# Patient Record
Sex: MALE | Race: WHITE | NOT HISPANIC OR LATINO | Employment: OTHER | URBAN - METROPOLITAN AREA
[De-identification: names, ages, dates, MRNs, and addresses within clinical notes are randomized per-mention and may not be internally consistent; named-entity substitution may affect disease eponyms.]

---

## 2017-01-09 ENCOUNTER — OFFICE VISIT (OUTPATIENT)
Dept: FAMILY MEDICINE | Facility: CLINIC | Age: 72
End: 2017-01-09
Payer: COMMERCIAL

## 2017-01-09 VITALS
HEIGHT: 69 IN | OXYGEN SATURATION: 97 % | BODY MASS INDEX: 31.36 KG/M2 | SYSTOLIC BLOOD PRESSURE: 128 MMHG | TEMPERATURE: 97.6 F | HEART RATE: 72 BPM | WEIGHT: 211.75 LBS | DIASTOLIC BLOOD PRESSURE: 78 MMHG

## 2017-01-09 DIAGNOSIS — M25.531 RIGHT WRIST PAIN: Primary | ICD-10-CM

## 2017-01-09 PROCEDURE — 99213 OFFICE O/P EST LOW 20 MIN: CPT | Performed by: FAMILY MEDICINE

## 2017-01-09 RX ORDER — MELOXICAM 15 MG/1
15 TABLET ORAL DAILY
Qty: 30 TABLET | Refills: 1 | Status: SHIPPED | OUTPATIENT
Start: 2017-01-09 | End: 2017-03-07

## 2017-01-09 RX ORDER — HYDROCODONE BITARTRATE AND ACETAMINOPHEN 5; 325 MG/1; MG/1
1-2 TABLET ORAL EVERY 8 HOURS PRN
Qty: 20 TABLET | Refills: 0 | Status: SHIPPED | OUTPATIENT
Start: 2017-01-09 | End: 2017-04-03

## 2017-01-09 NOTE — NURSING NOTE
"Chief Complaint   Patient presents with     Musculoskeletal Problem     right hand       Initial /78 mmHg  Pulse 72  Temp(Src) 97.6  F (36.4  C) (Oral)  Ht 5' 9\" (1.753 m)  Wt 211 lb 12 oz (96.049 kg)  BMI 31.26 kg/m2  SpO2 97% Estimated body mass index is 31.26 kg/(m^2) as calculated from the following:    Height as of this encounter: 5' 9\" (1.753 m).    Weight as of this encounter: 211 lb 12 oz (96.049 kg).  BP completed using cuff size: sherri Nicholson CMA      "

## 2017-01-09 NOTE — MR AVS SNAPSHOT
After Visit Summary   1/9/2017    Saul Hairston    MRN: [id number]           Patient Information     Date Of Birth          1945        Visit Information        Provider Department      1/9/2017 3:20 PM Waldo Matson MD [facility]        Today's Diagnoses     Right wrist pain    -  1       Care Instructions    Use mobic as perscribed. Do not use ibuprofen or aspirin along with it.   Tylenol can be used if needed along with mobic.  - when pain is severe, use vicodin - should not drive or operate heavy machinery.  - should benefit from seeing a sports medicine specialist before your trip.  - if you use gout pill - do not use mobic.          Follow-ups after your visit        Additional Services     ORTHO  REFERRAL       [facility] is referring you to the Orthopedic  Services at [facility].       The  Representative will assist you in the coordination of your Orthopedic and Musculoskeletal Care as prescribed by your physician.    The  Representative will call you within 1 business day to help schedule your appointment, or you may contact the  Representative at:    All areas ~ [phone]     Type of Referral : Non Surgical       Timeframe requested: 3 - 5 days    Coverage of these services is subject to the terms and limitations of your health insurance plan.  Please call member services at your health plan with any benefit or coverage questions.      If X-rays, CT or MRI's have been performed, please contact the facility where they were done to arrange for , prior to your scheduled appointment.  Please bring this referral request to your appointment and present it to your specialist.                  Who to contact     If you have questions or need follow up information about today's clinic visit or your schedule please contact [facility] directly at  "[phone].  Normal or non-critical lab and imaging results will be communicated to you by MyChart, letter or phone within 4 business days after the clinic has received the results. If you do not hear from us within 7 days, please contact the clinic through [employer] or phone. If you have a critical or abnormal lab result, we will notify you by phone as soon as possible.  Submit refill requests through [employer] or call your pharmacy and they will forward the refill request to us. Please allow 3 business days for your refill to be completed.          Additional Information About Your Visit        [employer] Information     [employer] gives you secure access to your electronic health record. If you see a primary care provider, you can also send messages to your care team and make appointments. If you have questions, please call your primary care clinic.  If you do not have a primary care provider, please call [phone] and they will assist you.        Care EveryWhere ID     This is your Care EveryWhere ID. This could be used by other organizations to access your [place] medical records  [id number]        Your Vitals Were     Pulse Temperature Height BMI (Body Mass Index) Pulse Oximetry       72 97.6  F (36.4  C) (Oral) 5' 9\" (1.753 m) 31.26 kg/m2 97%        Blood Pressure from Last 3 Encounters:   01/09/17 128/78   12/20/16 139/81   12/12/16 120/74    Weight from Last 3 Encounters:   01/09/17 211 lb 12 oz (96.049 kg)   12/20/16 212 lb (96.163 kg)   12/12/16 208 lb (94.348 kg)              We Performed the Following     ORTHO  REFERRAL          Today's Medication Changes          These changes are accurate as of: 1/9/17  3:50 PM.  If you have any questions, ask your nurse or doctor.               Start taking these medicines.        Dose/Directions    HYDROcodone-acetaminophen 5-325 MG per tablet   Commonly known as:  NORCO   Used for:  Right wrist pain   Started by:  Waldo Matson MD        Dose:  " 1-2 tablet   Take 1-2 tablets by mouth every 8 hours as needed for moderate to severe pain   Quantity:  20 tablet   Refills:  0       meloxicam 15 MG tablet   Commonly known as:  MOBIC   Used for:  Right wrist pain   Started by:  Waldo Matson MD        Dose:  15 mg   Take 1 tablet (15 mg) by mouth daily   Quantity:  30 tablet   Refills:  1            Where to get your medicines      These medications were sent to [place] [address] [place] [facility] - [facility] [address] [place] [address] [address]  [address] [facility] [address]     Phone:  [phone]    - meloxicam 15 MG tablet      Some of these will need a paper prescription and others can be bought over the counter.  Ask your nurse if you have questions.     Bring a paper prescription for each of these medications    - HYDROcodone-acetaminophen 5-325 MG per tablet             Primary Care Provider Office Phone # Fax #    Waldo Matson [phone]       [address] [address]        Thank you!     Thank you for choosing [facility]  for your care. Our goal is always to provide you with excellent care. Hearing back from our patients is one way we can continue to improve our services. Please take a few minutes to complete the written survey that you may receive in the mail after your visit with us. Thank you!             Your Updated Medication List - Protect others around you: Learn how to safely use, store and throw away your medicines at www.disposemymeds.org.          This list is accurate as of: 1/9/17  3:50 PM.  Always use your most recent med list.                   Brand Name Dispense Instructions for use    allopurinol 100 MG tablet    ZYLOPRIM    180 tablet    Take 2 tablets (200 mg) by mouth daily       atorvastatin 20 MG tablet    LIPITOR    90 tablet    Take 1 tablet (20 mg) by mouth daily       cyclobenzaprine 5 MG tablet    FLEXERIL    30 tablet    Take 1 tablet (5 mg) by  mouth 3 times daily as needed for muscle spasms       hydrochlorothiazide 25 MG tablet    HYDRODIURIL    90 tablet    Take 1 tablet (25 mg) by mouth daily       HYDROcodone-acetaminophen 5-325 MG per tablet    NORCO    20 tablet    Take 1-2 tablets by mouth every 8 hours as needed for moderate to severe pain       indomethacin 50 MG capsule    INDOCIN    40 capsule    Take 1 capsule (50 mg) by mouth 3 times daily (with meals)       meloxicam 15 MG tablet    MOBIC    30 tablet    Take 1 tablet (15 mg) by mouth daily       MULTIVITAMIN PO          OMEGA-3 FISH OIL PO          TAMSULOSIN HCL PO      Take 0.4 mg by mouth

## 2017-01-09 NOTE — PATIENT INSTRUCTIONS
Use mobic as perscribed. Do not use ibuprofen or aspirin along with it.   Tylenol can be used if needed along with mobic.  - when pain is severe, use vicodin - should not drive or operate heavy machinery.  - should benefit from seeing a sports medicine specialist before your trip.  - if you use gout pill - do not use mobic.

## 2017-01-11 ENCOUNTER — OFFICE VISIT (OUTPATIENT)
Dept: ORTHOPEDICS | Facility: CLINIC | Age: 72
End: 2017-01-11
Payer: COMMERCIAL

## 2017-01-11 ENCOUNTER — RADIANT APPOINTMENT (OUTPATIENT)
Dept: GENERAL RADIOLOGY | Facility: CLINIC | Age: 72
End: 2017-01-11
Attending: PHYSICAL MEDICINE & REHABILITATION
Payer: COMMERCIAL

## 2017-01-11 VITALS
WEIGHT: 211 LBS | HEIGHT: 69 IN | DIASTOLIC BLOOD PRESSURE: 78 MMHG | BODY MASS INDEX: 31.25 KG/M2 | SYSTOLIC BLOOD PRESSURE: 128 MMHG

## 2017-01-11 DIAGNOSIS — M25.531 RIGHT WRIST PAIN: ICD-10-CM

## 2017-01-11 DIAGNOSIS — M25.531 RIGHT WRIST PAIN: Primary | ICD-10-CM

## 2017-01-11 PROCEDURE — 73110 X-RAY EXAM OF WRIST: CPT | Mod: RT | Performed by: PHYSICAL MEDICINE & REHABILITATION

## 2017-01-11 PROCEDURE — 99203 OFFICE O/P NEW LOW 30 MIN: CPT | Performed by: PHYSICAL MEDICINE & REHABILITATION

## 2017-01-11 NOTE — PROGRESS NOTES
" [facility]   Clinic Visit s Jan 11, 2017    Subjective:  Saul Hairston is a 71 year old right-hand dominant male, who is seen in consultation at the request of Dr. Matson for evaluation of right wrist pain.    Symptoms began 1 week ago.  Describes injury as possibly from moving boxes at that time. Since that time symptoms have been improving.  Reports achy pain that is located around the hypothenar eminence of the right hand with radiation absent.  Pain is 6/10 in maximal severity and 2/10 currently.  Symptoms are generally worse with ulnar deviation activities and with daily activities and better with Norco and Mobic.  Other treatment has consisted of wrist brace with moderate relief.  Reports weakness of the right hand.  Denies any numbness/tingling/weakness of the right upper extremity.  Denies any previous right hand injuries/surgeries.    Patient's past medical, surgical, social, and family histories are reviewed today.  There are no significant contributory medical issues  Past Medical History   Diagnosis Date     Gout, unspecified      CARDIOVASCULAR SCREENING; LDL GOAL LESS THAN 160 8/14/2006     Seasonal allergic rhinitis      Spring and Fall     Kidney stone 2009     Doing ok now     Elevated PSA      Umbilical hernia without mention of obstruction or gangrene 6/2013     Arthritis      Gout     Hypertension        Review of Systems:  Constitutional: NEGATIVE for fever, chills, or change in weight  Skin: NEGATIVE for worrisome rashes, moles, or lesions  Neuro: POSITIVE for weakness of the right hand  MSK: see HPI  Additional 10 point ROS is negative other than symptoms noted above and in HPI    Objective:  /78 mmHg  Ht 5' 9\" (1.753 m)  Wt 211 lb (95.709 kg)  BMI 31.15 kg/m2  General: healthy, alert, obese, and in no distress  Skin: no suspicious lesions or rashes  Psych: mentation appears normal and affect normal/bright  HEENT: no scleral icterus  CV: no pedal edema  Resp: " normal respiratory effort without conversational dyspnea   Neuro: motor strength as noted below  Lymph: no palpable lymphadenopathy    MSK:    RIGHT WRIST  Inspection:    No swelling, bruising, discoloration, or obvious deformity or asymmetry  Palpation:    Tender about the triquetrum, trapezoid, and MCP joint of the little finger    No tenderness of the distal ulna, distal radius, scaphoid, lunate, pisiform, trapezium, capitate, or hamate, thumb MCP joint, thumb PIP joint, thumb IP joint, or CMC joint of the thumb, proximal/mid/distal     phalanges, PIP joints, and DIP joints of the 2nd-5th fingers, and MCP joints of the 2nd-4th fingers  Strength:    Little finger flexion: 5/5 (with pain)    Little finger extension: 5/5    Little finger adduction: 5/5    Little finger abduction: 5/5  Special Tests:    Positive: None    Negative: Thenar eminence wasting, hypothenar eminence wasting, flexor digitorum superficialis testing, and flexor digitorum profundus testing    Imaging:  Right wrist x-rays (3 views) were ordered, independent visualization of images was performed, and interpreted in the office today  Impression:   1. Negative for fracture, subluxation, joint space narrowing, or other acute osseous abnormality.    ASSESSMENT:  1. Acute right wrist pain - differential diagnosis includes flexor carpi ulnaris tendinopathy, triangular fibrocartilage complex degeneration/tear, wrist osteoarthrosis, etc    PLAN:  1. Acetaminophen/ice as needed for improved pain control.  2. Activity modification as discussed, including limitation of activities that cause pain/discomfort.  3. Initiate use of wrist brace for 2-4 weeks for further treatment purposes.  4. Continue with use of Mobic for 1 week for further treatment purposes.  5. Follow-up in 2-4 weeks if no improvement of symptoms for further evaluation/medical care.  Consider MR arthrogram of the right wrist, right wrist corticosteroid injection with ultrasound guidance  (flexor carpi ulnaris tendon sheath vs triangular fibrocatilage complex), referral to hand therapy, etc as deemed appropriate moving forward.  Instructed to contact our office should the condition evolve or worsen.    Patient's conditions were thoroughly discussed during today's visit with greater than 50% of the visit spent counseling the patient with total time spent face-to-face with the patient being 15 minutes.    Eduar Weldon DO, CAQSM  [facility]    Disclaimer: This note consists of symbols derived from keyboarding, dictation and/or voice recognition software. As a result, there may be errors in the script that have gone undetected. Please consider this when interpreting information found in this chart.    This document serves as a record of the services and decisions personally performed and made by Eduar Weldon DO. It was created on his behalf by Donald Rosa, a trained medical scribe. The creation of this document is based on the provider's statements to the medical scribe.  Donald Rosa January 11, 2017 1:56 PM

## 2017-01-11 NOTE — PATIENT INSTRUCTIONS
We addressed the following today:    1. Right ulnar sided wrist pain    Activity modification as discussed    Over the counter medication: Acetaminophen (Tylenol) 1000 mg every 6 hours with food (Maximum of 3000 mg/day)    Prescription Medication as directed: Mobic as prescribed for an additional week    Initiate use of wrist brace for 2-4 weeks as discussed    Follow-up in 2-4 weeks if no improvement of symptoms for further evaluation/medical care (call direct clinic number [[phone]] at any time with questions or concerns)

## 2017-01-11 NOTE — Clinical Note
Dear Saul Hartmann saw me at [facility] [place] [place] on Jan 11, 2017.  Please refer to the visit note at your convenience and feel free to contact me should you have any questions.  Sincerely,  Eduar Weldon DO, [facility]

## 2017-01-11 NOTE — MR AVS SNAPSHOT
After Visit Summary   1/11/2017    Saul Hairston    MRN: [id number]           Patient Information     Date Of Birth          1945        Visit Information        Provider Department      1/11/2017 1:40 PM Eduar Weldon DO [facility]        Today's Diagnoses     Right wrist pain    -  1       Care Instructions    We addressed the following today:    1. Right ulnar sided wrist pain    Activity modification as discussed    Over the counter medication: Acetaminophen (Tylenol) 1000 mg every 6 hours with food (Maximum of 3000 mg/day)    Prescription Medication as directed: Mobic as prescribed for an additional week    Initiate use of wrist brace for 2-4 weeks as discussed    Follow-up in 2-4 weeks if no improvement of symptoms for further evaluation/medical care (call direct clinic number [[phone]] at any time with questions or concerns)          Follow-ups after your visit        Who to contact     If you have questions or need follow up information about today's clinic visit or your schedule please contact [facility] directly at [phone].  Normal or non-critical lab and imaging results will be communicated to you by [employer], letter or phone within 4 business days after the clinic has received the results. If you do not hear from us within 7 days, please contact the clinic through [employer] or phone. If you have a critical or abnormal lab result, we will notify you by phone as soon as possible.  Submit refill requests through [employer] or call your pharmacy and they will forward the refill request to us. Please allow 3 business days for your refill to be completed.          Additional Information About Your Visit        [employer] Information     [employer] gives you secure access to your electronic health record. If you see a primary care provider, you can also send messages to your care team and make appointments. If you have questions, please call your  "primary care clinic.  If you do not have a primary care provider, please call [phone] and they will assist you.        Care EveryWhere ID     This is your Care EveryWhere ID. This could be used by other organizations to access your [place] medical records  [id number]        Your Vitals Were     Height BMI (Body Mass Index)                1.753 m (5' 9\") 31.15 kg/m2           Blood Pressure from Last 3 Encounters:   01/11/17 128/78   01/09/17 128/78   12/20/16 139/81    Weight from Last 3 Encounters:   01/11/17 95.709 kg (211 lb)   01/09/17 96.049 kg (211 lb 12 oz)   12/20/16 96.163 kg (212 lb)               Primary Care Provider Office Phone # Fax #    Waldo Brennan Matson [phone]       [address] [address]        Thank you!     Thank you for choosing [facility]  for your care. Our goal is always to provide you with excellent care. Hearing back from our patients is one way we can continue to improve our services. Please take a few minutes to complete the written survey that you may receive in the mail after your visit with us. Thank you!             Your Updated Medication List - Protect others around you: Learn how to safely use, store and throw away your medicines at www.disposemymeds.org.          This list is accurate as of: 1/11/17  2:19 PM.  Always use your most recent med list.                   Brand Name Dispense Instructions for use    allopurinol 100 MG tablet    ZYLOPRIM    180 tablet    Take 2 tablets (200 mg) by mouth daily       atorvastatin 20 MG tablet    LIPITOR    90 tablet    Take 1 tablet (20 mg) by mouth daily       cyclobenzaprine 5 MG tablet    FLEXERIL    30 tablet    Take 1 tablet (5 mg) by mouth 3 times daily as needed for muscle spasms       hydrochlorothiazide 25 MG tablet    HYDRODIURIL    90 tablet    Take 1 tablet (25 mg) by mouth daily       HYDROcodone-acetaminophen 5-325 MG per tablet "    NORCO    20 tablet    Take 1-2 tablets by mouth every 8 hours as needed for moderate to severe pain       indomethacin 50 MG capsule    INDOCIN    40 capsule    Take 1 capsule (50 mg) by mouth 3 times daily (with meals)       meloxicam 15 MG tablet    MOBIC    30 tablet    Take 1 tablet (15 mg) by mouth daily       MULTIVITAMIN PO          OMEGA-3 FISH OIL PO          order for DME     1 Units    Equipment being ordered: right wrist splint       TAMSULOSIN HCL PO      Take 0.4 mg by mouth

## 2017-01-11 NOTE — NURSING NOTE
"Chief Complaint   Patient presents with     Musculoskeletal Problem     right wrist pain       Initial /78 mmHg  Ht 5' 9\" (1.753 m)  Wt 211 lb (95.709 kg)  BMI 31.15 kg/m2 Estimated body mass index is 31.15 kg/(m^2) as calculated from the following:    Height as of this encounter: 5' 9\" (1.753 m).    Weight as of this encounter: 211 lb (95.709 kg).  BP completed using cuff size: yamini Mckeon  ATC/R      "

## 2017-03-07 DIAGNOSIS — M25.531 RIGHT WRIST PAIN: ICD-10-CM

## 2017-03-08 NOTE — TELEPHONE ENCOUNTER
MELOXICAM 15 MG TABLET        Last Written Prescription Date: 1/9/17  Last Quantity: 30, # refills: 1  Last Office Visit with G, P or [facility] prescribing provider: 1/9/17       Creatinine   Date Value Ref Range Status   09/19/2016 1.15 0.66 - 1.25 mg/dL Final     Lab Results   Component Value Date    AST 16 09/19/2016     Lab Results   Component Value Date    ALT 34 09/19/2016     BP Readings from Last 3 Encounters:   01/11/17 128/78   01/09/17 128/78   12/20/16 139/81

## 2017-03-09 RX ORDER — MELOXICAM 15 MG/1
TABLET ORAL
Qty: 30 TABLET | Refills: 5 | Status: SHIPPED | OUTPATIENT
Start: 2017-03-09 | End: 2017-04-03

## 2017-03-15 ENCOUNTER — OFFICE VISIT (OUTPATIENT)
Dept: FAMILY MEDICINE | Facility: CLINIC | Age: 72
End: 2017-03-15
Payer: COMMERCIAL

## 2017-03-15 VITALS
HEART RATE: 74 BPM | WEIGHT: 209 LBS | TEMPERATURE: 97.9 F | SYSTOLIC BLOOD PRESSURE: 134 MMHG | OXYGEN SATURATION: 96 % | DIASTOLIC BLOOD PRESSURE: 72 MMHG | BODY MASS INDEX: 30.86 KG/M2

## 2017-03-15 DIAGNOSIS — M1A.0720 IDIOPATHIC CHRONIC GOUT OF LEFT FOOT WITHOUT TOPHUS: ICD-10-CM

## 2017-03-15 DIAGNOSIS — I10 BENIGN ESSENTIAL HYPERTENSION: ICD-10-CM

## 2017-03-15 DIAGNOSIS — L30.9 DERMATITIS: Primary | ICD-10-CM

## 2017-03-15 PROCEDURE — 36415 COLL VENOUS BLD VENIPUNCTURE: CPT | Performed by: FAMILY MEDICINE

## 2017-03-15 PROCEDURE — 84550 ASSAY OF BLOOD/URIC ACID: CPT | Performed by: FAMILY MEDICINE

## 2017-03-15 PROCEDURE — 99213 OFFICE O/P EST LOW 20 MIN: CPT | Performed by: FAMILY MEDICINE

## 2017-03-15 RX ORDER — ALLOPURINOL 100 MG/1
200 TABLET ORAL DAILY
Qty: 180 TABLET | Refills: 1 | Status: SHIPPED | OUTPATIENT
Start: 2017-03-15 | End: 2017-03-18

## 2017-03-15 RX ORDER — TRIAMCINOLONE ACETONIDE 1 MG/G
CREAM TOPICAL
Qty: 45 G | Refills: 0 | Status: SHIPPED | OUTPATIENT
Start: 2017-03-15 | End: 2018-03-07

## 2017-03-15 NOTE — NURSING NOTE
"Chief Complaint   Patient presents with     Rash       Initial /77 (BP Location: Right arm, Patient Position: Chair, Cuff Size: Adult Large)  Pulse 74  Temp 97.9  F (36.6  C) (Oral)  Wt 209 lb (94.8 kg)  SpO2 96%  BMI 30.86 kg/m2 Estimated body mass index is 30.86 kg/(m^2) as calculated from the following:    Height as of 1/11/17: 5' 9\" (1.753 m).    Weight as of this encounter: 209 lb (94.8 kg).  Medication Reconciliation: complete     Angelica Yen MA    "

## 2017-03-15 NOTE — PATIENT INSTRUCTIONS
Try to limit your use of NSAIDs (Non-Steroidal Anti-Inflammatory Drugs), a class of pain relievers that includes the over-the-counter medications aspirin, ibuprofen (Advil and Motrin), and naproxen (Aleve) - and meloxicam.  Regular use can raise blood pressure, damage kidneys, and upset stomach.    For the dermatitis use the triamcinolone cream for a couple weeks.  If no improvement, see dermatology.

## 2017-03-15 NOTE — MR AVS SNAPSHOT
After Visit Summary   3/15/2017    Saul Hairston    MRN: [id number]           Patient Information     Date Of Birth          1945        Visit Information        Provider Department      3/15/2017 3:40 PM Leatha Fernandez MD [facility]        Today's Diagnoses     Dermatitis    -  1    Idiopathic chronic gout 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          Care Instructions    Try to limit your use of NSAIDs (Non-Steroidal Anti-Inflammatory Drugs), a class of pain relievers that includes the over-the-counter medications aspirin, ibuprofen (Advil and Motrin), and naproxen (Aleve) - and meloxicam.  Regular use can raise blood pressure, damage kidneys, and upset stomach.    For the dermatitis use the triamcinolone cream for a couple weeks.  If no improvement, see dermatology.        Follow-ups after your visit        Additional Services     DERMATOLOGY REFERRAL       Your provider has referred you to:   [facility] [phone]   [email or web address]  OR  [facility] [phone]   [email or web address]    Please be aware that coverage of these services is subject to the terms and limitations of your health insurance plan.  Call member services at your health plan with any benefit or coverage questions.      Please bring the following to your appointment:  Any x-rays, CTs or MRIs which have been performed.  Contact the facility where they were done to arrange for  prior to your scheduled appointment.  Any new CT, MRI or other procedures ordered by your specialist must be performed at a [facility] facility or coordinated by your clinic's referral office.    List of current medications   This referral request   Any documents/labs given to you for this referral                  Who to contact     If you have questions or need follow up information about today's clinic visit or your schedule please contact  [facility] directly at [phone].  Normal or non-critical lab and imaging results will be communicated to you by MyChart, letter or phone within 4 business days after the clinic has received the results. If you do not hear from us within 7 days, please contact the clinic through [employer] or phone. If you have a critical or abnormal lab result, we will notify you by phone as soon as possible.  Submit refill requests through [employer] or call your pharmacy and they will forward the refill request to us. Please allow 3 business days for your refill to be completed.          Additional Information About Your Visit        [employer] Information     [employer] gives you secure access to your electronic health record. If you see a primary care provider, you can also send messages to your care team and make appointments. If you have questions, please call your primary care clinic.  If you do not have a primary care provider, please call [phone] and they will assist you.        Care EveryWhere ID     This is your Care EveryWhere ID. This could be used by other organizations to access your [facility] medical records  [id number]        Your Vitals Were     Pulse Temperature Pulse Oximetry BMI (Body Mass Index)          74 97.9  F (36.6  C) (Oral) 96% 30.86 kg/m2         Blood Pressure from Last 3 Encounters:   03/15/17 148/77   01/11/17 128/78   01/09/17 128/78    Weight from Last 3 Encounters:   03/15/17 209 lb (94.8 kg)   01/11/17 211 lb (95.7 kg)   01/09/17 211 lb 12 oz (96 kg)              We Performed the Following     DERMATOLOGY REFERRAL     Uric acid          Today's Medication Changes          These changes are accurate as of: 3/15/17  4:19 PM.  If you have any questions, ask your nurse or doctor.               Start taking these medicines.        Dose/Directions    triamcinolone 0.1 % cream   Commonly known as:  KENALOG   Used for:  Dermatitis   Started by:  Leatha Feranndez MD        Apply sparingly  to affected area three times daily for 14 days.   Quantity:  45 g   Refills:  0         Stop taking these medicines if you haven't already. Please contact your care team if you have questions.     cyclobenzaprine 5 MG tablet   Commonly known as:  FLEXERIL   Stopped by:  Leatha Fernandez MD           order for DME   Stopped by:  Leatha Fernandez MD                Where to get your medicines      These medications were sent to [place] [address] [place] [facility] [address]  [address] [address]     Phone:  [phone]     allopurinol 100 MG tablet    triamcinolone 0.1 % cream                Primary Care Provider Office Phone # Fax #    Waldo Brennan Matson [phone]       [address] [address]        Thank you!     Thank you for choosing [facility]  for your care. Our goal is always to provide you with excellent care. Hearing back from our patients is one way we can continue to improve our services. Please take a few minutes to complete the written survey that you may receive in the mail after your visit with us. Thank you!             Your Updated Medication List - Protect others around you: Learn how to safely use, store and throw away your medicines at www.disposemymeds.org.          This list is accurate as of: 3/15/17  4:19 PM.  Always use your most recent med list.                   Brand Name Dispense Instructions for use    allopurinol 100 MG tablet    ZYLOPRIM    180 tablet    Take 2 tablets (200 mg) by mouth daily       atorvastatin 20 MG tablet    LIPITOR    90 tablet    Take 1 tablet (20 mg) by mouth daily       hydrochlorothiazide 25 MG tablet    HYDRODIURIL    90 tablet    Take 1 tablet (25 mg) by mouth daily       HYDROcodone-acetaminophen 5-325 MG per tablet    NORCO    20 tablet    Take 1-2 tablets by mouth every 8 hours as needed for moderate to severe pain       indomethacin 50 MG  capsule    INDOCIN    40 capsule    Take 1 capsule (50 mg) by mouth 3 times daily (with meals)       meloxicam 15 MG tablet    MOBIC    30 tablet    TAKE 1 TABLET (15 MG) BY MOUTH DAILY       MULTIVITAMIN PO          OMEGA-3 FISH OIL PO          TAMSULOSIN HCL PO      Take 0.4 mg by mouth       triamcinolone 0.1 % cream    KENALOG    45 g    Apply sparingly to affected area three times daily for 14 days.

## 2017-03-15 NOTE — PROGRESS NOTES
"  SUBJECTIVE:                                                    Saul Hairston is a 71 year old male who presents to clinic today for the following health issues:      Rash      Duration: x 2 mos    Description  Location: started on right forearm - now on both arms, both legs, right side of neck  Itching: severe    Intensity:  moderate    Accompanying signs and symptoms: None    History (similar episodes/previous evaluation): None    Precipitating or alleviating factors:  New exposures:  None.  Tried switching to hypoallergenic laundry detergent but no change  Recent travel: No     Therapies tried and outcome: hydrocortisone cream -  helps the itch but isn't stopping it from spreading.       Gout  Increased allopurinol dose to 200 mg daily last Sept when uric acid was 10.2.  Has not had uric acid level rechecked.  Has not had further acute gout episodes.   Has been taking meloxicam daily since wrist injury 2 months ago and notes that it helps his joints feel better \"all over.\"  Wrist pain has resolved.   He also occasionally uses ibuprofen and has indocin on hand for gout attacks.      Problem list and histories reviewed & adjusted, as indicated.  Additional history: as documented    BP Readings from Last 3 Encounters:   03/15/17 134/72   01/11/17 128/78   01/09/17 128/78    Wt Readings from Last 3 Encounters:   03/15/17 209 lb (94.8 kg)   01/11/17 211 lb (95.7 kg)   01/09/17 211 lb 12 oz (96 kg)           Reviewed and updated as needed this visit by clinical staff  Tobacco  Allergies  Meds  Problems  Med Hx  Surg Hx  Fam Hx  Soc Hx        Reviewed and updated as needed this visit by Provider  Allergies  Meds  Problems           OBJECTIVE:                                                    /72  Pulse 74  Temp 97.9  F (36.6  C) (Oral)  Wt 209 lb (94.8 kg)  SpO2 96%  BMI 30.86 kg/m2  Body mass index is 30.86 kg/(m^2).  GEN:  no apparent distress  SKIN:  poorly-demarcated, erythematous, scaly, rough, " raised, maculopapular rash with excoriations - large patch on right forearm and scattered patches on left forearm and lower legs.       ASSESSMENT/PLAN:                                                        1. Dermatitis  Unclear etiology.  Will treat with topical steroid cream.  If no response he will see derm.  - triamcinolone (KENALOG) 0.1 % cream; Apply sparingly to affected area three times daily for 14 days.  Dispense: 45 g; Refill: 0  - DERMATOLOGY REFERRAL    2. Idiopathic chronic gout 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  Discussed that we should do repeat uric acid level as his was quite high on the 100 mg dose allopurinol.    - allopurinol (ZYLOPRIM) 100 MG tablet; Take 2 tablets (200 mg) by mouth daily  Dispense: 180 tablet; Refill: 1  - Uric acid    3. Benign essential hypertension  Initial reading was high but repeat reading was at goal.  Recommended he back off on the NSAIDs.      Patient Instructions   Try to limit your use of NSAIDs (Non-Steroidal Anti-Inflammatory Drugs), a class of pain relievers that includes the over-the-counter medications aspirin, ibuprofen (Advil and Motrin), and naproxen (Aleve) - and meloxicam.  Regular use can raise blood pressure, damage kidneys, and upset stomach.    For the dermatitis use the triamcinolone cream for a couple weeks.  If no improvement, see dermatology.       Leatha Fernandez MD  [facility]

## 2017-03-17 LAB — URATE SERPL-MCNC: 8.9 MG/DL (ref 3.5–7.2)

## 2017-03-18 ENCOUNTER — TELEPHONE (OUTPATIENT)
Dept: FAMILY MEDICINE | Facility: CLINIC | Age: 72
End: 2017-03-18

## 2017-03-18 DIAGNOSIS — M1A.0720 IDIOPATHIC CHRONIC GOUT OF LEFT FOOT WITHOUT TOPHUS: Primary | ICD-10-CM

## 2017-03-18 RX ORDER — ALLOPURINOL 100 MG/1
300 TABLET ORAL DAILY
Start: 2017-03-18 | End: 2017-09-26

## 2017-03-18 RX ORDER — COLCHICINE 0.6 MG/1
0.6 TABLET ORAL DAILY
Qty: 30 TABLET | Refills: 1 | Status: SHIPPED | OUTPATIENT
Start: 2017-03-18 | End: 2017-03-30

## 2017-03-19 NOTE — PROGRESS NOTES
Gary Hughes,  Thanks for coming to clinic.  Your uric acid level is better than it was last fall.  (It dropped from 10.2 to 8.9 on the 200 mg dose of allopurinol.)  However, our goal is to get the uric acid level down to less than 6.0 to prevent complications from gout.      I therefore recommend that you increase your allopurinol dose to 300 mg daily and then come back in a month for a lab-only appointment to recheck the uric acid level on that dose.   For now you can take 3 of your 100 mg tablets together (but eventually we will switch you to the 300 mg tablets).      I also recommend that you take daily colchicine for the first month after a dose increase as sometimes raising the dose when the uric acid level is still high can precipitate a gout episode.  I will send in a prescription for colchicine for you.    Please let me know if you have any questions.  Leatha Fernandez MD

## 2017-03-20 ENCOUNTER — TRANSFERRED RECORDS (OUTPATIENT)
Dept: HEALTH INFORMATION MANAGEMENT | Facility: CLINIC | Age: 72
End: 2017-03-20

## 2017-03-30 ENCOUNTER — OFFICE VISIT (OUTPATIENT)
Dept: FAMILY MEDICINE | Facility: CLINIC | Age: 72
End: 2017-03-30
Payer: COMMERCIAL

## 2017-03-30 VITALS
WEIGHT: 210 LBS | SYSTOLIC BLOOD PRESSURE: 142 MMHG | RESPIRATION RATE: 16 BRPM | BODY MASS INDEX: 31.01 KG/M2 | HEART RATE: 67 BPM | DIASTOLIC BLOOD PRESSURE: 76 MMHG | TEMPERATURE: 97 F | OXYGEN SATURATION: 98 %

## 2017-03-30 DIAGNOSIS — M25.50 PAIN IN JOINT, MULTIPLE SITES: Primary | ICD-10-CM

## 2017-03-30 LAB
BASOPHILS # BLD AUTO: 0 10E9/L (ref 0–0.2)
BASOPHILS NFR BLD AUTO: 0.3 %
DIFFERENTIAL METHOD BLD: NORMAL
EOSINOPHIL # BLD AUTO: 0.4 10E9/L (ref 0–0.7)
EOSINOPHIL NFR BLD AUTO: 4.1 %
ERYTHROCYTE [DISTWIDTH] IN BLOOD BY AUTOMATED COUNT: 13.9 % (ref 10–15)
ERYTHROCYTE [SEDIMENTATION RATE] IN BLOOD BY WESTERGREN METHOD: 8 MM/H (ref 0–20)
HCT VFR BLD AUTO: 49.5 % (ref 40–53)
HGB BLD-MCNC: 16.3 G/DL (ref 13.3–17.7)
LYMPHOCYTES # BLD AUTO: 1.6 10E9/L (ref 0.8–5.3)
LYMPHOCYTES NFR BLD AUTO: 15.5 %
MCH RBC QN AUTO: 28.7 PG (ref 26.5–33)
MCHC RBC AUTO-ENTMCNC: 32.9 G/DL (ref 31.5–36.5)
MCV RBC AUTO: 87 FL (ref 78–100)
MONOCYTES # BLD AUTO: 0.7 10E9/L (ref 0–1.3)
MONOCYTES NFR BLD AUTO: 6.8 %
NEUTROPHILS # BLD AUTO: 7.7 10E9/L (ref 1.6–8.3)
NEUTROPHILS NFR BLD AUTO: 73.3 %
PLATELET # BLD AUTO: 200 10E9/L (ref 150–450)
RBC # BLD AUTO: 5.68 10E12/L (ref 4.4–5.9)
WBC # BLD AUTO: 10.5 10E9/L (ref 4–11)

## 2017-03-30 PROCEDURE — 86140 C-REACTIVE PROTEIN: CPT | Performed by: NURSE PRACTITIONER

## 2017-03-30 PROCEDURE — 99214 OFFICE O/P EST MOD 30 MIN: CPT | Performed by: NURSE PRACTITIONER

## 2017-03-30 PROCEDURE — 85025 COMPLETE CBC W/AUTO DIFF WBC: CPT | Performed by: NURSE PRACTITIONER

## 2017-03-30 PROCEDURE — 36415 COLL VENOUS BLD VENIPUNCTURE: CPT | Performed by: NURSE PRACTITIONER

## 2017-03-30 PROCEDURE — 85652 RBC SED RATE AUTOMATED: CPT | Performed by: NURSE PRACTITIONER

## 2017-03-30 PROCEDURE — 84550 ASSAY OF BLOOD/URIC ACID: CPT | Performed by: NURSE PRACTITIONER

## 2017-03-30 RX ORDER — PREDNISONE 20 MG/1
20 TABLET ORAL 2 TIMES DAILY
Qty: 10 TABLET | Refills: 0 | Status: SHIPPED | OUTPATIENT
Start: 2017-03-30 | End: 2017-09-26

## 2017-03-30 NOTE — PROGRESS NOTES
SUBJECTIVE:                                                    Saul Hairston is a 71 year old male who presents to clinic today for the following health issues:    Musculoskeletal problem/pain      Duration: 1 day    Description  Location: right lower arm (entire forearm up through elbow)    Intensity:  severe    Accompanying signs and symptoms: Eczema over site of pain    History  Previous similar problem: no   Previous evaluation:  none    Precipitating or alleviating factors:  Trauma or overuse: no   Aggravating factors include: turning wrist/ forearm    Therapies tried and outcome: NSAID - ibuprofen, no relief       Has had some difficulty moving right lower arm for several months.    Became mildly sore over the past few days.    Sudden onset pain last night, worst of it to lower arm, wrist and hand also painful.  Mild numbness and tingling to 1-3rd fingers.  Now very difficult to move hand and wrist, couldn't write his name.    No fevers, chills, or body aches.  Does note fatigue.      No initial injury.  Rash to bilateral lower arms and lower legs, saw derm, diagnosed with eczema, dramatic improvement with cream.    Gout to toes, allopurinol dose increased 1 week ago.    No dysuria.  Chronic nasal congestion, no other new URI symptoms.  No cough or sobb.  No chest pain or palpitations.  No abd pain, nausea, diarrhea.       No personal of family history of inflammatory arthritis.    Patient Active Problem List   Diagnosis     Idiopathic chronic gout 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     Rotator cuff tear     Kidney stone     Elevated PSA     Seasonal allergic rhinitis     Hyperlipidemia LDL goal <130     Umbilical hernia     Benign essential hypertension     Past Surgical History:   Procedure Laterality Date     COLONOSCOPY  6/16/2006     COLONOSCOPY N/A 6/15/2016    Procedure: COLONOSCOPY;  Surgeon: Poly Sanchez MD;  Location:  GI     HERNIORRHAPHY UMBILICAL  7/11/2013    Procedure: HERNIORRHAPHY  UMBILICAL;  Open Umbilical Hernia Repair With Mesh ;  Surgeon: Sergio Tomas MD;  Location: [facility] OR     ROTATOR CUFF REPAIR RT/LT  5/19/2011    Left Shoulder     SURGICAL HISTORY OF -       ear operation as child     SURGICAL HISTORY OF -       removal of pseudogout deposits - Right knee     TONSILLECTOMY      Child       Social History   Substance Use Topics     Smoking status: Never Smoker     Smokeless tobacco: Never Used     Alcohol use No     Family History   Problem Relation Age of Onset     Hypertension Father      Alcohol/Drug Father      Allergies Father      Respiratory Father      CEREBROVASCULAR DISEASE Maternal Grandmother      Arthritis Maternal Grandmother      Alzheimer Disease Mother      Arthritis Mother      Eye Disorder Mother      C.A.D. Maternal Uncle      C.A.D. Paternal Uncle      Prostate Cancer Maternal Uncle      Lipids Sister      Lipids Brother      Obesity Brother      C.A.D. Son          Current Outpatient Prescriptions   Medication Sig Dispense Refill     predniSONE (DELTASONE) 20 MG tablet Take 1 tablet (20 mg) by mouth 2 times daily 10 tablet 0     allopurinol (ZYLOPRIM) 100 MG tablet Take 3 tablets (300 mg) by mouth daily       triamcinolone (KENALOG) 0.1 % cream Apply sparingly to affected area three times daily for 14 days. 45 g 0     HYDROcodone-acetaminophen (NORCO) 5-325 MG per tablet Take 1-2 tablets by mouth every 8 hours as needed for moderate to severe pain 20 tablet 0     TAMSULOSIN HCL PO Take 0.4 mg by mouth       hydrochlorothiazide (HYDRODIURIL) 25 MG tablet Take 1 tablet (25 mg) by mouth daily 90 tablet 3     atorvastatin (LIPITOR) 20 MG tablet Take 1 tablet (20 mg) by mouth daily 90 tablet 3     indomethacin (INDOCIN) 50 MG capsule Take 1 capsule (50 mg) by mouth 3 times daily (with meals) 40 capsule 3     Omega-3 Fatty Acids (OMEGA-3 FISH OIL PO)        Multiple Vitamins-Minerals (MULTIVITAMIN PO)        meloxicam (MOBIC) 15 MG tablet TAKE 1 TABLET (15 MG)  BY MOUTH DAILY (Patient not taking: Reported on 3/30/2017) 30 tablet 5       ROS:  Const, HEENT, Resp, CV, Neuro, MSK, GI,  as above, otherwise negative       OBJECTIVE:                                                    /76 (BP Location: Left arm, Patient Position: Chair, Cuff Size: Adult Regular)  Pulse 67  Temp 97  F (36.1  C) (Tympanic)  Resp 16  Wt 210 lb (95.3 kg)  SpO2 98%  BMI 31.01 kg/m2   GENERAL APPEARANCE: healthy, alert and no distress.  Pt is unable to shake hands with right hand  EYES: Eyes grossly normal to inspection and conjunctivae and sclerae normal  RESP: respirations nonlabored   MS:   Right elbow:  No erythema or swelling.  No pain with palpation olecranon or alteral/medical epicondyl.  Limited active flexion and extension, ROM improved with passive movement but still limited   Right lower arm:  No pain with palpation radius or ulna  Right wrist: No erythema or swelling.  No pain with palpation distal ulna or radius, or carpals.  Limited ulnar/radial deviation and flexion/extension.  Right hand: No pain with palpation of metacarpals or phalanges.  Unable to fully clench fist,unable to fully extend digits.  SKIN: fading erythema with mild scaling to dorsal aspect bilateral lower arms and hands  PSYCH: mentation appears normal and affect normal/bright     Diagnostic test results:  Diagnostic Test Results:  Results for orders placed or performed in visit on 03/30/17 (from the past 24 hour(s))   CBC with platelets and differential   Result Value Ref Range    WBC 10.5 4.0 - 11.0 10e9/L    RBC Count 5.68 4.4 - 5.9 10e12/L    Hemoglobin 16.3 13.3 - 17.7 g/dL    Hematocrit 49.5 40.0 - 53.0 %    MCV 87 78 - 100 fl    MCH 28.7 26.5 - 33.0 pg    MCHC 32.9 31.5 - 36.5 g/dL    RDW 13.9 10.0 - 15.0 %    Platelet Count 200 150 - 450 10e9/L    Diff Method Automated Method     % Neutrophils 73.3 %    % Lymphocytes 15.5 %    % Monocytes 6.8 %    % Eosinophils 4.1 %    % Basophils 0.3 %    Absolute  Neutrophil 7.7 1.6 - 8.3 10e9/L    Absolute Lymphocytes 1.6 0.8 - 5.3 10e9/L    Absolute Monocytes 0.7 0.0 - 1.3 10e9/L    Absolute Eosinophils 0.4 0.0 - 0.7 10e9/L    Absolute Basophils 0.0 0.0 - 0.2 10e9/L   ESR: Erythrocyte sedimentation rate   Result Value Ref Range    Sed Rate 8 0 - 20 mm/h        ASSESSMENT/PLAN:                                                    (M25.50) Pain in joint, multiple sites  (primary encounter diagnosis)  Comment: unclear etiology.  No joint erythema or swelling to suggest septic arthritis and normal wbc/esr.  Consider gout flare with recent adjustment allopurinol but no improvement in symptoms with ibu and atypical to affect multiple joints.  Normal ESR makes inflammatory arthritis less likely.  Acute onset does not fit with tendonitis  Plan: CBC with platelets and differential, ESR:         Erythrocyte sedimentation rate, CRP,         inflammation, Uric acid, predniSONE (DELTASONE)        20 MG tablet        Start prednisone 20mg twice a day x 5 days, monitor symptoms closely.  If worsening, impacting more joints, fevers, or visible redness/swelling follow up immediately for evaluation.  Recheck scheduled for Monday.          See Patient Instructions    Domitila Feldman, [facility]    Patient Instructions   1.  Unclear cause of joint pain, sudden onset fits more with infection, inflammatory process, gout usually does not hit multiple joints.  Reassuring normal white count and inflammatory marker.  Start prednisone (antiinflammatory med) 1 pill  Twice a day for 5 days.  Follow up on Monday for recheck    2.  If fever, pain is spreading to other joints, worsening pain, or red swollen joints go into urgent care or emergency room over the weekend.

## 2017-03-30 NOTE — NURSING NOTE
"Chief Complaint   Patient presents with     Musculoskeletal Problem       Initial /76 (BP Location: Left arm, Patient Position: Chair, Cuff Size: Adult Regular)  Pulse 67  Temp 97  F (36.1  C) (Tympanic)  Resp 16  Wt 210 lb (95.3 kg)  SpO2 98%  BMI 31.01 kg/m2 Estimated body mass index is 31.01 kg/(m^2) as calculated from the following:    Height as of 1/11/17: 5' 9\" (1.753 m).    Weight as of this encounter: 210 lb (95.3 kg).  Medication Reconciliation: complete     Juliana Garcia, [facility]INC[facility]    "

## 2017-03-30 NOTE — MR AVS SNAPSHOT
After Visit Summary   3/30/2017    Saul Hairston    MRN: [id number]           Patient Information     Date Of Birth          1945        Visit Information        Provider Department      3/30/2017 3:20 PM Domitila Feldman APRN CNP [facility]        Today's Diagnoses     Pain in joint, multiple sites    -  1      Care Instructions    1.  Unclear cause of joint pain, sudden onset fits more with infection, inflammatory process, gout usually does not hit multiple joints.  Reassuring normal white count and inflammatory marker.  Start prednisone (antiinflammatory med) 1 pill  Twice a day for 5 days.  Follow up on Monday for recheck    2.  If fever, pain is spreading to other joints, worsening pain, or red swollen joints go into urgent care or emergency room over the weekend.        Follow-ups after your visit        Who to contact     If you have questions or need follow up information about today's clinic visit or your schedule please contact [facility] directly at [phone].  Normal or non-critical lab and imaging results will be communicated to you by [employer], letter or phone within 4 business days after the clinic has received the results. If you do not hear from us within 7 days, please contact the clinic through [employer] or phone. If you have a critical or abnormal lab result, we will notify you by phone as soon as possible.  Submit refill requests through [employer] or call your pharmacy and they will forward the refill request to us. Please allow 3 business days for your refill to be completed.          Additional Information About Your Visit        [employer] Information     [employer] gives you secure access to your electronic health record. If you see a primary care provider, you can also send messages to your care team and make appointments. If you have questions, please call your primary care clinic.  If you do not have a primary care provider, please call  [phone] and they will assist you.        Care EveryWhere ID     This is your Care EveryWhere ID. This could be used by other organizations to access your [place] medical records  [id number]        Your Vitals Were     Pulse Temperature Respirations Pulse Oximetry BMI (Body Mass Index)       67 97  F (36.1  C) (Tympanic) 16 98% 31.01 kg/m2        Blood Pressure from Last 3 Encounters:   03/30/17 142/76   03/15/17 134/72   01/11/17 128/78    Weight from Last 3 Encounters:   03/30/17 210 lb (95.3 kg)   03/15/17 209 lb (94.8 kg)   01/11/17 211 lb (95.7 kg)              We Performed the Following     CBC with platelets and differential     CRP, inflammation     ESR: Erythrocyte sedimentation rate     Uric acid          Today's Medication Changes          These changes are accurate as of: 3/30/17  4:34 PM.  If you have any questions, ask your nurse or doctor.               Start taking these medicines.        Dose/Directions    predniSONE 20 MG tablet   Commonly known as:  DELTASONE   Used for:  Pain in joint, multiple sites   Started by:  Domitila Feldman APRN CNP        Dose:  20 mg   Take 1 tablet (20 mg) by mouth 2 times daily   Quantity:  10 tablet   Refills:  0         Stop taking these medicines if you haven't already. Please contact your care team if you have questions.     colchicine 0.6 MG tablet   Commonly known as:  COLCRYS   Stopped by:  Domitila Feldman APRN CNP                Where to get your medicines      These medications were sent to [place] [address] [place] [facility] [address] [facility]  [address] [facility] [address]     Phone:  [phone]     predniSONE 20 MG tablet                Primary Care Provider Office Phone # Fax #    Waldo Brennan Matson [phone]       [address] [address]        Thank you!     Thank you for choosing [facility]  for your care. Our goal is always to provide  you with excellent care. Hearing back from our patients is one way we can continue to improve our services. Please take a few minutes to complete the written survey that you may receive in the mail after your visit with us. Thank you!             Your Updated Medication List - Protect others around you: Learn how to safely use, store and throw away your medicines at www.disposemymeds.org.          This list is accurate as of: 3/30/17  4:34 PM.  Always use your most recent med list.                   Brand Name Dispense Instructions for use    allopurinol 100 MG tablet    ZYLOPRIM     Take 3 tablets (300 mg) by mouth daily       atorvastatin 20 MG tablet    LIPITOR    90 tablet    Take 1 tablet (20 mg) by mouth daily       hydrochlorothiazide 25 MG tablet    HYDRODIURIL    90 tablet    Take 1 tablet (25 mg) by mouth daily       HYDROcodone-acetaminophen 5-325 MG per tablet    NORCO    20 tablet    Take 1-2 tablets by mouth every 8 hours as needed for moderate to severe pain       indomethacin 50 MG capsule    INDOCIN    40 capsule    Take 1 capsule (50 mg) by mouth 3 times daily (with meals)       meloxicam 15 MG tablet    MOBIC    30 tablet    TAKE 1 TABLET (15 MG) BY MOUTH DAILY       MULTIVITAMIN PO          OMEGA-3 FISH OIL PO          predniSONE 20 MG tablet    DELTASONE    10 tablet    Take 1 tablet (20 mg) by mouth 2 times daily       TAMSULOSIN HCL PO      Take 0.4 mg by mouth       triamcinolone 0.1 % cream    KENALOG    45 g    Apply sparingly to affected area three times daily for 14 days.

## 2017-03-30 NOTE — PATIENT INSTRUCTIONS
1.  Unclear cause of joint pain, sudden onset fits more with infection, inflammatory process, gout usually does not hit multiple joints.  Reassuring normal white count and inflammatory marker.  Start prednisone (antiinflammatory med) 1 pill  Twice a day for 5 days.  Follow up on Monday for recheck    2.  If fever, pain is spreading to other joints, worsening pain, or red swollen joints go into urgent care or emergency room over the weekend.

## 2017-03-31 LAB
CRP SERPL-MCNC: 8.2 MG/L (ref 0–8)
URATE SERPL-MCNC: 6.2 MG/DL (ref 3.5–7.2)

## 2017-03-31 NOTE — PROGRESS NOTES
SUBJECTIVE:                                                    Saul Hairston is a 71 year old male who presents to clinic today for the following health issues:      Arm pain F/U      The night after visit arm pain improved greatly    Doing much better now. Not 100% gone but able to move much better       Interval history:  Seen 3/30/17 with acute onset right lower arm, hand, wrist, and elbow pain, significant limited ROM of hand, wrist, and elbow.  Pt had normal CBC and ESR.  Started on prednisone course for possible inflammatory/viral/gout etiology.  Hx gout with recent change to allopurinol dose 1 week ago.    Update today:   Pain has improved, notes ongoing mild pain to right lower arm and right wrist pain with some limited range of motion.    Limited rang of motion of right wrist since December, noticed he is using fork differently.  Difficulty shaving.  Did see ortho 1/2016 who felt symptoms were related to tendonitis/arthritis, started on NSAIDs, pt reports no improvement.      Patient Active Problem List   Diagnosis     Idiopathic chronic gout 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     Rotator cuff tear     Kidney stone     Elevated PSA     Seasonal allergic rhinitis     Hyperlipidemia LDL goal <130     Umbilical hernia     Hypertension goal BP (blood pressure) < 150/90     Past Surgical History:   Procedure Laterality Date     COLONOSCOPY  6/16/2006     COLONOSCOPY N/A 6/15/2016    Procedure: COLONOSCOPY;  Surgeon: Poly Sanchez MD;  Location:  GI     HERNIORRHAPHY UMBILICAL  7/11/2013    Procedure: HERNIORRHAPHY UMBILICAL;  Open Umbilical Hernia Repair With Mesh ;  Surgeon: Sergio Tomas MD;  Location: [facility] OR     ROTATOR CUFF REPAIR RT/LT  5/19/2011    Left Shoulder     SURGICAL HISTORY OF -       ear operation as child     SURGICAL HISTORY OF -       removal of pseudogout deposits - Right knee     TONSILLECTOMY      Child       Social History   Substance Use Topics     Smoking status: Never Smoker      Smokeless tobacco: Never Used     Alcohol use No     Family History   Problem Relation Age of Onset     Hypertension Father      Alcohol/Drug Father      Allergies Father      Respiratory Father      CEREBROVASCULAR DISEASE Maternal Grandmother      Arthritis Maternal Grandmother      Alzheimer Disease Mother      Arthritis Mother      Eye Disorder Mother      C.A.D. Maternal Uncle      C.A.D. Paternal Uncle      Prostate Cancer Maternal Uncle      Lipids Sister      Lipids Brother      Obesity Brother      C.A.D. Son          Current Outpatient Prescriptions   Medication Sig Dispense Refill     predniSONE (DELTASONE) 20 MG tablet 2 tabs (40 mg) daily x 3 days, 1 tab (20 mg) daily x 3 days, then 1/2 tab (10 mg) x 3 days. 11 tablet 0     predniSONE (DELTASONE) 20 MG tablet Take 1 tablet (20 mg) by mouth 2 times daily 10 tablet 0     allopurinol (ZYLOPRIM) 100 MG tablet Take 3 tablets (300 mg) by mouth daily       triamcinolone (KENALOG) 0.1 % cream Apply sparingly to affected area three times daily for 14 days. 45 g 0     TAMSULOSIN HCL PO Take 0.4 mg by mouth       hydrochlorothiazide (HYDRODIURIL) 25 MG tablet Take 1 tablet (25 mg) by mouth daily 90 tablet 3     atorvastatin (LIPITOR) 20 MG tablet Take 1 tablet (20 mg) by mouth daily 90 tablet 3     indomethacin (INDOCIN) 50 MG capsule Take 1 capsule (50 mg) by mouth 3 times daily (with meals) 40 capsule 3     Omega-3 Fatty Acids (OMEGA-3 FISH OIL PO)        Multiple Vitamins-Minerals (MULTIVITAMIN PO)          ROS:  Const, MSK as above, otherwise negative       OBJECTIVE:                                                    /84  Pulse 76  Temp 97  F (36.1  C) (Tympanic)  Resp 16  Wt 209 lb (94.8 kg)  SpO2 97%  BMI 30.86 kg/m2   GENERAL APPEARANCE: healthy, alert and no distress  EYES: Eyes grossly normal to inspection and conjunctivae and sclerae normal  RESP: respirations nonlabored   MS:   Right elbow: no erythema or swelling, full pain-free  ROM  Right lower arm:  No pain with palpation radius or ulna, no erythema or edema  Right wrist: no erythema or swelling. Does have limited flexion and radial/ulnar deviation but improved since last visit.    Full hand range of motion, again no erythema or swelling.  PSYCH: mentation appears normal and affect normal/bright     Office Visit on 03/30/2017   Component Date Value Ref Range Status     WBC 03/30/2017 10.5  4.0 - 11.0 10e9/L Final     RBC Count 03/30/2017 5.68  4.4 - 5.9 10e12/L Final     Hemoglobin 03/30/2017 16.3  13.3 - 17.7 g/dL Final     Hematocrit 03/30/2017 49.5  40.0 - 53.0 % Final     MCV 03/30/2017 87  78 - 100 fl Final     MCH 03/30/2017 28.7  26.5 - 33.0 pg Final     MCHC 03/30/2017 32.9  31.5 - 36.5 g/dL Final     RDW 03/30/2017 13.9  10.0 - 15.0 % Final     Platelet Count 03/30/2017 200  150 - 450 10e9/L Final     Diff Method 03/30/2017 Automated Method   Final     % Neutrophils 03/30/2017 73.3  % Final     % Lymphocytes 03/30/2017 15.5  % Final     % Monocytes 03/30/2017 6.8  % Final     % Eosinophils 03/30/2017 4.1  % Final     % Basophils 03/30/2017 0.3  % Final     Absolute Neutrophil 03/30/2017 7.7  1.6 - 8.3 10e9/L Final     Absolute Lymphocytes 03/30/2017 1.6  0.8 - 5.3 10e9/L Final     Absolute Monocytes 03/30/2017 0.7  0.0 - 1.3 10e9/L Final     Absolute Eosinophils 03/30/2017 0.4  0.0 - 0.7 10e9/L Final     Absolute Basophils 03/30/2017 0.0  0.0 - 0.2 10e9/L Final     Sed Rate 03/30/2017 8  0 - 20 mm/h Final     CRP Inflammation 03/30/2017 8.2* 0.0 - 8.0 mg/L Final     Uric Acid 03/30/2017 6.2  3.5 - 7.2 mg/dL Final          ASSESSMENT/PLAN:                                                    (M25.50) Multiple joint pain  (primary encounter diagnosis)  Comment: improving with prednisone, ongoing right wrist limited ROM.  normal uric acid (tough on allopurinol), normal inflammatory markers and WBC making infectious/inflammatory etiology less likely, however response to prednisone  indicates underlying inflammatory component  Plan: predniSONE (DELTASONE) 20 MG tablet,         RHEUMATOLOGY REFERRAL        Continue prednisone over 9 day taper.  If ongoing limited right wrist ROM recommend follow up with rheumatology for workup of possible inflammatory arthritis.    (I10) Hypertension goal BP (blood pressure) < 150/90  Comment: controlled on recheck   Plan: monitor           See Patient Instructions    Domitila Feldman, RICHA  [facility]    Patient Instructions   1.  Continue prednisone and taper off over the next 9 days.  This seems like some sort of inflammatory process given that it has improved with prednisone.  I recommend follow up rheumatology if right wrist range of motion does not improve over the next 2 weeks.  Call /mychart if  youre going outside of [place] so we can send records.

## 2017-04-03 ENCOUNTER — OFFICE VISIT (OUTPATIENT)
Dept: FAMILY MEDICINE | Facility: CLINIC | Age: 72
End: 2017-04-03
Payer: COMMERCIAL

## 2017-04-03 VITALS
TEMPERATURE: 97 F | HEART RATE: 76 BPM | WEIGHT: 209 LBS | SYSTOLIC BLOOD PRESSURE: 142 MMHG | DIASTOLIC BLOOD PRESSURE: 84 MMHG | BODY MASS INDEX: 30.86 KG/M2 | RESPIRATION RATE: 16 BRPM | OXYGEN SATURATION: 97 %

## 2017-04-03 DIAGNOSIS — I10 HYPERTENSION GOAL BP (BLOOD PRESSURE) < 150/90: ICD-10-CM

## 2017-04-03 DIAGNOSIS — M25.50 MULTIPLE JOINT PAIN: Primary | ICD-10-CM

## 2017-04-03 PROCEDURE — 99213 OFFICE O/P EST LOW 20 MIN: CPT | Performed by: NURSE PRACTITIONER

## 2017-04-03 RX ORDER — PREDNISONE 20 MG/1
TABLET ORAL
Qty: 11 TABLET | Refills: 0 | Status: SHIPPED | OUTPATIENT
Start: 2017-04-03 | End: 2017-09-26

## 2017-04-03 NOTE — PATIENT INSTRUCTIONS
1.  Continue prednisone and taper off over the next 9 days.  This seems like some sort of inflammatory process given that it has improved with prednisone.  I recommend follow up rheumatology if right wrist range of motion does not improve over the next 2 weeks.  Call /mychart if  youre going outside of [place] so we can send records.

## 2017-04-03 NOTE — MR AVS SNAPSHOT
After Visit Summary   4/3/2017    Saul Hairston    MRN: [id number]           Patient Information     Date Of Birth          1945        Visit Information        Provider Department      4/3/2017 10:20 AM Domitila Feldman APRN CNP [facility]        Today's Diagnoses     Hypertension goal BP (blood pressure) < 150/90    -  1    Multiple joint pain          Care Instructions    1.  Continue prednisone and taper off over the next 9 days.  This seems like some sort of inflammatory process given that it has improved with prednisone.  I recommend follow up rheumatology if right wrist range of motion does not improve over the next 2 weeks.  Call /[email or web address] if  youre going outside of [place] so we can send records.        Follow-ups after your visit        Additional Services     RHEUMATOLOGY REFERRAL       Your provider has referred you to: [facility]: Rheumatology Clinic [facility] [phone]   [email or web address]  [facility] [place] [phone]   [email or web address]    Please be aware that coverage of these services is subject to the terms and limitations of your health insurance plan.  Call member services at your health plan with any benefit or coverage questions.      Please bring the following with you to your appointment:    (1) Any X-Rays, CTs or MRIs which have been performed.  Contact the facility where they were done to arrange for  prior to your scheduled appointment.    (2) List of current medications   (3) This referral request   (4) Any documents/labs given to you for this referral                  Who to contact     If you have questions or need follow up information about today's clinic visit or your schedule please contact [facility] directly at [phone].  Normal or non-critical lab and imaging results will be communicated to you by MyChart, letter or phone within 4  business days after the clinic has received the results. If you do not hear from us within 7 days, please contact the clinic through [employer] or phone. If you have a critical or abnormal lab result, we will notify you by phone as soon as possible.  Submit refill requests through [employer] or call your pharmacy and they will forward the refill request to us. Please allow 3 business days for your refill to be completed.          Additional Information About Your Visit        [employer] Information     [employer] gives you secure access to your electronic health record. If you see a primary care provider, you can also send messages to your care team and make appointments. If you have questions, please call your primary care clinic.  If you do not have a primary care provider, please call [phone] and they will assist you.        Care EveryWhere ID     This is your Care EveryWhere ID. This could be used by other organizations to access your [place] medical records  [id number]        Your Vitals Were     Pulse Temperature Respirations Pulse Oximetry BMI (Body Mass Index)       76 97  F (36.1  C) (Tympanic) 16 97% 30.86 kg/m2        Blood Pressure from Last 3 Encounters:   04/03/17 142/84   03/30/17 142/76   03/15/17 134/72    Weight from Last 3 Encounters:   04/03/17 209 lb (94.8 kg)   03/30/17 210 lb (95.3 kg)   03/15/17 209 lb (94.8 kg)              We Performed the Following     RHEUMATOLOGY REFERRAL          Today's Medication Changes          These changes are accurate as of: 4/3/17 10:38 AM.  If you have any questions, ask your nurse or doctor.               These medicines have changed or have updated prescriptions.        Dose/Directions    * predniSONE 20 MG tablet   Commonly known as:  DELTASONE   This may have changed:  Another medication with the same name was added. Make sure you understand how and when to take each.   Used for:  Pain in joint, multiple sites   Changed by:  Domitila Feldman APRN CNP         Dose:  20 mg   Take 1 tablet (20 mg) by mouth 2 times daily   Quantity:  10 tablet   Refills:  0       * predniSONE 20 MG tablet   Commonly known as:  DELTASONE   This may have changed:  You were already taking a medication with the same name, and this prescription was added. Make sure you understand how and when to take each.   Used for:  Multiple joint pain   Changed by:  Domitila Feldman APRN CNP        2 tabs (40 mg) daily x 3 days, 1 tab (20 mg) daily x 3 days, then 1/2 tab (10 mg) x 3 days.   Quantity:  11 tablet   Refills:  0       * Notice:  This list has 2 medication(s) that are the same as other medications prescribed for you. Read the directions carefully, and ask your doctor or other care provider to review them with you.      Stop taking these medicines if you haven't already. Please contact your care team if you have questions.     HYDROcodone-acetaminophen 5-325 MG per tablet   Commonly known as:  NORCO   Stopped by:  Domitila Feldman APRN CNP           meloxicam 15 MG tablet   Commonly known as:  MOBIC   Stopped by:  Domitila Feldman APRN CNP                Where to get your medicines      These medications were sent to [place] [address] [place] [address]  [address] [place] [address]     Phone:  [phone]     predniSONE 20 MG tablet                Primary Care Provider Office Phone # Fax #    Waldo Brennan Matson [phone]       [address] [address]        Thank you!     Thank you for choosing [facility]  for your care. Our goal is always to provide you with excellent care. Hearing back from our patients is one way we can continue to improve our services. Please take a few minutes to complete the written survey that you may receive in the mail after your visit with us. Thank you!             Your Updated Medication List - Protect others around you: Learn how to  safely use, store and throw away your medicines at www.disposemymeds.org.          This list is accurate as of: 4/3/17 10:38 AM.  Always use your most recent med list.                   Brand Name Dispense Instructions for use    allopurinol 100 MG tablet    ZYLOPRIM     Take 3 tablets (300 mg) by mouth daily       atorvastatin 20 MG tablet    LIPITOR    90 tablet    Take 1 tablet (20 mg) by mouth daily       hydrochlorothiazide 25 MG tablet    HYDRODIURIL    90 tablet    Take 1 tablet (25 mg) by mouth daily       indomethacin 50 MG capsule    INDOCIN    40 capsule    Take 1 capsule (50 mg) by mouth 3 times daily (with meals)       MULTIVITAMIN PO          OMEGA-3 FISH OIL PO          * predniSONE 20 MG tablet    DELTASONE    10 tablet    Take 1 tablet (20 mg) by mouth 2 times daily       * predniSONE 20 MG tablet    DELTASONE    11 tablet    2 tabs (40 mg) daily x 3 days, 1 tab (20 mg) daily x 3 days, then 1/2 tab (10 mg) x 3 days.       TAMSULOSIN HCL PO      Take 0.4 mg by mouth       triamcinolone 0.1 % cream    KENALOG    45 g    Apply sparingly to affected area three times daily for 14 days.       * Notice:  This list has 2 medication(s) that are the same as other medications prescribed for you. Read the directions carefully, and ask your doctor or other care provider to review them with you.

## 2017-04-03 NOTE — NURSING NOTE
"Chief Complaint   Patient presents with     RECHECK       Initial /88 (BP Location: Right arm, Patient Position: Chair, Cuff Size: Adult Regular)  Pulse 76  Temp 97  F (36.1  C) (Tympanic)  Resp 16  Wt 209 lb (94.8 kg)  SpO2 97%  BMI 30.86 kg/m2 Estimated body mass index is 30.86 kg/(m^2) as calculated from the following:    Height as of 1/11/17: 5' 9\" (1.753 m).    Weight as of this encounter: 209 lb (94.8 kg).  Medication Reconciliation: complete     Juliana Garcia, CMA    "

## 2017-04-10 ENCOUNTER — OFFICE VISIT (OUTPATIENT)
Dept: URGENT CARE | Facility: URGENT CARE | Age: 72
End: 2017-04-10
Payer: COMMERCIAL

## 2017-04-10 VITALS
OXYGEN SATURATION: 96 % | DIASTOLIC BLOOD PRESSURE: 80 MMHG | BODY MASS INDEX: 30.57 KG/M2 | WEIGHT: 207 LBS | RESPIRATION RATE: 20 BRPM | HEART RATE: 85 BPM | TEMPERATURE: 99 F | SYSTOLIC BLOOD PRESSURE: 154 MMHG

## 2017-04-10 DIAGNOSIS — R50.9 FEVER AND CHILLS: Primary | ICD-10-CM

## 2017-04-10 DIAGNOSIS — J10.1 INFLUENZA B: ICD-10-CM

## 2017-04-10 LAB
FLUAV+FLUBV AG SPEC QL: ABNORMAL
FLUAV+FLUBV AG SPEC QL: NEGATIVE
SPECIMEN SOURCE: ABNORMAL

## 2017-04-10 PROCEDURE — 99214 OFFICE O/P EST MOD 30 MIN: CPT | Performed by: PHYSICIAN ASSISTANT

## 2017-04-10 PROCEDURE — 87804 INFLUENZA ASSAY W/OPTIC: CPT | Performed by: PHYSICIAN ASSISTANT

## 2017-04-10 RX ORDER — OSELTAMIVIR PHOSPHATE 75 MG/1
75 CAPSULE ORAL 2 TIMES DAILY
Qty: 10 CAPSULE | Refills: 0 | Status: SHIPPED | OUTPATIENT
Start: 2017-04-10 | End: 2017-09-26

## 2017-04-10 NOTE — PATIENT INSTRUCTIONS
Influenza  Influenza ( the flu ) is an infection that affects your respiratory tract (the mouth, nose, and lungs, and the passages between them). Unlike a cold, the flu can make you very ill. And it can lead to pneumonia, a serious lung infection. For some people, especially older adults, young children, and people with certain chronic conditions, the flu can have serious complications and even be fatal.  What Are the Risk Factors for the Flu?     Viruses that cause influenza spread through the air in droplets when someone who has the flu coughs, sneezes, laughs, or talks.   Anyone can get the flu. But you re more likely to become infected if you:    Have a weakened immune system.    Work in a health care setting where you may be exposed to flu germs.    Live or work with someone who has the flu.    Haven t received an annual flu shot.  How Does the Flu Spread?  The flu is caused by viruses. The viruses spread through the air in droplets when someone who has the flu coughs, sneezes, laughs, or talks. You can become infected when you inhale these viruses directly. You can also become infected when you touch a surface on which the droplets have landed and then transfer the germs to your eyes, nose, or mouth. Touching used tissues, or sharing utensils, drinking glasses, or a toothbrush with an infected person can expose you to flu viruses, too.  What Are the Symptoms of the Flu?  Flu symptoms tend to come on quickly and may last a few days to a few weeks. They include:    Fever usually higher than 101 F  (38.3 C) and chills    Sore throat and headache    Dry cough    Runny nose    Tiredness and weakness    Muscle aches  Factors That Can Make Flu Worse  For some people, the flu can be very serious. The risk of complications is greater for:    Children under age 5.    Adults 65 years of age and older.    People with a chronic illness, such as diabetes or heart, kidney, or lung disease.    People who live in a nursing  home or long-term care facility.   How Is the Flu Treated?  Influenza usually improves after 7 days or so. In some cases, your health care provider may prescribe an antiviral medication. This may help you get well sooner. For the medication to help, you need to take it as soon as possible (ideally within 48 hours) after your symptoms start. If you develop pneumonia or other serious illness, hospital care may be needed.  Easing Flu Symptoms    Drink lots of fluids such as water, juice, and warm soup. A good rule is to drink enough so that you urinate your normal amount.    Get plenty of rest.    Ask your health care provider what to take for fever and pain.    Call your provider if your fever rises over 101 F (38.3 C) or you become dizzy, lightheaded, or short of breath.  Taking Steps to Protect Others    Wash your hands often, especially after coughing or sneezing. Or, clean your hands with an alcohol-based hand  containing at least 60 percent alcohol.    Cough or sneeze into a tissue. Then throw the tissue away and wash your hands. If you don t have a tissue, cough and sneeze into the crook of your elbow.    Stay home until at least 24 hours after you no longer have a fever or chills. Be sure the fever isn t being hidden by fever-reducing medication.    Don t share food, utensils, drinking glasses, or a toothbrush with others.    Ask your health care provider if others in your household should receive antiviral medication to help them avoid infection.  How Can the Flu Be Prevented?    One of the best ways to avoid the flu is to get a flu vaccination each year. Viruses that cause the flu change from year to year. For that reason, doctors recommend getting the flu vaccine each year, as soon as it's available in your area. The vaccine may be given as a shot or as a nasal spray. Your health care provider can tell you which vaccine is right for you.    Wash your hands often. Frequent handwashing is a proven way  to help prevent infection.    Carry an alcohol-based hand gel containing at least 60 percent alcohol. Use it when you don t have access to soap and water. Then wash your hands as soon as you can.    Avoid touching your eyes, nose, and mouth.    At home and work, clean phones, computer keyboards, and toys often with disinfectant wipes.    If possible, avoid close contact with others who have the flu or symptoms of the flu.  Handwashing Tips  Handwashing is one of the best ways to prevent many common infections. If you re caring for or visiting someone with the flu, wash your hands each time you enter and leave the room. Follow these steps:    Use warm water and plenty of soap. Rub your hands together well.    Clean the whole hand, under your nails, between your fingers, and up the wrists.    Wash for at least 15 seconds.    Rinse, letting the water run down your fingers, not up your wrists.    Dry your hands well. Use a paper towel to turn off the faucet and open the door.  Using Alcohol-Based Hand   Alcohol-based hand  are also a good choice. Use them when you don t have access to soap and water. Follow these steps:    Squeeze about a tablespoon of gel into the palm of one hand.    Rub your hands together briskly, cleaning the backs of your hands, the palms, between your fingers, and up the wrists.    Rub until the gel is gone and your hands are completely dry.  Preventing Influenza in Healthcare Settings  The flu is a special concern for people in hospitals and long-term care facilities. To help prevent the spread of flu, many hospitals and nursing homes take these steps:    Health care providers wash their hands or use an alcohol-based hand  before and after treating each patient.    People with the flu have private rooms and bathrooms or share a room with someone with the same infection.    High-risk patients who don t have the flu are encouraged to get the flu and pneumonia  vaccines.    All health care workers are encouraged or required to get flu shots.        8276-4659 [employer]. [address] [place] [address], [place] [address]. All rights reserved. This information is not intended as a substitute for professional medical care. Always follow your healthcare professional's instructions.

## 2017-04-10 NOTE — MR AVS SNAPSHOT
After Visit Summary   4/10/2017    Saul Hairston    MRN: [id number]           Patient Information     Date Of Birth          1945        Visit Information        Provider Department      4/10/2017 1:00 PM Eduar Fletcher PA-C [facility]        Today's Diagnoses     Fever and chills    -  1    Influenza B          Care Instructions      Influenza  Influenza ( the flu ) is an infection that affects your respiratory tract (the mouth, nose, and lungs, and the passages between them). Unlike a cold, the flu can make you very ill. And it can lead to pneumonia, a serious lung infection. For some people, especially older adults, young children, and people with certain chronic conditions, the flu can have serious complications and even be fatal.  What Are the Risk Factors for the Flu?     Viruses that cause influenza spread through the air in droplets when someone who has the flu coughs, sneezes, laughs, or talks.   Anyone can get the flu. But you re more likely to become infected if you:    Have a weakened immune system.    Work in a health care setting where you may be exposed to flu germs.    Live or work with someone who has the flu.    Haven t received an annual flu shot.  How Does the Flu Spread?  The flu is caused by viruses. The viruses spread through the air in droplets when someone who has the flu coughs, sneezes, laughs, or talks. You can become infected when you inhale these viruses directly. You can also become infected when you touch a surface on which the droplets have landed and then transfer the germs to your eyes, nose, or mouth. Touching used tissues, or sharing utensils, drinking glasses, or a toothbrush with an infected person can expose you to flu viruses, too.  What Are the Symptoms of the Flu?  Flu symptoms tend to come on quickly and may last a few days to a few weeks. They include:    Fever usually higher than 101 F  (38.3 C) and chills    Sore throat and  headache    Dry cough    Runny nose    Tiredness and weakness    Muscle aches  Factors That Can Make Flu Worse  For some people, the flu can be very serious. The risk of complications is greater for:    Children under age 5.    Adults 65 years of age and older.    People with a chronic illness, such as diabetes or heart, kidney, or lung disease.    People who live in a nursing home or long-term care facility.   How Is the Flu Treated?  Influenza usually improves after 7 days or so. In some cases, your health care provider may prescribe an antiviral medication. This may help you get well sooner. For the medication to help, you need to take it as soon as possible (ideally within 48 hours) after your symptoms start. If you develop pneumonia or other serious illness, hospital care may be needed.  Easing Flu Symptoms    Drink lots of fluids such as water, juice, and warm soup. A good rule is to drink enough so that you urinate your normal amount.    Get plenty of rest.    Ask your health care provider what to take for fever and pain.    Call your provider if your fever rises over 101 F (38.3 C) or you become dizzy, lightheaded, or short of breath.  Taking Steps to Protect Others    Wash your hands often, especially after coughing or sneezing. Or, clean your hands with an alcohol-based hand  containing at least 60 percent alcohol.    Cough or sneeze into a tissue. Then throw the tissue away and wash your hands. If you don t have a tissue, cough and sneeze into the crook of your elbow.    Stay home until at least 24 hours after you no longer have a fever or chills. Be sure the fever isn t being hidden by fever-reducing medication.    Don t share food, utensils, drinking glasses, or a toothbrush with others.    Ask your health care provider if others in your household should receive antiviral medication to help them avoid infection.  How Can the Flu Be Prevented?    One of the best ways to avoid the flu is to get a  flu vaccination each year. Viruses that cause the flu change from year to year. For that reason, doctors recommend getting the flu vaccine each year, as soon as it's available in your area. The vaccine may be given as a shot or as a nasal spray. Your health care provider can tell you which vaccine is right for you.    Wash your hands often. Frequent handwashing is a proven way to help prevent infection.    Carry an alcohol-based hand gel containing at least 60 percent alcohol. Use it when you don t have access to soap and water. Then wash your hands as soon as you can.    Avoid touching your eyes, nose, and mouth.    At home and work, clean phones, computer keyboards, and toys often with disinfectant wipes.    If possible, avoid close contact with others who have the flu or symptoms of the flu.  Handwashing Tips  Handwashing is one of the best ways to prevent many common infections. If you re caring for or visiting someone with the flu, wash your hands each time you enter and leave the room. Follow these steps:    Use warm water and plenty of soap. Rub your hands together well.    Clean the whole hand, under your nails, between your fingers, and up the wrists.    Wash for at least 15 seconds.    Rinse, letting the water run down your fingers, not up your wrists.    Dry your hands well. Use a paper towel to turn off the faucet and open the door.  Using Alcohol-Based Hand   Alcohol-based hand  are also a good choice. Use them when you don t have access to soap and water. Follow these steps:    Squeeze about a tablespoon of gel into the palm of one hand.    Rub your hands together briskly, cleaning the backs of your hands, the palms, between your fingers, and up the wrists.    Rub until the gel is gone and your hands are completely dry.  Preventing Influenza in Healthcare Settings  The flu is a special concern for people in hospitals and long-term care facilities. To help prevent the spread of flu, many  hospitals and nursing homes take these steps:    Health care providers wash their hands or use an alcohol-based hand  before and after treating each patient.    People with the flu have private rooms and bathrooms or share a room with someone with the same infection.    High-risk patients who don t have the flu are encouraged to get the flu and pneumonia vaccines.    All health care workers are encouraged or required to get flu shots.        [id number] [employer]. [address] [place] [address], [place] [address]. All rights reserved. This information is not intended as a substitute for professional medical care. Always follow your healthcare professional's instructions.              Follow-ups after your visit        Who to contact     If you have questions or need follow up information about today's clinic visit or your schedule please contact [facility] directly at [phone].  Normal or non-critical lab and imaging results will be communicated to you by MyChart, letter or phone within 4 business days after the clinic has received the results. If you do not hear from us within 7 days, please contact the clinic through [email or web address] or phone. If you have a critical or abnormal lab result, we will notify you by phone as soon as possible.  Submit refill requests through [employer] or call your pharmacy and they will forward the refill request to us. Please allow 3 business days for your refill to be completed.          Additional Information About Your Visit        MyChart Information     [employer] gives you secure access to your electronic health record. If you see a primary care provider, you can also send messages to your care team and make appointments. If you have questions, please call your primary care clinic.  If you do not have a primary care provider, please call [phone] and they will assist you.        Care EveryWhere ID     This is your Care EveryWhere ID. This could  be used by other organizations to access your [place] medical records  [id number]        Your Vitals Were     Pulse Temperature Respirations Pulse Oximetry BMI (Body Mass Index)       85 99  F (37.2  C) (Oral) 20 96% 30.57 kg/m2        Blood Pressure from Last 3 Encounters:   04/10/17 154/80   04/03/17 142/84   03/30/17 142/76    Weight from Last 3 Encounters:   04/10/17 207 lb (93.9 kg)   04/03/17 209 lb (94.8 kg)   03/30/17 210 lb (95.3 kg)              We Performed the Following     Influenza A/B antigen          Today's Medication Changes          These changes are accurate as of: 4/10/17  1:33 PM.  If you have any questions, ask your nurse or doctor.               Start taking these medicines.        Dose/Directions    oseltamivir 75 MG capsule   Commonly known as:  TAMIFLU   Used for:  Influenza B   Started by:  Eduar Fletcher PA-C        Dose:  75 mg   Take 1 capsule (75 mg) by mouth 2 times daily   Quantity:  10 capsule   Refills:  0            Where to get your medicines      These medications were sent to [place] [address] [place] [address]  [address] [facility] [address]     Phone:  [phone]     oseltamivir 75 MG capsule                Primary Care Provider Office Phone # Fax #    Waldo Brennan Matson [phone]       [address] [address]        Thank you!     Thank you for choosing [facility]  for your care. Our goal is always to provide you with excellent care. Hearing back from our patients is one way we can continue to improve our services. Please take a few minutes to complete the written survey that you may receive in the mail after your visit with us. Thank you!             Your Updated Medication List - Protect others around you: Learn how to safely use, store and throw away your medicines at www.disposemymeds.org.          This list is accurate as of: 4/10/17   1:33 PM.  Always use your most recent med list.                   Brand Name Dispense Instructions for use    allopurinol 100 MG tablet    ZYLOPRIM     Take 3 tablets (300 mg) by mouth daily       atorvastatin 20 MG tablet    LIPITOR    90 tablet    Take 1 tablet (20 mg) by mouth daily       hydrochlorothiazide 25 MG tablet    HYDRODIURIL    90 tablet    Take 1 tablet (25 mg) by mouth daily       indomethacin 50 MG capsule    INDOCIN    40 capsule    Take 1 capsule (50 mg) by mouth 3 times daily (with meals)       MULTIVITAMIN PO          OMEGA-3 FISH OIL PO          oseltamivir 75 MG capsule    TAMIFLU    10 capsule    Take 1 capsule (75 mg) by mouth 2 times daily       * predniSONE 20 MG tablet    DELTASONE    10 tablet    Take 1 tablet (20 mg) by mouth 2 times daily       * predniSONE 20 MG tablet    DELTASONE    11 tablet    2 tabs (40 mg) daily x 3 days, 1 tab (20 mg) daily x 3 days, then 1/2 tab (10 mg) x 3 days.       TAMSULOSIN HCL PO      Take 0.4 mg by mouth       triamcinolone 0.1 % cream    KENALOG    45 g    Apply sparingly to affected area three times daily for 14 days.       * Notice:  This list has 2 medication(s) that are the same as other medications prescribed for you. Read the directions carefully, and ask your doctor or other care provider to review them with you.

## 2017-04-10 NOTE — NURSING NOTE
"Chief Complaint   Patient presents with     Cough     cough , sinus pressure, headache x 2 days ,chill and fever x last night        Initial /80 (BP Location: Right arm, Patient Position: Chair, Cuff Size: Adult Large)  Pulse 85  Temp 99  F (37.2  C) (Oral)  Resp 20  Wt 207 lb (93.9 kg)  SpO2 96%  BMI 30.57 kg/m2 Estimated body mass index is 30.57 kg/(m^2) as calculated from the following:    Height as of 1/11/17: 5' 9\" (1.753 m).    Weight as of this encounter: 207 lb (93.9 kg).  Medication Reconciliation: complete    "

## 2017-04-10 NOTE — PROGRESS NOTES
SUBJECTIVE:   Saul Hairston is a 71 year old male presenting with a chief complaint of fever, nasal congestion, rhinorrhea, cough  and myalgias.  Onset of symptoms was 1 day(s) ago.  Course of illness is same.    Severity moderate  Current and Associated symptoms: fever, chills  Treatment measures tried include OTC meds.  Predisposing factors include none.    Past Medical History:   Diagnosis Date     Arthritis     Gout     CARDIOVASCULAR SCREENING; LDL GOAL LESS THAN 160 8/14/2006     Elevated PSA      Gout, unspecified      Hypertension      Kidney stone 2009    Doing ok now     Seasonal allergic rhinitis     Spring and Fall     Umbilical hernia without mention of obstruction or gangrene 6/2013        Allergies   Allergen Reactions     No Known Drug Allergies          Social History   Substance Use Topics     Smoking status: Never Smoker     Smokeless tobacco: Never Used     Alcohol use No       ROS:  CONSTITUTIONAL:POSITIVE  for fever and chills  INTEGUMENTARY/SKIN: NEGATIVE for worrisome rashes, moles or lesions  ENT/MOUTH: Positive for runny nose, nasal congestion  RESP:POSITIVE for cough-non productive  CV: NEGATIVE for chest pain, palpitations or peripheral edema  GI: NEGATIVE for nausea, abdominal pain, heartburn, or change in bowel habits  MUSCULOSKELETAL: POSITIVE  for body aches  NEURO: NEGATIVE for weakness, dizziness or paresthesias    OBJECTIVE  :/80 (BP Location: Right arm, Patient Position: Chair, Cuff Size: Adult Large)  Pulse 85  Temp 99  F (37.2  C) (Oral)  Resp 20  Wt 207 lb (93.9 kg)  SpO2 96%  BMI 30.57 kg/m2  GENERAL APPEARANCE: healthy, alert and no distress  EYES: EOMI,  PERRL, conjunctiva clear  HENT: TM's normal bilaterally and rhinorrhea clear  NECK: supple, nontender, no lymphadenopathy  RESP: lungs clear to auscultation - no rales, rhonchi or wheezes  CV: regular rates and rhythm, normal S1 S2, no murmur noted  NEURO: Normal strength and tone, sensory exam grossly normal,   normal speech and mentation  SKIN: no suspicious lesions or rashes    Results for orders placed or performed in visit on 04/10/17   Influenza A/B antigen   Result Value Ref Range    Influenza A/B Agn Specimen Nasopharyngeal     Influenza A Negative NEG    Influenza B (A) NEG     Positive   Test results must be correlated with clinical data. If necessary, results   should be confirmed by a molecular assay or viral culture.         ASSESSMENT/PLAN:      ICD-10-CM    1. Fever and chills R50.9 Influenza A/B antigen   2. Influenza B J10.1 oseltamivir (TAMIFLU) 75 MG capsule     Patient given information about influenza.  Patient understands they are contagious until their fever has resolved without the use of motrin or tylenol.  At that time they can return to school/work.  Patient is to monitor for any worsening symptoms and return to the clinic if this occurs.  The most common complication of influenza is Pneumonia or other respiratory problems especially in those with underlying lung problems including asthma and COPD.  Patient will follow up if this occurs.

## 2017-07-25 ENCOUNTER — OFFICE VISIT (OUTPATIENT)
Dept: URGENT CARE | Facility: URGENT CARE | Age: 72
End: 2017-07-25
Payer: COMMERCIAL

## 2017-07-25 VITALS
HEART RATE: 61 BPM | SYSTOLIC BLOOD PRESSURE: 116 MMHG | WEIGHT: 208 LBS | OXYGEN SATURATION: 95 % | BODY MASS INDEX: 30.72 KG/M2 | TEMPERATURE: 97.8 F | DIASTOLIC BLOOD PRESSURE: 82 MMHG

## 2017-07-25 DIAGNOSIS — L03.113 CELLULITIS OF RIGHT UPPER EXTREMITY: ICD-10-CM

## 2017-07-25 DIAGNOSIS — T63.444A BEE STING REACTION, UNDETERMINED INTENT, INITIAL ENCOUNTER: Primary | ICD-10-CM

## 2017-07-25 PROCEDURE — 99214 OFFICE O/P EST MOD 30 MIN: CPT | Performed by: FAMILY MEDICINE

## 2017-07-25 RX ORDER — CEPHALEXIN 500 MG/1
500 CAPSULE ORAL 3 TIMES DAILY
Qty: 30 CAPSULE | Refills: 0 | Status: SHIPPED | OUTPATIENT
Start: 2017-07-25 | End: 2017-08-04

## 2017-07-25 RX ORDER — PREDNISONE 20 MG/1
20 TABLET ORAL DAILY
Qty: 5 TABLET | Refills: 0 | Status: SHIPPED | OUTPATIENT
Start: 2017-07-25 | End: 2017-07-30

## 2017-07-25 RX ORDER — CETIRIZINE HYDROCHLORIDE 10 MG/1
10 TABLET ORAL EVERY EVENING
Qty: 10 TABLET | Refills: 0 | COMMUNITY
Start: 2017-07-25 | End: 2017-08-04

## 2017-07-25 NOTE — PROGRESS NOTES
SUBJECTIVE:Saul Hairston is a 71 year old male who presents to the clinic today for a rash.  Onset of rash was 1 day(s) ago.   Rash is still present.   Location of the rash: arm, lower.  Associated symptoms include: itching, painful and redness.    Symptoms are moderate and rash seems to be worsening.  Therapies tried to improve the rash: none.  Previous history of a similar rash? No  Recent exposure history: bee sting    Past Medical History:   Diagnosis Date     Arthritis     Gout     CARDIOVASCULAR SCREENING; LDL GOAL LESS THAN 160 8/14/2006     Elevated PSA      Gout, unspecified      Hypertension      Kidney stone 2009    Doing ok now     Seasonal allergic rhinitis     Spring and Fall     Umbilical hernia without mention of obstruction or gangrene 6/2013     Allergies   Allergen Reactions     No Known Drug Allergies      Social History   Substance Use Topics     Smoking status: Never Smoker     Smokeless tobacco: Never Used     Alcohol use No       ROS:CONSTITUTIONAL:NEGATIVE for fever, chills, change in weight  RESP:NEGATIVE for significant cough or SOB    EXAM: VITALS: /82 (BP Location: Left arm, Patient Position: Chair, Cuff Size: Adult Regular)  Pulse 61  Temp 97.8  F (36.6  C) (Oral)  Wt 208 lb (94.3 kg)  SpO2 95%  BMI 30.72 kg/m2  General:healthy,alert,no distress    Location: arm, lower     Distribution: localized     Lesion grouping: single patch and unilateral     Lesion type: macular     Color: red with warmth and tendernessPERTINENT EXAM: GENERAL APPEARANCE: healthy, alert and no distress      ICD-10-CM    1. Bee sting reaction, undetermined intent, initial encounter T63.444A cetirizine (ZYRTEC) 10 MG tablet     predniSONE (DELTASONE) 20 MG tablet   2. Cellulitis of right upper extremity L03.113 cephALEXin (KEFLEX) 500 MG capsule       Follow-up with primary clinic if not improving

## 2017-07-25 NOTE — MR AVS SNAPSHOT
After Visit Summary   7/25/2017    Saul Hairston    MRN: [id number]           Patient Information     Date Of Birth          1945        Visit Information        Provider Department      7/25/2017 1:15 PM Efrain Zapata, DO [facility]        Today's Diagnoses     Bee sting reaction, undetermined intent, initial encounter    -  1    Cellulitis of right upper extremity           Follow-ups after your visit        Who to contact     If you have questions or need follow up information about today's clinic visit or your schedule please contact [facility] directly at [phone].  Normal or non-critical lab and imaging results will be communicated to you by [employer], letter or phone within 4 business days after the clinic has received the results. If you do not hear from us within 7 days, please contact the clinic through [employer] or phone. If you have a critical or abnormal lab result, we will notify you by phone as soon as possible.  Submit refill requests through [employer] or call your pharmacy and they will forward the refill request to us. Please allow 3 business days for your refill to be completed.          Additional Information About Your Visit        MyChart Information     [employer] gives you secure access to your electronic health record. If you see a primary care provider, you can also send messages to your care team and make appointments. If you have questions, please call your primary care clinic.  If you do not have a primary care provider, please call [phone] and they will assist you.        Care EveryWhere ID     This is your Care EveryWhere ID. This could be used by other organizations to access your [place] medical records  [id number]        Your Vitals Were     Pulse Temperature Pulse Oximetry BMI (Body Mass Index)          61 97.8  F (36.6  C) (Oral) 95% 30.72 kg/m2         Blood Pressure from Last 3 Encounters:   07/25/17  116/82   04/10/17 154/80   04/03/17 142/84    Weight from Last 3 Encounters:   07/25/17 208 lb (94.3 kg)   04/10/17 207 lb (93.9 kg)   04/03/17 209 lb (94.8 kg)              Today, you had the following     No orders found for display         Today's Medication Changes          These changes are accurate as of: 7/25/17  2:27 PM.  If you have any questions, ask your nurse or doctor.               Start taking these medicines.        Dose/Directions    cephALEXin 500 MG capsule   Commonly known as:  KEFLEX   Used for:  Cellulitis of right upper extremity   Started by:  Efrain Zapata DO        Dose:  500 mg   Take 1 capsule (500 mg) by mouth 3 times daily for 10 days   Quantity:  30 capsule   Refills:  0       cetirizine 10 MG tablet   Commonly known as:  zyrTEC   Used for:  Bee sting reaction, undetermined intent, initial encounter   Started by:  Efrain Zapata DO        Dose:  10 mg   Take 1 tablet (10 mg) by mouth every evening for 10 days   Quantity:  10 tablet   Refills:  0         These medicines have changed or have updated prescriptions.        Dose/Directions    * predniSONE 20 MG tablet   Commonly known as:  DELTASONE   This may have changed:  Another medication with the same name was added. Make sure you understand how and when to take each.   Used for:  Pain in joint, multiple sites   Changed by:  Domitila Munguia APRN CNP        Dose:  20 mg   Take 1 tablet (20 mg) by mouth 2 times daily   Quantity:  10 tablet   Refills:  0       * predniSONE 20 MG tablet   Commonly known as:  DELTASONE   This may have changed:  Another medication with the same name was added. Make sure you understand how and when to take each.   Used for:  Multiple joint pain   Changed by:  Domitila Munguia APRN CNP        2 tabs (40 mg) daily x 3 days, 1 tab (20 mg) daily x 3 days, then 1/2 tab (10 mg) x 3 days.   Quantity:  11 tablet   Refills:  0       * predniSONE 20 MG tablet   Commonly known as:  DELTASONE    This may have changed:  You were already taking a medication with the same name, and this prescription was added. Make sure you understand how and when to take each.   Used for:  Bee sting reaction, undetermined intent, initial encounter   Changed by:  Efrain Zapata DO        Dose:  20 mg   Take 1 tablet (20 mg) by mouth daily for 5 days   Quantity:  5 tablet   Refills:  0       * Notice:  This list has 3 medication(s) that are the same as other medications prescribed for you. Read the directions carefully, and ask your doctor or other care provider to review them with you.         Where to get your medicines      These medications were sent to [place] [id number] [place] [facility] [address] [place] [address]  [address] [facility] [address]     Phone:  [phone]     cephALEXin 500 MG capsule    predniSONE 20 MG tablet         Some of these will need a paper prescription and others can be bought over the counter.  Ask your nurse if you have questions.     You don't need a prescription for these medications     cetirizine 10 MG tablet                Primary Care Provider Office Phone # Fax #    Waldo Brennan Matson [phone]       [address] [address]        Equal Access to Services     OLGA MARINELLI AH: Hadii marybeth corral Sogold, waaxda luqadaha, qaybta kaalmada adejulitayada, luis e yang. So [facility] [phone].    ATENCIÓN: Si habla español, tiene a dubon disposición servicios gratuitos de asistencia lingüística. Nithin al [phone].    We comply with applicable federal civil rights laws and [place] laws. We do not discriminate on the basis of race, color, national origin, age, disability sex, sexual orientation or gender identity.            Thank you!     Thank you for choosing [facility]  for your care. Our goal is always to provide you with excellent care. Hearing back from our  patients is one way we can continue to improve our services. Please take a few minutes to complete the written survey that you may receive in the mail after your visit with us. Thank you!             Your Updated Medication List - Protect others around you: Learn how to safely use, store and throw away your medicines at www.disposemymeds.org.          This list is accurate as of: 7/25/17  2:27 PM.  Always use your most recent med list.                   Brand Name Dispense Instructions for use Diagnosis    allopurinol 100 MG tablet    ZYLOPRIM     Take 3 tablets (300 mg) by mouth daily    Idiopathic chronic gout 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       atorvastatin 20 MG tablet    LIPITOR    90 tablet    Take 1 tablet (20 mg) by mouth daily    Hyperlipidemia LDL goal <130       cephALEXin 500 MG capsule    KEFLEX    30 capsule    Take 1 capsule (500 mg) by mouth 3 times daily for 10 days    Cellulitis of right upper extremity       cetirizine 10 MG tablet    zyrTEC    10 tablet    Take 1 tablet (10 mg) by mouth every evening for 10 days    Bee sting reaction, undetermined intent, initial encounter       hydrochlorothiazide 25 MG tablet    HYDRODIURIL    90 tablet    Take 1 tablet (25 mg) by mouth daily    Benign essential hypertension       indomethacin 50 MG capsule    INDOCIN    40 capsule    Take 1 capsule (50 mg) by mouth 3 times daily (with meals)    Idiopathic chronic gout 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       MULTIVITAMIN PO           OMEGA-3 FISH OIL PO           oseltamivir 75 MG capsule    TAMIFLU    10 capsule    Take 1 capsule (75 mg) by mouth 2 times daily    Influenza B       * predniSONE 20 MG tablet    DELTASONE    10 tablet    Take 1 tablet (20 mg) by mouth 2 times daily    Pain in joint, multiple sites       * predniSONE 20 MG tablet    DELTASONE    11 tablet    2 tabs (40 mg) daily x 3 days, 1 tab (20 mg) daily x 3 days, then 1/2 tab (10 mg) x 3 days.    Multiple joint pain       * predniSONE 20 MG tablet     DELTASONE    5 tablet    Take 1 tablet (20 mg) by mouth daily for 5 days    Bee sting reaction, undetermined intent, initial encounter       TAMSULOSIN HCL PO      Take 0.4 mg by mouth        triamcinolone 0.1 % cream    KENALOG    45 g    Apply sparingly to affected area three times daily for 14 days.    Dermatitis       * Notice:  This list has 3 medication(s) that are the same as other medications prescribed for you. Read the directions carefully, and ask your doctor or other care provider to review them with you.

## 2017-07-25 NOTE — NURSING NOTE
"Chief Complaint   Patient presents with     Insect Bites     probable bee sting to rt upper arm yesterday,swelling,redness       Initial /82 (BP Location: Left arm, Patient Position: Chair, Cuff Size: Adult Regular)  Pulse 61  Temp 97.8  F (36.6  C) (Oral)  Wt 208 lb (94.3 kg)  SpO2 95%  BMI 30.72 kg/m2 Estimated body mass index is 30.72 kg/(m^2) as calculated from the following:    Height as of 1/11/17: 5' 9\" (1.753 m).    Weight as of this encounter: 208 lb (94.3 kg).  Medication Reconciliation: complete   Adela REDDY    "

## 2017-09-25 ENCOUNTER — TRANSFERRED RECORDS (OUTPATIENT)
Dept: HEALTH INFORMATION MANAGEMENT | Facility: CLINIC | Age: 72
End: 2017-09-25

## 2017-09-25 NOTE — PROGRESS NOTES
SUBJECTIVE:   Saul Hairston is a 72 year old male who presents for Preventive Visit.    Are you in the first 12 months of your Medicare coverage?  No    Physical   Annual:     Getting at least 3 servings of Calcium per day::  NO    Bi-annual eye exam::  Yes    Dental care twice a year::  Yes    Sleep apnea or symptoms of sleep apnea::  Daytime drowsiness and Excessive snoring    Frequency of exercise::  None    Taking medications regularly::  Yes    Medication side effects::  Not applicable    Additional concerns today::  No      COGNITIVE SCREEN  1) Repeat 3 items (Banana, Sunrise, Chair)    2) Clock draw: NORMAL  3) 3 item recall: Recalls 2 objects   Results: NORMAL clock, 1-2 items recalled: COGNITIVE IMPAIRMENT LESS LIKELY    Mini-CogTM Copyright S Pop. Licensed by the author for use in [facility]; reprinted with permission (minna@[facility]). All rights reserved.          Reviewed and updated as needed this visit by clinical staff  Tobacco  Allergies  Meds  Med Hx  Surg Hx  Fam Hx  Soc Hx        Reviewed and updated as needed this visit by Provider        Social History   Substance Use Topics     Smoking status: Never Smoker     Smokeless tobacco: Never Used     Alcohol use No       patient does not drink alcohol           Today's PHQ-2 Score:   PHQ-2 ( 1999 [employer]) 9/24/2017   Q1: Little interest or pleasure in doing things 0   Q2: Feeling down, depressed or hopeless 0   PHQ-2 Score 0   Q1: Little interest or pleasure in doing things Not at all   Q2: Feeling down, depressed or hopeless Not at all   PHQ-2 Score 0       Do you feel safe in your environment - Yes    Do you have a Health Care Directive?: No: Advance care planning reviewed with patient; information given to patient to review.      Current providers sharing in care for this patient include: Patient Care Team:  Waldo Matson MD as PCP - General (Family Practice)      Hearing impairment: Yes, Difficulty following a  "conversation in a noisy restaurant or crowded room.    Find that men's voices are easier to understand than women's.    Ability to successfully perform activities of daily living: Yes, no assistance needed     Fall risk:         Home safety:  none identified       The following health maintenance items are reviewed in Epic and correct as of today:  Health Maintenance   Topic Date Due     AORTIC ANEURYSM SCREENING (SYSTEM ASSIGNED)  09/25/2012     ADVANCE DIRECTIVE PLANNING Q5 YRS  09/20/2016     CMP Q1 YR  09/19/2017     LIPID MONITORING Q1 YEAR  09/19/2017     INFLUENZA VACCINE (SYSTEM ASSIGNED)  09/01/2017     FALL RISK ASSESSMENT  09/19/2017     CBC Q1 YR  03/30/2018     TETANUS IMMUNIZATION (SYSTEM ASSIGNED)  05/28/2023     COLON CANCER SCREEN (SYSTEM ASSIGNED)  06/15/2026     PNEUMOCOCCAL  Completed     HEPATITIS C SCREENING  Completed     MRI of prostate tomorrow by urologist.    Swollen foot - sitting for long period of time gets better.   Lump on elbow -   Feeling tired and stops breathing at night time.  Spot on foot.     ROS:  Constitutional, HEENT, cardiovascular, pulmonary, GI, , musculoskeletal, neuro, skin, endocrine and psych systems are negative, except as otherwise noted.      OBJECTIVE:   /77 (BP Location: Right arm, Patient Position: Chair, Cuff Size: Adult Large)  Pulse 66  Temp 98.1  F (36.7  C) (Oral)  Resp 16  Ht 5' 9\" (1.753 m)  Wt 208 lb 4 oz (94.5 kg)  SpO2 98%  BMI 30.75 kg/m2 Estimated body mass index is 30.75 kg/(m^2) as calculated from the following:    Height as of this encounter: 5' 9\" (1.753 m).    Weight as of this encounter: 208 lb 4 oz (94.5 kg).  EXAM:   GENERAL: healthy, alert and no distress  EYES: Eyes grossly normal to inspection, PERRL and conjunctivae and sclerae normal  HENT: ear canals and TM's normal, nose and mouth without ulcers or lesions  NECK: no adenopathy, no asymmetry, masses, or scars and thyroid normal to palpation  RESP: lungs clear to " auscultation - no rales, rhonchi or wheezes  CV: regular rate and rhythm, normal S1 S2, no S3 or S4, no murmur, click or rub, no peripheral edema and peripheral pulses strong  ABDOMEN: soft, nontender, no hepatosplenomegaly, no masses and bowel sounds normal  MS: no gross musculoskeletal defects noted, no edema  SKIN:right foot -small seborrheic keratosis, left elbow - small cystic lesion. Healed surgical scar.   NEURO: Normal strength and tone, mentation intact and speech normal  PSYCH: mentation appears normal, affect normal/bright    ASSESSMENT / PLAN:   1. Medicare annual wellness visit, subsequent   compression socks for swollen feet. If not improving - follow up.  Benign lesions on skin.      2. Need for prophylactic vaccination and inoculation against influenza       3. Snoring  With some stopped breathing as per wife. Obese. Sleep study would be helpful.   - SLEEP EVALUATION & MANAGEMENT REFERRAL - ADULT; Future    4. Hyperlipidemia LDL goal <130     - Lipid with Reflex to Direct LDL  - Comprehensive metabolic panel    5. Hypertension goal BP (blood pressure) < 150/90       6. Non morbid obesity, unspecified obesity type  Body mass index is 30.75 kg/(m^2).    7. Idiopathic chronic gout 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     - Uric acid    End of Life Planning:  Patient currently has an advanced directive: Yes.  Practitioner is supportive of decision.    COUNSELING:  Reviewed preventive health counseling, as reflected in patient instructions  Special attention given to:       Regular exercise       Healthy diet/nutrition       Vision screening       Hearing screening       Dental care       Colon cancer screening       Prostate cancer screening    BP Screening:   Last 3 BP Readings:    BP Readings from Last 3 Encounters:   09/26/17 137/77   07/25/17 116/82   04/10/17 154/80       The following was recommended to the patient:  Re-screen BP within a year and recommended lifestyle modifications    Estimated body mass  "index is 30.75 kg/(m^2) as calculated from the following:    Height as of this encounter: 5' 9\" (1.753 m).    Weight as of this encounter: 208 lb 4 oz (94.5 kg).  Weight management plan: Discussed healthy diet and exercise guidelines and patient will follow up in 12 months in clinic to re-evaluate.   reports that he has never smoked. He has never used smokeless tobacco.        Appropriate preventive services were discussed with this patient, including applicable screening as appropriate for cardiovascular disease, diabetes, osteopenia/osteoporosis, and glaucoma.  As appropriate for age/gender, discussed screening for colorectal cancer, prostate cancer, breast cancer, and cervical cancer. Checklist reviewing preventive services available has been given to the patient.    Reviewed patients plan of care and provided an AVS. The Basic Care Plan (routine screening as documented in Health Maintenance) for Saul meets the Care Plan requirement. This Care Plan has been established and reviewed with the Patient.    Counseling Resources:  ATP IV Guidelines  Pooled Cohorts Equation Calculator  Breast Cancer Risk Calculator  FRAX Risk Assessment  ICSI Preventive Guidelines  Dietary Guidelines for Americans, 2010  USDA's MyPlate  ASA Prophylaxis  Lung CA Screening    Waldo Matson MD, MD  [facility] for HPI/ROS submitted by the patient on 9/24/2017   PHQ-2 Score: 0              "

## 2017-09-26 ENCOUNTER — OFFICE VISIT (OUTPATIENT)
Dept: FAMILY MEDICINE | Facility: CLINIC | Age: 72
End: 2017-09-26
Payer: COMMERCIAL

## 2017-09-26 VITALS
HEART RATE: 66 BPM | WEIGHT: 208.25 LBS | HEIGHT: 69 IN | RESPIRATION RATE: 16 BRPM | BODY MASS INDEX: 30.84 KG/M2 | DIASTOLIC BLOOD PRESSURE: 77 MMHG | SYSTOLIC BLOOD PRESSURE: 137 MMHG | TEMPERATURE: 98.1 F | OXYGEN SATURATION: 98 %

## 2017-09-26 DIAGNOSIS — E78.5 HYPERLIPIDEMIA LDL GOAL <130: ICD-10-CM

## 2017-09-26 DIAGNOSIS — Z23 NEED FOR PROPHYLACTIC VACCINATION AND INOCULATION AGAINST INFLUENZA: ICD-10-CM

## 2017-09-26 DIAGNOSIS — Z00.00 MEDICARE ANNUAL WELLNESS VISIT, SUBSEQUENT: Primary | ICD-10-CM

## 2017-09-26 DIAGNOSIS — I10 HYPERTENSION GOAL BP (BLOOD PRESSURE) < 150/90: ICD-10-CM

## 2017-09-26 DIAGNOSIS — E66.9 NON MORBID OBESITY, UNSPECIFIED OBESITY TYPE: ICD-10-CM

## 2017-09-26 DIAGNOSIS — R06.83 SNORING: ICD-10-CM

## 2017-09-26 DIAGNOSIS — M1A.0720 IDIOPATHIC CHRONIC GOUT OF LEFT FOOT WITHOUT TOPHUS: ICD-10-CM

## 2017-09-26 PROCEDURE — 80061 LIPID PANEL: CPT | Performed by: FAMILY MEDICINE

## 2017-09-26 PROCEDURE — 36415 COLL VENOUS BLD VENIPUNCTURE: CPT | Performed by: FAMILY MEDICINE

## 2017-09-26 PROCEDURE — 84550 ASSAY OF BLOOD/URIC ACID: CPT | Performed by: FAMILY MEDICINE

## 2017-09-26 PROCEDURE — 80053 COMPREHEN METABOLIC PANEL: CPT | Performed by: FAMILY MEDICINE

## 2017-09-26 PROCEDURE — 99397 PER PM REEVAL EST PAT 65+ YR: CPT | Performed by: FAMILY MEDICINE

## 2017-09-26 NOTE — NURSING NOTE
"Chief Complaint   Patient presents with     Physical       Initial /77 (BP Location: Right arm, Patient Position: Chair, Cuff Size: Adult Large)  Pulse 66  Temp 98.1  F (36.7  C) (Oral)  Resp 16  Ht 1.753 m (5' 9\")  Wt 94.5 kg (208 lb 4 oz)  SpO2 98%  BMI 30.75 kg/m2 Estimated body mass index is 30.75 kg/(m^2) as calculated from the following:    Height as of this encounter: 1.753 m (5' 9\").    Weight as of this encounter: 94.5 kg (208 lb 4 oz).  Medication Reconciliation: complete     Ashley Nava CMA      "

## 2017-09-26 NOTE — MR AVS SNAPSHOT
After Visit Summary   9/26/2017    Saul Hairston    MRN: [id number]           Patient Information     Date Of Birth          1945        Visit Information        Provider Department      9/26/2017 10:40 AM Waldo Matson MD [facility] [place]        Today's Diagnoses     Medicare annual wellness visit, subsequent    -  1    Need for prophylactic vaccination and inoculation against influenza        Snoring        Hyperlipidemia LDL goal <130        Hypertension goal BP (blood pressure) < 150/90        Non morbid obesity, unspecified obesity type        Idiopathic chronic gout 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          Care Instructions      Preventive Health Recommendations:       Male Ages 65 and over    Yearly exam:             See your health care provider every year in order to  o   Review health changes.   o   Discuss preventive care.    o   Review your medicines if your doctor has prescribed any.    Talk with your health care provider about whether you should have a test to screen for prostate cancer (PSA).    Every 3 years, have a diabetes test (fasting glucose). If you are at risk for diabetes, you should have this test more often.    Every 5 years, have a cholesterol test. Have this test more often if you are at risk for high cholesterol or heart disease.     Every 10 years, have a colonoscopy. Or, have a yearly FIT test (stool test). These exams will check for colon cancer.    Talk to with your health care provider about screening for Abdominal Aortic Aneurysm if you have a family history of AAA or have a history of smoking.  Shots:     Get a flu shot each year.     Get a tetanus shot every 10 years.     Talk to your doctor about your pneumonia vaccines. There are now two you should receive - Pneumovax (PPSV 23) and Prevnar (PCV 13).    Talk to your doctor about a shingles vaccine.     Talk to your doctor about the hepatitis B vaccine.  Nutrition:     Eat at least 5 servings of  fruits and vegetables each day.     Eat whole-grain bread, whole-wheat pasta and brown rice instead of white grains and rice.     Talk to your doctor about Calcium and Vitamin D.   Lifestyle    Exercise for at least 150 minutes a week (30 minutes a day, 5 days a week). This will help you control your weight and prevent disease.     Limit alcohol to one drink per day.     No smoking.     Wear sunscreen to prevent skin cancer.     See your dentist every six months for an exam and cleaning.     See your eye doctor every 1 to 2 years to screen for conditions such as glaucoma, macular degeneration and cataracts.          Follow-ups after your visit        Additional Services     SLEEP EVALUATION & MANAGEMENT REFERRAL - ADULT       Please be aware that coverage of these services is subject to the terms and limitations of your health insurance plan.  Call member services at your health plan with any benefit or coverage questions.      Please bring the following to your appointment:    >>   List of current medications   >>   This referral request   >>   Any documents/labs given to you for this referral    [facility])   [phone] (Age 18 and up)                  Future tests that were ordered for you today     Open Future Orders        Priority Expected Expires Ordered    SLEEP EVALUATION & MANAGEMENT REFERRAL - ADULT Routine  9/26/2018 9/26/2017            Who to contact     If you have questions or need follow up information about today's clinic visit or your schedule please contact [facility] directly at [phone].  Normal or non-critical lab and imaging results will be communicated to you by MyChart, letter or phone within 4 business days after the clinic has received the results. If you do not hear from us within 7 days, please contact the clinic through MyChart or phone. If you have a critical or abnormal lab result, we will notify you by phone as soon as possible.  Submit refill  "requests through [employer] or call your pharmacy and they will forward the refill request to us. Please allow 3 business days for your refill to be completed.          Additional Information About Your Visit        [employer] Information     [employer] gives you secure access to your electronic health record. If you see a primary care provider, you can also send messages to your care team and make appointments. If you have questions, please call your primary care clinic.  If you do not have a primary care provider, please call [phone] and they will assist you.        Care EveryWhere ID     This is your Care EveryWhere ID. This could be used by other organizations to access your [facility] medical records  [id number]        Your Vitals Were     Pulse Temperature Respirations Height Pulse Oximetry BMI (Body Mass Index)    66 98.1  F (36.7  C) (Oral) 16 5' 9\" (1.753 m) 98% 30.75 kg/m2       Blood Pressure from Last 3 Encounters:   09/26/17 137/77   07/25/17 116/82   04/10/17 154/80    Weight from Last 3 Encounters:   09/26/17 208 lb 4 oz (94.5 kg)   07/25/17 208 lb (94.3 kg)   04/10/17 207 lb (93.9 kg)              We Performed the Following     Comprehensive metabolic panel     Lipid with Reflex to Direct LDL     Uric acid        Primary Care Provider Office Phone # Fax #    Waldo Brennan Matson [phone] [address] [place] [id number]        Equal Access to Services     OLGA MARINELLI AH: Hadii marybeth Oro, waaxda phoebe, qaybta kaalmada jaya, luis e yang. So [facility] [phone].    ATENCIÓN: Si habla español, tiene a dubon disposición servicios gratuitos de asistencia lingüística. Llame al [phone].    We comply with applicable federal civil rights laws and [place] laws. We do not discriminate on the basis of race, color, national origin, age, disability sex, sexual orientation or gender identity.            Thank you!     Thank you for " choosing [facility]  for your care. Our goal is always to provide you with excellent care. Hearing back from our patients is one way we can continue to improve our services. Please take a few minutes to complete the written survey that you may receive in the mail after your visit with us. Thank you!             Your Updated Medication List - Protect others around you: Learn how to safely use, store and throw away your medicines at www.disposemymeds.org.          This list is accurate as of: 9/26/17 11:13 AM.  Always use your most recent med list.                   Brand Name Dispense Instructions for use Diagnosis    allopurinol 100 MG tablet    ZYLOPRIM    270 tablet    Take 3 tablets (300 mg) by mouth daily    Idiopathic chronic gout 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       atorvastatin 20 MG tablet    LIPITOR    90 tablet    Take 1 tablet (20 mg) by mouth daily    Hyperlipidemia LDL goal <130       hydrochlorothiazide 25 MG tablet    HYDRODIURIL    90 tablet    Take 1 tablet (25 mg) by mouth daily    Benign essential hypertension       indomethacin 50 MG capsule    INDOCIN    40 capsule    Take 1 capsule (50 mg) by mouth 3 times daily (with meals)    Idiopathic chronic gout 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       MULTIVITAMIN PO           OMEGA-3 FISH OIL PO           triamcinolone 0.1 % cream    KENALOG    45 g    Apply sparingly to affected area three times daily for 14 days.    Dermatitis

## 2017-09-27 LAB
ALBUMIN SERPL-MCNC: 3.8 G/DL (ref 3.4–5)
ALP SERPL-CCNC: 126 U/L (ref 40–150)
ALT SERPL W P-5'-P-CCNC: 63 U/L (ref 0–70)
ANION GAP SERPL CALCULATED.3IONS-SCNC: 11 MMOL/L (ref 3–14)
AST SERPL W P-5'-P-CCNC: 30 U/L (ref 0–45)
BILIRUB SERPL-MCNC: 0.4 MG/DL (ref 0.2–1.3)
BUN SERPL-MCNC: 21 MG/DL (ref 7–30)
CALCIUM SERPL-MCNC: 8.9 MG/DL (ref 8.5–10.1)
CHLORIDE SERPL-SCNC: 106 MMOL/L (ref 94–109)
CHOLEST SERPL-MCNC: 137 MG/DL
CO2 SERPL-SCNC: 25 MMOL/L (ref 20–32)
CREAT SERPL-MCNC: 1.06 MG/DL (ref 0.66–1.25)
GFR SERPL CREATININE-BSD FRML MDRD: 69 ML/MIN/1.7M2
GLUCOSE SERPL-MCNC: 106 MG/DL (ref 70–99)
HDLC SERPL-MCNC: 39 MG/DL
LDLC SERPL CALC-MCNC: 81 MG/DL
NONHDLC SERPL-MCNC: 98 MG/DL
POTASSIUM SERPL-SCNC: 4.2 MMOL/L (ref 3.4–5.3)
PROT SERPL-MCNC: 7.2 G/DL (ref 6.8–8.8)
SODIUM SERPL-SCNC: 142 MMOL/L (ref 133–144)
TRIGL SERPL-MCNC: 84 MG/DL
URATE SERPL-MCNC: 7.3 MG/DL (ref 3.5–7.2)

## 2017-10-17 ENCOUNTER — OFFICE VISIT (OUTPATIENT)
Dept: SLEEP MEDICINE | Facility: CLINIC | Age: 72
End: 2017-10-17
Attending: FAMILY MEDICINE
Payer: COMMERCIAL

## 2017-10-17 VITALS
DIASTOLIC BLOOD PRESSURE: 74 MMHG | BODY MASS INDEX: 31.26 KG/M2 | HEIGHT: 70 IN | OXYGEN SATURATION: 98 % | SYSTOLIC BLOOD PRESSURE: 153 MMHG | HEART RATE: 69 BPM | WEIGHT: 218.4 LBS | RESPIRATION RATE: 16 BRPM

## 2017-10-17 DIAGNOSIS — R40.0 SLEEPINESS: ICD-10-CM

## 2017-10-17 DIAGNOSIS — I10 HYPERTENSION GOAL BP (BLOOD PRESSURE) < 150/90: ICD-10-CM

## 2017-10-17 DIAGNOSIS — G47.30 OBSERVED SLEEP APNEA: Primary | ICD-10-CM

## 2017-10-17 DIAGNOSIS — I10 ESSENTIAL HYPERTENSION: ICD-10-CM

## 2017-10-17 DIAGNOSIS — R06.83 SNORING: ICD-10-CM

## 2017-10-17 PROCEDURE — 99204 OFFICE O/P NEW MOD 45 MIN: CPT | Performed by: PHYSICIAN ASSISTANT

## 2017-10-17 NOTE — NURSING NOTE
"Chief Complaint   Patient presents with     Sleep Problem     Consult, \"Tired, not rested in the morning.\"       Initial /79  Pulse 69  Resp 16  Ht 1.778 m (5' 10\")  Wt 99.1 kg (218 lb 6.4 oz)  SpO2 98%  BMI 31.34 kg/m2 Estimated body mass index is 31.34 kg/(m^2) as calculated from the following:    Height as of this encounter: 1.778 m (5' 10\").    Weight as of this encounter: 99.1 kg (218 lb 6.4 oz).  Medication Reconciliation: complete     ESS 8  Neck 46cm  Britt Mark    "
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
## 2017-10-17 NOTE — MR AVS SNAPSHOT
"              After Visit Summary   10/17/2017    Saul Hairston    MRN: [id number]           Patient Information     Date Of Birth          1945        Visit Information        Provider Department      10/17/2017 12:30 PM Goltz, Bennett Ezra, PA-C [facility]        Today's Diagnoses     Observed sleep apnea    -  1    Snoring        Sleepiness        Essential hypertension        Hypertension goal BP (blood pressure) < 150/90          Care Instructions    MY TREATMENT INFORMATION FOR SLEEP APNEA-  Saulbev Hairston    DOCTOR : Bennett Ezra Goltz, PA-C  SLEEP CENTER : [facility]     MY CONTACT NUMBER: [phone]    Am I having a sleep study at a sleep center?  Make sure you have an appointment for the study before you leave!    Am I having a home sleep study?  Watch this video:  https://www.iJento.com/watch?v=CteI_GhyP9g&[email or web address]    Frequently asked questions:  1. What is Obstructive Sleep Apnea (MUNIRA)? MUNIRA is the most common type of sleep apnea. Apnea means, \"without breath.\"  Apnea is most often caused by narrowing or collapse of the upper airway as muscles relax during sleep.   Almost everyone has occasional apneas. Most people with sleep apnea have had brief interruptions at night frequently for many years.  The severity of sleep apnea is related to how frequent and severe the events are.   2. What are the consequences of MUNIRA? Symptoms include: feeling sleepy during the day, snoring loudly, gasping or stopping of breathing, trouble sleeping, and occasionally morning headaches or heartburn at night.  Sleepiness can be serious and even increase the risk of falling asleep while driving. Other health consequences may include development of high blood pressure and other cardiovascular disease in persons who are susceptible. Untreated MUNIRA can contribute to heart disease, stroke and diabetes.   3. What are the treatment options? In most situations, sleep apnea is a lifelong " disease that must be managed with daily therapy. Medications are not effective for sleep apnea and surgery is generally not considered until other therapies have been tried. Your treatment is your choice . Continuous Positive Airway (CPAP) works right away and is the therapy that is effective in nearly everyone. An oral device to hold your jaw forward is usually the next most reliable option. Other options include postioning devices (to keep you off your back), weight loss, and surgery including a tongue pacing device. There is more detail about some of these options below.    Important tips for using CPAP and similar devices   Know your equipment:  CPAP is continuous positive airway pressure that prevents obstructive sleep apnea by keeping the throat from collapsing while you are sleeping. In most cases, the device is  smart  and can slowly self-adjusts if your throat collapses and keeps a record every day of how well you are treated-this information is available to you and your care team.  BPAP is bilevel positive airway pressure that keeps your throat open and also assists each breath with a pressure boost to maintain adequate breathing.  Special kinds of BPAP are used in patients who have inadequate breathing from lung or heart disease. In most cases, the device is  smart  and can slowly self-adjusts to assist breathing. Like CPAP, the device keeps a record of how well you are treated.  Your mask is your connection to the device. You get to choose what feels most comfortable and the staff will help to make sure if fits. Here: are some examples of the different masks that are available:       Key points to remember on your journey with sleep apnea:  1. Sleep study.  PAP devices often need to be adjusted during a sleep study to show that they are effective and adjusted right.  2. Good tips to remember: Try wearing just the mask during a quiet time during the day so your body adapts to wearing it. A humidifier is  recommended for comfort in most cases to prevent drying of your nose and throat. Allergy medication from your provider may help you if you are having nasal congestion.  3. Getting settled-in. It takes more than one night for most of us to get used to wearing a mask. Try wearing just the mask during a quiet time during the day so your body adapts to wearing it. A humidifier is recommended for comfort in most cases. Our team will work with you carefully on the first day and will be in contact within 4 days and again at 2 and 4 weeks for advice and remote device adjustments. Your therapy is evaluated by the device each day.   4. Use it every night. The more you are able to sleep naturally for 7-8 hours, the more likely you will have good sleep and to prevent health risks or symptoms from sleep apnea. Even if you use it 4 hours it helps. Occasionally all of us are unable to use a medical therapy, in sleep apnea, it is not dangerous to miss one night.   5. Communicate. Call our skilled team on the number provided on the first day if your visit for problems that make it difficult to wear the device. Over 2 out of 3 patients can learn to wear the device long-term with help from our team. Remember to call our team or your sleep providers if you are unable to wear the device as we may have other solutions for those who cannot adapt to mask CPAP therapy. It is recommended that you sleep your sleep provider within the first 3 months and yearly after that if you are not having problems.   Take care of your equipment. Make sure you clean your mask and tubing using directions every day and that your filter and mask are replaced as recommended or if they are not working.     BESIDES CPAP, WHAT OTHER THERAPIES ARE THERE?    Positioning Device  Positioning devices are generally used when sleep apnea is mild and only occurs on your back.This example shows a pillow that straps around the waist. It may be appropriate for those whose  sleep study shows milder sleep apnea that occurs primarily when lying flat on one's back. Preliminary studies have shown benefit but effectiveness at home may need to be verified by a home sleep test. These devices are generally not covered by medical insurance.  Examples of devices that maintain sleeping on the back to prevent snoring and mild sleep apnea.    Belt type body positioner  Http://Napatech[employer]/    Electronic reminder  Http://nightshifttherapy.com/  http://www.Yododo.OptionsCity Software.au/product.html    http://[employer]/    Oral Appliance  What is oral appliance therapy?  An oral appliance device fits on your teeth at night like a retainer used after having braces. The device is made by a specialized dentist and requires several visits over 1-2 months before a manufactured device is made to fit your teeth and is adjusted to prevent your sleep apnea. Once an oral device is working properly, snoring should be improved. A home sleep test may be recommended at that time if to determine whether the sleep apnea is adequately treated.       Some things to remember:  -Oral devices are often, but not always, covered by your medical insurance. Be sure to check with your insurance provider.   -If you are referred for oral therapy, you will be given a list of specialized dentists to consider or you may choose to visit the Web site of the American Academy of Dental Sleep Medicine  -Oral devices are less likely to work if you have severe sleep apnea or are extremely overweight.     More detailed information  An oral appliance is a small acrylic device that fits over the upper and lower teeth  (similar to a retainer or a mouth guard). This device slightly moves jaw forward, which moves the base of the tongue forward, opens the airway, improves breathing for effective treat snoring and obstructive sleep apnea in perhaps 7 out of 10 people .  The best working devices are custom-made by a dental device  after a mold  is made of the teeth 1, 2, 3.  When is an oral appliance indicated?  Oral appliance therapy is recommended as a first-line treatment for patients with primary snoring, mild sleep apnea, and for patients with moderate sleep apnea who prefer appliance therapy to use of CPAP4, 5. Severity of sleep apnea is determined by sleep testing and is based on the number of respiratory events per hour of sleep.   How successful is oral appliance therapy?  The success rate of oral appliance therapy in patients with mild sleep apnea is 75-80% while in patients with moderate sleep apnea it is 50-70%. The chance of success in patients with severe sleep apnea is 40-50%. The research also shows that oral appliances have a beneficial effect on the cardiovascular health of MUNIRA patients at the same magnitude as CPAP therapy7.  Oral appliances should be a second-line treatment in cases of severe sleep apnea, but if not completely successful then a combination therapy utilizing CPAP plus oral appliance therapy may be effective. Oral appliances tend to be effective in a broad range of patients although studies show that the patients who have the highest success are females, younger patients, those with milder disease, and less severe obesity. 3, 6.   Finding a dentist that practices dental sleep medicine  Specific training is available through the American Academy of Dental Sleep Medicine for dentists interested in working in the field of sleep. To find a dentist who is educated in the field of sleep and the use of oral appliances, near you, visit the Web site of the American Academy of Dental Sleep Medicine.    References  1. Henrietta, et al. Objectively measured vs self-reported compliance during oral appliance therapy for sleep-disordered breathing. Chest 2013; 144(5): 1500-1520.  2. Dajuan et al. Objective measurement of compliance during oral appliance therapy for sleep-disordered breathing. Thorax 2013; 68(1): 91-96.  3.  Hector et al. Mandibular advancement devices in 620 men and women with MUNIRA and snoring: tolerability and predictors of treatment success. Chest 2004; 125: 5966-4514.  4. Momo et al. Oral appliances for snoring and MUNIRA: a review. Sleep 2006; 29: 244-262.  5. Delores et al. Oral appliance treatment for MUNIRA: an update. J Clin Sleep Med 2014; 10(2): 215-227.  6. Inga et al. Predictors of OSAH treatment outcome. J Dent Res 2007; 86: 2246-6641.      Weight Loss:    Weight loss is a long-term strategy that may improve sleep apnea in some patients.    Weight management is a personal decision.  If you are interested in exploring weight loss strategies, the following discussion covers the impact on weight loss on sleep apnea and the approaches that may be successful.    Weight loss decreases severity of sleep apnea in most people with obesity. For those with mild obesity who have developed snoring with weight gain, even 15-30 pound weight loss can improve and occasionally eliminate sleep apnea.  Structured and life-long dietary and health habits are necessary to lose weight and keep healthier weight levels.     Though there may be significant health benefits from weight loss, long-term weight loss is very difficult to achieve- studies show success with dietary management in less than 10% of people. In addition, substantial weight loss may require years of dietary control and may be difficult if patients have severe obesity. In these cases, surgical management may be considered.  Finally, older individuals who have tolerated obesity without health complications may be less likely to benefit from weight loss strategies.      Your BMI is Body mass index is 31.34 kg/(m^2).  Weight management is a personal decision.  If you are interested in exploring weight loss strategies, the following discussion covers the approaches that may be successful. Body mass index (BMI) is one way to tell whether you are at a healthy  weight, overweight, or obese. It measures your weight in relation to your height.  A BMI of 18.5 to 24.9 is in the healthy range. A person with a BMI of 25 to 29.9 is considered overweight, and someone with a BMI of 30 or greater is considered obese. More than two-thirds of American adults are considered overweight or obese.  Being overweight or obese increases the risk for further weight gain. Excess weight may lead to heart disease and diabetes.  Creating and following plans for healthy eating and physical activity may help you improve your health.  Weight control is part of healthy lifestyle and includes exercise, emotional health, and healthy eating habits. Careful eating habits lifelong are the mainstay of weight control. Though there are significant health benefits from weight loss, long-term weight loss with diet alone may be very difficult to achieve- studies show long-term success with dietary management in less than 10% of people. Attaining a healthy weight may be especially difficult to achieve in those with severe obesity. In some cases, medications, devices and surgical management might be considered.  What can you do?  If you are overweight or obese and are interested in methods for weight loss, you should discuss this with your provider.     Consider reducing daily calorie intake by 500 calories.     Keep a food journal.     Avoiding skipping meals, consider cutting portions instead.    Diet combined with exercise helps maintain muscle while optimizing fat loss. Strength training is particularly important for building and maintaining muscle mass. Exercise helps reduce stress, increase energy, and improves fitness. Increasing exercise without diet control, however, may not burn enough calories to loose weight.       Start walking three days a week 10-20 minutes at a time    Work towards walking thirty minutes five days a week     Eventually, increase the speed of your walking for 1-2 minutes at  time    In addition, we recommend that you review healthy lifestyles and methods for weight loss available through the National Institutes of Health patient information sites:  http://win.niddk.nih.gov/publications/index.htm    And look into health and wellness programs that may be available through your health insurance provider, employer, local community center, or [employer].    Weight management plan: Patient was referred to their PCP to discuss a diet and exercise plan.  Surgery:    Surgery for obstructive sleep apnea is considered generally only when other therapies fail to work. Surgery may be discussed with you if you are having a difficult time tolerating CPAP and or when there is an abnormal structure that requires surgical correction.  Nose and throat surgeries often enlarge the airway to prevent collapse.  Most of these surgeries create pain for 1-2 weeks and up to half of the most common surgeries are not effective throughout life.  You should carefully discuss the benefits and drawbacks to surgery with your sleep provider and surgeon to determine if it is the best solution for you.   More information  Surgery for MUNIRA is directed at areas that are responsible for narrowing or complete obstruction of the airway during sleep.  There are a wide range of procedures available to enlarge and/or stabilize the airway to prevent blockage of breathing in the three major areas where it can occur: the palate, tongue, and nasal regions.  Successful surgical treatment depends on the accurate identification of the factors responsible for obstructive sleep apnea in each person.  A personalized approach is required because there is no single treatment that works well for everyone.  Because of anatomic variation, consultation with an examination by a sleep surgeon is a critical first step in determining what surgical options are best for each patient.  In some cases, examination during sedation may be recommended in  order to guide the selection of procedures.  Patients will be counseled about risks and benefits as well as the typical recovery course after surgery. Surgery is typically not a cure for a person s MUNIRA.  However, surgery will often significantly improve one s MUNIRA severity (termed  success rate ).  Even in the absence of a cure, surgery will decrease the cardiovascular risk associated with OSA7; improve overall quality of life8 (sleepiness, functionality, sleep quality, etc).  Palate Procedures:  Patients with MUNIRA often have narrowing of their airway in the region of their tonsils and uvula.  The goals of palate procedures are to widen the airway in this region as well as to help the tissues resist collapse.  Modern palate procedure techniques focus on tissue conservation and soft tissue rearrangement, rather than tissue removal.  Often the uvula is preserved in this procedure. Residual sleep apnea is common in patient after pharyngoplasty with an average reduction in sleep apnea events of 33%2.    Tongue Procedures:  ExamWhile patients are awake, the muscles that surround the throat are active and keep this region open for breathing. These muscles relax during sleep, allowing the tongue and other structures to collapse and block breathing.  There are several different tongue procedures available.  Selection of a tongue base procedure depends on characteristics seen on physical exam.  Generally, procedures are aimed at removing bulky tissues in this area or preventing the back of the tongue from falling back during sleep.  Success rates for tongue surgery range from 50-62%3.  Hypoglossal Nerve Stimulation:  Hypoglossal nerve stimulation has recently received approval from the [place] Food and Drug Administration for the treatment of obstructive sleep apnea.  This is based on research showing that the system was safe and effective in treating sleep apnea6.  Results showed that the median AHI score decreased 68%,  from 29.3 to 9.0. This therapy uses an implant system that senses breathing patterns and delivers mild stimulation to airway muscles, which keeps the airway open during sleep.  The system consists of three fully implanted components: a small generator (similar in size to a pacemaker), a breathing sensor, and a stimulation lead.  Using a small handheld remote, a patient turns the therapy on before bed and off upon awakening.    Candidates for this device must be greater than 22 years of age, have moderate to severe MUNIRA (AHI between 20-65), BMI less than 32, have tried CPAP/oral appliance without success, and have appropriate upper airway anatomy (determined by a sleep endoscopy performed by Dr. Lester).  Hypoglossal Nerve Stimulation Pathway:    The sleep surgeon s office will work with the patient through the insurance prior-authorization process (including communications and appeals).    Nasal Procedures:  Nasal obstruction can interfere with nasal breathing during the day and night.  Studies have shown that relief of nasal obstruction can improve the ability of some patients to tolerate positive airway pressure therapy for obstructive sleep apnea1.  Treatment options include medications such as nasal saline, topical corticosteroid and antihistamine sprays, and oral medications such as antihistamines or decongestants. Non-surgical treatments can include external nasal dilators for selected patients. If these are not successful by themselves, surgery can improve the nasal airway either alone or in combination with these other options.  Combination Procedures:  Combination of surgical procedures and other treatments may be recommended, particularly if patients have more than one area of narrowing or persistent positional disease.  The success rate of combination surgery ranges from 66-80%2,3.    References  1. Milton MARLEY. The Role of the Nose in Snoring and Obstructive Sleep Apnoea: An Update.  Eur Arch  Otorhinolaryngol. 2011; 268: 1365-73.  2.  Saida SM; Golden JA; Donovan JR; Pallanch JF; Mara MB; Dario SG; Day GLASER. Surgical modifications of the upper airway for obstructive sleep apnea in adults: a systematic review and meta-analysis. SLEEP 2010;33(10):4220-6642. Josiah FERREIRA. Hypopharyngeal surgery in obstructive sleep apnea: an evidence-based medicine review.  Arch Otolaryngol Head Neck Surg. 2006 Feb;132(2):206-13.  3. Heron YH1, Hiram Y, Shiraz JUSTIN. The efficacy of anatomically based multilevel surgery for obstructive sleep apnea. Otolaryngol Head Neck Surg. 2003 Oct;129(4):327-35.  4. Josiah FERREIRA, Goldberg A. Hypopharyngeal Surgery in Obstructive Sleep Apnea: An Evidence-Based Medicine Review. Arch Otolaryngol Head Neck Surg. 2006 Feb;132(2):206-13.  5. Ileana MILLER et al. Upper-Airway Stimulation for Obstructive Sleep Apnea.  N Engl J Med. 2014 Jan 9;370(2):139-49.  6. Peker Y et al. Increased Incidence of Cardiovascular Disease in Middle-aged Men with Obstructive Sleep Apnea. Am J Respir Crit Care Med; 2002 166: 159-165  7. Boo GOFF et al. Studying Life Effects and Effectiveness of Palatopharyngoplasty (SLEEP) study: Subjective Outcomes of Isolated Uvulopalatopharyngoplasty. Otolaryngol Head Neck Surg. 2011; 144: 623-631.            Follow-ups after your visit        Follow-up notes from your care team     Return in about 2 weeks (around 10/31/2017) for Sleep Study Review.      Your next 10 appointments already scheduled     Oct 23, 2017 11:30 AM CDT   H[place]  with [place]ED 7  [place]   [facility]    [address] [address]   [phone]            Oct 24, 2017 12:00 PM CDT   HST Drop Off with  SLEEP CENTER DME   [facility] ([facility])    [address] [address]   [phone]            Oct 31, 2017  1:30 PM CDT   Return Sleep Patient with Stevenson  "Ezra Goltz, PA-C   [facility] ([facility])    [address] [address] [phone]              Future tests that were ordered for you today     Open Future Orders        Priority Expected Expires Ordered    HST-Home Sleep Apnea Test Routine  4/18/2018 10/17/2017            Who to contact     If you have questions or need follow up information about today's clinic visit or your schedule please contact [facility] directly at [phone].  Normal or non-critical lab and imaging results will be communicated to you by Nearpodhart, letter or phone within 4 business days after the clinic has received the results. If you do not hear from us within 7 days, please contact the clinic through Cent[employer] or phone. If you have a critical or abnormal lab result, we will notify you by phone as soon as possible.  Submit refill requests through [employer] or call your pharmacy and they will forward the refill request to us. Please allow 3 business days for your refill to be completed.          Additional Information About Your Visit        Nearpodhart Information     [employer] gives you secure access to your electronic health record. If you see a primary care provider, you can also send messages to your care team and make appointments. If you have questions, please call your primary care clinic.  If you do not have a primary care provider, please call [phone] and they will assist you.        Care EveryWhere ID     This is your Care EveryWhere ID. This could be used by other organizations to access your [facility] medical records  [id number]        Your Vitals Were     Pulse Respirations Height Pulse Oximetry BMI (Body Mass Index)       69 16 1.778 m (5' 10\") 98% 31.34 kg/m2        Blood Pressure from Last 3 Encounters:   10/17/17 153/74   09/26/17 137/77   07/25/17 116/82    Weight from Last 3 Encounters:   10/17/17 99.1 kg (218 lb 6.4 oz)   09/26/17 " 94.5 kg (208 lb 4 oz)   07/25/17 94.3 kg (208 lb)              We Performed the Following     SLEEP EVALUATION & MANAGEMENT REFERRAL - ADULT        Primary Care Provider Office Phone # Fax #    Waldo Brennan Matson [phone] [address] [place] [id number]        Equal Access to Services     OLGA MARINELLI : Hadii aad ku hadasho Soomaali, waaxda luqadaha, qaybta kaalmada adeegyada, waxay sashain hayaan adejulita henrypaulaarsalan gill . So [facility] [phone].    ATENCIÓN: Si habla español, tiene a dubon disposición servicios gratuitos de asistencia lingüística. [facility] [phone].    We comply with applicable federal civil rights laws and [place] laws. We do not discriminate on the basis of race, color, national origin, age, disability, sex, sexual orientation, or gender identity.            Thank you!     Thank you for choosing [facility]  for your care. Our goal is always to provide you with excellent care. Hearing back from our patients is one way we can continue to improve our services. Please take a few minutes to complete the written survey that you may receive in the mail after your visit with us. Thank you!             Your Updated Medication List - Protect others around you: Learn how to safely use, store and throw away your medicines at www.disposemymeds.org.          This list is accurate as of: 10/17/17  1:27 PM.  Always use your most recent med list.                   Brand Name Dispense Instructions for use Diagnosis    allopurinol 100 MG tablet    ZYLOPRIM    270 tablet    Take 3 tablets (300 mg) by mouth daily    Idiopathic chronic gout 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       atorvastatin 20 MG tablet    LIPITOR    90 tablet    Take 1 tablet (20 mg) by mouth daily    Hyperlipidemia LDL goal <130       hydrochlorothiazide 25 MG tablet    HYDRODIURIL    90 tablet    Take 1 tablet (25 mg) by mouth daily    Benign essential hypertension       indomethacin 50 MG capsule     INDOCIN    40 capsule    Take 1 capsule (50 mg) by mouth 3 times daily (with meals)    Idiopathic chronic gout 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       MULTIVITAMIN PO           OMEGA-3 FISH OIL PO           triamcinolone 0.1 % cream    KENALOG    45 g    Apply sparingly to affected area three times daily for 14 days.    Dermatitis

## 2017-10-17 NOTE — PATIENT INSTRUCTIONS
"MY TREATMENT INFORMATION FOR SLEEP APNEA-  Saul Hairston    DOCTOR : Bennett Ezra Goltz, PA-C  SLEEP CENTER : [facility]     MY CONTACT NUMBER: [phone]    Am I having a sleep study at a sleep center?  Make sure you have an appointment for the study before you leave!    Am I having a home sleep study?  Watch this video:  https://www.Sapheneia.com/watch?v=CteI_GhyP9g&list=[email or web address]    Frequently asked questions:  1. What is Obstructive Sleep Apnea (MUNIRA)? MUNIRA is the most common type of sleep apnea. Apnea means, \"without breath.\"  Apnea is most often caused by narrowing or collapse of the upper airway as muscles relax during sleep.   Almost everyone has occasional apneas. Most people with sleep apnea have had brief interruptions at night frequently for many years.  The severity of sleep apnea is related to how frequent and severe the events are.   2. What are the consequences of MUNIRA? Symptoms include: feeling sleepy during the day, snoring loudly, gasping or stopping of breathing, trouble sleeping, and occasionally morning headaches or heartburn at night.  Sleepiness can be serious and even increase the risk of falling asleep while driving. Other health consequences may include development of high blood pressure and other cardiovascular disease in persons who are susceptible. Untreated MUNIRA can contribute to heart disease, stroke and diabetes.   3. What are the treatment options? In most situations, sleep apnea is a lifelong disease that must be managed with daily therapy. Medications are not effective for sleep apnea and surgery is generally not considered until other therapies have been tried. Your treatment is your choice . Continuous Positive Airway (CPAP) works right away and is the therapy that is effective in nearly everyone. An oral device to hold your jaw forward is usually the next most reliable option. Other options include postioning devices (to keep you off your back), weight loss, and " surgery including a tongue pacing device. There is more detail about some of these options below.    Important tips for using CPAP and similar devices   Know your equipment:  CPAP is continuous positive airway pressure that prevents obstructive sleep apnea by keeping the throat from collapsing while you are sleeping. In most cases, the device is  smart  and can slowly self-adjusts if your throat collapses and keeps a record every day of how well you are treated-this information is available to you and your care team.  BPAP is bilevel positive airway pressure that keeps your throat open and also assists each breath with a pressure boost to maintain adequate breathing.  Special kinds of BPAP are used in patients who have inadequate breathing from lung or heart disease. In most cases, the device is  smart  and can slowly self-adjusts to assist breathing. Like CPAP, the device keeps a record of how well you are treated.  Your mask is your connection to the device. You get to choose what feels most comfortable and the staff will help to make sure if fits. Here: are some examples of the different masks that are available:       Key points to remember on your journey with sleep apnea:  1. Sleep study.  PAP devices often need to be adjusted during a sleep study to show that they are effective and adjusted right.  2. Good tips to remember: Try wearing just the mask during a quiet time during the day so your body adapts to wearing it. A humidifier is recommended for comfort in most cases to prevent drying of your nose and throat. Allergy medication from your provider may help you if you are having nasal congestion.  3. Getting settled-in. It takes more than one night for most of us to get used to wearing a mask. Try wearing just the mask during a quiet time during the day so your body adapts to wearing it. A humidifier is recommended for comfort in most cases. Our team will work with you carefully on the first day and will be  in contact within 4 days and again at 2 and 4 weeks for advice and remote device adjustments. Your therapy is evaluated by the device each day.   4. Use it every night. The more you are able to sleep naturally for 7-8 hours, the more likely you will have good sleep and to prevent health risks or symptoms from sleep apnea. Even if you use it 4 hours it helps. Occasionally all of us are unable to use a medical therapy, in sleep apnea, it is not dangerous to miss one night.   5. Communicate. Call our skilled team on the number provided on the first day if your visit for problems that make it difficult to wear the device. Over 2 out of 3 patients can learn to wear the device long-term with help from our team. Remember to call our team or your sleep providers if you are unable to wear the device as we may have other solutions for those who cannot adapt to mask CPAP therapy. It is recommended that you sleep your sleep provider within the first 3 months and yearly after that if you are not having problems.   Take care of your equipment. Make sure you clean your mask and tubing using directions every day and that your filter and mask are replaced as recommended or if they are not working.     BESIDES CPAP, WHAT OTHER THERAPIES ARE THERE?    Positioning Device  Positioning devices are generally used when sleep apnea is mild and only occurs on your back.This example shows a pillow that straps around the waist. It may be appropriate for those whose sleep study shows milder sleep apnea that occurs primarily when lying flat on one's back. Preliminary studies have shown benefit but effectiveness at home may need to be verified by a home sleep test. These devices are generally not covered by medical insurance.  Examples of devices that maintain sleeping on the back to prevent snoring and mild sleep apnea.    Belt type body positioner  Http://"[employer]/    Electronic  reminder  Http://nightshifttherapy.com/  http://www.[employer]on[employer].au/product.html    http://[employer]/    Oral Appliance  What is oral appliance therapy?  An oral appliance device fits on your teeth at night like a retainer used after having braces. The device is made by a specialized dentist and requires several visits over 1-2 months before a manufactured device is made to fit your teeth and is adjusted to prevent your sleep apnea. Once an oral device is working properly, snoring should be improved. A home sleep test may be recommended at that time if to determine whether the sleep apnea is adequately treated.       Some things to remember:  -Oral devices are often, but not always, covered by your medical insurance. Be sure to check with your insurance provider.   -If you are referred for oral therapy, you will be given a list of specialized dentists to consider or you may choose to visit the Web site of the American Academy of Dental Sleep Medicine  -Oral devices are less likely to work if you have severe sleep apnea or are extremely overweight.     More detailed information  An oral appliance is a small acrylic device that fits over the upper and lower teeth  (similar to a retainer or a mouth guard). This device slightly moves jaw forward, which moves the base of the tongue forward, opens the airway, improves breathing for effective treat snoring and obstructive sleep apnea in perhaps 7 out of 10 people .  The best working devices are custom-made by a dental device  after a mold is made of the teeth 1, 2, 3.  When is an oral appliance indicated?  Oral appliance therapy is recommended as a first-line treatment for patients with primary snoring, mild sleep apnea, and for patients with moderate sleep apnea who prefer appliance therapy to use of CPAP4, 5. Severity of sleep apnea is determined by sleep testing and is based on the number of respiratory events per hour of sleep.   How successful is oral  appliance therapy?  The success rate of oral appliance therapy in patients with mild sleep apnea is 75-80% while in patients with moderate sleep apnea it is 50-70%. The chance of success in patients with severe sleep apnea is 40-50%. The research also shows that oral appliances have a beneficial effect on the cardiovascular health of MUNIRA patients at the same magnitude as CPAP therapy7.  Oral appliances should be a second-line treatment in cases of severe sleep apnea, but if not completely successful then a combination therapy utilizing CPAP plus oral appliance therapy may be effective. Oral appliances tend to be effective in a broad range of patients although studies show that the patients who have the highest success are females, younger patients, those with milder disease, and less severe obesity. 3, 6.   Finding a dentist that practices dental sleep medicine  Specific training is available through the American Academy of Dental Sleep Medicine for dentists interested in working in the field of sleep. To find a dentist who is educated in the field of sleep and the use of oral appliances, near you, visit the Web site of the American Academy of Dental Sleep Medicine.    References  1. Henrietta et al. Objectively measured vs self-reported compliance during oral appliance therapy for sleep-disordered breathing. Chest 2013; 144(5): 9482-7240.  2. Dajuan et al. Objective measurement of compliance during oral appliance therapy for sleep-disordered breathing. Thorax 2013; 68(1): 91-96.  3. Hector, et al. Mandibular advancement devices in 620 men and women with MUNIRA and snoring: tolerability and predictors of treatment success. Chest 2004; 125: 0420-1175.  4. Momo, et al. Oral appliances for snoring and MUNIRA: a review. Sleep 2006; 29: 244-262.  5. eDlores et al. Oral appliance treatment for MUNIRA: an update. J Clin Sleep Med 2014; 10(2): 215-227.  6. Inga et al. Predictors of OSAH treatment outcome. J  Dent Res 2007; 86: 9741-7691.      Weight Loss:    Weight loss is a long-term strategy that may improve sleep apnea in some patients.    Weight management is a personal decision.  If you are interested in exploring weight loss strategies, the following discussion covers the impact on weight loss on sleep apnea and the approaches that may be successful.    Weight loss decreases severity of sleep apnea in most people with obesity. For those with mild obesity who have developed snoring with weight gain, even 15-30 pound weight loss can improve and occasionally eliminate sleep apnea.  Structured and life-long dietary and health habits are necessary to lose weight and keep healthier weight levels.     Though there may be significant health benefits from weight loss, long-term weight loss is very difficult to achieve- studies show success with dietary management in less than 10% of people. In addition, substantial weight loss may require years of dietary control and may be difficult if patients have severe obesity. In these cases, surgical management may be considered.  Finally, older individuals who have tolerated obesity without health complications may be less likely to benefit from weight loss strategies.      Your BMI is Body mass index is 31.34 kg/(m^2).  Weight management is a personal decision.  If you are interested in exploring weight loss strategies, the following discussion covers the approaches that may be successful. Body mass index (BMI) is one way to tell whether you are at a healthy weight, overweight, or obese. It measures your weight in relation to your height.  A BMI of 18.5 to 24.9 is in the healthy range. A person with a BMI of 25 to 29.9 is considered overweight, and someone with a BMI of 30 or greater is considered obese. More than two-thirds of American adults are considered overweight or obese.  Being overweight or obese increases the risk for further weight gain. Excess weight may lead to heart  disease and diabetes.  Creating and following plans for healthy eating and physical activity may help you improve your health.  Weight control is part of healthy lifestyle and includes exercise, emotional health, and healthy eating habits. Careful eating habits lifelong are the mainstay of weight control. Though there are significant health benefits from weight loss, long-term weight loss with diet alone may be very difficult to achieve- studies show long-term success with dietary management in less than 10% of people. Attaining a healthy weight may be especially difficult to achieve in those with severe obesity. In some cases, medications, devices and surgical management might be considered.  What can you do?  If you are overweight or obese and are interested in methods for weight loss, you should discuss this with your provider.     Consider reducing daily calorie intake by 500 calories.     Keep a food journal.     Avoiding skipping meals, consider cutting portions instead.    Diet combined with exercise helps maintain muscle while optimizing fat loss. Strength training is particularly important for building and maintaining muscle mass. Exercise helps reduce stress, increase energy, and improves fitness. Increasing exercise without diet control, however, may not burn enough calories to loose weight.       Start walking three days a week 10-20 minutes at a time    Work towards walking thirty minutes five days a week     Eventually, increase the speed of your walking for 1-2 minutes at time    In addition, we recommend that you review healthy lifestyles and methods for weight loss available through the National Institutes of Health patient information sites:  http://win.niddk.nih.gov/publications/index.htm    And look into health and wellness programs that may be available through your health insurance provider, employer, local community center, or [employer].    Weight management plan: Patient was referred to  their PCP to discuss a diet and exercise plan.  Surgery:    Surgery for obstructive sleep apnea is considered generally only when other therapies fail to work. Surgery may be discussed with you if you are having a difficult time tolerating CPAP and or when there is an abnormal structure that requires surgical correction.  Nose and throat surgeries often enlarge the airway to prevent collapse.  Most of these surgeries create pain for 1-2 weeks and up to half of the most common surgeries are not effective throughout life.  You should carefully discuss the benefits and drawbacks to surgery with your sleep provider and surgeon to determine if it is the best solution for you.   More information  Surgery for MUNIRA is directed at areas that are responsible for narrowing or complete obstruction of the airway during sleep.  There are a wide range of procedures available to enlarge and/or stabilize the airway to prevent blockage of breathing in the three major areas where it can occur: the palate, tongue, and nasal regions.  Successful surgical treatment depends on the accurate identification of the factors responsible for obstructive sleep apnea in each person.  A personalized approach is required because there is no single treatment that works well for everyone.  Because of anatomic variation, consultation with an examination by a sleep surgeon is a critical first step in determining what surgical options are best for each patient.  In some cases, examination during sedation may be recommended in order to guide the selection of procedures.  Patients will be counseled about risks and benefits as well as the typical recovery course after surgery. Surgery is typically not a cure for a person s MUNIRA.  However, surgery will often significantly improve one s MUNIRA severity (termed  success rate ).  Even in the absence of a cure, surgery will decrease the cardiovascular risk associated with OSA7; improve overall quality of life8  (sleepiness, functionality, sleep quality, etc).  Palate Procedures:  Patients with MUNIRA often have narrowing of their airway in the region of their tonsils and uvula.  The goals of palate procedures are to widen the airway in this region as well as to help the tissues resist collapse.  Modern palate procedure techniques focus on tissue conservation and soft tissue rearrangement, rather than tissue removal.  Often the uvula is preserved in this procedure. Residual sleep apnea is common in patient after pharyngoplasty with an average reduction in sleep apnea events of 33%2.    Tongue Procedures:  ExamWhile patients are awake, the muscles that surround the throat are active and keep this region open for breathing. These muscles relax during sleep, allowing the tongue and other structures to collapse and block breathing.  There are several different tongue procedures available.  Selection of a tongue base procedure depends on characteristics seen on physical exam.  Generally, procedures are aimed at removing bulky tissues in this area or preventing the back of the tongue from falling back during sleep.  Success rates for tongue surgery range from 50-62%3.  Hypoglossal Nerve Stimulation:  Hypoglossal nerve stimulation has recently received approval from the [place] Food and Drug Administration for the treatment of obstructive sleep apnea.  This is based on research showing that the system was safe and effective in treating sleep apnea6.  Results showed that the median AHI score decreased 68%, from 29.3 to 9.0. This therapy uses an implant system that senses breathing patterns and delivers mild stimulation to airway muscles, which keeps the airway open during sleep.  The system consists of three fully implanted components: a small generator (similar in size to a pacemaker), a breathing sensor, and a stimulation lead.  Using a small handheld remote, a patient turns the therapy on before bed and off upon awakening.     Candidates for this device must be greater than 22 years of age, have moderate to severe MUNIRA (AHI between 20-65), BMI less than 32, have tried CPAP/oral appliance without success, and have appropriate upper airway anatomy (determined by a sleep endoscopy performed by Dr. Lester).  Hypoglossal Nerve Stimulation Pathway:    The sleep surgeon s office will work with the patient through the insurance prior-authorization process (including communications and appeals).    Nasal Procedures:  Nasal obstruction can interfere with nasal breathing during the day and night.  Studies have shown that relief of nasal obstruction can improve the ability of some patients to tolerate positive airway pressure therapy for obstructive sleep apnea1.  Treatment options include medications such as nasal saline, topical corticosteroid and antihistamine sprays, and oral medications such as antihistamines or decongestants. Non-surgical treatments can include external nasal dilators for selected patients. If these are not successful by themselves, surgery can improve the nasal airway either alone or in combination with these other options.  Combination Procedures:  Combination of surgical procedures and other treatments may be recommended, particularly if patients have more than one area of narrowing or persistent positional disease.  The success rate of combination surgery ranges from 66-80%2,3.    References  1. Milton MARLEY. The Role of the Nose in Snoring and Obstructive Sleep Apnoea: An Update.  Eur Arch Otorhinolaryngol. 2011; 268: 1365-73.  2.  Saida SM; Golden JA; Donovan JR; Pallanch JF; Mara MB; Dario SG; Day REBOLLARD. Surgical modifications of the upper airway for obstructive sleep apnea in adults: a systematic review and meta-analysis. SLEEP 2010;33(10):0666-3363. Josiah FERREIRA. Hypopharyngeal surgery in obstructive sleep apnea: an evidence-based medicine review.  Arch Otolaryngol Head Neck Surg. 2006 Feb;132(2):206-13.  3. Heron DIETZ,  Hiram Y, Shiraz JUSTIN. The efficacy of anatomically based multilevel surgery for obstructive sleep apnea. Otolaryngol Head Neck Surg. 2003 Oct;129(4):327-35.  4. Josiah FERREIRA, Goldberg A. Hypopharyngeal Surgery in Obstructive Sleep Apnea: An Evidence-Based Medicine Review. Arch Otolaryngol Head Neck Surg. 2006 Feb;132(2):206-13.  5. Ileana MILLER et al. Upper-Airway Stimulation for Obstructive Sleep Apnea.  N Engl J Med. 2014 Jan 9;370(2):139-49.  6. Karlos Y et al. Increased Incidence of Cardiovascular Disease in Middle-aged Men with Obstructive Sleep Apnea. Am J Respir Crit Care Med; 2002 166: 159-165  7. Boo GOFF et al. Studying Life Effects and Effectiveness of Palatopharyngoplasty (SLEEP) study: Subjective Outcomes of Isolated Uvulopalatopharyngoplasty. Otolaryngol Head Neck Surg. 2011; 144: 623-631.

## 2017-10-18 ENCOUNTER — DOCUMENTATION ONLY (OUTPATIENT)
Dept: VASCULAR SURGERY | Facility: CLINIC | Age: 72
End: 2017-10-18

## 2017-10-18 DIAGNOSIS — Z13.6 ENCOUNTER FOR ABDOMINAL AORTIC ANEURYSM (AAA) SCREENING: Primary | ICD-10-CM

## 2017-10-23 ENCOUNTER — OFFICE VISIT (OUTPATIENT)
Dept: SLEEP MEDICINE | Facility: CLINIC | Age: 72
End: 2017-10-23
Payer: COMMERCIAL

## 2017-10-23 DIAGNOSIS — G47.30 OBSERVED SLEEP APNEA: ICD-10-CM

## 2017-10-23 DIAGNOSIS — R40.0 SLEEPINESS: ICD-10-CM

## 2017-10-23 DIAGNOSIS — I10 ESSENTIAL HYPERTENSION: ICD-10-CM

## 2017-10-23 DIAGNOSIS — I10 HYPERTENSION GOAL BP (BLOOD PRESSURE) < 150/90: ICD-10-CM

## 2017-10-23 DIAGNOSIS — R06.83 SNORING: Primary | ICD-10-CM

## 2017-10-23 NOTE — PROGRESS NOTES
Patient instructed on HST use. Patient demonstrated and verbalized knowledge of use. Device programmed to start at midnight. Device will be returned tomorrow before noon.    Shaniqua DelgadoGarcia  [facility] - Specialist

## 2017-10-23 NOTE — MR AVS SNAPSHOT
After Visit Summary   10/23/2017    Saul Hairston    MRN: [id number]           Patient Information     Date Of Birth          1945        Visit Information        Provider Department      10/23/2017 11:30 AM BED 7 [facility]        Today's Diagnoses     Snoring    -  1       Follow-ups after your visit        Your next 10 appointments already scheduled     Oct 24, 2017 12:00 PM CDT   HST Drop Off with [facility] D[facility]   [facility] ([facility])    [address]  [facility] [address] [phone]            Oct 31, 2017  1:30 PM CDT   Return Sleep Patient with Bennett Ezra Goltz, PA-C   [facility] ([facility])    [address]  [place] [facility] [place] [place] [address] [phone]              Who to contact     If you have questions or need follow up information about today's clinic visit or your schedule please contact [facility] directly at [phone].  Normal or non-critical lab and imaging results will be communicated to you by Particlehart, letter or phone within 4 business days after the clinic has received the results. If you do not hear from us within 7 days, please contact the clinic through Echographt or phone. If you have a critical or abnormal lab result, we will notify you by phone as soon as possible.  Submit refill requests through SolePower or call your pharmacy and they will forward the refill request to us. Please allow 3 business days for your refill to be completed.          Additional Information About Your Visit        Particlehart Information     SolePower gives you secure access to your electronic health record. If you see a primary care provider, you can also send messages to your care team and make appointments. If you have questions, please call your primary care clinic.  If you do not have a primary care provider, please call [phone]  and they will assist you.        Care EveryWhere ID     This is your Care EveryWhere ID. This could be used by other organizations to access your [place] medical records  [id number]         Blood Pressure from Last 3 Encounters:   10/17/17 153/74   09/26/17 137/77   07/25/17 116/82    Weight from Last 3 Encounters:   10/17/17 99.1 kg (218 lb 6.4 oz)   09/26/17 94.5 kg (208 lb 4 oz)   07/25/17 94.3 kg (208 lb)              Today, you had the following     No orders found for display       Primary Care Provider Office Phone # Fax #    Waldo rBennan Matson [phone] [address] [place], [place] [id number]        Equal Access to Services     OLGA MARINELLI : Haddavid veeo Sogold, waaxda luqadaha, qaybta kaalmada adejulitayada, luis e yang. So [facility] [phone].    ATENCIÓN: Si habla español, tiene a dubon disposición servicios gratuitos de asistencia lingüística. Llame al [phone].    We comply with applicable federal civil rights laws and [place] laws. We do not discriminate on the basis of race, color, national origin, age, disability, sex, sexual orientation, or gender identity.            Thank you!     Thank you for choosing [facility]  for your care. Our goal is always to provide you with excellent care. Hearing back from our patients is one way we can continue to improve our services. Please take a few minutes to complete the written survey that you may receive in the mail after your visit with us. Thank you!             Your Updated Medication List - Protect others around you: Learn how to safely use, store and throw away your medicines at www.disposemymeds.org.          This list is accurate as of: 10/23/17  1:44 PM.  Always use your most recent med list.                   Brand Name Dispense Instructions for use Diagnosis    allopurinol 100 MG tablet    ZYLOPRIM    270 tablet    Take 3 tablets (300 mg) by mouth daily     Idiopathic chronic gout 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       atorvastatin 20 MG tablet    LIPITOR    90 tablet    Take 1 tablet (20 mg) by mouth daily    Hyperlipidemia LDL goal <130       hydrochlorothiazide 25 MG tablet    HYDRODIURIL    90 tablet    Take 1 tablet (25 mg) by mouth daily    Benign essential hypertension       indomethacin 50 MG capsule    INDOCIN    40 capsule    Take 1 capsule (50 mg) by mouth 3 times daily (with meals)    Idiopathic chronic gout 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       MULTIVITAMIN PO           OMEGA-3 FISH OIL PO           triamcinolone 0.1 % cream    KENALOG    45 g    Apply sparingly to affected area three times daily for 14 days.    Dermatitis

## 2017-10-24 ENCOUNTER — DOCUMENTATION ONLY (OUTPATIENT)
Dept: SLEEP MEDICINE | Facility: CLINIC | Age: 72
End: 2017-10-24

## 2017-10-24 NOTE — NURSING NOTE
HST drop off  Download successful. Routed for scoring.    Shaniqua DelgadoGarcia  [facility] Clinic - Specialist

## 2017-10-25 ENCOUNTER — TELEPHONE (OUTPATIENT)
Dept: SLEEP MEDICINE | Facility: CLINIC | Age: 72
End: 2017-10-25

## 2017-10-25 DIAGNOSIS — G47.33 OSA (OBSTRUCTIVE SLEEP APNEA): Primary | ICD-10-CM

## 2017-10-25 NOTE — TELEPHONE ENCOUNTER
I called Mr. Hairston because his home study showed severe MUNIRA and I would like to get him started on treatment as soon as possible. I called and left a message asking for a return call.  Bennett Goltz, PA-C      Home Sleep Study Test Results  Saul Hairston underwent a home sleep study done on 10/23/2017 for feeling tired and being told he stops breathing in his sleep for 15+ years. His medical history is significant for HTN, gout, obesity and allergic rhinitis.    Bed Time Starts: 12:18 AM.  Bed Time Ends: 7:26 AM.  Total estimated sleep time 398.6 minutes.    AHI: 68.8/hr DORIS: 55.7/hr Supine AHI: 0/hr Lateral AHI: 75.1/hr on left and 62.4/hr on right   Average SpO2: 89.2%, baseline 91.6% Lowest Desaturation: 62%  Time Spent Below 89%: 124.4 minutes  Total Obstructive Apneas 420  Total Central/Mixed Apneas 10  Hypopneas 27    Description of Snoring: Loud    Average Pulse: 62.6 bpm  Highest Pulse: 88 bpm  Lowest Pulse: 51 bpm    Percent of time spent supine: 0.4%. He spent 48.1% on his left and 45% on his right.  Preliminary Interpretation of Results:  Severe MUNIRA with hypoxemia

## 2017-10-26 PROCEDURE — 99207 ZZC NO CHARGE LOS: CPT | Performed by: INTERNAL MEDICINE

## 2017-10-26 PROCEDURE — G0399 HOME SLEEP TEST/TYPE 3 PORTA: HCPCS | Performed by: INTERNAL MEDICINE

## 2017-10-26 NOTE — PROCEDURES
"HOME SLEEP STUDY INTERPRETATION    Patient: Saul Hairston  MRN: [id number]  YOB: 1945  Study Date: 10/23/2017  Referring Provider: Waldo Matson;   Ordering Provider: Bennett Goltz, PA     Indications for Home Study: Saul Hairston is a 72 year old male with a history of hypertension, gout who presents with symptoms suggestive of obstructive sleep apnea.    Estimated body mass index is 31.34 kg/(m^2) as calculated from the following:    Height as of 10/17/17: 1.778 m (5' 10\").    Weight as of 10/17/17: 99.1 kg (218 lb 6.4 oz).  Total score - Fenton: 8 (10/17/2017 12:00 PM)  STOP-BAN/8    Data: A full night home sleep study was performed recording the standard physiologic parameters including body position, movement, sound, nasal pressure, thermal oral airflow, chest and abdominal movements with respiratory inductance plethysmography, and oxygen saturation by pulse oximetry. Pulse rate was estimated by oximetry recording. This study was considered adequate based on > 4 hours of quality oximetry and respiratory recording. As specified by the AASM Manual for the Scoring of Sleep and Associated events, version 2.3, Rule VIII.D 1B, 4% oxygen desaturation scoring for hypopneas is used as a standard of care on all home sleep apnea testing.    Analysis Time:  398.6 minutes    Respiration:   Sleep Associated Hypoxemia: sustained hypoxemia was present. Baseline oxygen saturation was 91.6%.  Time with saturation less than or equal to 88% was 124.4 minutes. The lowest oxygen saturation was 62%.   Snoring: Snoring was loud.  Respiratory events: The home study revealed a presence of 420 obstructive apneas and 1 mixed and 9 central apneas. There were 27 hypopneas resulting in a combined apnea/hypopnea index [AHI] of 68.8 events per hour.  AHI was - per hour supine, - per hour prone, 75 per hour on left side, and 62.4 per hour on right side.   Pattern: Excluding events noted above, respiratory rate and " pattern was Normal.    Position: Percent of time spent: supine - 0.4%, prone - 0%, on left - 48%, on right - 45%.    Heart Rate: By pulse oximetry normal rate was noted.     Assessment:   Severe obstructive sleep apnea.  Sleep associated hypoxemia was present.    Recommendations:  Consider polysomnography with full night PAP titration or auto PAP therapy.   Suggest optimizing sleep hygiene and avoiding sleep deprivation.  Weight management.    Diagnosis Code(s): Obstructive Sleep Apnea G47.33, Hypoxemia G47.36    Paramjit Zaman MD, MD, October 26, 2017   Diplomate, American Board of Psychiatry and Neurology, Sleep Medicine

## 2017-10-27 NOTE — TELEPHONE ENCOUNTER
I spoke with Saul and told him I would recommend getting started on CPAP as soon as possible. His wife has a machine that she has not used. It is about 3 years old. He would like to try that machine. I have placed an order to have the pressures changed to 6-18 cm, and for new supplies. He will be out of town next week. He will return to clinic in 2 months. He was advised to bring his CPAP to that appointment.   Bennett Goltz, PA-C

## 2017-10-31 ENCOUNTER — TELEPHONE (OUTPATIENT)
Dept: SLEEP MEDICINE | Facility: CLINIC | Age: 72
End: 2017-10-31

## 2017-11-03 DIAGNOSIS — M1A.0720 IDIOPATHIC CHRONIC GOUT OF LEFT FOOT WITHOUT TOPHUS: ICD-10-CM

## 2017-11-07 RX ORDER — INDOMETHACIN 50 MG/1
CAPSULE ORAL
Qty: 40 CAPSULE | Refills: 2 | Status: SHIPPED | OUTPATIENT
Start: 2017-11-07 | End: 2020-11-17

## 2017-11-07 NOTE — TELEPHONE ENCOUNTER
LOV: 9/26/2017  All labs updated on 9/26/2017.    Prescription not on formulary. Will route to PCP for review.     Thanks! Blanka Thomas RN

## 2017-11-21 DIAGNOSIS — G47.33 OSA (OBSTRUCTIVE SLEEP APNEA): Primary | ICD-10-CM

## 2017-11-27 ENCOUNTER — HOSPITAL ENCOUNTER (EMERGENCY)
Facility: CLINIC | Age: 72
Discharge: HOME | End: 2017-11-27
Attending: EMERGENCY MEDICINE | Admitting: EMERGENCY MEDICINE
Payer: MEDICARE

## 2017-11-27 ENCOUNTER — DOCUMENTATION ONLY (OUTPATIENT)
Dept: SLEEP MEDICINE | Facility: CLINIC | Age: 72
End: 2017-11-27

## 2017-11-27 ENCOUNTER — APPOINTMENT (OUTPATIENT)
Dept: CT IMAGING | Facility: CLINIC | Age: 72
End: 2017-11-27
Attending: EMERGENCY MEDICINE
Payer: MEDICARE

## 2017-11-27 VITALS
DIASTOLIC BLOOD PRESSURE: 83 MMHG | HEART RATE: 61 BPM | WEIGHT: 217 LBS | TEMPERATURE: 97.8 F | RESPIRATION RATE: 16 BRPM | OXYGEN SATURATION: 96 % | SYSTOLIC BLOOD PRESSURE: 156 MMHG | HEIGHT: 70 IN | BODY MASS INDEX: 31.07 KG/M2

## 2017-11-27 DIAGNOSIS — R06.02 SOB (SHORTNESS OF BREATH): ICD-10-CM

## 2017-11-27 DIAGNOSIS — R07.9 CHEST PAIN, UNSPECIFIED TYPE: ICD-10-CM

## 2017-11-27 LAB
ALBUMIN SERPL-MCNC: 3.7 G/DL (ref 3.4–5)
ALP SERPL-CCNC: 134 U/L (ref 40–150)
ALT SERPL W P-5'-P-CCNC: 55 U/L (ref 0–70)
ANION GAP SERPL CALCULATED.3IONS-SCNC: 9 MMOL/L (ref 3–14)
AST SERPL W P-5'-P-CCNC: 42 U/L (ref 0–45)
BASOPHILS # BLD AUTO: 0.1 10E9/L (ref 0–0.2)
BASOPHILS NFR BLD AUTO: 0.7 %
BILIRUB DIRECT SERPL-MCNC: <0.1 MG/DL (ref 0–0.2)
BILIRUB SERPL-MCNC: 0.4 MG/DL (ref 0.2–1.3)
BUN SERPL-MCNC: 20 MG/DL (ref 7–30)
CALCIUM SERPL-MCNC: 8.7 MG/DL (ref 8.5–10.1)
CHLORIDE SERPL-SCNC: 102 MMOL/L (ref 94–109)
CO2 SERPL-SCNC: 27 MMOL/L (ref 20–32)
CREAT SERPL-MCNC: 1.08 MG/DL (ref 0.66–1.25)
D DIMER PPP FEU-MCNC: 0.8 UG/ML FEU (ref 0–0.5)
DIFFERENTIAL METHOD BLD: ABNORMAL
EOSINOPHIL # BLD AUTO: 0.9 10E9/L (ref 0–0.7)
EOSINOPHIL NFR BLD AUTO: 10.9 %
ERYTHROCYTE [DISTWIDTH] IN BLOOD BY AUTOMATED COUNT: 13.2 % (ref 10–15)
GFR SERPL CREATININE-BSD FRML MDRD: 68 ML/MIN/1.7M2
GLUCOSE SERPL-MCNC: 114 MG/DL (ref 70–99)
HCT VFR BLD AUTO: 45.6 % (ref 40–53)
HGB BLD-MCNC: 15.6 G/DL (ref 13.3–17.7)
IMM GRANULOCYTES # BLD: 0 10E9/L (ref 0–0.4)
IMM GRANULOCYTES NFR BLD: 0.2 %
INTERPRETATION ECG - MUSE: NORMAL
LYMPHOCYTES # BLD AUTO: 2.4 10E9/L (ref 0.8–5.3)
LYMPHOCYTES NFR BLD AUTO: 29.8 %
MCH RBC QN AUTO: 29.4 PG (ref 26.5–33)
MCHC RBC AUTO-ENTMCNC: 34.2 G/DL (ref 31.5–36.5)
MCV RBC AUTO: 86 FL (ref 78–100)
MONOCYTES # BLD AUTO: 0.7 10E9/L (ref 0–1.3)
MONOCYTES NFR BLD AUTO: 8.5 %
NEUTROPHILS # BLD AUTO: 4.1 10E9/L (ref 1.6–8.3)
NEUTROPHILS NFR BLD AUTO: 49.9 %
NRBC # BLD AUTO: 0 10*3/UL
NRBC BLD AUTO-RTO: 0 /100
NT-PROBNP SERPL-MCNC: 73 PG/ML (ref 0–900)
PLATELET # BLD AUTO: 207 10E9/L (ref 150–450)
POTASSIUM SERPL-SCNC: 3.7 MMOL/L (ref 3.4–5.3)
PROT SERPL-MCNC: 6.8 G/DL (ref 6.8–8.8)
RBC # BLD AUTO: 5.3 10E12/L (ref 4.4–5.9)
SODIUM SERPL-SCNC: 138 MMOL/L (ref 133–144)
TROPONIN I SERPL-MCNC: <0.015 UG/L (ref 0–0.04)
TROPONIN I SERPL-MCNC: <0.015 UG/L (ref 0–0.04)
WBC # BLD AUTO: 8.2 10E9/L (ref 4–11)

## 2017-11-27 PROCEDURE — 93005 ELECTROCARDIOGRAM TRACING: CPT

## 2017-11-27 PROCEDURE — 80048 BASIC METABOLIC PNL TOTAL CA: CPT | Performed by: EMERGENCY MEDICINE

## 2017-11-27 PROCEDURE — 80076 HEPATIC FUNCTION PANEL: CPT | Performed by: EMERGENCY MEDICINE

## 2017-11-27 PROCEDURE — 25000132 ZZH RX MED GY IP 250 OP 250 PS 637: Mod: GY | Performed by: PHYSICIAN ASSISTANT

## 2017-11-27 PROCEDURE — 85379 FIBRIN DEGRADATION QUANT: CPT | Performed by: EMERGENCY MEDICINE

## 2017-11-27 PROCEDURE — 84484 ASSAY OF TROPONIN QUANT: CPT | Performed by: EMERGENCY MEDICINE

## 2017-11-27 PROCEDURE — A9270 NON-COVERED ITEM OR SERVICE: HCPCS | Mod: GY | Performed by: PHYSICIAN ASSISTANT

## 2017-11-27 PROCEDURE — 71260 CT THORAX DX C+: CPT

## 2017-11-27 PROCEDURE — 25000125 ZZHC RX 250: Performed by: EMERGENCY MEDICINE

## 2017-11-27 PROCEDURE — 25000128 H RX IP 250 OP 636: Performed by: EMERGENCY MEDICINE

## 2017-11-27 PROCEDURE — 85025 COMPLETE CBC W/AUTO DIFF WBC: CPT | Performed by: EMERGENCY MEDICINE

## 2017-11-27 PROCEDURE — 99285 EMERGENCY DEPT VISIT HI MDM: CPT | Mod: 25

## 2017-11-27 PROCEDURE — 83880 ASSAY OF NATRIURETIC PEPTIDE: CPT | Performed by: EMERGENCY MEDICINE

## 2017-11-27 RX ORDER — NITROGLYCERIN 0.4 MG/1
0.4 TABLET SUBLINGUAL EVERY 5 MIN PRN
Status: DISCONTINUED | OUTPATIENT
Start: 2017-11-27 | End: 2017-11-27 | Stop reason: HOSPADM

## 2017-11-27 RX ORDER — IOPAMIDOL 755 MG/ML
76 INJECTION, SOLUTION INTRAVASCULAR ONCE
Status: COMPLETED | OUTPATIENT
Start: 2017-11-27 | End: 2017-11-27

## 2017-11-27 RX ADMIN — NITROGLYCERIN 0.4 MG: 0.4 TABLET SUBLINGUAL at 06:55

## 2017-11-27 RX ADMIN — NITROGLYCERIN 0.4 MG: 0.4 TABLET SUBLINGUAL at 07:01

## 2017-11-27 RX ADMIN — SODIUM CHLORIDE 99 ML: 9 INJECTION, SOLUTION INTRAVENOUS at 08:26

## 2017-11-27 RX ADMIN — IOPAMIDOL 76 ML: 755 INJECTION, SOLUTION INTRAVENOUS at 08:27

## 2017-11-27 ASSESSMENT — ENCOUNTER SYMPTOMS
SHORTNESS OF BREATH: 1
NAUSEA: 0
HEADACHES: 0
BACK PAIN: 0
ABDOMINAL PAIN: 0
WEAKNESS: 0
DIARRHEA: 0
LIGHT-HEADEDNESS: 0
WHEEZING: 0
VOMITING: 0
HEMATURIA: 0
NECK PAIN: 0
FEVER: 0
DYSURIA: 0
NUMBNESS: 0
CHILLS: 0
COUGH: 0

## 2017-11-27 NOTE — ED AVS SNAPSHOT
Emergency Department    [address] [facility] [address]    Phone:  [phone]    Fax:  [phone]                                       Saul Hairston   MRN: [id number]    Department:   Emergency Department   Date of Visit:  11/27/2017           After Visit Summary Signature Page     I have received my discharge instructions, and my questions have been answered. I have discussed any challenges I see with this plan with the nurse or doctor.    ..........................................................................................................................................  Patient/Patient Representative Signature      ..........................................................................................................................................  Patient Representative Print Name and Relationship to Patient    ..................................................               ................................................  Date                                            Time    ..........................................................................................................................................  Reviewed by Signature/Title    ...................................................              ..............................................  Date                                                            Time

## 2017-11-27 NOTE — ED PROVIDER NOTES
History     Chief Complaint:  Shortness of Breath    HPI   Saul Hairston is a 72 year old male with a medical history including hypertension and hyperlipidemia who presents with shortness of breath and chest heaviness.  The patient reports he awoke at 4:30 AM with shortness of breath and chest heaviness that has since persisted.  He notes similar symptoms in the past, though they have never lasted this long. He started using a CPAP machine a week ago, and has had no prior concerns with him.  He voices no other areas of concern including fever, chills, cough, abdominal pain, jaw pain, or focal numbness/tingling.  He denies a cardiac history.  He notes an 8 hour car trip to [place] recently.     Cardiac PE/DVT Risk Factors:   The patient has a history of hypertension and hyperlipidemia, No history of diabetes or smoking. No known history of CAD. The patient denies any personal or familial history of PE, DVT, or clotting disorder. The patient reports recent travel with a 8 hour car ride to [place]. No surgery or other immobilizations.      Allergies:  No known drug allergies    Medications:    Indocin  Zyloprim  Kenalog  Hydrodiuril  Lipitor  Omega 3 Fatty Acids  Multivitamin    Past Medical History:    Arthritis  Elevated PSA  Hypertension  Kidney Stone  Seasonal Allergic Rhinitis  Umbilical hernia  Gout  Rotator cuff tear    Past Surgical History:    Colonoscopy  Herniorrhaphy  Rotator cuff repair  Removal of pseudogout deposits right knee  Tonsillectomy    Family History:    HTN- father  Alcohol/Drug- father  Allergies- father  Respiratory- father  Alzheimer- mother  Arthritis- mother  Eye disorder- mother  Lipids- brother and sister  CAD- son    Social History:  Marital Status:     Presents to the ED with his wife.   No alcohol use.  Never smoker.     Review of Systems   Constitutional: Negative for chills and fever.   Respiratory: Positive for shortness of breath. Negative for cough and wheezing.   "  Cardiovascular: Positive for chest pain (\"heaviness\").   Gastrointestinal: Negative for abdominal pain, diarrhea, nausea and vomiting.   Genitourinary: Negative for dysuria and hematuria.   Musculoskeletal: Negative for back pain and neck pain.   Neurological: Negative for weakness, light-headedness, numbness and headaches.   All other systems reviewed and are negative.    Physical Exam   First Vitals:  /81  Pulse 61  Temp 97.8  F (36.6  C) (Oral)  Resp 16  Ht 1.778 m (5' 10\")  Wt 98.4 kg (217 lb)  SpO2 96%  BMI 31.14 kg/m2    Vitals:    11/27/17 0832 11/27/17 0900 11/27/17 0930 11/27/17 0945   BP: 142/75 138/75 146/81    Pulse:       Resp: 14   16   Temp:       TempSrc:       SpO2: 96% 97% 97% 96%   Weight:       Height:            Physical Exam  General: Alert, interactive in mild distress.   Head:  Scalp is atraumatic.  Eyes:  EOM intact. The pupils are equal, round, and reactive to light. No scleral icterus.  ENT:                                      Ears:  The external ears are normal.  Nose:  The external nose is normal.  Throat:  The oropharynx is normal. Mucus membranes are moist.                 Neck:  Normal range of motion. There is no rigidity. Trachea midline.  CV:  Regular rate and rhythm. No murmur. 2+ radial and DP pulses bilaterally. 1+ pitting edema bilateral LE.   Resp:  Breath sounds are clear bilaterally. Non-labored, no retractions or accessory muscle use.  GI:  Abdomen is soft, no distension, no tenderness.   MS:  Normal strength in all 4 extremities. No lower extremity edema.   Skin:  Warm and dry, No rash or lesions noted. Right big toe is swollen and erythematous.   Neuro:  Strength 5/5 x4. Sensation intact in all 4 extremities. CN 3-12 intact. GCS: 15  Psych:  Awake. Alert and orientedx3. Appropriate interactions.   Lymph: No anterior or posterior cervical lymphadenopathy noted.       Emergency Department Course   ECG done at 0548. ECG read at 0700. Indication: SOB  Rate 60 " bpm. OK interval 176. QRS duration 90. QT/QTc 426/426. P-R-T axes 55 25 63.  Normal sinus rhythm. Normal ECG.    Imaging:  Radiographic findings were communicated with the patient who voiced understanding of the findings.    CT Chest Pulmonary Embolism w Contrast:  IMPRESSION:  1. No evidence of pulmonary embolism. Thoracic aorta is unremarkable with scattered calcified plaque.  2. Coronary artery calcification.  3. No evidence of pulmonary infiltrate or consolidation.  Preliminary result, per Radiology.    Laboratory:  CBC: WBC 8.2, HGB 15.6,   BMP: Glucose 114(H) o/w WNL (Creat 1.08)  Hepatic Panel: WNL  D dimer: 0.8(H)  BNP: 73    Troponin <0.015 (collect time 6:13am)  Repeat Troponin <0.015 (collect time 9am)    Interventions:  0650 Nitroglycerin sublingual 0.4mg tablet PO      Emergency Department Course:  Past medical records, nursing notes, and vitals reviewed.  I performed an exam of the patient and obtained history, as documented above. GCS 15.     Blood drawn and IV inserted  Supervising provider also interviewed and examined the patient at bedside and agrees with the plan.    6:55 AM:  I rechecked the patient and updated patient and wife on findings thus far. Reports persistent chest heaviness, will order nitro 0.4mg.     7:45AM:  I discussed the D-dimer result with the patient and updated him on plan for CT scan. He reports feeling improved.     The patient was sent for a CT chest while in the emergency department, findings above.     9:00 AM: I rechecked the patient and updated him on the CT results.  Plan for repeat troponin and if negative, discharge home with outpatient stress test follow up.     9:50 AM: I rechecked the patient and updated him on the findings thus far.  Findings and plan explained to the patient and spouse. Patient discharged home with instructions regarding supportive care, medications, and reasons to return. The importance of close follow-up was reviewed.      Impression &  Plan      HEART Score  Background  Calculates the overall risk of adverse event in patient's presenting with chest pain.  Based on 5 criteria (each assigned 0-2 points) including suspiciousness of history, EKG, age, risk factors and troponin.    Data  72 year old male  has Idiopathic chronic gout 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; Rotator cuff tear; Kidney stone; Elevated PSA; Seasonal allergic rhinitis; Hyperlipidemia LDL goal <130; Umbilical hernia; Hypertension goal BP (blood pressure) < 150/90; and Non morbid obesity, unspecified obesity type on his problem list.   reports that he has never smoked. He has never used smokeless tobacco.  family history includes Alcohol/Drug in his father; Allergies in his father; Alzheimer Disease in his mother; Arthritis in his maternal grandmother and mother; C.A.D. in his maternal uncle, paternal uncle, and son; CEREBROVASCULAR DISEASE in his maternal grandmother; Eye Disorder in his mother; Hypertension in his father; Lipids in his brother and sister; Obesity in his brother; Prostate Cancer in his maternal uncle; Respiratory in his father.  Lab Results   Component Value Date    TROPI <0.015 11/27/2017     Criteria   0-2 points for each of 5 items (maximum of 10 points):  Score 0- History slightly suspicious for coronary syndrome  Score 0- EKG Normal  Score 2- Age 65 years or older  Score 1- One to 2 risk factors for atherosclerotic disease  Score 0- Within normal limits for troponin levels  Interpretation  Risk of adverse outcome  Heart Score: 3  Total Score 0-3- Adverse Outcome Risk 2.5% - Supports early discharge with appropriate follow-up    Medical Decision Making:  Saul Hairston is a 72 year old male with a medical history including hypertension and hyperlipidemia who presented to the ER for evaluation of chest heaviness and shortness of breath. Vital stable on arrival. Differential diagnosis is broad, including, but is not limited to acute coronary syndrome, congestion heart  failure, pulmonary embolism, aortic dissection, pneumonia, pneumothorax, musculoskeletal pain, esophageal spasm, atypical reflux / gastritis, anxiety, among others.    Work-up in the ED included EKG, CT chest, and laboratory studies.  EKG reveals normal sinus rhythm without signs of ischemia.  ACS was strongly considered, although felt unlikely at this point given normal EKG and normal serial troponins.  Symptoms were improved following nitroglycerin.  HEART Score calculated to be 3, thus placing the patient at low risk for adverse cardiac outcome and appropriate for outpatient management.  D dimer was positive prompting a CT chest w/ contrast, which was negative for PE. CT chest did show coronary artery calcification, which was discussed with the patient. Aortic dissection unlikely in the absence of ripping or tearing pain radiating towards patient's back, symmetric radial pulse, and unremarkable CT scan.  No evidence of pneumothorax or pneumonia on CT scan.  No reproducible tenderness to palpation over the patient's chest wall.    Patient was observed in the ER with well controlled symptoms.  Results of the above studies were discussed with the patient.  He is felt safe for discharge home with close outpatient follow-up and outpatient stress test. Symptoms were improved and patient felt comfortable with the proposed plan of care.  Return to the ER with worsening chest pain, shortness of breath, or any other new or troubling symptoms.  Questions of the patient answered prior to discharge.      Diagnosis:    ICD-10-CM    1. SOB (shortness of breath) R06.02 NM Lexiscan stress test (nuc card)   2. Chest pain, unspecified type R07.9 NM Lexiscan stress test (nuc card)       Disposition: Discharged to home    Discharge Medications:  New Prescriptions    No medications on file       Susana Martínez PA-C  Supervising provider, Dr. Valenzuela   11/27/2017    EMERGENCY DEPARTMENT         Susana Martínez PA-C  11/27/17  2900

## 2017-11-27 NOTE — ED PROVIDER NOTES
"Emergency Department Attending Supervision Note  11/27/2017  6:31 AM    I evaluated this patient in conjunction with CHAY Rudd.    Briefly, the patient presented with shortness of breath. He states this started about two hours prior to arrival. He states he still feels short of breath here in the ED and has some \"heaviness\" in his chest. He complains of some swelling in his feet as well, although that is not new. He mentions an 8-hour driving trip to [place] recently. He states he has experienced this intermittently over the past year as well, although today's episode was worse and has lasted longer. He denies cough, fever, nausea, or any other symptoms.  He took Asprin prior to arrival to the ED. Of note, the patient was just started on CPAP six days ago.    On my exam,   Nursing note and vitals reviewed.  Constitutional:  Oriented to person, place, and time. Cooperative.   HENT:   Nose:    Nose normal.   Mouth/Throat:   Mucous membranes are normal.   Eyes:    Conjunctivae normal and EOM are normal.      Pupils are equal, round, and reactive to light.   Neck:    Trachea normal.   Cardiovascular:  Normal rate, regular rhythm, normal heart sounds and normal pulses. No murmur heard.  Pulmonary/Chest:  Effort normal and breath sounds normal.   Abdominal:   Soft. Normal appearance and bowel sounds are normal.      There is no tenderness.      There is no rebound and no CVA tenderness.   Musculoskeletal:  Extremities atraumatic x 4. 1+ bilateral lower extremity pitting edema.  Lymphadenopathy:  No cervical adenopathy.   Neurological:   Alert and oriented to person, place, and time. Normal strength.      No cranial nerve deficit or sensory deficit. GCS eye subscore is 4. GCS verbal subscore is 5. GCS motor subscore is 6.   Skin:    Skin is intact. No rash noted.   Psychiatric:   Normal mood and affect.    Results:  ECG done at 0548. ECG read at 0700. Indication: SOB  Rate 60 bpm. OH interval 176. QRS duration 90. " QT/QTc 426/426. P-R-T axes 55 25 63.  Normal sinus rhythm. Normal ECG.    Laboratory:  CBC: WBC 8.2, HGB 15.6,   BMP: Glucose 114(H) o/w WNL (Creat 1.08)  Hepatic Panel: WNL  D dimer: 0.8(H)  BNP: 73    Troponin <0.015 (collect time 6:13am)  Repeat Troponin <0.015 (collect time 9am)    Imaging:  Radiographic findings were communicated with the patient who voiced understanding of the findings.    CT Chest Pulmonary Embolism w Contrast:  IMPRESSION:  1. No evidence of pulmonary embolism. Thoracic aorta is unremarkable  with scattered calcified plaque.  2. Coronary artery calcification.  3. No evidence of pulmonary infiltrate or consolidation.  Preliminary result, per Radiology.    ED course:  Nursing notes and vitals reviewed.  I performed an exam of the patient as documented above.     Patient's D-dimer was elevated, so Chest CT was obtained to rule out PE.    Repeat Troponin was drawn and within normal limits.    Findings and plan explained to the patient. Patient discharged home with instructions regarding supportive care, medications, and reasons to return. The importance of close follow-up was reviewed. The patient was ordered an outpatient NM Lexiscan stress test.    Medical Decision Making:  This is a 72 year old male who came in with some chest discomfort and shortness of breath. He had a normal EKG here, as well as normal vital signs and a normal exam. I considered the possibility of pulmonary embolism, as well as cardiac ischemia and CHF. I also considered the possibility of anxiety and something related to his CPAP. We proceeded with the above work up here, including blood work and EKG. His initial Troponin and EKG were both normal, which were reassuring. His D dimer, however, was elevated, and therefore we proceeded with a CT scan of his chest to rule out a PE. That CT scan is negative. We then discussed the next options and course of action. We obtained a delta Troponin, which was also negative. We  will be arranging for an outpatient stress test as well. He should follow up with his PCP as soon as possible and return for any concerns or worsening symptoms.    Diagnosis    ICD-10-CM    1. SOB (shortness of breath) R06.02 NM Lexiscan stress test (nuc card)   2. Chest pain, unspecified type R07.9 NM Lexiscan stress test (nuc card)     Brian Valenzuela MD     I, Mavis Sandhu, am serving as a scribe at 0630 on November 27, 2017  to document services personally performed by Dr. Valenzuela based on my observations and the provider's statements to me.           Brian Valenzuela MD  11/27/17 1107

## 2017-11-27 NOTE — PROGRESS NOTES
Pt used machine that was his wife's who barely used hers, but owns it. Had SN on machine released from wife's name on [employer] (she got the machine through [facility]) and added SN to pt's account in [employer]. Pt received a F&P Simplus FF mask in Medium, and new filters for the machine. Otherwise, all supplies were in good shape.

## 2017-11-27 NOTE — DISCHARGE INSTRUCTIONS
*You may resume diet and activities as tolerated.  *No new medications. Continue your current medications.  Recommend continue your daily aspirin until your stress test.  *Follow-up with your doctor for a recheck in 2-3 days.  Follow-up for your outpatient stress test in the next 3 days.  *Return if you develop difficulty in breathing, faint or feel like you will faint or become worse in any way.

## 2017-11-27 NOTE — ED AVS SNAPSHOT
Emergency Department    [address] [facility] [address]    Phone:  [phone]    Fax:  [phone]                                       Saul Hairston   MRN: [id number]    Department:   Emergency Department   Date of Visit:  11/27/2017           Patient Information     Date Of Birth          1945        Your diagnoses for this visit were:     SOB (shortness of breath)     Chest pain, unspecified type        You were seen by Brian Valenzuela MD.      Follow-up Information     Follow up with Waldo Matson MD In 2 days.    Specialty:  Family Practice    Why:  recheck    Contact information:    [address] [facility] [address] [phone]          Follow up with  Emergency Department.    Specialty:  EMERGENCY MEDICINE    Why:  As needed, If symptoms worsen    Contact information:    [address] [facility] [address] [phone]        Discharge Instructions       *You may resume diet and activities as tolerated.  *No new medications. Continue your current medications.  Recommend continue your daily aspirin until your stress test.  *Follow-up with your doctor for a recheck in 2-3 days.  Follow-up for your outpatient stress test in the next 3 days.  *Return if you develop difficulty in breathing, faint or feel like you will faint or become worse in any way.          Discharge References/Attachments     CHEST PAIN, UNCERTAIN CAUSE (ENGLISH)      Future Appointments        Provider Department Dept Phone Center    12/1/2017 8:20 AM Waldo Matson MD, MD [facility] [phone]     1/9/2018 3:00 PM Bennett Ezra Goltz, PA-C, [facility] [phone] [facility]      24 Hour Appointment Hotline       To make an appointment at any [facility], call [phone] ([phone]). If you don't have a family doctor or clinic, we will help you find one. [facility] are conveniently located to serve  the needs of you and your family.          ED Discharge Orders     NM Lexiscan stress test (nuc card)                    Review of your medicines      Our records show that you are taking the medicines listed below. If these are incorrect, please call your family doctor or clinic.        Dose / Directions Last dose taken    allopurinol 100 MG tablet   Commonly known as:  ZYLOPRIM   Dose:  300 mg   Quantity:  270 tablet        Take 3 tablets (300 mg) by mouth daily   Refills:  1        atorvastatin 20 MG tablet   Commonly known as:  LIPITOR   Dose:  20 mg   Quantity:  90 tablet        Take 1 tablet (20 mg) by mouth daily   Refills:  3        hydrochlorothiazide 25 MG tablet   Commonly known as:  HYDRODIURIL   Dose:  25 mg   Quantity:  90 tablet        Take 1 tablet (25 mg) by mouth daily   Refills:  3        indomethacin 50 MG capsule   Commonly known as:  INDOCIN   Quantity:  40 capsule        TAKE 1 CAPSULE BY MOUTH 3 TIMES DAILY WITH MEALS   Refills:  2        MULTIVITAMIN PO        Refills:  0        OMEGA-3 FISH OIL PO        Refills:  0        triamcinolone 0.1 % cream   Commonly known as:  KENALOG   Quantity:  45 g        Apply sparingly to affected area three times daily for 14 days.   Refills:  0                Procedures and tests performed during your visit     Basic metabolic panel    CBC with platelets differential    CT Chest Pulmonary Embolism w Contrast    Cardiac Continuous Monitoring    D dimer quantitative    EKG 12 lead    Hepatic panel    Nt probnp inpatient    Peripheral IV: Standard    Pulse oximetry nursing    Troponin I    Troponin I (now)      Orders Needing Specimen Collection     None      Pending Results     Date and Time Order Name Status Description    11/27/2017 0746 CT Chest Pulmonary Embolism w Contrast Preliminary             Pending Culture Results     No orders found from 11/25/2017 to 11/28/2017.            Pending Results Instructions     If you had any lab results that were not  finalized at the time of your Discharge, you can call the ED Lab Result RN at [phone]. You will be contacted by this team for any positive Lab results or changes in treatment. The nurses are available 7 days a week from 10A to 6:30P.  You can leave a message 24 hours per day and they will return your call.        Test Results From Your Hospital Stay        11/27/2017  6:26 AM      Component Results     Component Value Ref Range & Units Status    WBC 8.2 4.0 - 11.0 10e9/L Final    RBC Count 5.30 4.4 - 5.9 10e12/L Final    Hemoglobin 15.6 13.3 - 17.7 g/dL Final    Hematocrit 45.6 40.0 - 53.0 % Final    MCV 86 78 - 100 fl Final    MCH 29.4 26.5 - 33.0 pg Final    MCHC 34.2 31.5 - 36.5 g/dL Final    RDW 13.2 10.0 - 15.0 % Final    Platelet Count 207 150 - 450 10e9/L Final    Diff Method Automated Method  Final    % Neutrophils 49.9 % Final    % Lymphocytes 29.8 % Final    % Monocytes 8.5 % Final    % Eosinophils 10.9 % Final    % Basophils 0.7 % Final    % Immature Granulocytes 0.2 % Final    Nucleated RBCs 0 0 /100 Final    Absolute Neutrophil 4.1 1.6 - 8.3 10e9/L Final    Absolute Lymphocytes 2.4 0.8 - 5.3 10e9/L Final    Absolute Monocytes 0.7 0.0 - 1.3 10e9/L Final    Absolute Eosinophils 0.9 (H) 0.0 - 0.7 10e9/L Final    Absolute Basophils 0.1 0.0 - 0.2 10e9/L Final    Abs Immature Granulocytes 0.0 0 - 0.4 10e9/L Final    Absolute Nucleated RBC 0.0  Final         11/27/2017  6:41 AM      Component Results     Component Value Ref Range & Units Status    Sodium 138 133 - 144 mmol/L Final    Potassium 3.7 3.4 - 5.3 mmol/L Final    Chloride 102 94 - 109 mmol/L Final    Carbon Dioxide 27 20 - 32 mmol/L Final    Anion Gap 9 3 - 14 mmol/L Final    Glucose 114 (H) 70 - 99 mg/dL Final    Urea Nitrogen 20 7 - 30 mg/dL Final    Creatinine 1.08 0.66 - 1.25 mg/dL Final    GFR Estimate 68 >60 mL/min/1.7m2 Final    Non  GFR Calc    GFR Estimate If Black 82 >60 mL/min/1.7m2 Final    African American GFR Calc     Calcium 8.7 8.5 - 10.1 mg/dL Final         11/27/2017  6:45 AM      Component Results     Component Value Ref Range & Units Status    Troponin I ES <0.015 0.000 - 0.045 ug/L Final    The 99th percentile for upper reference range is 0.045 ug/L.  Troponin values   in the range of 0.045 - 0.120 ug/L may be associated with risks of adverse   clinical events.           11/27/2017  7:42 AM      Component Results     Component Value Ref Range & Units Status    Bilirubin Direct <0.1 0.0 - 0.2 mg/dL Final    Bilirubin Total 0.4 0.2 - 1.3 mg/dL Final    Albumin 3.7 3.4 - 5.0 g/dL Final    Protein Total 6.8 6.8 - 8.8 g/dL Final    Alkaline Phosphatase 134 40 - 150 U/L Final    ALT 55 0 - 70 U/L Final    AST 42 0 - 45 U/L Final         11/27/2017  7:44 AM      Component Results     Component Value Ref Range & Units Status    D Dimer 0.8 (H) 0.0 - 0.50 ug/ml FEU Final    This D-dimer assay is intended for use in conjunction with a clinical pretest   probability assessment model to exclude pulmonary embolism (PE) and deep   venous thrombosis (DVT) in outpatients suspected of PE or DVT. The cut-off   value is 0.5 ug/mL FEU.           11/27/2017  7:45 AM      Component Results     Component Value Ref Range & Units Status    N-Terminal Pro BNP Inpatient 73 0 - 900 pg/mL Final       Reference range shown and results flagged as abnormal are suggested inpatient   cut points for confirming diagnosis if CHF in an acute setting. Establishing a   baseline value for each individual patient is useful for follow-up. An   inpatient or emergency department NT-proPBNP <300 pg/mL effectively rules out   acute CHF, with 99% negative predictive value.  The outpatient non-acute reference range for ruling out CHF is:   0-125 pg/mL (age 18 to less than 75)   0-450 pg/mL (age 75 yrs and older)           11/27/2017  8:40 AM      Narrative     CT CHEST PULMONARY EMBOLISM WITH CONTRAST November 27, 2017 8:31 AM     HISTORY: Shortness of breath,  pulmonary embolus.    TECHNIQUE: Thin section axial images are performed from the thoracic  inlet to the lung bases utilizing 76 mL of Isovue 370 IV contrast  without adverse event. Coronal reformatted images are also generated.  Radiation dose for this scan was reduced using automated exposure  control, adjustment of the mA and/or kV according to patient size, or  iterative reconstruction technique.    FINDINGS:     Chest: Left major fissure lymph node is noted on series 5, image 79.  Lungs are otherwise clear with mild respiratory motion artifact. Mild  dependent atelectasis is present. There is no pneumothorax or pleural  effusion. Heart is normal in size. No pericardial fluid. Small hiatal  hernia. Esophagus is otherwise unremarkable. Thyroid gland is normal  in appearance. No enlarged axillary or intrathoracic lymph nodes.  Small hilar lymph nodes bilaterally are not enlarged by CT criteria.  There is no evidence of pulmonary embolism. Thoracic aorta  demonstrates a few scattered calcified plaques. No aneurysm or  dissection. Coronary artery calcification is also present. Limited  images upper abdomen are unremarkable. Single gastrohepatic node is  borderline enlarged. No other enlarged upper abdominal lymph nodes are  appreciated. Bone window examination is within normal limits.        Impression     IMPRESSION:  1. No evidence of pulmonary embolism. Thoracic aorta is unremarkable  with scattered calcified plaque.  2. Coronary artery calcification.  3. No evidence of pulmonary infiltrate or consolidation.           11/27/2017  9:33 AM      Component Results     Component Value Ref Range & Units Status    Troponin I ES <0.015 0.000 - 0.045 ug/L Final    The 99th percentile for upper reference range is 0.045 ug/L.  Troponin values   in the range of 0.045 - 0.120 ug/L may be associated with risks of adverse   clinical events.                  Clinical Quality Measure: Blood Pressure Screening     Your blood  pressure was checked while you were in the emergency department today. The last reading we obtained was  BP: 146/81 . Please read the guidelines below about what these numbers mean and what you should do about them.  If your systolic blood pressure (the top number) is less than 120 and your diastolic blood pressure (the bottom number) is less than 80, then your blood pressure is normal. There is nothing more that you need to do about it.  If your systolic blood pressure (the top number) is 120-139 or your diastolic blood pressure (the bottom number) is 80-89, your blood pressure may be higher than it should be. You should have your blood pressure rechecked within a year by a primary care provider.  If your systolic blood pressure (the top number) is 140 or greater or your diastolic blood pressure (the bottom number) is 90 or greater, you may have high blood pressure. High blood pressure is treatable, but if left untreated over time it can put you at risk for heart attack, stroke, or kidney failure. You should have your blood pressure rechecked by a primary care provider within the next 4 weeks.  If your provider in the emergency department today gave you specific instructions to follow-up with your doctor or provider even sooner than that, you should follow that instruction and not wait for up to 4 weeks for your follow-up visit.        Thank you for choosing [facility]       Thank you for choosing [facility] for your care. Our goal is always to provide you with excellent care. Hearing back from our patients is one way we can continue to improve our services. Please take a few minutes to complete the written survey that you may receive in the mail after you visit with us. Thank you!        [employer] Information     [facility] gives you secure access to your electronic health record. If you see a primary care provider, you can also send messages to your care team and make appointments. If you have questions, please call your  primary care clinic.  If you do not have a primary care provider, please call [phone] and they will assist you.        Care EveryWhere ID     This is your Care EveryWhere ID. This could be used by other organizations to access your [facility] medical records  [id number]        Equal Access to Services     OLGA MARINELLI : Dayan Oro, sachin sandhu, mingo [place], luis e yang. So [facility] [phone].    ATENCIÓN: Si habla español, tiene a dubon disposición servicios gratuitos de asistencia lingüística. Llame al [phone].    We comply with applicable federal civil rights laws and [place] laws. We do not discriminate on the basis of race, color, national origin, age, disability, sex, sexual orientation, or gender identity.            After Visit Summary       This is your record. Keep this with you and show to your community pharmacist(s) and doctor(s) at your next visit.

## 2017-11-27 NOTE — PROGRESS NOTES
3 DAY STM VISIT    Patient contacted for 3 day STM visit  Subjective measures:  Patient feels first few nights were rough and he is now getting a stress test after recent ER visit due to shortness of breath.  No issues with pressure or mask discomfort.  He feels last night was much better.      Device type: Auto-CPAP  PAP settings: CPAP min 6 cm  H20     CPAP max 18 cm  H20          Assessment: Nightly usage over four hour per patient report.  He is using wife's old device.  No data.   Action plan: Pt to have f/u 14 day STM visit.  Diagnostic AHI:   68.8

## 2017-11-28 ENCOUNTER — HOSPITAL ENCOUNTER (OUTPATIENT)
Dept: CARDIOLOGY | Facility: CLINIC | Age: 72
Discharge: HOME | End: 2017-11-28
Attending: PHYSICIAN ASSISTANT | Admitting: PHYSICIAN ASSISTANT
Payer: MEDICARE

## 2017-11-28 VITALS — DIASTOLIC BLOOD PRESSURE: 70 MMHG | HEART RATE: 68 BPM | SYSTOLIC BLOOD PRESSURE: 136 MMHG

## 2017-11-28 DIAGNOSIS — R07.9 CHEST PAIN, UNSPECIFIED TYPE: ICD-10-CM

## 2017-11-28 DIAGNOSIS — R06.02 SOB (SHORTNESS OF BREATH): ICD-10-CM

## 2017-11-28 PROCEDURE — 78452 HT MUSCLE IMAGE SPECT MULT: CPT | Mod: 26 | Performed by: INTERNAL MEDICINE

## 2017-11-28 PROCEDURE — 34300033 ZZH RX 343: Performed by: INTERNAL MEDICINE

## 2017-11-28 PROCEDURE — 25000128 H RX IP 250 OP 636: Performed by: INTERNAL MEDICINE

## 2017-11-28 PROCEDURE — 93018 CV STRESS TEST I&R ONLY: CPT | Performed by: INTERNAL MEDICINE

## 2017-11-28 PROCEDURE — 93017 CV STRESS TEST TRACING ONLY: CPT

## 2017-11-28 PROCEDURE — A9502 TC99M TETROFOSMIN: HCPCS | Performed by: INTERNAL MEDICINE

## 2017-11-28 PROCEDURE — 93016 CV STRESS TEST SUPVJ ONLY: CPT | Performed by: INTERNAL MEDICINE

## 2017-11-28 RX ORDER — AMINOPHYLLINE 25 MG/ML
50-100 INJECTION, SOLUTION INTRAVENOUS
Status: DISCONTINUED | OUTPATIENT
Start: 2017-11-28 | End: 2017-11-29 | Stop reason: HOSPADM

## 2017-11-28 RX ORDER — ALBUTEROL SULFATE 90 UG/1
2 AEROSOL, METERED RESPIRATORY (INHALATION) EVERY 5 MIN PRN
Status: DISCONTINUED | OUTPATIENT
Start: 2017-11-28 | End: 2017-11-29 | Stop reason: HOSPADM

## 2017-11-28 RX ORDER — REGADENOSON 0.08 MG/ML
0.4 INJECTION, SOLUTION INTRAVENOUS ONCE
Status: COMPLETED | OUTPATIENT
Start: 2017-11-28 | End: 2017-11-28

## 2017-11-28 RX ORDER — ACYCLOVIR 200 MG/1
0-1 CAPSULE ORAL
Status: DISCONTINUED | OUTPATIENT
Start: 2017-11-28 | End: 2017-11-29 | Stop reason: HOSPADM

## 2017-11-28 RX ADMIN — REGADENOSON 0.4 MG: 0.08 INJECTION, SOLUTION INTRAVENOUS at 13:24

## 2017-11-28 RX ADMIN — TETROFOSMIN 3.9 MCI.: 1.38 INJECTION, POWDER, LYOPHILIZED, FOR SOLUTION INTRAVENOUS at 11:43

## 2017-11-28 RX ADMIN — TETROFOSMIN 12.6 MCI.: 1.38 INJECTION, POWDER, LYOPHILIZED, FOR SOLUTION INTRAVENOUS at 13:21

## 2017-12-04 ENCOUNTER — OFFICE VISIT (OUTPATIENT)
Dept: FAMILY MEDICINE | Facility: CLINIC | Age: 72
End: 2017-12-04
Payer: COMMERCIAL

## 2017-12-04 VITALS
HEART RATE: 59 BPM | OXYGEN SATURATION: 97 % | DIASTOLIC BLOOD PRESSURE: 83 MMHG | SYSTOLIC BLOOD PRESSURE: 138 MMHG | BODY MASS INDEX: 30.56 KG/M2 | WEIGHT: 213 LBS | RESPIRATION RATE: 14 BRPM | TEMPERATURE: 97.6 F

## 2017-12-04 DIAGNOSIS — R06.02 SHORTNESS OF BREATH: Primary | ICD-10-CM

## 2017-12-04 PROCEDURE — 99213 OFFICE O/P EST LOW 20 MIN: CPT | Performed by: FAMILY MEDICINE

## 2017-12-04 RX ORDER — ALBUTEROL SULFATE 90 UG/1
2 AEROSOL, METERED RESPIRATORY (INHALATION) EVERY 6 HOURS PRN
Qty: 1 INHALER | Refills: 0 | Status: SHIPPED | OUTPATIENT
Start: 2017-12-04 | End: 2018-03-07

## 2017-12-04 NOTE — MR AVS SNAPSHOT
After Visit Summary   12/4/2017    Saul Hairston    MRN: [id number]           Patient Information     Date Of Birth          1945        Visit Information        Provider Department      12/4/2017 12:00 PM Waldo Matson MD [facility]        Today's Diagnoses     Shortness of breath    -  1       Follow-ups after your visit        Your next 10 appointments already scheduled     Jan 09, 2018  3:00 PM CST   Return Sleep Patient with Bennett Ezra Goltz, PA-C   [facility] ([facility])    [address] [address] [phone]              Who to contact     If you have questions or need follow up information about today's clinic visit or your schedule please contact [facility] directly at [phone].  Normal or non-critical lab and imaging results will be communicated to you by MyChart, letter or phone within 4 business days after the clinic has received the results. If you do not hear from us within 7 days, please contact the clinic through [email or web address] or phone. If you have a critical or abnormal lab result, we will notify you by phone as soon as possible.  Submit refill requests through [employer] or call your pharmacy and they will forward the refill request to us. Please allow 3 business days for your refill to be completed.          Additional Information About Your Visit        MyChart Information     [employer] gives you secure access to your electronic health record. If you see a primary care provider, you can also send messages to your care team and make appointments. If you have questions, please call your primary care clinic.  If you do not have a primary care provider, please call [phone] and they will assist you.        Care EveryWhere ID     This is your Care EveryWhere ID. This could be used by other organizations to access your [facility] medical records  [id number]        Your  Vitals Were     Pulse Temperature Respirations Pulse Oximetry BMI (Body Mass Index)       59 97.6  F (36.4  C) (Oral) 14 97% 30.56 kg/m2        Blood Pressure from Last 3 Encounters:   12/04/17 138/83   11/28/17 136/70   11/27/17 156/83    Weight from Last 3 Encounters:   12/04/17 213 lb (96.6 kg)   11/27/17 217 lb (98.4 kg)   10/17/17 218 lb 6.4 oz (99.1 kg)              Today, you had the following     No orders found for display         Today's Medication Changes          These changes are accurate as of: 12/4/17  5:04 PM.  If you have any questions, ask your nurse or doctor.               Start taking these medicines.        Dose/Directions    albuterol 108 (90 BASE) MCG/ACT Inhaler   Commonly known as:  PROAIR HFA/PROVENTIL HFA/VENTOLIN HFA   Used for:  Shortness of breath   Started by:  Waldo Matson MD        Dose:  2 puff   Inhale 2 puffs into the lungs every 6 hours as needed for shortness of breath / dyspnea or wheezing   Quantity:  1 Inhaler   Refills:  0            Where to get your medicines      These medications were sent to [address] [address]     Phone:  [phone]     albuterol 108 (90 BASE) MCG/ACT Inhaler                Primary Care Provider Office Phone # Fax #    Waldo Matson [phone] [address] [place] [address]        Equal Access to Services     [facility] CenterFELIPA : Haddavid corral Sogold, waaxda luqadaha, qaybta kaalmakhalif campbellay idiin hayaan adeeg kharash la'aan . So [facility] [phone].    ATENCIÓN: Si habla español, tiene a dubon disposición servicios gratuitos de asistencia lingüística. Llame al [phone].    We comply with applicable federal civil rights laws and [place] laws. We do not discriminate on the basis of race, color, national origin, age, disability, sex, sexual orientation, or gender identity.            Thank you!     Thank  you for choosing [facility]  for your care. Our goal is always to provide you with excellent care. Hearing back from our patients is one way we can continue to improve our services. Please take a few minutes to complete the written survey that you may receive in the mail after your visit with us. Thank you!             Your Updated Medication List - Protect others around you: Learn how to safely use, store and throw away your medicines at www.disposemymeds.org.          This list is accurate as of: 12/4/17  5:04 PM.  Always use your most recent med list.                   Brand Name Dispense Instructions for use Diagnosis    albuterol 108 (90 BASE) MCG/ACT Inhaler    PROAIR HFA/PROVENTIL HFA/VENTOLIN HFA    1 Inhaler    Inhale 2 puffs into the lungs every 6 hours as needed for shortness of breath / dyspnea or wheezing    Shortness of breath       allopurinol 100 MG tablet    ZYLOPRIM    270 tablet    Take 3 tablets (300 mg) by mouth daily    Idiopathic chronic gout 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       atorvastatin 20 MG tablet    LIPITOR    90 tablet    Take 1 tablet (20 mg) by mouth daily    Hyperlipidemia LDL goal <130       hydrochlorothiazide 25 MG tablet    HYDRODIURIL    90 tablet    Take 1 tablet (25 mg) by mouth daily    Benign essential hypertension       indomethacin 50 MG capsule    INDOCIN    40 capsule    TAKE 1 CAPSULE BY MOUTH 3 TIMES DAILY WITH MEALS    Idiopathic chronic gout 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       MULTIVITAMIN PO           OMEGA-3 FISH OIL PO           triamcinolone 0.1 % cream    KENALOG    45 g    Apply sparingly to affected area three times daily for 14 days.    Dermatitis
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
## 2017-12-06 ENCOUNTER — DOCUMENTATION ONLY (OUTPATIENT)
Dept: SLEEP MEDICINE | Facility: CLINIC | Age: 72
End: 2017-12-06

## 2017-12-06 NOTE — PROGRESS NOTES
14 DAY STM VISIT    Message left for patient to return call     Assessment:   Waiting on subjective measures.   Action plan: waiting for patient to return call.  and pt to have 30 day STM visit.    Device type: Auto-CPAP  PAP settings: CPAP min 6 cm  H20     CPAP max 18 cm  H20     Objective measures: 14 day rolling measures -using wife's old device no data available           Diagnostic AHI:   68.8    Objective measure goal  Compliance   Goal >70%  Leak   Goal < 24 lpm  AHI  Goal < 5  Usage  Goal >240

## 2017-12-22 ENCOUNTER — DOCUMENTATION ONLY (OUTPATIENT)
Dept: SLEEP MEDICINE | Facility: CLINIC | Age: 72
End: 2017-12-22

## 2017-12-22 NOTE — PROGRESS NOTES
30 DAY [facility] VISIT-only subjective measurements     Subjective measures:   Pt reports that his sleep is improving and he is adjusting to it.  He sometimes has issues when he first starts therapy as he feels pressure is too high then it gets better.     Assessment:  Patient failing following subjective benchmarks: pressure issues.  Some issues when first starting out.   Action plan:   Patient has a follow up visit with Bennett Goltz, PA on 1/9/2018 and will bring his device with him to appointment for download   Device type: Auto-CPAP  PAP settings: CPAP min 6 cm  H20     CPAP max 18 cm  H20      Objective measures: 14 day rolling measures -using wife's old device.  No data available.          Diagnostic AHI:   68.8        Objective measure goal  Compliance   Goal >70%  Leak   Goal < 24 lpm  AHI  Goal < 5  Usage  Goal >240

## 2018-01-03 ENCOUNTER — TRANSFERRED RECORDS (OUTPATIENT)
Dept: HEALTH INFORMATION MANAGEMENT | Facility: CLINIC | Age: 73
End: 2018-01-03

## 2018-01-08 ENCOUNTER — TRANSFERRED RECORDS (OUTPATIENT)
Dept: HEALTH INFORMATION MANAGEMENT | Facility: CLINIC | Age: 73
End: 2018-01-08

## 2018-01-15 ENCOUNTER — OFFICE VISIT (OUTPATIENT)
Dept: SLEEP MEDICINE | Facility: CLINIC | Age: 73
End: 2018-01-15
Payer: COMMERCIAL

## 2018-01-15 VITALS
BODY MASS INDEX: 30.52 KG/M2 | WEIGHT: 213.2 LBS | HEART RATE: 75 BPM | RESPIRATION RATE: 18 BRPM | DIASTOLIC BLOOD PRESSURE: 92 MMHG | OXYGEN SATURATION: 97 % | SYSTOLIC BLOOD PRESSURE: 164 MMHG | HEIGHT: 70 IN

## 2018-01-15 DIAGNOSIS — G47.33 OSA (OBSTRUCTIVE SLEEP APNEA): Primary | ICD-10-CM

## 2018-01-15 PROCEDURE — 99214 OFFICE O/P EST MOD 30 MIN: CPT | Performed by: PHYSICIAN ASSISTANT

## 2018-01-15 NOTE — MR AVS SNAPSHOT
After Visit Summary   1/15/2018    Saul Hairston    MRN: [id number]           Patient Information     Date Of Birth          1945        Visit Information        Provider Department      1/15/2018 3:30 PM Goltz, Bennett Ezra, PA-C [facility]        Today's Diagnoses     MUNIRA (obstructive sleep apnea)    -  1      Care Instructions      Your BMI is Body mass index is 30.59 kg/(m^2).  Weight management is a personal decision.  If you are interested in exploring weight loss strategies, the following discussion covers the approaches that may be successful. Body mass index (BMI) is one way to tell whether you are at a healthy weight, overweight, or obese. It measures your weight in relation to your height.  A BMI of 18.5 to 24.9 is in the healthy range. A person with a BMI of 25 to 29.9 is considered overweight, and someone with a BMI of 30 or greater is considered obese. More than two-thirds of American adults are considered overweight or obese.  Being overweight or obese increases the risk for further weight gain. Excess weight may lead to heart disease and diabetes.  Creating and following plans for healthy eating and physical activity may help you improve your health.  Weight control is part of healthy lifestyle and includes exercise, emotional health, and healthy eating habits. Careful eating habits lifelong are the mainstay of weight control. Though there are significant health benefits from weight loss, long-term weight loss with diet alone may be very difficult to achieve- studies show long-term success with dietary management in less than 10% of people. Attaining a healthy weight may be especially difficult to achieve in those with severe obesity. In some cases, medications, devices and surgical management might be considered.  What can you do?  If you are overweight or obese and are interested in methods for weight loss, you should discuss this with your provider.     Consider  reducing daily calorie intake by 500 calories.     Keep a food journal.     Avoiding skipping meals, consider cutting portions instead.    Diet combined with exercise helps maintain muscle while optimizing fat loss. Strength training is particularly important for building and maintaining muscle mass. Exercise helps reduce stress, increase energy, and improves fitness. Increasing exercise without diet control, however, may not burn enough calories to loose weight.       Start walking three days a week 10-20 minutes at a time    Work towards walking thirty minutes five days a week     Eventually, increase the speed of your walking for 1-2 minutes at time    In addition, we recommend that you review healthy lifestyles and methods for weight loss available through the National Institutes of Health patient information sites:  http://win.niddk.nih.gov/publications/index.htm    And look into health and wellness programs that may be available through your health insurance provider, employer, local community center, or gwyn club.    Weight management plan: Patient was referred to their PCP to discuss a diet and exercise plan.              Follow-ups after your visit        Your next 10 appointments already scheduled     Apr 16, 2018 10:30 AM CDT   Return Sleep Patient with Bennett Ezra Goltz, PA-C   [facility] ([facility])    [address] [address] [phone]              Who to contact     If you have questions or need follow up information about today's clinic visit or your schedule please contact [facility] directly at [phone].  Normal or non-critical lab and imaging results will be communicated to you by MyChart, letter or phone within 4 business days after the clinic has received the results. If you do not hear from us within 7 days, please contact the clinic through MyChart or phone. If you have a critical or abnormal  "lab result, we will notify you by phone as soon as possible.  Submit refill requests through [employer] or call your pharmacy and they will forward the refill request to us. Please allow 3 business days for your refill to be completed.          Additional Information About Your Visit        [employer] Information     [employer] gives you secure access to your electronic health record. If you see a primary care provider, you can also send messages to your care team and make appointments. If you have questions, please call your primary care clinic.  If you do not have a primary care provider, please call [phone] and they will assist you.        Care EveryWhere ID     This is your Care EveryWhere ID. This could be used by other organizations to access your [facility] medical records  [id number]        Your Vitals Were     Pulse Respirations Height Pulse Oximetry BMI (Body Mass Index)       75 18 1.778 m (5' 10\") 97% 30.59 kg/m2        Blood Pressure from Last 3 Encounters:   01/15/18 (!) 164/92   12/04/17 138/83   11/28/17 136/70    Weight from Last 3 Encounters:   01/15/18 96.7 kg (213 lb 3.2 oz)   12/04/17 96.6 kg (213 lb)   11/27/17 98.4 kg (217 lb)              We Performed the Following     Comprehensive DME        Primary Care Provider Office Phone # Fax #    Waldo Brennan Matson [phone] [address] [place] [address]        Equal Access to Services     OLGA MARINELLI AH: Hadii marybeth Oro, wajeanie sandhu, qaybta malinda [place], luis e yang. So [facility] [phone].    ATENCIÓN: Si habla español, tiene a dubon disposición servicios gratuitos de asistencia lingüística. Nithin al [phone].    We comply with applicable federal civil rights laws and [place] laws. We do not discriminate on the basis of race, color, national origin, age, disability, sex, sexual orientation, or gender identity.            Thank you!     Thank you for choosing " [facility]  for your care. Our goal is always to provide you with excellent care. Hearing back from our patients is one way we can continue to improve our services. Please take a few minutes to complete the written survey that you may receive in the mail after your visit with us. Thank you!             Your Updated Medication List - Protect others around you: Learn how to safely use, store and throw away your medicines at www.disposemymeds.org.          This list is accurate as of: 1/15/18  4:20 PM.  Always use your most recent med list.                   Brand Name Dispense Instructions for use Diagnosis    albuterol 108 (90 BASE) MCG/ACT Inhaler    PROAIR HFA/PROVENTIL HFA/VENTOLIN HFA    1 Inhaler    Inhale 2 puffs into the lungs every 6 hours as needed for shortness of breath / dyspnea or wheezing    Shortness of breath       allopurinol 100 MG tablet    ZYLOPRIM    270 tablet    Take 3 tablets (300 mg) by mouth daily    Idiopathic chronic gout 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       atorvastatin 20 MG tablet    LIPITOR    90 tablet    Take 1 tablet (20 mg) by mouth daily    Hyperlipidemia LDL goal <130       hydrochlorothiazide 25 MG tablet    HYDRODIURIL    90 tablet    Take 1 tablet (25 mg) by mouth daily    Benign essential hypertension       indomethacin 50 MG capsule    INDOCIN    40 capsule    TAKE 1 CAPSULE BY MOUTH 3 TIMES DAILY WITH MEALS    Idiopathic chronic gout 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       MULTIVITAMIN PO           OMEGA-3 FISH OIL PO           triamcinolone 0.1 % cream    KENALOG    45 g    Apply sparingly to affected area three times daily for 14 days.    Dermatitis

## 2018-01-15 NOTE — PATIENT INSTRUCTIONS

## 2018-01-15 NOTE — NURSING NOTE
"Chief Complaint   Patient presents with     CPAP Follow Up       Initial /74  Pulse 75  Resp 18  Ht 1.778 m (5' 10\")  Wt 96.7 kg (213 lb 3.2 oz)  SpO2 97%  BMI 30.59 kg/m2 Estimated body mass index is 30.59 kg/(m^2) as calculated from the following:    Height as of this encounter: 1.778 m (5' 10\").    Weight as of this encounter: 96.7 kg (213 lb 3.2 oz).  Medication Reconciliation: complete     ESS 8    Shaniqua [facility]  Sleep Clinic - Specialist      "

## 2018-01-15 NOTE — PROGRESS NOTES
Sleep Study Follow-Up Visit:    Date on this visit: 1/15/2018    Saul Hairston comes in today for follow-up of his CPAP use for severe MUNIRA. He was initially at the [facility] for feeling tired and being told he stops breathing in his sleep for 15+ years. His medical history is significant for HTN, gout, obesity and allergic rhinitis.       AHI: 68.8/hr              DORIS: 55.7/hr              Supine AHI: 0/hr                  Lateral AHI: 75.1/hr on left and 62.4/hr on right                     Average SpO2: 89.2%, baseline 91.6%                 Lowest Desaturation: 62%  Time Spent Below 89%: 124.4 minutes  Total Obstructive Apneas 420, Total Central/Mixed Apneas 10, Hypopneas 27    The compliance data shows that he has used the CPAP for 23/30 nights, 13.3% of nights for >4 hours.  The 90th% pressure is 11.3 cm.  The average time in large leak is 5 sec.  The average nightly usage is 3:18.  The average AHI is 2.8/hr. There were a couple of times the pressure increased more than usual and then he removed the mask.    He is on auto CPAP 6-18 cm. He sometimes removes it in his sleep (only a couple of times) and sometimes fall asleep before putting it on. Mostly, he just does not put it back on after waking for the restroom.    He goes to bed around 1-1:30 AM. He is usually up by about 8 AM. He feels the air pressure is a rush when he first puts it on. After that, he is fine with the pressure. He uses a full face mask. The mask does not bother him. He does not notice much mask leak. He does get a dry mouth, more when he does not use it. Some nights the CPAP just bugs him, and he does not feel very comfortable with it. That is not too often.  He does not think he snores with the CPAP on. He thinks his snoring is reduced now even without the mask. He uses the SoClean.    He is still tired. He gets drowsy in the evening possibly after dinner. He is not taking as many naps overall, though.     He was getting  short of breath at the end of November. That was happening around 4 AM. He started the night on CPAP but does not think he had the CPAP on when he woke. That has not happened since.     Past medical/surgical history, family history, social history, medications and allergies were reviewed.      Problem List:  Patient Active Problem List    Diagnosis Date Noted     Non morbid obesity, unspecified obesity type 09/26/2017     Priority: Medium     Hypertension goal BP (blood pressure) < 150/90 04/03/2017     Priority: Medium     Umbilical hernia 06/01/2013     Priority: Medium     Problem list name updated by automated process. Provider to review       Elevated PSA      Priority: Medium     Seasonal allergic rhinitis      Priority: Medium     Spring and Fall       Kidney stone      Priority: Medium     Doing ok now       Rotator cuff tear 12/16/2010     Priority: Medium     Hyperlipidemia LDL goal <130 08/14/2006     Priority: Medium     Idiopathic chronic gout 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      Priority: Medium     Problem list name updated by automated process. Provider to review          Impression/Plan:    (G47.33) MUNIRA (obstructive sleep apnea)  (primary encounter diagnosis)  Comment: Severe MUNIRA, not meeting usage goals primarily due to not putting it back on when he wakes to use the restroom. I am narrowing the pressure range because he sometimes seems to wake with the pressure increasing higher than the usual 95th% pressure.   Plan: Changed pressures to 7-13 cm. I checked his mask fit, it seemed good. I showed him how to use the ramp. He seemed more comfortable putting it on at lower pressures. We reviewed his HST to demonstrate the severity of his apnea. We reviewed that benefits to his health and improvement in his symptoms will really begin at about 4 hours of use per night at a minimum. He was strongly encouraged to put the mask back on when coming back from the restroom. We discussed the schedule for cleaning  and replacing supplies.       He will follow up with me in about 3 month(s) to check compliance.     Twenty-five minutes spent with patient, all of which were spent face-to-face counseling, consulting, coordinating plan of care.      Bennett Goltz, PA-C    CC: No ref. provider found

## 2018-02-09 ENCOUNTER — OFFICE VISIT (OUTPATIENT)
Dept: FAMILY MEDICINE | Facility: CLINIC | Age: 73
End: 2018-02-09
Payer: COMMERCIAL

## 2018-02-09 VITALS
SYSTOLIC BLOOD PRESSURE: 150 MMHG | OXYGEN SATURATION: 97 % | DIASTOLIC BLOOD PRESSURE: 72 MMHG | BODY MASS INDEX: 29.49 KG/M2 | WEIGHT: 206 LBS | RESPIRATION RATE: 16 BRPM | HEIGHT: 70 IN | TEMPERATURE: 98.4 F | HEART RATE: 68 BPM

## 2018-02-09 DIAGNOSIS — I10 HYPERTENSION GOAL BP (BLOOD PRESSURE) < 150/90: ICD-10-CM

## 2018-02-09 DIAGNOSIS — G47.33 OSA (OBSTRUCTIVE SLEEP APNEA): ICD-10-CM

## 2018-02-09 DIAGNOSIS — R97.20 ELEVATED PSA: ICD-10-CM

## 2018-02-09 DIAGNOSIS — Z01.818 PRE-OP EXAM: Primary | ICD-10-CM

## 2018-02-09 LAB — HGB BLD-MCNC: 17 G/DL (ref 13.3–17.7)

## 2018-02-09 PROCEDURE — 93000 ELECTROCARDIOGRAM COMPLETE: CPT | Performed by: FAMILY MEDICINE

## 2018-02-09 PROCEDURE — 36415 COLL VENOUS BLD VENIPUNCTURE: CPT | Performed by: FAMILY MEDICINE

## 2018-02-09 PROCEDURE — 99214 OFFICE O/P EST MOD 30 MIN: CPT | Performed by: FAMILY MEDICINE

## 2018-02-09 PROCEDURE — 80053 COMPREHEN METABOLIC PANEL: CPT | Performed by: FAMILY MEDICINE

## 2018-02-09 PROCEDURE — 85018 HEMOGLOBIN: CPT | Performed by: FAMILY MEDICINE

## 2018-02-09 NOTE — NURSING NOTE
"Chief Complaint   Patient presents with     Pre-Op Exam       Initial /72 (Cuff Size: Adult Regular)  Pulse 68  Temp 98.4  F (36.9  C) (Oral)  Resp 16  Ht 5' 10\" (1.778 m)  Wt 206 lb (93.4 kg)  SpO2 97%  BMI 29.56 kg/m2 Estimated body mass index is 29.56 kg/(m^2) as calculated from the following:    Height as of this encounter: 5' 10\" (1.778 m).    Weight as of this encounter: 206 lb (93.4 kg).  Medication Reconciliation: complete     Liss Nicholson CMA      "

## 2018-02-09 NOTE — PROGRESS NOTES
[place] [address] [place] [address] [id number] [phone]  Dept: [phone]    PRE-OP EVALUATION:  Today's date: 2018    Saul Hairston (: 1945) presents for pre-operative evaluation assessment as requested by Aj Celis MD.  He requires evaluation and anesthesia risk assessment prior to undergoing surgery/procedure for treatment of FLEXIBLE CYSTOSCOPY, CYROABLATION OF PROSTATE  .  Proposed procedure: CYSTOSCOPY FLEXIBLE, CRYOABLATION PROSTATE    Date of Surgery/ Procedure: 2018  Time of Surgery/ Procedure: 9:20am  Hospital/Surgical Facility: [facility]  Fax number for surgical facility:   Primary Physician: Waldo Matson  Type of Anesthesia Anticipated: General    Patient has a Health Care Directive or Living Will:  NO    Preop Questions 2018   1.  Do you have a history of heart attack, stroke, stent, bypass or surgery on an artery in the head, neck, heart or legs? No   2.  Do you ever have any pain or discomfort in your chest? YES - not currently.   3.  Do you have a history of  Heart Failure? No   4.   Are you troubled by shortness of breath when:  walking on a level surface, or up a slight hill, or at night? YES -  Not currently.   5.  Do you currently have a cold, bronchitis or other respiratory infection? No   6.  Do you have a cough, shortness of breath, or wheezing? No   7.  Do you sometimes get pains in the calves of your legs when you walk? No   8. Do you or anyone in your family have previous history of blood clots? No   9.  Do you or does anyone in your family have a serious bleeding problem such as prolonged bleeding following surgeries or cuts? No   10. Have you ever had problems with anemia or been told to take iron pills? No   11. Have you had any abnormal blood loss such as black, tarry or bloody stools? No   12. Have you ever had a blood transfusion? No   13. Have you or any of your relatives ever had problems with  anesthesia? No   14. Do you have sleep apnea, excessive snoring or daytime drowsiness? YES - sleep apnea.    15. Do you have any prosthetic heart valves? No   16. Do you have prosthetic joints? No       HPI:                                                      Brief HPI related to upcoming procedure: He has a prostatic enlargement and he is going for cystoscopy and ablation of his prostate.      He has a sleep apnea and uses a CPAP machine.  He does not feel like he will be staying overnight.    He takes his blood pressure medication but feels anxious for his upcoming surgery.    MEDICAL HISTORY:                                                      Patient Active Problem List    Diagnosis Date Noted     MUNIRA (obstructive sleep apnea) 01/15/2018     Priority: Medium     Non morbid obesity, unspecified obesity type 09/26/2017     Priority: Medium     Hypertension goal BP (blood pressure) < 150/90 04/03/2017     Priority: Medium     Umbilical hernia 06/01/2013     Priority: Medium     Problem list name updated by automated process. Provider to review       Elevated PSA      Priority: Medium     Seasonal allergic rhinitis      Priority: Medium     Spring and Fall       Kidney stone      Priority: Medium     Doing ok now       Rotator cuff tear 12/16/2010     Priority: Medium     Hyperlipidemia LDL goal <130 08/14/2006     Priority: Medium     Idiopathic chronic gout 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      Priority: Medium     Problem list name updated by automated process. Provider to review        Past Medical History:   Diagnosis Date     Arthritis     Gout     CARDIOVASCULAR SCREENING; LDL GOAL LESS THAN 160 8/14/2006     Elevated PSA      Gout, unspecified      Hypertension      Kidney stone 2009    Doing ok now     Seasonal allergic rhinitis     Spring and Fall     Umbilical hernia without mention of obstruction or gangrene 6/2013     Past Surgical History:   Procedure Laterality Date     COLONOSCOPY  6/16/2006      COLONOSCOPY N/A 6/15/2016    Procedure: COLONOSCOPY;  Surgeon: Poly Sanchez MD;  Location: [facility]     HERNIORRHAPHY UMBILICAL  7/11/2013    Procedure: HERNIORRHAPHY UMBILICAL;  Open Umbilical Hernia Repair With Mesh ;  Surgeon: Sergio Tomas MD;  Location: [facility]     ROTATOR CUFF REPAIR RT/LT  5/19/2011    Left Shoulder     SURGICAL HISTORY OF -       ear operation as child     SURGICAL HISTORY OF -       removal of pseudogout deposits - Right knee     TONSILLECTOMY      Child     Current Outpatient Prescriptions   Medication Sig Dispense Refill     albuterol (PROAIR HFA/PROVENTIL HFA/VENTOLIN HFA) 108 (90 BASE) MCG/ACT Inhaler Inhale 2 puffs into the lungs every 6 hours as needed for shortness of breath / dyspnea or wheezing 1 Inhaler 0     indomethacin (INDOCIN) 50 MG capsule TAKE 1 CAPSULE BY MOUTH 3 TIMES DAILY WITH MEALS 40 capsule 2     allopurinol (ZYLOPRIM) 100 MG tablet Take 3 tablets (300 mg) by mouth daily 270 tablet 1     triamcinolone (KENALOG) 0.1 % cream Apply sparingly to affected area three times daily for 14 days. 45 g 0     hydrochlorothiazide (HYDRODIURIL) 25 MG tablet Take 1 tablet (25 mg) by mouth daily 90 tablet 3     atorvastatin (LIPITOR) 20 MG tablet Take 1 tablet (20 mg) by mouth daily 90 tablet 3     Omega-3 Fatty Acids (OMEGA-3 FISH OIL PO)        Multiple Vitamins-Minerals (MULTIVITAMIN PO)        OTC products: None, except as noted above    Allergies   Allergen Reactions     No Known Drug Allergies       Latex Allergy: NO    Social History   Substance Use Topics     Smoking status: Never Smoker     Smokeless tobacco: Never Used     Alcohol use No     History   Drug Use No       REVIEW OF SYSTEMS:                                                    Constitutional, neuro, ENT, endocrine, pulmonary, cardiac, gastrointestinal, genitourinary, musculoskeletal, integument and psychiatric systems are negative, except as otherwise noted.    EXAM:                                    "                 /72 (Cuff Size: Adult Regular)  Pulse 68  Temp 98.4  F (36.9  C) (Oral)  Resp 16  Ht 5' 10\" (1.778 m)  Wt 206 lb (93.4 kg)  SpO2 97%  BMI 29.56 kg/m2    GENERAL APPEARANCE: healthy, alert and no distress     EYES: EOMI,  PERRL     HENT: ear canals and TM's normal and nose and mouth without ulcers or lesions     NECK: no adenopathy, no asymmetry, masses, or scars and thyroid normal to palpation     RESP: lungs clear to auscultation - no rales, rhonchi or wheezes     CV: regular rates and rhythm, normal S1 S2, no S3 or S4       ABDOMEN: Obese, soft, nontender, no HSM or masses and bowel sounds normal     MS: extremities normal- no gross deformities noted, no evidence of inflammation in joints, FROM in all extremities.     SKIN: no suspicious lesions or rashes     NEURO: Normal strength and tone, sensory exam grossly normal, mentation intact and speech normal     PSYCH: mentation appears normal. and affect normal/bright    DIAGNOSTICS:                                                    EKG: appears normal, NSR, unchanged from previous tracings  Diffuse Twave abnormality.  Had lexiscan - nov 2017 -which was essentially normal.  Results for orders placed or performed in visit on 02/09/18   Hemoglobin   Result Value Ref Range    Hemoglobin 17.0 13.3 - 17.7 g/dL   BMP pending        Recent Labs   Lab Test  11/27/17   0613  09/26/17   1115  03/30/17   1601   HGB  15.6   --   16.3   PLT  207   --   200   NA  138  142   --    POTASSIUM  3.7  4.2   --    CR  1.08  1.06   --         IMPRESSION:                                                    Reason for surgery/procedure: Elevated PSA  Diagnosis/reason for consult: Preop    The proposed surgical procedure is considered INTERMEDIATE risk.    REVISED CARDIAC RISK INDEX  The patient has the following serious cardiovascular risks for perioperative complications such as (MI, PE, VFib and 3  AV Block):  No serious cardiac risks  INTERPRETATION: 0 " risks: Class I (very low risk - 0.4% complication rate)    The patient has the following additional risks for perioperative complications:   See below      ICD-10-CM    1. Pre-op exam Z01.818 EKG 12-lead complete w/read - Clinics     Hemoglobin     Comprehensive metabolic panel   2. Elevated PSA R97.20    3. Hypertension goal BP (blood pressure) < 150/90 I10 Comprehensive metabolic panel   4. MUNIRA (obstructive sleep apnea) G47.33        RECOMMENDATIONS:                                                      --Consult hospital rounder / IM to assist post-op medical management if any complications arises.      --His blood pressure is slightly on higher side and I recommended him to take his blood pressure medication on the day of his surgery.    --I recommended him to bring his CPAP machine if he needs to stay overnight at the hospital.    -I gave him written instructions to stop all the NSAIDs.-     APPROVAL GIVEN to proceed with proposed procedure, without further diagnostic evaluation       Signed Electronically by: Waldo Matson MD, MD    Copy of this evaluation report is provided to requesting physician.    [facility] Preop Guidelines

## 2018-02-09 NOTE — MR AVS SNAPSHOT
After Visit Summary   2/9/2018    Saul Hairston    MRN: [id number]           Patient Information     Date Of Birth          1945        Visit Information        Provider Department      2/9/2018 9:00 AM Waldo Matson MD [facility]        Today's Diagnoses     Pre-op exam    -  1    Elevated PSA        Hypertension goal BP (blood pressure) < 150/90        MUNIRA (obstructive sleep apnea)          Care Instructions      Before Your Surgery      Call your surgeon if there is any change in your health. This includes signs of a cold or flu (such as a sore throat, runny nose, cough, rash or fever).    Do not smoke, drink alcohol or take over the counter medicine (unless your surgeon or primary care doctor tells you to) for the 24 hours before and after surgery.    If you take prescribed drugs: Follow your doctor s orders about which medicines to take and which to stop until after surgery.    Eating and drinking prior to surgery: follow the instructions from your surgeon    Take a shower or bath the night before surgery. Use the soap your surgeon gave you to gently clean your skin. If you do not have soap from your surgeon, use your regular soap. Do not shave or scrub the surgery site.  Wear clean pajamas and have clean sheets on your bed.     -take your bp medication with tiny bit of water on the day of surgery.   - do not use indomethacin starting today. Do not use ibuprofen, aspirin, aleve type of drugs.   - tylenol is fine if needed.           Follow-ups after your visit        Your next 10 appointments already scheduled     Feb 13, 2018   Procedure with Aj Caal MD   [facility] (--)    [address] [place] [address] [address], [address]  [facility] [address]   [phone]            Apr 16, 2018 10:30 AM CDT   Return Sleep Patient with Bennett Ezra Goltz, PA-C   [facility] ([facility])    [address]  [address]  [facility]  [phone]              Who to contact     If you have questions or need follow up information about today's clinic visit or your schedule please contact [facility] directly at [phone].  Normal or non-critical lab and imaging results will be communicated to you by MyChart, letter or phone within 4 business days after the clinic has received the results. If you do not hear from us within 7 days, please contact the clinic through [facility] or phone. If you have a critical or abnormal lab result, we will notify you by phone as soon as possible.  Submit refill requests through [facility] or call your pharmacy and they will forward the refill request to us. Please allow 3 business days for your refill to be completed.          Additional Information About Your Visit        [facility] Information     [facility] gives you secure access to your electronic health record. If you see a primary care provider, you can also send messages to your care team and make appointments. If you have questions, please call your primary care clinic.  If you do not have a primary care provider, please call [phone] and they will assist you.        Care EveryWhere ID     This is your Care EveryWhere ID. This could be used by other organizations to access your [facility] medical records  [id number]        Your Vitals Were     Pulse Temperature Respirations Height Pulse Oximetry BMI (Body Mass Index)    68 98.4  F (36.9  C) (Oral) 16 5' 10\" (1.778 m) 97% 29.56 kg/m2       Blood Pressure from Last 3 Encounters:   02/09/18 150/72   01/15/18 (!) 164/92   12/04/17 138/83    Weight from Last 3 Encounters:   02/09/18 206 lb (93.4 kg)   01/15/18 213 lb 3.2 oz (96.7 kg)   12/04/17 213 lb (96.6 kg)              We Performed the Following     Comprehensive metabolic panel     EKG 12-lead complete w/read - Clinics     Hemoglobin        Primary Care Provider Office Phone # Fax #    Waldo Matson [phone] " [phone]       [address] [place] [phone]        Equal Access to Services     ANAGIFTY ISIS : Hadii marybeth kinney hadwallacejason Soantoniaali, waaxda luqadaha, qaybta kaamilcarda lizandrorebeccamarci, luis e delong vichandu bonejulita elainepaulaarsalan yang. So [facility] [phone].    ATENCIÓN: Si habla español, tiene a dubon disposición servicios gratuitos de asistencia lingüística. Madaiame al [phone].    We comply with applicable federal civil rights laws and [place] laws. We do not discriminate on the basis of race, color, national origin, age, disability, sex, sexual orientation, or gender identity.            Thank you!     Thank you for choosing [facility]  for your care. Our goal is always to provide you with excellent care. Hearing back from our patients is one way we can continue to improve our services. Please take a few minutes to complete the written survey that you may receive in the mail after your visit with us. Thank you!             Your Updated Medication List - Protect others around you: Learn how to safely use, store and throw away your medicines at www.disposemymeds.org.          This list is accurate as of 2/9/18  9:38 AM.  Always use your most recent med list.                   Brand Name Dispense Instructions for use Diagnosis    albuterol 108 (90 BASE) MCG/ACT Inhaler    PROAIR HFA/PROVENTIL HFA/VENTOLIN HFA    1 Inhaler    Inhale 2 puffs into the lungs every 6 hours as needed for shortness of breath / dyspnea or wheezing    Shortness of breath       allopurinol 100 MG tablet    ZYLOPRIM    270 tablet    Take 3 tablets (300 mg) by mouth daily    Idiopathic chronic gout 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       atorvastatin 20 MG tablet    LIPITOR    90 tablet    Take 1 tablet (20 mg) by mouth daily    Hyperlipidemia LDL goal <130       hydrochlorothiazide 25 MG tablet    HYDRODIURIL    90 tablet    Take 1 tablet (25 mg) by mouth daily    Benign essential hypertension       indomethacin 50 MG capsule    INDOCIN    40  capsule    TAKE 1 CAPSULE BY MOUTH 3 TIMES DAILY WITH MEALS    Idiopathic chronic gout 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       MULTIVITAMIN PO           OMEGA-3 FISH OIL PO           triamcinolone 0.1 % cream    KENALOG    45 g    Apply sparingly to affected area three times daily for 14 days.    Dermatitis

## 2018-02-09 NOTE — PATIENT INSTRUCTIONS
Before Your Surgery      Call your surgeon if there is any change in your health. This includes signs of a cold or flu (such as a sore throat, runny nose, cough, rash or fever).    Do not smoke, drink alcohol or take over the counter medicine (unless your surgeon or primary care doctor tells you to) for the 24 hours before and after surgery.    If you take prescribed drugs: Follow your doctor s orders about which medicines to take and which to stop until after surgery.    Eating and drinking prior to surgery: follow the instructions from your surgeon    Take a shower or bath the night before surgery. Use the soap your surgeon gave you to gently clean your skin. If you do not have soap from your surgeon, use your regular soap. Do not shave or scrub the surgery site.  Wear clean pajamas and have clean sheets on your bed.     -take your bp medication with tiny bit of water on the day of surgery.   - do not use indomethacin starting today. Do not use ibuprofen, aspirin, aleve type of drugs.   - tylenol is fine if needed.

## 2018-02-09 NOTE — H&P (VIEW-ONLY)
[place] [address] [place] [address] [id number] [phone]  Dept: [phone]    PRE-OP EVALUATION:  Today's date: 2018    Saul Hairston (: 1945) presents for pre-operative evaluation assessment as requested by Aj Celis MD.  He requires evaluation and anesthesia risk assessment prior to undergoing surgery/procedure for treatment of FLEXIBLE CYSTOSCOPY, CYROABLATION OF PROSTATE  .  Proposed procedure: CYSTOSCOPY FLEXIBLE, CRYOABLATION PROSTATE    Date of Surgery/ Procedure: 2018  Time of Surgery/ Procedure: 9:20am  Hospital/Surgical Facility: [facility]  Fax number for surgical facility:   Primary Physician: Waldo Matson  Type of Anesthesia Anticipated: General    Patient has a Health Care Directive or Living Will:  NO    Preop Questions 2018   1.  Do you have a history of heart attack, stroke, stent, bypass or surgery on an artery in the head, neck, heart or legs? No   2.  Do you ever have any pain or discomfort in your chest? YES - not currently.   3.  Do you have a history of  Heart Failure? No   4.   Are you troubled by shortness of breath when:  walking on a level surface, or up a slight hill, or at night? YES -  Not currently.   5.  Do you currently have a cold, bronchitis or other respiratory infection? No   6.  Do you have a cough, shortness of breath, or wheezing? No   7.  Do you sometimes get pains in the calves of your legs when you walk? No   8. Do you or anyone in your family have previous history of blood clots? No   9.  Do you or does anyone in your family have a serious bleeding problem such as prolonged bleeding following surgeries or cuts? No   10. Have you ever had problems with anemia or been told to take iron pills? No   11. Have you had any abnormal blood loss such as black, tarry or bloody stools? No   12. Have you ever had a blood transfusion? No   13. Have you or any of your relatives ever had problems with  anesthesia? No   14. Do you have sleep apnea, excessive snoring or daytime drowsiness? YES - sleep apnea.    15. Do you have any prosthetic heart valves? No   16. Do you have prosthetic joints? No       HPI:                                                      Brief HPI related to upcoming procedure: He has a prostatic enlargement and he is going for cystoscopy and ablation of his prostate.      He has a sleep apnea and uses a CPAP machine.  He does not feel like he will be staying overnight.    He takes his blood pressure medication but feels anxious for his upcoming surgery.    MEDICAL HISTORY:                                                      Patient Active Problem List    Diagnosis Date Noted     MUNIRA (obstructive sleep apnea) 01/15/2018     Priority: Medium     Non morbid obesity, unspecified obesity type 09/26/2017     Priority: Medium     Hypertension goal BP (blood pressure) < 150/90 04/03/2017     Priority: Medium     Umbilical hernia 06/01/2013     Priority: Medium     Problem list name updated by automated process. Provider to review       Elevated PSA      Priority: Medium     Seasonal allergic rhinitis      Priority: Medium     Spring and Fall       Kidney stone      Priority: Medium     Doing ok now       Rotator cuff tear 12/16/2010     Priority: Medium     Hyperlipidemia LDL goal <130 08/14/2006     Priority: Medium     Idiopathic chronic gout 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      Priority: Medium     Problem list name updated by automated process. Provider to review        Past Medical History:   Diagnosis Date     Arthritis     Gout     CARDIOVASCULAR SCREENING; LDL GOAL LESS THAN 160 8/14/2006     Elevated PSA      Gout, unspecified      Hypertension      Kidney stone 2009    Doing ok now     Seasonal allergic rhinitis     Spring and Fall     Umbilical hernia without mention of obstruction or gangrene 6/2013     Past Surgical History:   Procedure Laterality Date     COLONOSCOPY  6/16/2006      COLONOSCOPY N/A 6/15/2016    Procedure: COLONOSCOPY;  Surgeon: Poly Sanchez MD;  Location: [facility]     HERNIORRHAPHY UMBILICAL  7/11/2013    Procedure: HERNIORRHAPHY UMBILICAL;  Open Umbilical Hernia Repair With Mesh ;  Surgeon: Sergio Tomas MD;  Location: [facility]     ROTATOR CUFF REPAIR RT/LT  5/19/2011    Left Shoulder     SURGICAL HISTORY OF -       ear operation as child     SURGICAL HISTORY OF -       removal of pseudogout deposits - Right knee     TONSILLECTOMY      Child     Current Outpatient Prescriptions   Medication Sig Dispense Refill     albuterol (PROAIR HFA/PROVENTIL HFA/VENTOLIN HFA) 108 (90 BASE) MCG/ACT Inhaler Inhale 2 puffs into the lungs every 6 hours as needed for shortness of breath / dyspnea or wheezing 1 Inhaler 0     indomethacin (INDOCIN) 50 MG capsule TAKE 1 CAPSULE BY MOUTH 3 TIMES DAILY WITH MEALS 40 capsule 2     allopurinol (ZYLOPRIM) 100 MG tablet Take 3 tablets (300 mg) by mouth daily 270 tablet 1     triamcinolone (KENALOG) 0.1 % cream Apply sparingly to affected area three times daily for 14 days. 45 g 0     hydrochlorothiazide (HYDRODIURIL) 25 MG tablet Take 1 tablet (25 mg) by mouth daily 90 tablet 3     atorvastatin (LIPITOR) 20 MG tablet Take 1 tablet (20 mg) by mouth daily 90 tablet 3     Omega-3 Fatty Acids (OMEGA-3 FISH OIL PO)        Multiple Vitamins-Minerals (MULTIVITAMIN PO)        OTC products: None, except as noted above    Allergies   Allergen Reactions     No Known Drug Allergies       Latex Allergy: NO    Social History   Substance Use Topics     Smoking status: Never Smoker     Smokeless tobacco: Never Used     Alcohol use No     History   Drug Use No       REVIEW OF SYSTEMS:                                                    Constitutional, neuro, ENT, endocrine, pulmonary, cardiac, gastrointestinal, genitourinary, musculoskeletal, integument and psychiatric systems are negative, except as otherwise noted.    EXAM:                                    "                 /72 (Cuff Size: Adult Regular)  Pulse 68  Temp 98.4  F (36.9  C) (Oral)  Resp 16  Ht 5' 10\" (1.778 m)  Wt 206 lb (93.4 kg)  SpO2 97%  BMI 29.56 kg/m2    GENERAL APPEARANCE: healthy, alert and no distress     EYES: EOMI,  PERRL     HENT: ear canals and TM's normal and nose and mouth without ulcers or lesions     NECK: no adenopathy, no asymmetry, masses, or scars and thyroid normal to palpation     RESP: lungs clear to auscultation - no rales, rhonchi or wheezes     CV: regular rates and rhythm, normal S1 S2, no S3 or S4       ABDOMEN: Obese, soft, nontender, no HSM or masses and bowel sounds normal     MS: extremities normal- no gross deformities noted, no evidence of inflammation in joints, FROM in all extremities.     SKIN: no suspicious lesions or rashes     NEURO: Normal strength and tone, sensory exam grossly normal, mentation intact and speech normal     PSYCH: mentation appears normal. and affect normal/bright    DIAGNOSTICS:                                                    EKG: appears normal, NSR, unchanged from previous tracings  Diffuse Twave abnormality.  Had lexiscan - nov 2017 -which was essentially normal.  Results for orders placed or performed in visit on 02/09/18   Hemoglobin   Result Value Ref Range    Hemoglobin 17.0 13.3 - 17.7 g/dL   BMP pending        Recent Labs   Lab Test  11/27/17   0613  09/26/17   1115  03/30/17   1601   HGB  15.6   --   16.3   PLT  207   --   200   NA  138  142   --    POTASSIUM  3.7  4.2   --    CR  1.08  1.06   --         IMPRESSION:                                                    Reason for surgery/procedure: Elevated PSA  Diagnosis/reason for consult: Preop    The proposed surgical procedure is considered INTERMEDIATE risk.    REVISED CARDIAC RISK INDEX  The patient has the following serious cardiovascular risks for perioperative complications such as (MI, PE, VFib and 3  AV Block):  No serious cardiac risks  INTERPRETATION: 0 " risks: Class I (very low risk - 0.4% complication rate)    The patient has the following additional risks for perioperative complications:   See below      ICD-10-CM    1. Pre-op exam Z01.818 EKG 12-lead complete w/read - Clinics     Hemoglobin     Comprehensive metabolic panel   2. Elevated PSA R97.20    3. Hypertension goal BP (blood pressure) < 150/90 I10 Comprehensive metabolic panel   4. MUNIRA (obstructive sleep apnea) G47.33        RECOMMENDATIONS:                                                      --Consult hospital rounder / IM to assist post-op medical management if any complications arises.      --His blood pressure is slightly on higher side and I recommended him to take his blood pressure medication on the day of his surgery.    --I recommended him to bring his CPAP machine if he needs to stay overnight at the hospital.    -I gave him written instructions to stop all the NSAIDs.-     APPROVAL GIVEN to proceed with proposed procedure, without further diagnostic evaluation       Signed Electronically by: Waldo Matson MD, MD    Copy of this evaluation report is provided to requesting physician.    [facility] Preop Guidelines

## 2018-02-10 LAB
ALBUMIN SERPL-MCNC: 4.7 G/DL (ref 3.4–5)
ALP SERPL-CCNC: 115 U/L (ref 40–150)
ALT SERPL W P-5'-P-CCNC: 62 U/L (ref 0–70)
ANION GAP SERPL CALCULATED.3IONS-SCNC: 13 MMOL/L (ref 3–14)
AST SERPL W P-5'-P-CCNC: 40 U/L (ref 0–45)
BILIRUB SERPL-MCNC: 0.4 MG/DL (ref 0.2–1.3)
BUN SERPL-MCNC: 24 MG/DL (ref 7–30)
CALCIUM SERPL-MCNC: 9.6 MG/DL (ref 8.5–10.1)
CHLORIDE SERPL-SCNC: 107 MMOL/L (ref 94–109)
CO2 SERPL-SCNC: 22 MMOL/L (ref 20–32)
CREAT SERPL-MCNC: 1.09 MG/DL (ref 0.66–1.25)
GFR SERPL CREATININE-BSD FRML MDRD: 67 ML/MIN/1.7M2
GLUCOSE SERPL-MCNC: 82 MG/DL (ref 70–99)
POTASSIUM SERPL-SCNC: 3.9 MMOL/L (ref 3.4–5.3)
PROT SERPL-MCNC: 7.6 G/DL (ref 6.8–8.8)
SODIUM SERPL-SCNC: 142 MMOL/L (ref 133–144)

## 2018-02-13 ENCOUNTER — HOSPITAL ENCOUNTER (OUTPATIENT)
Facility: CLINIC | Age: 73
Discharge: HOME | End: 2018-02-13
Attending: UROLOGY | Admitting: UROLOGY
Payer: MEDICARE

## 2018-02-13 ENCOUNTER — ANESTHESIA EVENT (OUTPATIENT)
Dept: SURGERY | Facility: CLINIC | Age: 73
End: 2018-02-13
Payer: MEDICARE

## 2018-02-13 ENCOUNTER — ANESTHESIA (OUTPATIENT)
Dept: SURGERY | Facility: CLINIC | Age: 73
End: 2018-02-13
Payer: MEDICARE

## 2018-02-13 VITALS
WEIGHT: 199.3 LBS | SYSTOLIC BLOOD PRESSURE: 138 MMHG | HEIGHT: 71 IN | BODY MASS INDEX: 27.9 KG/M2 | DIASTOLIC BLOOD PRESSURE: 81 MMHG | RESPIRATION RATE: 16 BRPM | TEMPERATURE: 97.9 F | HEART RATE: 74 BPM | OXYGEN SATURATION: 95 %

## 2018-02-13 DIAGNOSIS — C61 PROSTATE CA (H): Primary | ICD-10-CM

## 2018-02-13 PROCEDURE — 36000075 ZZH SURGERY LEVEL 6 EA 15 ADDTL MIN: Performed by: UROLOGY

## 2018-02-13 PROCEDURE — 25000128 H RX IP 250 OP 636: Performed by: NURSE ANESTHETIST, CERTIFIED REGISTERED

## 2018-02-13 PROCEDURE — 40000170 ZZH STATISTIC PRE-PROCEDURE ASSESSMENT II: Performed by: UROLOGY

## 2018-02-13 PROCEDURE — 25000125 ZZHC RX 250: Performed by: NURSE ANESTHETIST, CERTIFIED REGISTERED

## 2018-02-13 PROCEDURE — 27210995 ZZH RX 272: Performed by: UROLOGY

## 2018-02-13 PROCEDURE — 36000073 ZZH SURGERY LEVEL 6 1ST 30 MIN: Performed by: UROLOGY

## 2018-02-13 PROCEDURE — 71000013 ZZH RECOVERY PHASE 1 LEVEL 1 EA ADDTL HR: Performed by: UROLOGY

## 2018-02-13 PROCEDURE — 37000008 ZZH ANESTHESIA TECHNICAL FEE, 1ST 30 MIN: Performed by: UROLOGY

## 2018-02-13 PROCEDURE — 25000128 H RX IP 250 OP 636: Performed by: ANESTHESIOLOGY

## 2018-02-13 PROCEDURE — 25000566 ZZH SEVOFLURANE, EA 15 MIN: Performed by: UROLOGY

## 2018-02-13 PROCEDURE — 71000027 ZZH RECOVERY PHASE 2 EACH 15 MINS: Performed by: UROLOGY

## 2018-02-13 PROCEDURE — A9270 NON-COVERED ITEM OR SERVICE: HCPCS | Performed by: UROLOGY

## 2018-02-13 PROCEDURE — 27210794 ZZH OR GENERAL SUPPLY STERILE: Performed by: UROLOGY

## 2018-02-13 PROCEDURE — 25800025 ZZH RX 258: Performed by: UROLOGY

## 2018-02-13 PROCEDURE — 25000125 ZZHC RX 250: Performed by: UROLOGY

## 2018-02-13 PROCEDURE — 37000009 ZZH ANESTHESIA TECHNICAL FEE, EACH ADDTL 15 MIN: Performed by: UROLOGY

## 2018-02-13 PROCEDURE — C2618 PROBE/NEEDLE, CRYO: HCPCS | Performed by: UROLOGY

## 2018-02-13 PROCEDURE — A9270 NON-COVERED ITEM OR SERVICE: HCPCS | Mod: GY | Performed by: UROLOGY

## 2018-02-13 PROCEDURE — 25000132 ZZH RX MED GY IP 250 OP 250 PS 637: Mod: GY | Performed by: UROLOGY

## 2018-02-13 PROCEDURE — C1769 GUIDE WIRE: HCPCS | Performed by: UROLOGY

## 2018-02-13 PROCEDURE — 25000128 H RX IP 250 OP 636: Performed by: UROLOGY

## 2018-02-13 PROCEDURE — 71000012 ZZH RECOVERY PHASE 1 LEVEL 1 FIRST HR: Performed by: UROLOGY

## 2018-02-13 RX ORDER — CIPROFLOXACIN 500 MG/1
500 TABLET, FILM COATED ORAL 2 TIMES DAILY
Qty: 14 TABLET | Refills: 0 | Status: SHIPPED | OUTPATIENT
Start: 2018-02-13 | End: 2018-03-07

## 2018-02-13 RX ORDER — ONDANSETRON 2 MG/ML
4 INJECTION INTRAMUSCULAR; INTRAVENOUS EVERY 30 MIN PRN
Status: DISCONTINUED | OUTPATIENT
Start: 2018-02-13 | End: 2018-02-13 | Stop reason: HOSPADM

## 2018-02-13 RX ORDER — GENTAMICIN SULFATE 80 MG/100ML
80 INJECTION, SOLUTION INTRAVENOUS
Status: COMPLETED | OUTPATIENT
Start: 2018-02-13 | End: 2018-02-13

## 2018-02-13 RX ORDER — FENTANYL CITRATE 50 UG/ML
25-50 INJECTION, SOLUTION INTRAMUSCULAR; INTRAVENOUS
Status: DISCONTINUED | OUTPATIENT
Start: 2018-02-13 | End: 2018-02-13 | Stop reason: HOSPADM

## 2018-02-13 RX ORDER — HYDROCODONE BITARTRATE AND ACETAMINOPHEN 5; 325 MG/1; MG/1
1 TABLET ORAL ONCE
Status: COMPLETED | OUTPATIENT
Start: 2018-02-13 | End: 2018-02-13

## 2018-02-13 RX ORDER — ONDANSETRON 4 MG/1
4 TABLET, ORALLY DISINTEGRATING ORAL EVERY 30 MIN PRN
Status: DISCONTINUED | OUTPATIENT
Start: 2018-02-13 | End: 2018-02-13 | Stop reason: HOSPADM

## 2018-02-13 RX ORDER — SODIUM CHLORIDE, SODIUM LACTATE, POTASSIUM CHLORIDE, CALCIUM CHLORIDE 600; 310; 30; 20 MG/100ML; MG/100ML; MG/100ML; MG/100ML
INJECTION, SOLUTION INTRAVENOUS CONTINUOUS
Status: DISCONTINUED | OUTPATIENT
Start: 2018-02-13 | End: 2018-02-13 | Stop reason: HOSPADM

## 2018-02-13 RX ORDER — FUROSEMIDE 10 MG/ML
INJECTION INTRAMUSCULAR; INTRAVENOUS PRN
Status: DISCONTINUED | OUTPATIENT
Start: 2018-02-13 | End: 2018-02-13

## 2018-02-13 RX ORDER — DEXAMETHASONE SODIUM PHOSPHATE 4 MG/ML
INJECTION, SOLUTION INTRA-ARTICULAR; INTRALESIONAL; INTRAMUSCULAR; INTRAVENOUS; SOFT TISSUE PRN
Status: DISCONTINUED | OUTPATIENT
Start: 2018-02-13 | End: 2018-02-13

## 2018-02-13 RX ORDER — SODIUM CHLORIDE, SODIUM LACTATE, POTASSIUM CHLORIDE, CALCIUM CHLORIDE 600; 310; 30; 20 MG/100ML; MG/100ML; MG/100ML; MG/100ML
INJECTION, SOLUTION INTRAVENOUS CONTINUOUS PRN
Status: DISCONTINUED | OUTPATIENT
Start: 2018-02-13 | End: 2018-02-13

## 2018-02-13 RX ORDER — FENTANYL CITRATE 50 UG/ML
INJECTION, SOLUTION INTRAMUSCULAR; INTRAVENOUS PRN
Status: DISCONTINUED | OUTPATIENT
Start: 2018-02-13 | End: 2018-02-13

## 2018-02-13 RX ORDER — LIDOCAINE HYDROCHLORIDE 20 MG/ML
INJECTION, SOLUTION INFILTRATION; PERINEURAL PRN
Status: DISCONTINUED | OUTPATIENT
Start: 2018-02-13 | End: 2018-02-13

## 2018-02-13 RX ORDER — ONDANSETRON 2 MG/ML
INJECTION INTRAMUSCULAR; INTRAVENOUS PRN
Status: DISCONTINUED | OUTPATIENT
Start: 2018-02-13 | End: 2018-02-13

## 2018-02-13 RX ORDER — PROPOFOL 10 MG/ML
INJECTION, EMULSION INTRAVENOUS CONTINUOUS PRN
Status: DISCONTINUED | OUTPATIENT
Start: 2018-02-13 | End: 2018-02-13

## 2018-02-13 RX ORDER — EPHEDRINE SULFATE 50 MG/ML
INJECTION, SOLUTION INTRAMUSCULAR; INTRAVENOUS; SUBCUTANEOUS PRN
Status: DISCONTINUED | OUTPATIENT
Start: 2018-02-13 | End: 2018-02-13

## 2018-02-13 RX ORDER — HYDROCODONE BITARTRATE AND ACETAMINOPHEN 5; 325 MG/1; MG/1
1-2 TABLET ORAL EVERY 4 HOURS PRN
Qty: 30 TABLET | Refills: 0 | Status: SHIPPED | OUTPATIENT
Start: 2018-02-13 | End: 2018-03-07

## 2018-02-13 RX ORDER — PROPOFOL 10 MG/ML
INJECTION, EMULSION INTRAVENOUS PRN
Status: DISCONTINUED | OUTPATIENT
Start: 2018-02-13 | End: 2018-02-13

## 2018-02-13 RX ORDER — HYDROMORPHONE HYDROCHLORIDE 1 MG/ML
.3-.5 INJECTION, SOLUTION INTRAMUSCULAR; INTRAVENOUS; SUBCUTANEOUS EVERY 5 MIN PRN
Status: DISCONTINUED | OUTPATIENT
Start: 2018-02-13 | End: 2018-02-13 | Stop reason: HOSPADM

## 2018-02-13 RX ORDER — NALOXONE HYDROCHLORIDE 0.4 MG/ML
.1-.4 INJECTION, SOLUTION INTRAMUSCULAR; INTRAVENOUS; SUBCUTANEOUS
Status: DISCONTINUED | OUTPATIENT
Start: 2018-02-13 | End: 2018-02-13 | Stop reason: HOSPADM

## 2018-02-13 RX ADMIN — ONDANSETRON 4 MG: 2 INJECTION INTRAMUSCULAR; INTRAVENOUS at 10:18

## 2018-02-13 RX ADMIN — Medication 5 MG: at 09:44

## 2018-02-13 RX ADMIN — GENTAMICIN SULFATE 80 MG: 80 INJECTION, SOLUTION INTRAVENOUS at 09:23

## 2018-02-13 RX ADMIN — FENTANYL CITRATE 50 MCG: 50 INJECTION, SOLUTION INTRAMUSCULAR; INTRAVENOUS at 11:35

## 2018-02-13 RX ADMIN — FENTANYL CITRATE 50 MCG: 50 INJECTION, SOLUTION INTRAMUSCULAR; INTRAVENOUS at 09:32

## 2018-02-13 RX ADMIN — DEXAMETHASONE SODIUM PHOSPHATE 4 MG: 4 INJECTION, SOLUTION INTRA-ARTICULAR; INTRALESIONAL; INTRAMUSCULAR; INTRAVENOUS; SOFT TISSUE at 09:38

## 2018-02-13 RX ADMIN — HYDROCODONE BITARTRATE AND ACETAMINOPHEN 1 TABLET: 5; 325 TABLET ORAL at 12:52

## 2018-02-13 RX ADMIN — PROPOFOL 200 MCG/KG/MIN: 10 INJECTION, EMULSION INTRAVENOUS at 09:20

## 2018-02-13 RX ADMIN — PROPOFOL 200 MG: 10 INJECTION, EMULSION INTRAVENOUS at 09:19

## 2018-02-13 RX ADMIN — LIDOCAINE HYDROCHLORIDE 100 MG: 20 INJECTION, SOLUTION INFILTRATION; PERINEURAL at 09:19

## 2018-02-13 RX ADMIN — FENTANYL CITRATE 50 MCG: 50 INJECTION, SOLUTION INTRAMUSCULAR; INTRAVENOUS at 09:23

## 2018-02-13 RX ADMIN — SODIUM CHLORIDE, POTASSIUM CHLORIDE, SODIUM LACTATE AND CALCIUM CHLORIDE: 600; 310; 30; 20 INJECTION, SOLUTION INTRAVENOUS at 09:13

## 2018-02-13 RX ADMIN — FUROSEMIDE 10 MG: 10 INJECTION, SOLUTION INTRAVENOUS at 10:36

## 2018-02-13 RX ADMIN — HYDROMORPHONE HYDROCHLORIDE 0.5 MG: 1 INJECTION, SOLUTION INTRAMUSCULAR; INTRAVENOUS; SUBCUTANEOUS at 12:07

## 2018-02-13 RX ADMIN — FENTANYL CITRATE 50 MCG: 50 INJECTION, SOLUTION INTRAMUSCULAR; INTRAVENOUS at 11:14

## 2018-02-13 ASSESSMENT — LIFESTYLE VARIABLES: TOBACCO_USE: 0

## 2018-02-13 NOTE — IP AVS SNAPSHOT
MRN:[id number]                      After Visit Summary   2/13/2018    Saul Hairston    MRN: [id number]           Thank you!     Thank you for choosing [facility] for your care. Our goal is always to provide you with excellent care. Hearing back from our patients is one way we can continue to improve our services. Please take a few minutes to complete the written survey that you may receive in the mail after you visit with us. Thank you!        Patient Information     Date Of Birth          1945        About your hospital stay     You were admitted on:  February 13, 2018 You last received care in the:  [facility] PACU    You were discharged on:  February 13, 2018       Who to Call     For medical emergencies, please call 911.  For non-urgent questions about your medical care, please call your primary care provider or clinic, [phone]  For questions related to your surgery, please call your surgery clinic        Attending Provider     Provider Specialty    Aj Caal MD Urology       Primary Care Provider Office Phone # Fax #    Waldo Brennan Matson [phone]      After Care Instructions     Discharge Instructions       Patient to arrange for follow up appointment in 1weeks. Call for appt [phone]            Encourage fluids       Encourage fluids at home to keep urine clear to light pink            Encourage fluids       Encourage fluids at home to keep urine clear to light pink            No Aspirin, Ibuprofen or Naproxen products       for 7 - 10 days following surgery                  Your next 10 appointments already scheduled     Apr 16, 2018 10:30 AM CDT   Return Sleep Patient with Bennett Ezra Goltz, PA-C   [facility] ([facility])    [address] [id number] [phone]              Further instructions from your care team       Same Day Surgery Discharge Instructions  for  Sedation and General Anesthesia       It's not unusual to feel dizzy, light-headed or faint for up to 24 hours after surgery or while taking pain medication.  If you have these symptoms: sit for a few minutes before standing and have someone assist you when you get up to walk or use the bathroom.      You should rest and relax for the next 24 hours. We recommend you make arrangements to have an adult stay with you for at least 24 hours after your discharge.  Avoid hazardous and strenuous activity.      DO NOT DRIVE any vehicle or operate mechanical equipment for 24 hours following the end of your surgery.  Even though you may feel normal, your reactions may be affected by the medication you have received.      Do not drink alcoholic beverages for 24 hours following surgery.       Slowly progress to your regular diet as you feel able. It's not unusual to feel nauseated and/or vomit after receiving anesthesia.  If you develop these symptoms, drink clear liquids (apple juice, ginger ale, broth, 7-up, etc. ) until you feel better.  If your nausea and vomiting persists for 24 hours, please notify your surgeon.        All narcotic pain medications, along with inactivity and anesthesia, can cause constipation. Drinking plenty of liquids and increasing fiber intake will help.      For any questions of a medical nature, call your surgeon.      Do not make important decisions for 24 hours.      If you had general anesthesia, you may have a sore throat for a couple of days related to the breathing tube used during surgery.  You may use Cepacol lozenges to help with this discomfort.  If it worsens or if you develop a fever, contact your surgeon.       If you feel your pain is not well managed with the pain medications prescribed by your surgeon, please contact your surgeon's office to let them know so they can address your concerns.         KEEP URINE CATETHER IN FOR 1 WEEK, FOLLOW UP WITH DR NEVILLE IN 1 WEEK FOR CATHETER  REMOVAL.   LEAVE TEGADERM DRESSING ON FOR 3 DAYS    DISCHARGE INSTRUCTIONS FOR   CATHETER CARE AT HOME      .      Basic Catheter Care  1. Always wash hands before and after handling your catheter.  2. Use soap and water to wash the area around your catheter.  3. Do this procedure twice a day.  4. Proper cleansing will help keep the area from becoming irritated or infected.    Leg Bag  This is a small plastic bag that collects urine draining from your catheter and then strapped around your thigh. It will need to be emptied when the bag is 1/2 to 3/4 full.     Large Drainage Bag  1. This bag is larger than the leg bag and holds more urine.  It is to be used while at home, especially at night.    2. Before you go to bed, change the leg bag to the large drainage bag.  3. Pinch off the catheter with your fingers and swab the connection between the catheter and leg bag with alcohol sponge.  4. Disconnect the leg bag and connect the large drainage bag to your catheter.  5. When you get into bed, arrange the drainage tubing so that it doesn t kink.  6. Be sure to keep the bag below the level of your bladder and allow enough slack for turning.    Cleaning Your Drainage Bags    1. Wash hands.  2. Using funnel or syringe, fill the bag half full with a solution of 1/2  vinegar and 1/2 water.  3. Shake bag, allowing mixture to cleanse inside of bag.  4. Empty out all vinegar and water mixture from your bag.  5. Hang bag to dry when not in use.  6. Clean your bags anytime you change them.      Helpful Hints  1. Always keep drainage bags below bladder level to insure adequate drainage.  2. Drink 4-6 glasses of water daily along with other fluids you normally drink to keep urine free of infection and / or clots.  3. If you notice no urine in your bag for 2 to 4 hours or you develop extreme discomfort in bladder area, your catheter maybe plugged.  Notify your doctor.  4. If you notice your urine becomes foul smelling and cloudy,  "notify your doctor.  Also notify your doctor if you develop fever or chills.  5. If you notice urine leaking around the outside of the catheter, check to be sure catheter or tubing is not kinked.  6. Don t use leg bag while in bed.        Discharge Instructions following Cryoblation of the Prostate  [facility]    Diet:    Diet as tolerated.  Drink at least 6 glasses of liquid per day.  Activity:    No heavy lifting or strenuous activity until approved by surgeon.    Short walks and stair climbing are permissible.  Care After Surgery    Do not hold urine in your bladder.  Always empty your bladder when you have the urge to urinate.    Do not strain to have a bowel movement.  If constipated, take over-the-counter stool softeners (follow directions on package).    Do not drive a car or have intercourse until approved by your surgeon.    It is not unusual to pass small clots or to have red-tinged urine.  If this occurs, decrease activity and increase your fluid intake.  o You may expect to have some blood for at least 3-4 weeks, especially at the beginning or end of urination.  If there is dark, thick blood with difficulty urinating call your surgeon.  **If you have questions or concerns about your procedure,  call Dr. Caal at [phone]**      Pending Results     No orders found from 2/11/2018 to 2/14/2018.            Admission Information     Date & Time Provider Department Dept. Phone    2/13/2018 Aj Caal MD [facility] PACU [phone]      Your Vitals Were     Blood Pressure Pulse Temperature Respirations Height Weight    128/74 74 97.3  F (36.3  C) 14 1.803 m (5' 11\") 90.4 kg (199 lb 4.8 oz)    Pulse Oximetry BMI (Body Mass Index)                92% 27.8 kg/m2          [facility] Information     [facility] gives you secure access to your electronic health record. If you see a primary care provider, you can also send messages to your care team and make appointments. If you have " questions, please call your primary care clinic.  If you do not have a primary care provider, please call [phone] and they will assist you.        Care EveryWhere ID     This is your Care EveryWhere ID. This could be used by other organizations to access your [facility] medical records  [id number]        Equal Access to Services     OLGA MARINELLI : Dayan veeo Soantoniaali, waaxda luqadaha, qaybta kaalmada jaya, luis e yang. So [facility] [phone].    ATENCIÓN: Si habla español, tiene a dubon disposición servicios gratuitos de asistencia lingüística. [facility] [phone].    We comply with applicable federal civil rights laws and [place] laws. We do not discriminate on the basis of race, color, national origin, age, disability, sex, sexual orientation, or gender identity.               Review of your medicines      START taking        Dose / Directions    ciprofloxacin 500 MG tablet   Commonly known as:  CIPRO   Used for:  Prostate CA (H)        Dose:  500 mg   Take 1 tablet (500 mg) by mouth 2 times daily   Quantity:  14 tablet   Refills:  0       HYDROcodone-acetaminophen 5-325 MG per tablet   Commonly known as:  NORCO   Used for:  Prostate CA (H)   Notes to Patient:  One tablet taken at 12:52pm        Dose:  1-2 tablet   Take 1-2 tablets by mouth every 4 hours as needed for moderate to severe pain (Moderate to Severe Pain)   Quantity:  30 tablet   Refills:  0         CONTINUE these medicines which have NOT CHANGED        Dose / Directions    albuterol 108 (90 BASE) MCG/ACT Inhaler   Commonly known as:  PROAIR HFA/PROVENTIL HFA/VENTOLIN HFA   Used for:  Shortness of breath        Dose:  2 puff   Inhale 2 puffs into the lungs every 6 hours as needed for shortness of breath / dyspnea or wheezing   Quantity:  1 Inhaler   Refills:  0       allopurinol 100 MG tablet   Commonly known as:  ZYLOPRIM   Used for:  Idiopathic chronic gout 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        Dose:  300  mg   Take 3 tablets (300 mg) by mouth daily   Quantity:  270 tablet   Refills:  1       atorvastatin 20 MG tablet   Commonly known as:  LIPITOR   Used for:  Hyperlipidemia LDL goal <130        Dose:  20 mg   Take 1 tablet (20 mg) by mouth daily   Quantity:  90 tablet   Refills:  3       hydrochlorothiazide 25 MG tablet   Commonly known as:  HYDRODIURIL   Used for:  Benign essential hypertension        Dose:  25 mg   Take 1 tablet (25 mg) by mouth daily   Quantity:  90 tablet   Refills:  3       indomethacin 50 MG capsule   Commonly known as:  INDOCIN   Used for:  Idiopathic chronic gout 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        TAKE 1 CAPSULE BY MOUTH 3 TIMES DAILY WITH MEALS   Quantity:  40 capsule   Refills:  2       MULTIVITAMIN PO        Refills:  0       OMEGA-3 FISH OIL PO        Refills:  0       triamcinolone 0.1 % cream   Commonly known as:  KENALOG   Used for:  Dermatitis        Apply sparingly to affected area three times daily for 14 days.   Quantity:  45 g   Refills:  0            Where to get your medicines      These medications were sent to [facility] - [address]  [address] [facility] [address], [place] [address]     Phone:  [phone]     ciprofloxacin 500 MG tablet         Some of these will need a paper prescription and others can be bought over the counter. Ask your nurse if you have questions.     Bring a paper prescription for each of these medications     HYDROcodone-acetaminophen 5-325 MG per tablet                Protect others around you: Learn how to safely use, store and throw away your medicines at www.disposemymeds.org.        ANTIBIOTIC INSTRUCTION     You've Been Prescribed an Antibiotic - Now What?  Your healthcare team thinks that you or your loved one might have an infection. Some infections can be treated with antibiotics, which are powerful, life-saving drugs. Like all medications, antibiotics have side effects and should only be used when necessary.  There are some important things you should know about your antibiotic treatment.      Your healthcare team may run tests before you start taking an antibiotic.    Your team may take samples (e.g., from your blood, urine or other areas) to run tests to look for bacteria. These test can be important to determine if you need an antibiotic at all and, if you do, which antibiotic will work best.      Within a few days, your healthcare team might change or even stop your antibiotic.    Your team may start you on an antibiotic while they are working to find out what is making you sick.    Your team might change your antibiotic because test results show that a different antibiotic would be better to treat your infection.    In some cases, once your team has more information, they learn that you do not need an antibiotic at all. They may find out that you don't have an infection, or that the antibiotic you're taking won't work against your infection. For example, an infection caused by a virus can't be treated with antibiotics. Staying on an antibiotic when you don't need it is more likely to be harmful than helpful.      You may experience side effects from your antibiotic.    Like all medications, antibiotics have side effects. Some of these can be serious.    Let you healthcare team know if you have any known allergies when you are admitted to the hospital.    One significant side effect of nearly all antibiotics is the risk of severe and sometimes deadly diarrhea caused by Clostridium difficile (C. Difficile). This occurs when a person takes antibiotics because some good germs are destroyed. Antibiotic use allows C. diificile to take over, putting patients at high risk for this serious infection.    As a patient or caregiver, it is important to understand your or your loved one's antibiotic treatment. It is especially important for caregivers to speak up when patients can't speak for themselves. Here are some important  questions to ask your healthcare team.    What infection is this antibiotic treating and how do you know I have that infection?    What side effects might occur from this antibiotic?    How long will I need to take this antibiotic?    Is it safe to take this antibiotic with other medications or supplements (e.g., vitamins) that I am taking?     Are there any special directions I need to know about taking this antibiotic? For example, should I take it with food?    How will I be monitored to know whether my infection is responding to the antibiotic?    What tests may help to make sure the right antibiotic is prescribed for me?      Information provided by:  www.cdc.gov/getsmart  U.S. Department of Health and Human Services  Centers for disease Control and Prevention  National Center for Emerging and Zoonotic Infectious Diseases  Division of Healthcare Quality Promotion        Information about OPIOIDS     PRESCRIPTION OPIOIDS: WHAT YOU NEED TO KNOW    Prescription opioids can be used to help relieve moderate to severe pain and are often prescribed following a surgery or injury, or for certain health conditions. These medications can be an important part of treatment but also come with serious risks. It is important to work with your health care provider to make sure you are getting the safest, most effective care.    WHAT ARE THE RISKS AND SIDE EFFECTS OF OPIOID USE?  Prescription opioids carry serious risks of addiction and overdose, especially with prolonged use. An opioid overdose, often marked by slowed breathing can cause sudden death. The use of prescription opioids can have a number of side effects as well, even when taken as directed:      Tolerance - meaning you might need to take more of a medication for the same pain relief    Physical dependence - meaning you have symptoms of withdrawal when a medication is stopped    Increased sensitivity to pain    Constipation    Nausea, vomiting, and dry  mouth    Sleepiness and dizziness    Confusion    Depression    Low levels of testosterone that can result in lower sex drive, energy, and strength    Itching and sweating    RISKS ARE GREATER WITH:    History of drug misuse, substance use disorder, or overdose    Mental health conditions (such as depression or anxiety)    Sleep apnea    Older age (65 years or older)    Pregnancy    Avoid alcohol while taking prescription opioids.   Also, unless specifically advised by your health care provider, medications to avoid include:    Benzodiazepines (such as Xanax or Valium)    Muscle relaxants (such as Soma or Flexeril)    Hypnotics (such as Ambien or Lunesta)    Other prescription opioids    KNOW YOUR OPTIONS:  Talk to your health care provider about ways to manage your pain that do not involve prescription opioids. Some of these options may actually work better and have fewer risks and side effects:    Pain relievers such as acetaminophen, ibuprofen, and naproxen    Some medications that are also used for depression or seizures    Physical therapy and exercise    Cognitive behavioral therapy, a psychological, goal-directed approach, in which patients learn how to modify physical, behavioral, and emotional triggers of pain and stress    IF YOU ARE PRESCRIBED OPIOIDS FOR PAIN:    Never take opioids in greater amounts or more often than prescribed    Follow up with your primary health care provider and work together to create a plan on how to manage your pain.    Talk about ways to help manage your pain that do not involve prescription opioids    Talk about all concerns and side effects    Help prevent misuse and abuse    Never sell or share prescription opioids    Never use another person's prescription opioids    Store prescription opioids in a secure place and out of reach of others (this may include visitors, children, friends, and family)    Visit www.cdc.gov/drugoverdose to learn about risks of opioid abuse and  overdose    If you believe you may be struggling with addiction, tell your health care provider and ask for guidance or call [facility]'s National Helpline at 7-361-860-HELP    LEARN MORE / www.cdc.gov/drugoverdose/prescribing/guideline.html    Safely dispose of unused prescription opioids: Find your local drug take-back programs and more information about the importance of safe disposal at www.doseofreality.[place].gov             Medication List: This is a list of all your medications and when to take them. Check marks below indicate your daily home schedule. Keep this list as a reference.      Medications           Morning Afternoon Evening Bedtime As Needed    albuterol 108 (90 BASE) MCG/ACT Inhaler   Commonly known as:  PROAIR HFA/PROVENTIL HFA/VENTOLIN HFA   Inhale 2 puffs into the lungs every 6 hours as needed for shortness of breath / dyspnea or wheezing                                allopurinol 100 MG tablet   Commonly known as:  ZYLOPRIM   Take 3 tablets (300 mg) by mouth daily                                atorvastatin 20 MG tablet   Commonly known as:  LIPITOR   Take 1 tablet (20 mg) by mouth daily                                ciprofloxacin 500 MG tablet   Commonly known as:  CIPRO   Take 1 tablet (500 mg) by mouth 2 times daily                                hydrochlorothiazide 25 MG tablet   Commonly known as:  HYDRODIURIL   Take 1 tablet (25 mg) by mouth daily                                HYDROcodone-acetaminophen 5-325 MG per tablet   Commonly known as:  NORCO   Take 1-2 tablets by mouth every 4 hours as needed for moderate to severe pain (Moderate to Severe Pain)   Last time this was given:  1 tablet on 2/13/2018 12:52 PM   Notes to Patient:  One tablet taken at 12:52pm                                indomethacin 50 MG capsule   Commonly known as:  INDOCIN   TAKE 1 CAPSULE BY MOUTH 3 TIMES DAILY WITH MEALS                                MULTIVITAMIN PO                                OMEGA-3 FISH OIL PO                                 triamcinolone 0.1 % cream   Commonly known as:  KENALOG   Apply sparingly to affected area three times daily for 14 days.

## 2018-02-13 NOTE — DISCHARGE INSTRUCTIONS
Same Day Surgery Discharge Instructions for  Sedation and General Anesthesia       It's not unusual to feel dizzy, light-headed or faint for up to 24 hours after surgery or while taking pain medication.  If you have these symptoms: sit for a few minutes before standing and have someone assist you when you get up to walk or use the bathroom.      You should rest and relax for the next 24 hours. We recommend you make arrangements to have an adult stay with you for at least 24 hours after your discharge.  Avoid hazardous and strenuous activity.      DO NOT DRIVE any vehicle or operate mechanical equipment for 24 hours following the end of your surgery.  Even though you may feel normal, your reactions may be affected by the medication you have received.      Do not drink alcoholic beverages for 24 hours following surgery.       Slowly progress to your regular diet as you feel able. It's not unusual to feel nauseated and/or vomit after receiving anesthesia.  If you develop these symptoms, drink clear liquids (apple juice, ginger ale, broth, 7-up, etc. ) until you feel better.  If your nausea and vomiting persists for 24 hours, please notify your surgeon.        All narcotic pain medications, along with inactivity and anesthesia, can cause constipation. Drinking plenty of liquids and increasing fiber intake will help.      For any questions of a medical nature, call your surgeon.      Do not make important decisions for 24 hours.      If you had general anesthesia, you may have a sore throat for a couple of days related to the breathing tube used during surgery.  You may use Cepacol lozenges to help with this discomfort.  If it worsens or if you develop a fever, contact your surgeon.       If you feel your pain is not well managed with the pain medications prescribed by your surgeon, please contact your surgeon's office to let them know so they can address your concerns.         KEEP URINE CATETHER IN FOR 1 WEEK, FOLLOW  UP WITH DR NEVILLE IN 1 WEEK FOR CATHETER REMOVAL.   LEAVE TEGADERM DRESSING ON FOR 3 DAYS    DISCHARGE INSTRUCTIONS FOR   CATHETER CARE AT HOME      .      Basic Catheter Care  1. Always wash hands before and after handling your catheter.  2. Use soap and water to wash the area around your catheter.  3. Do this procedure twice a day.  4. Proper cleansing will help keep the area from becoming irritated or infected.    Leg Bag  This is a small plastic bag that collects urine draining from your catheter and then strapped around your thigh. It will need to be emptied when the bag is 1/2 to 3/4 full.     Large Drainage Bag  1. This bag is larger than the leg bag and holds more urine.  It is to be used while at home, especially at night.    2. Before you go to bed, change the leg bag to the large drainage bag.  3. Pinch off the catheter with your fingers and swab the connection between the catheter and leg bag with alcohol sponge.  4. Disconnect the leg bag and connect the large drainage bag to your catheter.  5. When you get into bed, arrange the drainage tubing so that it doesn t kink.  6. Be sure to keep the bag below the level of your bladder and allow enough slack for turning.    Cleaning Your Drainage Bags    1. Wash hands.  2. Using funnel or syringe, fill the bag half full with a solution of 1/2  vinegar and 1/2 water.  3. Shake bag, allowing mixture to cleanse inside of bag.  4. Empty out all vinegar and water mixture from your bag.  5. Hang bag to dry when not in use.  6. Clean your bags anytime you change them.      Helpful Hints  1. Always keep drainage bags below bladder level to insure adequate drainage.  2. Drink 4-6 glasses of water daily along with other fluids you normally drink to keep urine free of infection and / or clots.  3. If you notice no urine in your bag for 2 to 4 hours or you develop extreme discomfort in bladder area, your catheter maybe plugged.  Notify your doctor.  4. If you notice your  urine becomes foul smelling and cloudy, notify your doctor.  Also notify your doctor if you develop fever or chills.  5. If you notice urine leaking around the outside of the catheter, check to be sure catheter or tubing is not kinked.  6. Don t use leg bag while in bed.        Discharge Instructions following Cryoblation of the Prostate  [facility]    Diet:    Diet as tolerated.  Drink at least 6 glasses of liquid per day.  Activity:    No heavy lifting or strenuous activity until approved by surgeon.    Short walks and stair climbing are permissible.  Care After Surgery    Do not hold urine in your bladder.  Always empty your bladder when you have the urge to urinate.    Do not strain to have a bowel movement.  If constipated, take over-the-counter stool softeners (follow directions on package).    Do not drive a car or have intercourse until approved by your surgeon.    It is not unusual to pass small clots or to have red-tinged urine.  If this occurs, decrease activity and increase your fluid intake.  o You may expect to have some blood for at least 3-4 weeks, especially at the beginning or end of urination.  If there is dark, thick blood with difficulty urinating call your surgeon.  **If you have questions or concerns about your procedure,  call Dr. Caal at [phone]**

## 2018-02-13 NOTE — OR NURSING
Brown teaching with patient and wife.  Return demonstration by both patient and wife.  Questions answered.  Supplies sent with patient.

## 2018-02-13 NOTE — ANESTHESIA CARE TRANSFER NOTE
Patient: Saul Hairston    Procedure(s):  FLEXIBLE CYSTOSCOPY, CYROABLATION OF PROSTATE  - Wound Class: I-Clean    Diagnosis: prostate cancer   Diagnosis Additional Information: No value filed.    Anesthesia Type:   General, LMA     Note:  Airway :Face Mask  Patient transferred to:PACU  Comments: Patient spont ventilating. Adequate TV's. LMA removed without issues. To PACU with O2. Vitals stable. Report given to RN.Handoff Report: Identifed the Patient, Identified the Reponsible Provider, Reviewed the pertinent medical history, Discussed the surgical course, Reviewed Intra-OP anesthesia mangement and issues during anesthesia, Set expectations for post-procedure period and Allowed opportunity for questions and acknowledgement of understanding      Vitals: (Last set prior to Anesthesia Care Transfer)    CRNA VITALS  2/13/2018 1015 - 2/13/2018 1051      2/13/2018             Pulse: 61    SpO2: 96 %    Resp Rate (set): 10                Electronically Signed By: LASHA Tinsley CRNA  February 13, 2018  10:51 AM

## 2018-02-13 NOTE — ANESTHESIA PREPROCEDURE EVALUATION
Procedure: Procedure(s):  CYSTOSCOPY FLEXIBLE, CRYOABLATION PROSTATE  Preop diagnosis: prostate cancer     Allergies   Allergen Reactions     No Known Drug Allergies      Past Medical History:   Diagnosis Date     Arthritis     Gout     CARDIOVASCULAR SCREENING; LDL GOAL LESS THAN 160 8/14/2006     Elevated PSA      Gout, unspecified      Hypertension      Kidney stone 2009    Doing ok now     Seasonal allergic rhinitis     Spring and Fall     Umbilical hernia without mention of obstruction or gangrene 6/2013     Past Surgical History:   Procedure Laterality Date     COLONOSCOPY  6/16/2006     COLONOSCOPY N/A 6/15/2016    Procedure: COLONOSCOPY;  Surgeon: Poly Sanchez MD;  Location: [facility]     HERNIORRHAPHY UMBILICAL  7/11/2013    Procedure: HERNIORRHAPHY UMBILICAL;  Open Umbilical Hernia Repair With Mesh ;  Surgeon: Sergio Tomas MD;  Location: [facility]     ROTATOR CUFF REPAIR RT/LT  5/19/2011    Left Shoulder     SURGICAL HISTORY OF -       ear operation as child     SURGICAL HISTORY OF -       removal of pseudogout deposits - Right knee     TONSILLECTOMY      Child     Social History   Substance Use Topics     Smoking status: Never Smoker     Smokeless tobacco: Never Used     Alcohol use No     Prior to Admission medications    Medication Sig Start Date End Date Taking? Authorizing Provider   albuterol (PROAIR HFA/PROVENTIL HFA/VENTOLIN HFA) 108 (90 BASE) MCG/ACT Inhaler Inhale 2 puffs into the lungs every 6 hours as needed for shortness of breath / dyspnea or wheezing 12/4/17   Waldo Matson MD   indomethacin (INDOCIN) 50 MG capsule TAKE 1 CAPSULE BY MOUTH 3 TIMES DAILY WITH MEALS 11/7/17   Waldo Matson MD   allopurinol (ZYLOPRIM) 100 MG tablet Take 3 tablets (300 mg) by mouth daily 9/11/17   Waldo Matson MD   triamcinolone (KENALOG) 0.1 % cream Apply sparingly to affected area three times daily for 14 days.  Patient not taking: Reported on 2/9/2018 3/15/17    Leatha Fernandez MD   hydrochlorothiazide (HYDRODIURIL) 25 MG tablet Take 1 tablet (25 mg) by mouth daily 12/12/16   Waldo Matson MD   atorvastatin (LIPITOR) 20 MG tablet Take 1 tablet (20 mg) by mouth daily 12/12/16   Waldo Matson MD   Omega-3 Fatty Acids (OMEGA-3 FISH OIL PO)     Reported, Patient   Multiple Vitamins-Minerals (MULTIVITAMIN PO)     Reported, Patient     Current Facility-Administered Medications Ordered in Epic   Medication Dose Route Frequency Last Rate Last Dose     Provider ordered ALTERNATE pre op antibiotic.  1 each As instructed Continuous         gentamicin (GARAMYCIN) infusion 80 mg  80 mg Intravenous Pre-Op/Pre-procedure x 1 dose         No current [facility]-ordered outpatient prescriptions on file.       Another Antibiotic has been ordered.       Wt Readings from Last 1 Encounters:   02/09/18 93.4 kg (206 lb)     Temp Readings from Last 1 Encounters:   02/09/18 36.9  C (98.4  F) (Oral)     BP Readings from Last 6 Encounters:   02/09/18 150/72   01/15/18 (!) 164/92   12/04/17 138/83   11/28/17 136/70   11/27/17 156/83   10/17/17 153/74     Pulse Readings from Last 4 Encounters:   02/09/18 68   01/15/18 75   12/04/17 59   11/28/17 68     Resp Readings from Last 1 Encounters:   02/09/18 16     SpO2 Readings from Last 1 Encounters:   02/09/18 97%     Recent Labs   Lab Test  02/09/18   0948  11/27/17   0613   NA  142  138   POTASSIUM  3.9  3.7   CHLORIDE  107  102   CO2  22  27   ANIONGAP  13  9   GLC  82  114*   BUN  24  20   CR  1.09  1.08   MADIHA  9.6  8.7     Recent Labs   Lab Test  02/09/18   0948  11/27/17   0613   AST  40  42   ALT  62  55   ALKPHOS  115  134   BILITOTAL  0.4  0.4     Recent Labs   Lab Test  02/09/18   0948  11/27/17   0613  03/30/17   1601   WBC   --   8.2  10.5   HGB  17.0  15.6  16.3   PLT   --   207  200     No results for input(s): ABO, RH in the last 34284 hours.  No results for input(s): INR, PTT in the last 34072 hours.   Recent Labs   Lab  Test  11/27/17   0900  11/27/17   0613   TROPI  <0.015  <0.015     No results for input(s): PH, PCO2, PO2, HCO3 in the last 47307 hours.  No results for input(s): HCG in the last 67953 hours.  No results found for this or any previous visit (from the past 744 hour(s)).    RECENT LABS:   ECG:   ECHO:     Anesthesia Evaluation     . Pt has had prior anesthetic.     No history of anesthetic complications          ROS/MED HX    ENT/Pulmonary:     (+)sleep apnea, uses CPAP , . .   (-) tobacco use   Neurologic:       Cardiovascular:     (+) hypertension----. : . . . :. .       METS/Exercise Tolerance:     Hematologic:         Musculoskeletal:         GI/Hepatic:        (-) GERD   Renal/Genitourinary:     (+) Nephrolithiasis ,       Endo:     (+) Obesity, .      Psychiatric:         Infectious Disease:         Malignancy:   (+) Malignancy History of Prostate  Prostate CA Active status post Surgery,         Other:                     Physical Exam  Normal systems: cardiovascular and pulmonary    Airway   Mallampati: II  Neck ROM: full    Dental   (+) caps    Cardiovascular       Pulmonary                     Anesthesia Plan      History & Physical Review  History and physical reviewed and following examination; no interval change.    ASA Status:  2 .    NPO Status:  > 8 hours    Plan for General and LMA with Intravenous induction. Maintenance will be Balanced.    PONV prophylaxis:  Ondansetron (or other 5HT-3)       Postoperative Care  Postoperative pain management:  IV analgesics.      Consents  Anesthetic plan, risks, benefits and alternatives discussed with:  Patient..                          .

## 2018-02-13 NOTE — IP AVS SNAPSHOT
[place] [address]    [place] [address]    Phone:  [phone]                                       After Visit Summary   2/13/2018    Saul Hairston    MRN: [id number]           After Visit Summary Signature Page     I have received my discharge instructions, and my questions have been answered. I have discussed any challenges I see with this plan with the nurse or doctor.    ..........................................................................................................................................  Patient/Patient Representative Signature      ..........................................................................................................................................  Patient Representative Print Name and Relationship to Patient    ..................................................               ................................................  Date                                            Time    ..........................................................................................................................................  Reviewed by Signature/Title    ...................................................              ..............................................  Date                                                            Time

## 2018-02-13 NOTE — ANESTHESIA POSTPROCEDURE EVALUATION
Patient: Saul Hairston    Procedure(s):  FLEXIBLE CYSTOSCOPY, CYROABLATION OF PROSTATE  - Wound Class: I-Clean    Diagnosis:prostate cancer   Diagnosis Additional Information: No value filed.    Anesthesia Type:  General, LMA    Note:  Anesthesia Post Evaluation    Patient location during evaluation: PACU  Patient participation: Able to fully participate in evaluation  Level of consciousness: awake and alert  Pain management: adequate  Airway patency: patent  Cardiovascular status: acceptable  Respiratory status: acceptable  Hydration status: acceptable  PONV: none and controlled     Anesthetic complications: None          Last vitals:  Vitals:    02/13/18 1245 02/13/18 1300 02/13/18 1357   BP: 135/74 143/79 138/81   Pulse:      Resp: 13 16 16   Temp: 36.5  C (97.7  F) 36.6  C (97.9  F)    SpO2: 96% 95% 95%         Electronically Signed By: Asif Hsieh MD  February 13, 2018  2:34 PM

## 2018-02-13 NOTE — BRIEF OP NOTE
[facility] Brief Operative Note    Pre-operative diagnosis: prostate cancer    Post-operative diagnosis same   Procedure: Procedure(s):  FLEXIBLE CYSTOSCOPY, CYROABLATION OF PROSTATE  - Wound Class: I-Clean   Surgeon(s): Surgeon(s) and Role:     * Aj Caal MD - Primary   Estimated blood loss: 5cc    Specimens: none   Findings: 8 ice rods ,3 sensors

## 2018-02-13 NOTE — OP NOTE
Procedure Date: 02/13/2018      DATE OF SURGERY: 02/13/2018      PREOPERATIVE DIAGNOSIS:  Prostate cancer, Liss score 7 (3,4), Liss score 6 (3,3).  (PSA 13.9.      POSTOPERATIVE DIAGNOSIS:  Prostate cancer, Liss score 7 (3.4), Gladwin score 6 (3,3), PSA 13.9.      PROCEDURE:     1.  Cryoablation of the prostate.   2.  Flexible cystoscopy.   3.  Prostate volume study.      ANESTHESIA:  General.      SURGEON:  Aj Caal M.D.      ESTIMATED BLOOD LOSS:  Less  than 5 mL.      SUMMARY OF FINDINGS:  Eight cryo ice rods and 3 thermal sensors.      BRIEF HISTORY AND PHYSICAL:  The patient is a 72-year-old male with a history of an elevated rising PSA.  PSA increased to 13.9.  He did have an MRI of the prostate which demonstrated two Pyrexia 3 lesions.  He subsequently underwent transrectal ultrasound including 12 core and biopsies of the 2 suspicious lesions and this was positive for adenocarcinoma Liss score of 6 (3, 3) and Gladwin score 7 (3,4).  The patient's metastatic workup including CT and bone scan were negative for any obvious metastatic disease.  He did receive information concerning alternatives for treatment and he has elected to undergo cryotherapy.  He does understand the procedure, possible complications and realistic expectations and he has elected to proceed.      DESCRIPTION OF PROCEDURE:  Proper permits were obtained and signed, patient was taken after to the operating room under general anesthesia. He was placed in dorsal lithotomy position.  The rectum was aspirated and it was nice and clean.  He was prepped and draped in the usual sterile fashion.  He underwent flexible cystoscopy.  Urethra was normal.  Prostate measured approximately 5 cm and was obstructing secondary to the left lobe crossing the midline.  The bladder itself was essentially negative.  A Brown catheter was placed and 200 cc of saline were left in the bladder.  The patient then underwent transrectal ultrasound.  His  prostate was well visible with the sagittal and transverse views.  Prostate volume study demonstrated a prostate measuring 58 grams.      The patient underwent placement of eight cryo ice rods, three in row one, two in row two, one in row three, one in row 4, and one in row 5 and three thermal sensors in the  right lobe, mid prostate and left lobe of his prostate.  He then underwent repeat cystoscopy.  There was no evidence of perforation to the prostatic urethra.  An Amplatz stiff wire was placed through the scope and the urethral warmer then was placed over the wire in good position in the bladder.  All appropriate final checks were made.  Initial freeze cycle lasted 6 minutes reaching temperatures of -50 degrees Celsius or lower.  He then underwent four 1 minute thaw cycles followed by a second freeze cycle lasting approximately four and a half minutes again reaching temperatures of -50 degrees Celsius or lower.  He then underwent 15 one minute thaw cycles.  Urethral warmer remained in place for 20 minutes.  The needles were removed.  Betadine and pressure were applied to the perineum.  Tegaderm dressings were applied to the penis and perineum to prevent swelling.  The warmer then was removed.  A Brown catheter was placed and irrigated clear.  Rectum was intact.  B and O suppository also was placed and he was taken to the recovery room in stable condition.         MAHAMED NEVILLE MD             D: 2018   T: 2018   MT: ALEJANDRA      Name:     LUIS ALBERTO SHEA   MRN:      [id number]        Account:        [id number]   :      1945           Procedure Date: 2018      Document: [id number]       cc: Waldo Matson MD

## 2018-03-07 ENCOUNTER — OFFICE VISIT (OUTPATIENT)
Dept: FAMILY MEDICINE | Facility: CLINIC | Age: 73
End: 2018-03-07
Payer: COMMERCIAL

## 2018-03-07 VITALS
HEART RATE: 70 BPM | TEMPERATURE: 97.9 F | WEIGHT: 200.5 LBS | HEIGHT: 68 IN | SYSTOLIC BLOOD PRESSURE: 139 MMHG | RESPIRATION RATE: 12 BRPM | DIASTOLIC BLOOD PRESSURE: 74 MMHG | BODY MASS INDEX: 30.39 KG/M2 | OXYGEN SATURATION: 96 %

## 2018-03-07 DIAGNOSIS — G47.33 OSA (OBSTRUCTIVE SLEEP APNEA): ICD-10-CM

## 2018-03-07 DIAGNOSIS — R53.83 OTHER FATIGUE: Primary | ICD-10-CM

## 2018-03-07 DIAGNOSIS — I10 BENIGN ESSENTIAL HYPERTENSION: ICD-10-CM

## 2018-03-07 LAB
ERYTHROCYTE [DISTWIDTH] IN BLOOD BY AUTOMATED COUNT: 12.9 % (ref 10–15)
HCT VFR BLD AUTO: 48.1 % (ref 40–53)
HGB BLD-MCNC: 15.8 G/DL (ref 13.3–17.7)
MCH RBC QN AUTO: 28.9 PG (ref 26.5–33)
MCHC RBC AUTO-ENTMCNC: 32.8 G/DL (ref 31.5–36.5)
MCV RBC AUTO: 88 FL (ref 78–100)
PLATELET # BLD AUTO: 224 10E9/L (ref 150–450)
RBC # BLD AUTO: 5.46 10E12/L (ref 4.4–5.9)
WBC # BLD AUTO: 6.2 10E9/L (ref 4–11)

## 2018-03-07 PROCEDURE — 99214 OFFICE O/P EST MOD 30 MIN: CPT | Performed by: FAMILY MEDICINE

## 2018-03-07 PROCEDURE — 82306 VITAMIN D 25 HYDROXY: CPT | Performed by: FAMILY MEDICINE

## 2018-03-07 PROCEDURE — 36415 COLL VENOUS BLD VENIPUNCTURE: CPT | Performed by: FAMILY MEDICINE

## 2018-03-07 PROCEDURE — 85027 COMPLETE CBC AUTOMATED: CPT | Performed by: FAMILY MEDICINE

## 2018-03-07 PROCEDURE — 80053 COMPREHEN METABOLIC PANEL: CPT | Performed by: FAMILY MEDICINE

## 2018-03-07 RX ORDER — HYDROCHLOROTHIAZIDE 25 MG/1
25 TABLET ORAL DAILY
Qty: 90 TABLET | Refills: 3 | Status: SHIPPED | OUTPATIENT
Start: 2018-03-07 | End: 2018-05-23

## 2018-03-07 NOTE — MR AVS SNAPSHOT
After Visit Summary   3/7/2018    Saul Hairston    MRN: [id number]           Patient Information     Date Of Birth          1945        Visit Information        Provider Department      3/7/2018 1:00 PM Waldo Matson MD [facility]        Today's Diagnoses     Other fatigue    -  1    Benign essential hypertension        MUNIRA (obstructive sleep apnea)           Follow-ups after your visit        Your next 10 appointments already scheduled     Apr 16, 2018 10:30 AM CDT   Return Sleep Patient with Bennett Ezra Goltz, PA-C   [facility] ([facility])    [address] [address] [phone]              Who to contact     If you have questions or need follow up information about today's clinic visit or your schedule please contact [facility] directly at [phone].  Normal or non-critical lab and imaging results will be communicated to you by MyChart, letter or phone within 4 business days after the clinic has received the results. If you do not hear from us within 7 days, please contact the clinic through Pixiah[employer] or phone. If you have a critical or abnormal lab result, we will notify you by phone as soon as possible.  Submit refill requests through iSkoot or call your pharmacy and they will forward the refill request to us. Please allow 3 business days for your refill to be completed.          Additional Information About Your Visit        MyChart Information     iSk[employer] gives you secure access to your electronic health record. If you see a primary care provider, you can also send messages to your care team and make appointments. If you have questions, please call your primary care clinic.  If you do not have a primary care provider, please call [phone] and they will assist you.        Care EveryWhere ID     This is your Care EveryWhere ID. This could be used by other organizations to  "access your [facility] medical records  [id number]        Your Vitals Were     Pulse Temperature Respirations Height Pulse Oximetry BMI (Body Mass Index)    70 97.9  F (36.6  C) (Oral) 12 5' 8\" (1.727 m) 96% 30.49 kg/m2       Blood Pressure from Last 3 Encounters:   03/07/18 139/74   02/13/18 138/81   02/09/18 150/72    Weight from Last 3 Encounters:   03/07/18 200 lb 8 oz (90.9 kg)   02/13/18 199 lb 4.8 oz (90.4 kg)   02/09/18 206 lb (93.4 kg)              We Performed the Following     CBC with platelets     Comprehensive metabolic panel     Vitamin D Deficiency          Today's Medication Changes          These changes are accurate as of 3/7/18  2:36 PM.  If you have any questions, ask your nurse or doctor.               Stop taking these medicines if you haven't already. Please contact your care team if you have questions.     albuterol 108 (90 BASE) MCG/ACT Inhaler   Commonly known as:  PROAIR HFA/PROVENTIL HFA/VENTOLIN HFA   Stopped by:  Waldo Matson MD           ciprofloxacin 500 MG tablet   Commonly known as:  CIPRO   Stopped by:  Waldo Matson MD           HYDROcodone-acetaminophen 5-325 MG per tablet   Commonly known as:  NORCO   Stopped by:  Waldo Matson MD           triamcinolone 0.1 % cream   Commonly known as:  KENALOG   Stopped by:  Waldo Matson MD                Where to get your medicines      These medications were sent to [place] [address] [place] [facility] [address] [place] [address]     Phone:  [phone]     hydrochlorothiazide 25 MG tablet                Primary Care Provider Office Phone # Fax #    Waldo Matson [phone] [address] [place] [address]        Equal Access to Services     OLGA MARINELLI AH: Dayan Oro, sachin luroger, luis e saldana. [facility] [phone].    ATENCIÓN: Si habla " español, tiene a dubon disposición servicios gratuitos de asistencia lingüística. Nithin lowery [phone].    We comply with applicable federal civil rights laws and [place] laws. We do not discriminate on the basis of race, color, national origin, age, disability, sex, sexual orientation, or gender identity.            Thank you!     Thank you for choosing [facility]  for your care. Our goal is always to provide you with excellent care. Hearing back from our patients is one way we can continue to improve our services. Please take a few minutes to complete the written survey that you may receive in the mail after your visit with us. Thank you!             Your Updated Medication List - Protect others around you: Learn how to safely use, store and throw away your medicines at www.disposemymeds.org.          This list is accurate as of 3/7/18  2:36 PM.  Always use your most recent med list.                   Brand Name Dispense Instructions for use Diagnosis    allopurinol 100 MG tablet    ZYLOPRIM    270 tablet    Take 3 tablets (300 mg) by mouth daily    Idiopathic chronic gout 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       atorvastatin 20 MG tablet    LIPITOR    90 tablet    Take 1 tablet (20 mg) by mouth daily    Hyperlipidemia LDL goal <130       hydrochlorothiazide 25 MG tablet    HYDRODIURIL    90 tablet    Take 1 tablet (25 mg) by mouth daily    Benign essential hypertension       indomethacin 50 MG capsule    INDOCIN    40 capsule    TAKE 1 CAPSULE BY MOUTH 3 TIMES DAILY WITH MEALS    Idiopathic chronic gout 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       MULTIVITAMIN PO           OMEGA-3 FISH OIL PO
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
## 2018-03-08 DIAGNOSIS — E78.5 HYPERLIPIDEMIA LDL GOAL <130: ICD-10-CM

## 2018-03-08 LAB
ALBUMIN SERPL-MCNC: 3.6 G/DL (ref 3.4–5)
ALP SERPL-CCNC: 112 U/L (ref 40–150)
ALT SERPL W P-5'-P-CCNC: 80 U/L (ref 0–70)
ANION GAP SERPL CALCULATED.3IONS-SCNC: 7 MMOL/L (ref 3–14)
AST SERPL W P-5'-P-CCNC: 31 U/L (ref 0–45)
BILIRUB SERPL-MCNC: 0.6 MG/DL (ref 0.2–1.3)
BUN SERPL-MCNC: 20 MG/DL (ref 7–30)
CALCIUM SERPL-MCNC: 9.2 MG/DL (ref 8.5–10.1)
CHLORIDE SERPL-SCNC: 102 MMOL/L (ref 94–109)
CO2 SERPL-SCNC: 32 MMOL/L (ref 20–32)
CREAT SERPL-MCNC: 1.54 MG/DL (ref 0.66–1.25)
DEPRECATED CALCIDIOL+CALCIFEROL SERPL-MC: 35 UG/L (ref 20–75)
GFR SERPL CREATININE-BSD FRML MDRD: 45 ML/MIN/1.7M2
GLUCOSE SERPL-MCNC: 122 MG/DL (ref 70–99)
POTASSIUM SERPL-SCNC: 3.7 MMOL/L (ref 3.4–5.3)
PROT SERPL-MCNC: 6.9 G/DL (ref 6.8–8.8)
SODIUM SERPL-SCNC: 141 MMOL/L (ref 133–144)

## 2018-03-08 NOTE — TELEPHONE ENCOUNTER
"Requested Prescriptions   Pending Prescriptions Disp Refills     atorvastatin (LIPITOR) 20 MG tablet [Pharmacy Med Name: ATORVASTATIN 20 MG TABLET]  Last Written Prescription Date:  12/12/16  Last Fill Quantity: 90,  # refills: 3   Last office visit: 3/7/2018 with prescribing provider:     Future Office Visit:   90 tablet 3     Sig: TAKE 1 TABLET (20 MG) BY MOUTH DAILY    Statins Protocol Passed    3/8/2018 10:20 AM       Passed - LDL on file in past 12 months    Recent Labs   Lab Test  09/26/17   1115   LDL  81          Passed - No abnormal creatine kinase in past 12 months    No lab results found.         Passed - Recent (12 mo) or future (30 days) visit within the authorizing provider's specialty    Patient had office visit in the last year or has a visit in the next 30 days with authorizing provider.  See \"Patient Info\" tab in inbasket, or \"Choose Columns\" in Meds & Orders section of the refill encounter.          Passed - Patient is age 18 or older        "

## 2018-03-09 RX ORDER — ATORVASTATIN CALCIUM 20 MG/1
TABLET, FILM COATED ORAL
Qty: 90 TABLET | Refills: 1 | Status: SHIPPED | OUTPATIENT
Start: 2018-03-09 | End: 2018-10-03

## 2018-03-18 DIAGNOSIS — M1A.0720 IDIOPATHIC CHRONIC GOUT OF LEFT FOOT WITHOUT TOPHUS: ICD-10-CM

## 2018-03-19 NOTE — TELEPHONE ENCOUNTER
"Requested Prescriptions   Pending Prescriptions Disp Refills     allopurinol (ZYLOPRIM) 100 MG tablet [Pharmacy Med Name: ALLOPURINOL 100 MG TABLET]  Last Written Prescription Date:  9/11/2017  Last Fill Quantity: 270 tablet,  # refills: 1   Last Office Visit: 3/7/2018   Future Office Visit:      270 tablet 1     Sig: TAKE 3 TABLETS (300 MG) BY MOUTH DAILY    Gout Agents Protocol Failed    3/18/2018  5:02 PM       Failed - Normal serum creatinine on file in the past 12 months    Recent Labs   Lab Test  03/07/18   1326   CR  1.54*          Passed - CBC on file in past 12 months    Recent Labs   Lab Test  03/07/18   1326   WBC  6.2   RBC  5.46   HGB  15.8   HCT  48.1   PLT  224          Passed - ALT on file in past 12 months    Recent Labs   Lab Test  03/07/18   1326   ALT  80*          Passed - Uric acid greater than or equal to 6 on file in past 12 months    Recent Labs   Lab Test  09/26/17   1115   URIC  7.3*     If level is 6mg/dL or greater, ok to refill one time and refer to provider.          Passed - Recent (12 mo) or future (30 days) visit within the authorizing provider's specialty    Patient had office visit in the last 12 months or has a visit in the next 30 days with authorizing provider or within the authorizing provider's specialty.  See \"Patient Info\" tab in inbasket, or \"Choose Columns\" in Meds & Orders section of the refill encounter.           Passed - Patient is age 18 or older          "

## 2018-03-21 ENCOUNTER — TRANSFERRED RECORDS (OUTPATIENT)
Dept: HEALTH INFORMATION MANAGEMENT | Facility: CLINIC | Age: 73
End: 2018-03-21

## 2018-03-21 NOTE — TELEPHONE ENCOUNTER
Routing refill request to provider for review/approval because:  Labs out of range:  Creatinine, ALT, uric acid      Covering providers-Please sign if agree.    Thank you!  BRIE Pisano, ANAIDN, RN

## 2018-03-22 RX ORDER — ALLOPURINOL 100 MG/1
TABLET ORAL
Qty: 270 TABLET | Refills: 1 | Status: SHIPPED | OUTPATIENT
Start: 2018-03-22 | End: 2018-06-13

## 2018-05-23 ENCOUNTER — DOCUMENTATION ONLY (OUTPATIENT)
Dept: SLEEP MEDICINE | Facility: CLINIC | Age: 73
End: 2018-05-23

## 2018-05-23 ENCOUNTER — OFFICE VISIT (OUTPATIENT)
Dept: FAMILY MEDICINE | Facility: CLINIC | Age: 73
End: 2018-05-23
Payer: COMMERCIAL

## 2018-05-23 VITALS
DIASTOLIC BLOOD PRESSURE: 75 MMHG | TEMPERATURE: 97.6 F | HEART RATE: 64 BPM | OXYGEN SATURATION: 97 % | WEIGHT: 205.25 LBS | BODY MASS INDEX: 31.11 KG/M2 | HEIGHT: 68 IN | SYSTOLIC BLOOD PRESSURE: 151 MMHG | RESPIRATION RATE: 12 BRPM

## 2018-05-23 DIAGNOSIS — I10 HYPERTENSION GOAL BP (BLOOD PRESSURE) < 150/90: Primary | ICD-10-CM

## 2018-05-23 DIAGNOSIS — H92.03 OTALGIA, BILATERAL: ICD-10-CM

## 2018-05-23 DIAGNOSIS — R79.89 ELEVATED LFTS: ICD-10-CM

## 2018-05-23 DIAGNOSIS — N18.30 CKD (CHRONIC KIDNEY DISEASE) STAGE 3, GFR 30-59 ML/MIN (H): ICD-10-CM

## 2018-05-23 PROCEDURE — 99214 OFFICE O/P EST MOD 30 MIN: CPT | Performed by: FAMILY MEDICINE

## 2018-05-23 RX ORDER — AMLODIPINE BESYLATE 10 MG/1
TABLET ORAL
Qty: 30 TABLET | Refills: 1 | Status: SHIPPED | OUTPATIENT
Start: 2018-05-23 | End: 2018-06-13

## 2018-05-23 NOTE — PROGRESS NOTES
6 month [facility] Recheck Visit     Diagnostic AHI: 68.8     Message left for patient to return call     Assessment: Subjective measures only   Action plan: waiting for patient to return call.   pt to follow up per provider request (1-2 yrs)    Device type: Auto-CPAP  PAP settings: CPAP min 7 cm  H20     CPAP max 13 cm  H20  Objective measures: Using wife's machine-no data available
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
## 2018-05-23 NOTE — MR AVS SNAPSHOT
After Visit Summary   5/23/2018    Saul Hairston    MRN: [id number]           Patient Information     Date Of Birth          1945        Visit Information        Provider Department      5/23/2018 1:40 PM Waldo Matson MD [facility]        Today's Diagnoses     Hypertension goal BP (blood pressure) < 150/90    -  1    Elevated LFTs        CKD (chronic kidney disease) stage 3, GFR 30-59 ml/min           Follow-ups after your visit        Your next 10 appointments already scheduled     Jun 08, 2018  1:30 PM CDT   New Sleep Patient with Paramjit Zaman MD   [facility] ([facility] [place])    [address] [address] [phone]            Jun 13, 2018  1:20 PM CDT   Office Visit with Waldo Matson MD   [facility] ([facility])    [address] [place] [address] [phone]           Bring a current list of meds and any records pertaining to this visit. For Physicals, please bring immunization records and any forms needing to be filled out. Please arrive 10 minutes early to complete paperwork.              Who to contact     If you have questions or need follow up information about today's clinic visit or your schedule please contact [facility] directly at [phone].  Normal or non-critical lab and imaging results will be communicated to you by MyChart, letter or phone within 4 business days after the clinic has received the results. If you do not hear from us within 7 days, please contact the clinic through MyChart or phone. If you have a critical or abnormal lab result, we will notify you by phone as soon as possible.  Submit refill requests through [employer] or call your pharmacy and they will forward the refill request to us. Please allow 3 business days for your refill to be completed.          Additional Information About Your Visit    "     [facility] Information     [facility] gives you secure access to your electronic health record. If you see a primary care provider, you can also send messages to your care team and make appointments. If you have questions, please call your primary care clinic.  If you do not have a primary care provider, please call [phone] and they will assist you.        Care EveryWhere ID     This is your Care EveryWhere ID. This could be used by other organizations to access your [facility] medical records  [id number]        Your Vitals Were     Pulse Temperature Respirations Height Pulse Oximetry BMI (Body Mass Index)    64 97.6  F (36.4  C) (Oral) 12 5' 8\" (1.727 m) 97% 31.21 kg/m2       Blood Pressure from Last 3 Encounters:   05/23/18 151/75   03/07/18 139/74   02/13/18 138/81    Weight from Last 3 Encounters:   05/23/18 205 lb 4 oz (93.1 kg)   03/07/18 200 lb 8 oz (90.9 kg)   02/13/18 199 lb 4.8 oz (90.4 kg)              Today, you had the following     No orders found for display         Today's Medication Changes          These changes are accurate as of 5/23/18  2:03 PM.  If you have any questions, ask your nurse or doctor.               Start taking these medicines.        Dose/Directions    amLODIPine 10 MG tablet   Commonly known as:  NORVASC   Used for:  Hypertension goal BP (blood pressure) < 150/90   Started by:  Waldo Matson MD        Take 1/2 pill for a week and then 1 pill per day.   Quantity:  30 tablet   Refills:  1         Stop taking these medicines if you haven't already. Please contact your care team if you have questions.     hydrochlorothiazide 25 MG tablet   Commonly known as:  HYDRODIURIL   Stopped by:  Waldo Matson MD                Where to get your medicines      These medications were sent to [place] [address] [place] [address]  [address] [facility] [address]     Phone:  [phone]     amLODIPine 10 MG tablet                Primary Care " Provider Office Phone # Fax #    Waldo Brennan Matson [phone] [address] [facility] [address]        Equal Access to Services     OLGA MARINELLI : Hadii aad ku hadang Oro, wajosesitoda luqag, qaybta kaamilcarda jaya, luis e goel lizandrojulita moon jairo yang. So [facility] [phone].    ATENCIÓN: Si habla español, tiene a dubon disposición servicios gratuitos de asistencia lingüística. Llame al [phone].    We comply with applicable federal civil rights laws and [place] laws. We do not discriminate on the basis of race, color, national origin, age, disability, sex, sexual orientation, or gender identity.            Thank you!     Thank you for choosing [facility]  for your care. Our goal is always to provide you with excellent care. Hearing back from our patients is one way we can continue to improve our services. Please take a few minutes to complete the written survey that you may receive in the mail after your visit with us. Thank you!             Your Updated Medication List - Protect others around you: Learn how to safely use, store and throw away your medicines at www.disposemymeds.org.          This list is accurate as of 5/23/18  2:03 PM.  Always use your most recent med list.                   Brand Name Dispense Instructions for use Diagnosis    allopurinol 100 MG tablet    ZYLOPRIM    270 tablet    TAKE 3 TABLETS (300 MG) BY MOUTH DAILY    Idiopathic chronic gout 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       amLODIPine 10 MG tablet    NORVASC    30 tablet    Take 1/2 pill for a week and then 1 pill per day.    Hypertension goal BP (blood pressure) < 150/90       atorvastatin 20 MG tablet    LIPITOR    90 tablet    TAKE 1 TABLET (20 MG) BY MOUTH DAILY    Hyperlipidemia LDL goal <130       indomethacin 50 MG capsule    INDOCIN    40 capsule    TAKE 1 CAPSULE BY MOUTH 3 TIMES DAILY WITH MEALS    Idiopathic chronic gout 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       MULTIVITAMIN  PO           OMEGA-3 FISH OIL PO           TAMSULOSIN HCL PO      Take by mouth daily

## 2018-05-25 ENCOUNTER — TRANSFERRED RECORDS (OUTPATIENT)
Dept: HEALTH INFORMATION MANAGEMENT | Facility: CLINIC | Age: 73
End: 2018-05-25

## 2018-06-08 ENCOUNTER — OFFICE VISIT (OUTPATIENT)
Dept: SLEEP MEDICINE | Facility: CLINIC | Age: 73
End: 2018-06-08
Payer: COMMERCIAL

## 2018-06-08 VITALS
RESPIRATION RATE: 16 BRPM | OXYGEN SATURATION: 99 % | SYSTOLIC BLOOD PRESSURE: 133 MMHG | WEIGHT: 201 LBS | BODY MASS INDEX: 29.77 KG/M2 | HEART RATE: 71 BPM | HEIGHT: 69 IN | DIASTOLIC BLOOD PRESSURE: 76 MMHG

## 2018-06-08 DIAGNOSIS — G47.33 OSA (OBSTRUCTIVE SLEEP APNEA): Primary | ICD-10-CM

## 2018-06-08 PROCEDURE — 99214 OFFICE O/P EST MOD 30 MIN: CPT | Performed by: PHYSICIAN ASSISTANT

## 2018-06-08 NOTE — PATIENT INSTRUCTIONS
Reduce your use of Afrin. You are not supposed to use that more than 3 days in a row. Dilute the bottle with water as you use it.   Take an over -the-counter antihistamine like Zyrtec or Allegra daily.    Keep the mask on when you get up for the restroom, just detach the hose. This will remind you to turn the machine back on when you come back from the bathroom.

## 2018-06-08 NOTE — PROGRESS NOTES
Sleep Study Follow-Up Visit:    Date on this visit: 6/8/2018    Saul Hairston comes in today for follow-up of his CPAP use for severe MUNIRA. He was initially at the [facility] for feeling tired and being told he stops breathing in his sleep for 15+ years. His medical history is significant for HTN, gout, obesity and allergic rhinitis.         AHI: 68.8/hr    DORIS: 55.7/hr   Supine AHI: 0/hr   Lateral AHI: 75.1/hr on left and 62.4/hr on right                     Average SpO2: 89.2%, baseline 91.6%  Lowest Desaturation: 62% Time Spent Below 89%: 124.4 minutes  Total Obstructive Apneas 420, Total Central/Mixed Apneas 10, Hypopneas 27    At the last visit, his pressures were changed from 6-18 cm to 7-13 cm. He has been removing the mask in his sleep about one night per week. He continues to not put the mask back on after he wakes for the restroom.   He is comfortable with the air pressure. He is sometimes congested. He uses a Afrin nasal spray once a day as needed. Nasacort did not work. He has seasonal allergies. He sporadically takes pseudoephedrine. He has Zyrtec, but does not use it regularly. He does use distilled water in the humidifier. He uses a full face mask (Simplus). He feels it fits fine. He gets a little leak by the eye on some days. It might leak a little if he does not shave regularly. He does notice better energy with CPAP.  He had prostate surgery, which should help reduce how often he has to get up for the restroom.    The compliance data shows that he has used the CPAP for 29/30 nights, 23.3% of nights for >4 hours.  The 90th% pressure is 9.9 cm.  The average time in large leak is 2 sec.  The average nightly usage is 3:04.  The average AHI is 2.7/hr.      Past medical/surgical history, family history, social history, medications and allergies were reviewed.      Problem List:  Patient Active Problem List    Diagnosis Date Noted     MUNIRA (obstructive sleep apnea) 01/15/2018     Priority:  Medium     Non morbid obesity, unspecified obesity type 09/26/2017     Priority: Medium     Hypertension goal BP (blood pressure) < 150/90 04/03/2017     Priority: Medium     Umbilical hernia 06/01/2013     Priority: Medium     Problem list name updated by automated process. Provider to review       Elevated PSA      Priority: Medium     Seasonal allergic rhinitis      Priority: Medium     Spring and Fall       Kidney stone      Priority: Medium     Doing ok now       Rotator cuff tear 12/16/2010     Priority: Medium     Hyperlipidemia LDL goal <130 08/14/2006     Priority: Medium     Idiopathic chronic gout 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      Priority: Medium     Problem list name updated by automated process. Provider to review          Impression/Plan:    (G47.33) MUNIRA (obstructive sleep apnea)  (primary encounter diagnosis)  Comment: Mr. Hairston continues to have low CPAP compliance. He removes the mask when he gets up for the restroom and then does not put it back on. Nasal congestion seems to contribute to some difficulty using it. He has been using Afrin daily. He also gets some leak at his nasal bridge.  Plan: Comprehensive DME        Continue auto CPAP 7-13 cm. Try a different mask, possibly the DreamWear full face mask. Reduce your use of Afrin. You are not supposed to use that more than 3 days in a row. Dilute the bottle with water or saline as you use it, to help wean yourself off of it.   Take an over-the-counter antihistamine like Zyrtec or Allegra daily.  Keep the mask on when you get up for the restroom, just detach the hose. This will remind you to turn the machine back on when you come back from the bathroom.      He will follow up with me in about 3 month(s).     Twenty-five minutes spent with patient, all of which were spent face-to-face counseling, consulting, coordinating plan of care.      Bennett Goltz, PA-C    CC: No ref. provider found

## 2018-06-08 NOTE — MR AVS SNAPSHOT
After Visit Summary   6/8/2018    Saul Hairston    MRN: [id number]           Patient Information     Date Of Birth          1945        Visit Information        Provider Department      6/8/2018 1:30 PM Goltz, Bennett Ezra, PA-C [facility]        Today's Diagnoses     MUNIRA (obstructive sleep apnea)    -  1      Care Instructions    Reduce your use of Afrin. You are not supposed to use that more than 3 days in a row. Dilute the bottle with water as you use it.   Take an over -the-counter antihistamine like Zyrtec or Allegra daily.    Keep the mask on when you get up for the restroom, just detach the hose. This will remind you to turn the machine back on when you come back from the bathroom.          Follow-ups after your visit        Follow-up notes from your care team     Return in about 3 months (around 9/8/2018) for CPAP compliance recheck.      Your next 10 appointments already scheduled     Jun 13, 2018  1:20 PM CDT   Office Visit with Waldo Matson MD   [facility] ([facility])    [address] [place] [address] [phone]           Bring a current list of meds and any records pertaining to this visit. For Physicals, please bring immunization records and any forms needing to be filled out. Please arrive 10 minutes early to complete paperwork.            Sep 17, 2018 10:00 AM CDT   Return Sleep Patient with Bennett Ezra Goltz, PA-C   [facility] ([facility])    [address] [address] [phone]              Who to contact     If you have questions or need follow up information about today's clinic visit or your schedule please contact [facility] directly at [phone].  Normal or non-critical lab and imaging results will be communicated to you by MyChart, letter or phone within 4 business days after the clinic has received  "the results. If you do not hear from us within 7 days, please contact the clinic through Screenmailer or phone. If you have a critical or abnormal lab result, we will notify you by phone as soon as possible.  Submit refill requests through Screenmailer or call your pharmacy and they will forward the refill request to us. Please allow 3 business days for your refill to be completed.          Additional Information About Your Visit        [employer] Information     Screenmailer gives you secure access to your electronic health record. If you see a primary care provider, you can also send messages to your care team and make appointments. If you have questions, please call your primary care clinic.  If you do not have a primary care provider, please call [phone] and they will assist you.        Care EveryWhere ID     This is your Care EveryWhere ID. This could be used by other organizations to access your [place] medical records  [id number]        Your Vitals Were     Pulse Respirations Height Pulse Oximetry BMI (Body Mass Index)       71 16 1.74 m (5' 8.5\") 99% 30.12 kg/m2        Blood Pressure from Last 3 Encounters:   06/08/18 133/76   05/23/18 151/75   03/07/18 139/74    Weight from Last 3 Encounters:   06/08/18 91.2 kg (201 lb)   05/23/18 93.1 kg (205 lb 4 oz)   03/07/18 90.9 kg (200 lb 8 oz)              We Performed the Following     Comprehensive DME        Primary Care Provider Office Phone # Fax #    Waldo Brennan Matson [phone] [address] [place] [id number]        Equal Access to Services     [facility] HospitalFELIPA : Hadii marybeth kinney hadwallaceo Sogold, waaxda luqadaha, qaybta kaalmaluis e campbell . So [facility] [phone].    ATENCIÓN: Si habla español, tiene a dubon disposición servicios gratuitos de asistencia lingüística. Llame al [phone].    We comply with applicable federal civil rights laws and [place] laws. We do not discriminate on the basis of " race, color, national origin, age, disability, sex, sexual orientation, or gender identity.            Thank you!     Thank you for choosing [facility]  for your care. Our goal is always to provide you with excellent care. Hearing back from our patients is one way we can continue to improve our services. Please take a few minutes to complete the written survey that you may receive in the mail after your visit with us. Thank you!             Your Updated Medication List - Protect others around you: Learn how to safely use, store and throw away your medicines at www.disposemymeds.org.          This list is accurate as of 6/8/18  2:12 PM.  Always use your most recent med list.                   Brand Name Dispense Instructions for use Diagnosis    allopurinol 100 MG tablet    ZYLOPRIM    270 tablet    TAKE 3 TABLETS (300 MG) BY MOUTH DAILY    Idiopathic chronic gout 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       amLODIPine 10 MG tablet    NORVASC    30 tablet    Take 1/2 pill for a week and then 1 pill per day.    Hypertension goal BP (blood pressure) < 150/90       atorvastatin 20 MG tablet    LIPITOR    90 tablet    TAKE 1 TABLET (20 MG) BY MOUTH DAILY    Hyperlipidemia LDL goal <130       indomethacin 50 MG capsule    INDOCIN    40 capsule    TAKE 1 CAPSULE BY MOUTH 3 TIMES DAILY WITH MEALS    Idiopathic chronic gout 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       MULTIVITAMIN PO           OMEGA-3 FISH OIL PO           TAMSULOSIN HCL PO      Take by mouth daily

## 2018-06-08 NOTE — NURSING NOTE
"Chief Complaint   Patient presents with     CPAP Follow Up       Initial /76  Pulse 71  Resp 16  Ht 1.74 m (5' 8.5\")  Wt 91.2 kg (201 lb)  SpO2 99%  BMI 30.12 kg/m2 Estimated body mass index is 30.12 kg/(m^2) as calculated from the following:    Height as of this encounter: 1.74 m (5' 8.5\").    Weight as of this encounter: 91.2 kg (201 lb).    Medication Reconciliation: complete     ESS 6  Britt Mark        "

## 2018-06-13 ENCOUNTER — OFFICE VISIT (OUTPATIENT)
Dept: FAMILY MEDICINE | Facility: CLINIC | Age: 73
End: 2018-06-13
Payer: COMMERCIAL

## 2018-06-13 VITALS
RESPIRATION RATE: 12 BRPM | WEIGHT: 205.75 LBS | TEMPERATURE: 97 F | SYSTOLIC BLOOD PRESSURE: 136 MMHG | HEART RATE: 66 BPM | DIASTOLIC BLOOD PRESSURE: 69 MMHG | OXYGEN SATURATION: 97 % | HEIGHT: 69 IN | BODY MASS INDEX: 30.48 KG/M2

## 2018-06-13 DIAGNOSIS — M1A.0720 IDIOPATHIC CHRONIC GOUT OF LEFT FOOT WITHOUT TOPHUS: ICD-10-CM

## 2018-06-13 DIAGNOSIS — I10 HYPERTENSION GOAL BP (BLOOD PRESSURE) < 150/90: Primary | ICD-10-CM

## 2018-06-13 LAB
ERYTHROCYTE [DISTWIDTH] IN BLOOD BY AUTOMATED COUNT: 13.5 % (ref 10–15)
HCT VFR BLD AUTO: 47.4 % (ref 40–53)
HGB BLD-MCNC: 16 G/DL (ref 13.3–17.7)
MCH RBC QN AUTO: 29.2 PG (ref 26.5–33)
MCHC RBC AUTO-ENTMCNC: 33.8 G/DL (ref 31.5–36.5)
MCV RBC AUTO: 87 FL (ref 78–100)
PLATELET # BLD AUTO: 174 10E9/L (ref 150–450)
RBC # BLD AUTO: 5.48 10E12/L (ref 4.4–5.9)
WBC # BLD AUTO: 6.3 10E9/L (ref 4–11)

## 2018-06-13 PROCEDURE — 36415 COLL VENOUS BLD VENIPUNCTURE: CPT | Performed by: FAMILY MEDICINE

## 2018-06-13 PROCEDURE — 99214 OFFICE O/P EST MOD 30 MIN: CPT | Performed by: FAMILY MEDICINE

## 2018-06-13 PROCEDURE — 80053 COMPREHEN METABOLIC PANEL: CPT | Performed by: FAMILY MEDICINE

## 2018-06-13 PROCEDURE — 84550 ASSAY OF BLOOD/URIC ACID: CPT | Performed by: FAMILY MEDICINE

## 2018-06-13 PROCEDURE — 85027 COMPLETE CBC AUTOMATED: CPT | Performed by: FAMILY MEDICINE

## 2018-06-13 RX ORDER — ALLOPURINOL 100 MG/1
TABLET ORAL
Qty: 270 TABLET | Refills: 1 | Status: SHIPPED | OUTPATIENT
Start: 2018-06-13 | End: 2019-09-23

## 2018-06-13 RX ORDER — HYDROCHLOROTHIAZIDE 25 MG/1
25 TABLET ORAL DAILY
Qty: 90 TABLET | Refills: 1 | Status: SHIPPED | OUTPATIENT
Start: 2018-06-13 | End: 2019-08-12

## 2018-06-13 RX ORDER — AMLODIPINE BESYLATE 5 MG/1
5 TABLET ORAL DAILY
Qty: 90 TABLET | Refills: 1 | Status: SHIPPED | OUTPATIENT
Start: 2018-06-13 | End: 2018-12-26

## 2018-06-13 NOTE — MR AVS SNAPSHOT
After Visit Summary   6/13/2018    Saul Hairston    MRN: [id number]           Patient Information     Date Of Birth          1945        Visit Information        Provider Department      6/13/2018 1:20 PM Waldo Matson MD [facility]        Today's Diagnoses     Hypertension goal BP (blood pressure) < 150/90    -  1    Idiopathic chronic gout 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          Care Instructions    Follow up with me in 6 months if bp is fine.    See nurse in 1-2 months for bp recheck.           Follow-ups after your visit        Your next 10 appointments already scheduled     Sep 17, 2018 10:00 AM CDT   Return Sleep Patient with Bennett Ezra Goltz, PA-C   [facility] ([facility])    [address] [address] [phone]              Who to contact     If you have questions or need follow up information about today's clinic visit or your schedule please contact [facility] directly at [phone].  Normal or non-critical lab and imaging results will be communicated to you by Bonfairehart, letter or phone within 4 business days after the clinic has received the results. If you do not hear from us within 7 days, please contact the clinic through [employer] or phone. If you have a critical or abnormal lab result, we will notify you by phone as soon as possible.  Submit refill requests through Bonfyre or call your pharmacy and they will forward the refill request to us. Please allow 3 business days for your refill to be completed.          Additional Information About Your Visit        Bon[employer] Information     Bonfyre gives you secure access to your electronic health record. If you see a primary care provider, you can also send messages to your care team and make appointments. If you have questions, please call your primary care clinic.  If you do not have a primary care provider, please call  "[phone] and they will assist you.        Care EveryWhere ID     This is your Care EveryWhere ID. This could be used by other organizations to access your [place] medical records  [id number]        Your Vitals Were     Pulse Temperature Respirations Height Pulse Oximetry BMI (Body Mass Index)    66 97  F (36.1  C) (Oral) 12 5' 8.5\" (1.74 m) 97% 30.83 kg/m2       Blood Pressure from Last 3 Encounters:   06/13/18 136/69   06/08/18 133/76   05/23/18 151/75    Weight from Last 3 Encounters:   06/13/18 205 lb 12 oz (93.3 kg)   06/08/18 201 lb (91.2 kg)   05/23/18 205 lb 4 oz (93.1 kg)              We Performed the Following     CBC with platelets     Comprehensive metabolic panel     Uric acid          Today's Medication Changes          These changes are accurate as of 6/13/18  2:00 PM.  If you have any questions, ask your nurse or doctor.               Start taking these medicines.        Dose/Directions    hydrochlorothiazide 25 MG tablet   Commonly known as:  HYDRODIURIL   Used for:  Hypertension goal BP (blood pressure) < 150/90   Started by:  Waldo Matson MD        Dose:  25 mg   Take 1 tablet (25 mg) by mouth daily   Quantity:  90 tablet   Refills:  1         These medicines have changed or have updated prescriptions.        Dose/Directions    amLODIPine 5 MG tablet   Commonly known as:  NORVASC   This may have changed:    - medication strength  - how much to take  - how to take this  - when to take this  - additional instructions   Used for:  Hypertension goal BP (blood pressure) < 150/90   Changed by:  Waldo Matson MD        Dose:  5 mg   Take 1 tablet (5 mg) by mouth daily   Quantity:  90 tablet   Refills:  1            Where to get your medicines      These medications were sent to [facility] [id number] [facility] [place] [facility] [address] [facility] [address]  [address] [place] [address]     Phone:  [phone]     allopurinol 100 MG tablet    amLODIPine 5 MG tablet    " hydrochlorothiazide 25 MG tablet                Primary Care Provider Office Phone # Fax #    Waldo Brennan Matson [phone] [address] [place] [address]        Equal Access to Services     OLGA MARINELLI : Hadii aad ku hadang [place], waaxda luqadaha, qaybta kaalmada aderebeccada, luis e goel lizandrojulita moon jairo yang. So [facility] [phone].    ATENCIÓN: Si habla español, tiene a dubon disposición servicios gratuitos de asistencia lingüística. Llame al [phone].    We comply with applicable federal civil rights laws and [place] laws. We do not discriminate on the basis of race, color, national origin, age, disability, sex, sexual orientation, or gender identity.            Thank you!     Thank you for choosing [facility]  for your care. Our goal is always to provide you with excellent care. Hearing back from our patients is one way we can continue to improve our services. Please take a few minutes to complete the written survey that you may receive in the mail after your visit with us. Thank you!             Your Updated Medication List - Protect others around you: Learn how to safely use, store and throw away your medicines at www.disposemymeds.org.          This list is accurate as of 6/13/18  2:00 PM.  Always use your most recent med list.                   Brand Name Dispense Instructions for use Diagnosis    allopurinol 100 MG tablet    ZYLOPRIM    270 tablet    TAKE 3 TABLETS (300 MG) BY MOUTH DAILY    Idiopathic chronic gout 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       amLODIPine 5 MG tablet    NORVASC    90 tablet    Take 1 tablet (5 mg) by mouth daily    Hypertension goal BP (blood pressure) < 150/90       atorvastatin 20 MG tablet    LIPITOR    90 tablet    TAKE 1 TABLET (20 MG) BY MOUTH DAILY    Hyperlipidemia LDL goal <130       hydrochlorothiazide 25 MG tablet    HYDRODIURIL    90 tablet    Take 1 tablet (25 mg) by mouth daily    Hypertension goal BP (blood  pressure) < 150/90       indomethacin 50 MG capsule    INDOCIN    40 capsule    TAKE 1 CAPSULE BY MOUTH 3 TIMES DAILY WITH MEALS    Idiopathic chronic gout 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       MULTIVITAMIN PO           OMEGA-3 FISH OIL PO           TAMSULOSIN HCL PO      Take by mouth daily
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
## 2018-06-14 LAB
ALBUMIN SERPL-MCNC: 3.9 G/DL (ref 3.4–5)
ALP SERPL-CCNC: 120 U/L (ref 40–150)
ALT SERPL W P-5'-P-CCNC: 58 U/L (ref 0–70)
ANION GAP SERPL CALCULATED.3IONS-SCNC: 8 MMOL/L (ref 3–14)
AST SERPL W P-5'-P-CCNC: 29 U/L (ref 0–45)
BILIRUB SERPL-MCNC: 0.4 MG/DL (ref 0.2–1.3)
BUN SERPL-MCNC: 18 MG/DL (ref 7–30)
CALCIUM SERPL-MCNC: 9.2 MG/DL (ref 8.5–10.1)
CHLORIDE SERPL-SCNC: 106 MMOL/L (ref 94–109)
CO2 SERPL-SCNC: 28 MMOL/L (ref 20–32)
CREAT SERPL-MCNC: 1.01 MG/DL (ref 0.66–1.25)
GFR SERPL CREATININE-BSD FRML MDRD: 73 ML/MIN/1.7M2
GLUCOSE SERPL-MCNC: 128 MG/DL (ref 70–99)
POTASSIUM SERPL-SCNC: 4.1 MMOL/L (ref 3.4–5.3)
PROT SERPL-MCNC: 7.2 G/DL (ref 6.8–8.8)
SODIUM SERPL-SCNC: 142 MMOL/L (ref 133–144)
URATE SERPL-MCNC: 4.9 MG/DL (ref 3.5–7.2)

## 2018-07-06 ENCOUNTER — OFFICE VISIT (OUTPATIENT)
Dept: FAMILY MEDICINE | Facility: CLINIC | Age: 73
End: 2018-07-06
Payer: COMMERCIAL

## 2018-07-06 VITALS
DIASTOLIC BLOOD PRESSURE: 71 MMHG | HEART RATE: 66 BPM | TEMPERATURE: 98 F | HEIGHT: 69 IN | BODY MASS INDEX: 29.33 KG/M2 | WEIGHT: 198 LBS | OXYGEN SATURATION: 98 % | SYSTOLIC BLOOD PRESSURE: 132 MMHG | RESPIRATION RATE: 16 BRPM

## 2018-07-06 DIAGNOSIS — M1A.0720 IDIOPATHIC CHRONIC GOUT OF LEFT FOOT WITHOUT TOPHUS: ICD-10-CM

## 2018-07-06 DIAGNOSIS — M70.21 OLECRANON BURSITIS OF RIGHT ELBOW: Primary | ICD-10-CM

## 2018-07-06 DIAGNOSIS — R73.09 ELEVATED GLUCOSE: ICD-10-CM

## 2018-07-06 PROCEDURE — 99214 OFFICE O/P EST MOD 30 MIN: CPT | Performed by: FAMILY MEDICINE

## 2018-07-06 RX ORDER — PREDNISONE 20 MG/1
20 TABLET ORAL DAILY
Qty: 5 TABLET | Refills: 0 | Status: SHIPPED | OUTPATIENT
Start: 2018-07-06 | End: 2018-11-02

## 2018-07-06 RX ORDER — NAPROXEN 500 MG/1
500 TABLET ORAL 2 TIMES DAILY WITH MEALS
Qty: 10 TABLET | Refills: 0 | Status: CANCELLED | OUTPATIENT
Start: 2018-07-06 | End: 2018-07-11

## 2018-07-06 NOTE — PROGRESS NOTES
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 Was camping [place] [facility] recently and did a lot of cranking with the camper using right hand, repetitive movement rotatory at the elbow and activity not normally done. Was back home when right elbow started acting up 6 days ago. Feels mildly swollen has full range of motion. Not any worse. Has had previously similar episode long time ago but cant remember what was done at the time. No injury or fall. No bruising or warmth. Reports his Gout pain different and doesnt usually affect his elbows. Denies having diabetes and told by CP would monitor elevated glucose noted in June visit. Has not taken anything for pain. Has indocin at home for gout but uses rarely and told not to take indocin due to kidney. Recently hctz added to reduced dose amlodipine for better BP control and relieve swelling in legs from amlodipine. Legs doing fine till past week after ate at [employer] feel more puffy feet from salt consumed. No red meat. Ate only chicken. BP better though.     No fever or chills, no headache or dizziness, no double or blurry vision, no facial pain, earache, sore throat, runny nose, post nasal drip, no trouble hearing, smelling, tasting or swallowing, no cough , no chest pain, trouble breathing or palpitations, No abdominal pain, heart burn, reflux, nausea or vomiting or diarrhea or constipation, no blood in stools or black stools, no weight loss or night  sweats. No dysuria, hematuria, frequency, urgency, hesitancy, incontinence, No pelvic complaints. Feet swell from amlodipine.  No rash.    History of obesity, obstructive sleep apnea, hyperlipidemia on Lipitor, hypertension on amlodipine and hydrochlorothiazide, idiopathic chronic gout on allopurinol and rare use of Indocin, elevated PSA with family history of prostate cancer status post recent prostate surgery February 2018, history of prior umbilical hernia repair, kidney stone, seasonal allergic rhinitis, rotator cuff tendinitis repair on the left, prior tonsillectomy, ear surgery, pseudogout removed from knee, colonoscopy in the past under care of Dr Matson seen June 2018 for hypertension. [facility] given [place] [address] on February 13, 2018 post op and #4 5/4/2018 DDS.  New to me    Problem list and histories reviewed & adjusted, as indicated.  Additional history: as documented    Patient Active Problem List   Diagnosis     Idiopathic chronic gout 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     Rotator cuff tear     Kidney stone     Elevated PSA     Seasonal allergic rhinitis     Hyperlipidemia LDL goal <130     Umbilical hernia     Hypertension goal BP (blood pressure) < 150/90     Non morbid obesity, unspecified obesity type     MUNIRA (obstructive sleep apnea)     Past Surgical History:   Procedure Laterality Date     COLONOSCOPY  6/16/2006     COLONOSCOPY N/A 6/15/2016    Procedure: COLONOSCOPY;  Surgeon: Poly Sanchez MD;  Location:  GI     CYSTOSCOPY FLEXIBLE, CYOABLATION PROSTATE N/A 2/13/2018    Procedure: CYSTOSCOPY FLEXIBLE, CRYOABLATION PROSTATE;  FLEXIBLE CYSTOSCOPY, CYROABLATION OF PROSTATE ;  Surgeon: Aj Caal MD;  Location: [facility] OR     HERNIORRHAPHY UMBILICAL  7/11/2013    Procedure: HERNIORRHAPHY UMBILICAL;  Open Umbilical Hernia Repair With Mesh ;  Surgeon: Sergio Tomas MD;  Location: [facility] OR     ROTATOR CUFF REPAIR RT/LT  5/19/2011    Left Shoulder     SURGICAL HISTORY OF -       ear  operation as child     SURGICAL HISTORY OF -       removal of pseudogout deposits - Right knee     TONSILLECTOMY      Child       Social History   Substance Use Topics     Smoking status: Never Smoker     Smokeless tobacco: Never Used     Alcohol use No     Family History   Problem Relation Age of Onset     Hypertension Father      Alcohol/Drug Father      Allergies Father      Respiratory Father      Cerebrovascular Disease Maternal Grandmother      Arthritis Maternal Grandmother      Alzheimer Disease Mother      Arthritis Mother      Eye Disorder Mother      C.A.D. Maternal Uncle      C.A.D. Paternal Uncle      Prostate Cancer Maternal Uncle      Lipids Sister      Lipids Brother      Obesity Brother      C.A.D. Son          Allergies   Allergen Reactions     No Known Drug Allergies      Recent Labs   Lab Test  06/13/18   1359  03/07/18   1326  02/09/18   0948   09/26/17   1115  09/19/16   0926  10/26/15   1052   04/18/11   1026   LDL   --    --    --    --   81  154*  167*   < >  170*   HDL   --    --    --    --   39*  30*  31*   < >  38*   TRIG   --    --    --    --   84  282*  218*   < >  305*   ALT  58  80*  62   < >  63  34  41   < >  36   CR  1.01  1.54*  1.09   < >  1.06  1.15  1.03   < >  0.99   GFRESTIMATED  73  45*  67   < >  69  63  72   < >  76   GFRESTBLACK  88  54*  81   < >  84  76  87   < >  >90   POTASSIUM  4.1  3.7  3.9   < >  4.2  3.9  4.1   < >  4.4   TSH   --    --    --    --    --    --    --    --   1.26    < > = values in this interval not displayed.      BP Readings from Last 3 Encounters:   07/06/18 132/71   06/13/18 136/69   06/08/18 133/76    Wt Readings from Last 3 Encounters:   07/06/18 198 lb (89.8 kg)   06/13/18 205 lb 12 oz (93.3 kg)   06/08/18 201 lb (91.2 kg)         Labs reviewed in EPIC    Reviewed and updated as needed this visit by clinical staff       Reviewed and updated as needed this visit by Provider         ROS:  Constitutional, HEENT, cardiovascular, pulmonary, GI,  ", musculoskeletal, neuro, skin, endocrine and psych systems are negative, except as otherwise noted.    OBJECTIVE:     /71 (BP Location: Right arm, Patient Position: Sitting, Cuff Size: Adult Large)  Pulse 66  Temp 98  F (36.7  C) (Oral)  Resp 16  Ht 5' 8.5\" (1.74 m)  Wt 198 lb (89.8 kg)  SpO2 98%  BMI 29.67 kg/m2  Body mass index is 29.67 kg/(m^2).  GENERAL: alert, no distress and obese  EYES: Eyes grossly normal to inspection, PERRL and conjunctivae and sclerae normal  RESP: lungs clear to auscultation - no rales, rhonchi or wheezes  CV: regular rates and rhythm and normal S1 S2, no S3 or S4, has ina[pheral edema  MS: right olecranon area swollen and puffy, minimal tenderness, no redness or warmth. full range of motion at elbow joint.   SKIN: no suspicious lesions or rashes  NEURO: Normal strength and tone, mentation intact and speech normal  PSYCH: mentation appears normal, affect normal/bright    Diagnostic Test Results:  No results found for this or any previous visit (from the past 24 hour(s)).    ASSESSMENT/PLAN:       ICD-10-CM    1. Olecranon bursitis of right elbow M70.21 ORTHO  REFERRAL     predniSONE (DELTASONE) 20 MG tablet   2. Idiopathic chronic gout 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 M1A.0720 ORTHO  REFERRAL   3. Elevated glucose R73.09      Rest, ice, avoid resting elbow on a surface, compress with ace wrap. Given CKD, HTN hx no diabetes yet will do prednisone to decrease inflammation and swelling. Will do  Prednisone 20 mg daily 5 days with food, may raise blood pressure. See ortho if not better. Not tense so aspiration needed. No sign of infection. No antibiotics needed currently.   If has a fever to give us a call or go to  may need antibiotics. ultrasound / drainage could be considered then. Usually resolves on its own. Consider checking for diabetes if keeps happening. Suspect from repetitive movement cranking the camper. Though gout could also be contributing to " it. But prednisone would help that too.   See Patient Instructions    Lori Sarabia MD  [facility]

## 2018-07-06 NOTE — PATIENT INSTRUCTIONS
Rest, ice, avoid resting on a surface, compress with ace wrap  Prednisone 20 mg daily 5 days with food, may raise blood pressure  See ortho of not better  If has a fever give us a call or go to  may need antibiotics . 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 The bursa helps the muscles and tendons move smoothly over the bone. It also cushions and protects your elbow. Bursitis is when the bursa is inflamed or swollen. This is most often due to overuse of or injury to the elbow. Symptoms include swelling and pain. If the elbow is red and feels warm to the touch, the bursa itself may be infected.  In most cases, elbow bursitis resolves with medicine and self-care at home. It may take several weeks for the bursa to heal and the swelling to go away. In some cases, your healthcare provider may drain excess fluid from the bursa. Or, he or she may inject medicine directly into the bursa to help relieve symptoms. In severe cases, you may need surgery to remove the bursa may. If there is concern that the bursa is infected, your healthcare provider may prescribe antibiotics to treat the infection.    Home care  Your healthcare provider may prescribe medicine to help relieve pain and swelling. This may be an over-the-counter pain reliever or prescription pain medicine. Take all medicines as directed. To help treat or prevent infection, your provider may prescribe antibiotics. If these are prescribed, take them as directed until they are gone.  The following are general care guidelines:    Apply an ice pack or bag of frozen peas wrapped in a thin towel to your elbow for 15 to 20 minutes at a time. Do this 3 to 4 times a day until pain and swelling  improve.    Keep your elbow raised above the level of your heart whenever possible. This helps reduce swelling. When sitting or lying down, place your arm on a pillow that rests on your chest or on a pillow at your side.    Use an elastic wrap around the elbow joint to compress the area while it is healing. Make the wrap snug but not tight to the point of causing pain.    Rest your elbow to give it time to heal. You may need to wear an elbow pad to help protect and limit the movement of your elbow. During and after healing, avoid leaning on your elbows.  Follow-up care  Follow up with your healthcare provider, or as advised. If you have been referred to a specialist, make that appointment promptly.  When to seek medical advice  Call your healthcare provider right away if any of these occur:    Fever of 100.4 F (38 C) or higher, or as advised    Chills    Increased pain, swelling, warmth, redness, or drainage from the joint    Trouble moving the elbow joint    Numbness or tingling in the hand    Severe pain or swelling in forearm or hand    Loss of pink color and slow return of color after squeezing fingertip or hand  Date Last Reviewed: 6/1/2016 2000-2017 [employer]. [address] [place] [address], [place] [address]. All rights reserved. This information is not intended as a substitute for professional medical care. Always follow your healthcare professional's instructions.

## 2018-07-06 NOTE — MR AVS SNAPSHOT
After Visit Summary   7/6/2018    Saul Hairston    MRN: [id number]           Patient Information     Date Of Birth          1945        Visit Information        Provider Department      7/6/2018 11:20 AM Lori Sarabia MD [facility]        Today's Diagnoses     Olecranon bursitis of right elbow    -  1    Idiopathic chronic gout 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        Elevated glucose          Care Instructions    Rest, ice, avoid resting on a surface, compress with ace wrap  Prednisone 20 mg daily 5 days with food, may raise blood pressure  See ortho of not better  If has a fever give us a call or go to  may need antibiotics . 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 The bursa helps the muscles and tendons move smoothly over the bone. It also cushions and protects your elbow. Bursitis is when the bursa is inflamed or swollen. This is most often due to overuse of or injury to the elbow. Symptoms include swelling and pain. If the elbow is red and feels warm to the touch, the bursa itself may be infected.  In most cases, elbow bursitis resolves with medicine and self-care at home. It may take several weeks for the bursa to heal and the swelling to go away. In some cases, your healthcare provider may drain excess fluid from the bursa. Or, he or she may inject medicine directly into the bursa to help relieve symptoms. In severe cases, you may need surgery to remove the bursa may. If there is concern that the bursa is infected, your healthcare provider may prescribe antibiotics to treat the infection.    Home care  Your healthcare provider may prescribe medicine to help relieve pain and swelling. This may be an  over-the-counter pain reliever or prescription pain medicine. Take all medicines as directed. To help treat or prevent infection, your provider may prescribe antibiotics. If these are prescribed, take them as directed until they are gone.  The following are general care guidelines:    Apply an ice pack or bag of frozen peas wrapped in a thin towel to your elbow for 15 to 20 minutes at a time. Do this 3 to 4 times a day until pain and swelling improve.    Keep your elbow raised above the level of your heart whenever possible. This helps reduce swelling. When sitting or lying down, place your arm on a pillow that rests on your chest or on a pillow at your side.    Use an elastic wrap around the elbow joint to compress the area while it is healing. Make the wrap snug but not tight to the point of causing pain.    Rest your elbow to give it time to heal. You may need to wear an elbow pad to help protect and limit the movement of your elbow. During and after healing, avoid leaning on your elbows.  Follow-up care  Follow up with your healthcare provider, or as advised. If you have been referred to a specialist, make that appointment promptly.  When to seek medical advice  Call your healthcare provider right away if any of these occur:    Fever of 100.4 F (38 C) or higher, or as advised    Chills    Increased pain, swelling, warmth, redness, or drainage from the joint    Trouble moving the elbow joint    Numbness or tingling in the hand    Severe pain or swelling in forearm or hand    Loss of pink color and slow return of color after squeezing fingertip or hand  Date Last Reviewed: 6/1/2016 2000-2017 [employer]. [address] [place] [address], [place] [address]. All rights reserved. This information is not intended as a substitute for professional medical care. Always follow your healthcare professional's instructions.                Follow-ups after your visit        Additional Services     ORTHO   REFERRAL       [facility] is referring you to the Orthopedic  Services at [facility].       The  Representative will assist you in the coordination of your Orthopedic and Musculoskeletal Care as prescribed by your physician.    The  Representative will call you within 1 business day to help schedule your appointment, or you may contact the  Representative at:    All areas ~ [phone]     Type of Referral : Non Surgical       Timeframe requested: 3 - 5 days    Coverage of these services is subject to the terms and limitations of your health insurance plan.  Please call member services at your health plan with any benefit or coverage questions.      If X-rays, CT or MRI's have been performed, please contact the facility where they were done to arrange for , prior to your scheduled appointment.  Please bring this referral request to your appointment and present it to your specialist.                  Your next 10 appointments already scheduled     Sep 17, 2018 10:00 AM CDT   Return Sleep Patient with Bennett Ezra Goltz, PA-C   [facility] ([facility] [place])    [address] [place] [id number] [phone]              Who to contact     If you have questions or need follow up information about today's clinic visit or your schedule please contact [facility] directly at [phone].  Normal or non-critical lab and imaging results will be communicated to you by MyChart, letter or phone within 4 business days after the clinic has received the results. If you do not hear from us within 7 days, please contact the clinic through MyChart or phone. If you have a critical or abnormal lab result, we will notify you by phone as soon as possible.  Submit refill requests through [employer] or call your pharmacy and they will forward the refill request to us. Please allow 3  "business days for your refill to be completed.          Additional Information About Your Visit        MyChart Information     [employer] gives you secure access to your electronic health record. If you see a primary care provider, you can also send messages to your care team and make appointments. If you have questions, please call your primary care clinic.  If you do not have a primary care provider, please call [phone] and they will assist you.        Care EveryWhere ID     This is your Care EveryWhere ID. This could be used by other organizations to access your [facility] medical records  [id number]        Your Vitals Were     Pulse Temperature Respirations Height Pulse Oximetry BMI (Body Mass Index)    66 98  F (36.7  C) (Oral) 16 5' 8.5\" (1.74 m) 98% 29.67 kg/m2       Blood Pressure from Last 3 Encounters:   07/06/18 132/71   06/13/18 136/69   06/08/18 133/76    Weight from Last 3 Encounters:   07/06/18 198 lb (89.8 kg)   06/13/18 205 lb 12 oz (93.3 kg)   06/08/18 201 lb (91.2 kg)              We Performed the Following     ORTHO  REFERRAL          Today's Medication Changes          These changes are accurate as of 7/6/18 11:48 AM.  If you have any questions, ask your nurse or doctor.               Start taking these medicines.        Dose/Directions    predniSONE 20 MG tablet   Commonly known as:  DELTASONE   Used for:  Olecranon bursitis of right elbow   Started by:  Lori Sarabia MD        Dose:  20 mg   Take 1 tablet (20 mg) by mouth daily   Quantity:  5 tablet   Refills:  0            Where to get your medicines      These medications were sent to [place] [address] [place] [facility] [address]  [address] [facility] [address]     Phone:  [phone]     predniSONE 20 MG tablet                Primary Care Provider Office Phone # Fax #    Waldo Brennan Matson [phone] [address] [place] [address]        Equal Access to Services     " [facility] Hospital[facility]ELIPA : Hadii aad ku shayna Sogold, waaxda luqadaha, qaybta kaalmada adekassandra, luis e baljeet hayreggie henrypaulaarsalan gill . So [facility] [phone].    ATENCIÓN: Si habla español, tiene a dubon disposición servicios gratuitos de asistencia lingüística. Llame al [phone].    We comply with applicable federal civil rights laws and [place] laws. We do not discriminate on the basis of race, color, national origin, age, disability, sex, sexual orientation, or gender identity.            Thank you!     Thank you for choosing [facility]  for your care. Our goal is always to provide you with excellent care. Hearing back from our patients is one way we can continue to improve our services. Please take a few minutes to complete the written survey that you may receive in the mail after your visit with us. Thank you!             Your Updated Medication List - Protect others around you: Learn how to safely use, store and throw away your medicines at www.disposemymeds.org.          This list is accurate as of 7/6/18 11:48 AM.  Always use your most recent med list.                   Brand Name Dispense Instructions for use Diagnosis    allopurinol 100 MG tablet    ZYLOPRIM    270 tablet    TAKE 3 TABLETS (300 MG) BY MOUTH DAILY    Idiopathic chronic gout 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       amLODIPine 5 MG tablet    NORVASC    90 tablet    Take 1 tablet (5 mg) by mouth daily    Hypertension goal BP (blood pressure) < 150/90       atorvastatin 20 MG tablet    LIPITOR    90 tablet    TAKE 1 TABLET (20 MG) BY MOUTH DAILY    Hyperlipidemia LDL goal <130       hydrochlorothiazide 25 MG tablet    HYDRODIURIL    90 tablet    Take 1 tablet (25 mg) by mouth daily    Hypertension goal BP (blood pressure) < 150/90       indomethacin 50 MG capsule    INDOCIN    40 capsule    TAKE 1 CAPSULE BY MOUTH 3 TIMES DAILY WITH MEALS    Idiopathic chronic gout 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       MULTIVITAMIN PO           OMEGA-3 FISH  OIL PO           predniSONE 20 MG tablet    DELTASONE    5 tablet    Take 1 tablet (20 mg) by mouth daily    Olecranon bursitis of right elbow       TAMSULOSIN HCL PO      Take by mouth daily

## 2018-07-21 ENCOUNTER — TRANSFERRED RECORDS (OUTPATIENT)
Dept: HEALTH INFORMATION MANAGEMENT | Facility: CLINIC | Age: 73
End: 2018-07-21

## 2018-08-27 ENCOUNTER — TRANSFERRED RECORDS (OUTPATIENT)
Dept: HEALTH INFORMATION MANAGEMENT | Facility: CLINIC | Age: 73
End: 2018-08-27

## 2018-10-03 DIAGNOSIS — E78.5 HYPERLIPIDEMIA LDL GOAL <130: ICD-10-CM

## 2018-10-03 NOTE — TELEPHONE ENCOUNTER
"Requested Prescriptions   Pending Prescriptions Disp Refills     atorvastatin (LIPITOR) 20 MG tablet [Pharmacy Med Name: ATORVASTATIN 20 MG TABLET]  Last Written Prescription Date:  3/9/2018  Last Fill Quantity: 90 tablet,  # refills: 1   Last Office Visit: 7/6/2018   Future Office Visit:      90 tablet 1     Sig: TAKE 1 TABLET (20 MG) BY MOUTH DAILY    Statins Protocol Failed    10/3/2018  9:20 AM       Failed - LDL on file in past 12 months    Recent Labs   Lab Test  09/26/17   1115   LDL  81          Passed - No abnormal creatine kinase in past 12 months    No lab results found.          Passed - Recent (12 mo) or future (30 days) visit within the authorizing provider's specialty    Patient had office visit in the last 12 months or has a visit in the next 30 days with authorizing provider or within the authorizing provider's specialty.  See \"Patient Info\" tab in inbasket, or \"Choose Columns\" in Meds & Orders section of the refill encounter.           Passed - Patient is age 18 or older          "

## 2018-10-04 RX ORDER — ATORVASTATIN CALCIUM 20 MG/1
TABLET, FILM COATED ORAL
Qty: 30 TABLET | Refills: 0 | Status: SHIPPED | OUTPATIENT
Start: 2018-10-04 | End: 2018-11-13

## 2018-10-04 NOTE — TELEPHONE ENCOUNTER
,  --Please call patient (Does not read MyChart at all) and ask him to schedule a lab only for annual FLP.  A refill order for below medication  was sent to the pharmacy..     Thank you,  Lizette Ramirez RN

## 2018-10-15 DIAGNOSIS — E78.5 HYPERLIPIDEMIA LDL GOAL <130: ICD-10-CM

## 2018-10-15 LAB
CHOLEST SERPL-MCNC: 136 MG/DL
HDLC SERPL-MCNC: 41 MG/DL
LDLC SERPL CALC-MCNC: 66 MG/DL
NONHDLC SERPL-MCNC: 95 MG/DL
TRIGL SERPL-MCNC: 147 MG/DL

## 2018-10-15 PROCEDURE — 80061 LIPID PANEL: CPT | Performed by: FAMILY MEDICINE

## 2018-10-15 PROCEDURE — 36415 COLL VENOUS BLD VENIPUNCTURE: CPT | Performed by: FAMILY MEDICINE

## 2018-11-02 ENCOUNTER — OFFICE VISIT (OUTPATIENT)
Dept: FAMILY MEDICINE | Facility: CLINIC | Age: 73
End: 2018-11-02
Payer: COMMERCIAL

## 2018-11-02 VITALS
HEART RATE: 74 BPM | SYSTOLIC BLOOD PRESSURE: 137 MMHG | DIASTOLIC BLOOD PRESSURE: 79 MMHG | TEMPERATURE: 98.1 F | RESPIRATION RATE: 16 BRPM | WEIGHT: 196.5 LBS | HEIGHT: 69 IN | OXYGEN SATURATION: 97 % | BODY MASS INDEX: 29.1 KG/M2

## 2018-11-02 DIAGNOSIS — R73.01 IMPAIRED FASTING GLUCOSE: ICD-10-CM

## 2018-11-02 DIAGNOSIS — M25.522 LEFT ELBOW PAIN: Primary | ICD-10-CM

## 2018-11-02 LAB — HBA1C MFR BLD: 5.5 % (ref 0–5.6)

## 2018-11-02 PROCEDURE — 83036 HEMOGLOBIN GLYCOSYLATED A1C: CPT | Performed by: FAMILY MEDICINE

## 2018-11-02 PROCEDURE — 99213 OFFICE O/P EST LOW 20 MIN: CPT | Performed by: FAMILY MEDICINE

## 2018-11-02 PROCEDURE — 36415 COLL VENOUS BLD VENIPUNCTURE: CPT | Performed by: FAMILY MEDICINE

## 2018-11-02 RX ORDER — PREDNISONE 20 MG/1
40 TABLET ORAL DAILY
Qty: 10 TABLET | Refills: 0 | Status: SHIPPED | OUTPATIENT
Start: 2018-11-02 | End: 2018-11-07

## 2018-11-02 NOTE — MR AVS SNAPSHOT
"              After Visit Summary   11/2/2018    Saul Hairston    MRN: [id number]           Patient Information     Date Of Birth          1945        Visit Information        Provider Department      11/2/2018 1:40 PM Waldo Matson MD [facility]        Today's Diagnoses     Left elbow pain    -  1    Impaired fasting glucose           Follow-ups after your visit        Who to contact     If you have questions or need follow up information about today's clinic visit or your schedule please contact [facility] directly at [phone].  Normal or non-critical lab and imaging results will be communicated to you by [employer], letter or phone within 4 business days after the clinic has received the results. If you do not hear from us within 7 days, please contact the clinic through [employer] or phone. If you have a critical or abnormal lab result, we will notify you by phone as soon as possible.  Submit refill requests through [employer] or call your pharmacy and they will forward the refill request to us. Please allow 3 business days for your refill to be completed.          Additional Information About Your Visit        MyChart Information     [employer] gives you secure access to your electronic health record. If you see a primary care provider, you can also send messages to your care team and make appointments. If you have questions, please call your primary care clinic.  If you do not have a primary care provider, please call [phone] and they will assist you.        Care EveryWhere ID     This is your Care EveryWhere ID. This could be used by other organizations to access your [facility] medical records  [id number]        Your Vitals Were     Pulse Temperature Respirations Height Pulse Oximetry BMI (Body Mass Index)    74 98.1  F (36.7  C) (Oral) 16 5' 8.5\" (1.74 m) 97% 29.44 kg/m2       Blood Pressure from Last 3 Encounters:   11/02/18 137/79   07/06/18 132/71   06/13/18 " 136/69    Weight from Last 3 Encounters:   11/02/18 196 lb 8 oz (89.1 kg)   07/06/18 198 lb (89.8 kg)   06/13/18 205 lb 12 oz (93.3 kg)              We Performed the Following     Hemoglobin A1c          Today's Medication Changes          These changes are accurate as of 11/2/18 11:59 PM.  If you have any questions, ask your nurse or doctor.               Start taking these medicines.        Dose/Directions    predniSONE 20 MG tablet   Commonly known as:  DELTASONE   Used for:  Left elbow pain   Started by:  Waldo Matson MD        Dose:  40 mg   Take 2 tablets (40 mg) by mouth daily for 5 days   Quantity:  10 tablet   Refills:  0            Where to get your medicines      Some of these will need a paper prescription and others can be bought over the counter.  Ask your nurse if you have questions.     Bring a paper prescription for each of these medications     predniSONE 20 MG tablet                Primary Care Provider Office Phone # Fax #    Waldo Matson [phone] [address] [place] [address]        Equal Access to Services     [facility]: Hadii marybeth corral Sogold, waaxda luqadaha, qaybta kaalmada adekassandra, luis e gill . So [facility] [phone].    ATENCIÓN: Si habla español, tiene a dubon disposición servicios gratuitos de asistencia lingüística. Llame al [phone].    We comply with applicable federal civil rights laws and [place] laws. We do not discriminate on the basis of race, color, national origin, age, disability, sex, sexual orientation, or gender identity.            Thank you!     Thank you for choosing [facility]  for your care. Our goal is always to provide you with excellent care. Hearing back from our patients is one way we can continue to improve our services. Please take a few minutes to complete the written survey that you may receive in the mail after your visit with us. Thank  you!             Your Updated Medication List - Protect others around you: Learn how to safely use, store and throw away your medicines at www.disposemymeds.org.          This list is accurate as of 11/2/18 11:59 PM.  Always use your most recent med list.                   Brand Name Dispense Instructions for use Diagnosis    allopurinol 100 MG tablet    ZYLOPRIM    270 tablet    TAKE 3 TABLETS (300 MG) BY MOUTH DAILY    Idiopathic chronic gout 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       amLODIPine 5 MG tablet    NORVASC    90 tablet    Take 1 tablet (5 mg) by mouth daily    Hypertension goal BP (blood pressure) < 150/90       atorvastatin 20 MG tablet    LIPITOR    30 tablet    TAKE 1 TABLET (20 MG) BY MOUTH DAILY    Hyperlipidemia LDL goal <130       hydrochlorothiazide 25 MG tablet    HYDRODIURIL    90 tablet    Take 1 tablet (25 mg) by mouth daily    Hypertension goal BP (blood pressure) < 150/90       indomethacin 50 MG capsule    INDOCIN    40 capsule    TAKE 1 CAPSULE BY MOUTH 3 TIMES DAILY WITH MEALS    Idiopathic chronic gout 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       MULTIVITAMIN PO           OMEGA-3 FISH OIL PO           predniSONE 20 MG tablet    DELTASONE    10 tablet    Take 2 tablets (40 mg) by mouth daily for 5 days    Left elbow pain       TAMSULOSIN HCL PO      Take by mouth daily
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
## 2018-11-13 DIAGNOSIS — E78.5 HYPERLIPIDEMIA LDL GOAL <130: ICD-10-CM

## 2018-11-13 NOTE — TELEPHONE ENCOUNTER
"Requested Prescriptions   Pending Prescriptions Disp Refills     atorvastatin (LIPITOR) 20 MG tablet [Pharmacy Med Name: ATORVASTATIN 20 MG TABLET]  Last Written Prescription Date:  10/4/2018  Last Fill Quantity: 30 tabelt,  # refills: 0   Last Office Visit: 11/2/2018   Future Office Visit:      30 tablet 0     Sig: TAKE 1 TABLET BY MOUTH EVERY DAY    Statins Protocol Passed    11/13/2018  1:52 PM       Passed - LDL on file in past 12 months    Recent Labs   Lab Test  10/15/18   0818   LDL  66          Passed - No abnormal creatine kinase in past 12 months    No lab results found.          Passed - Recent (12 mo) or future (30 days) visit within the authorizing provider's specialty    Patient had office visit in the last 12 months or has a visit in the next 30 days with authorizing provider or within the authorizing provider's specialty.  See \"Patient Info\" tab in inbasket, or \"Choose Columns\" in Meds & Orders section of the refill encounter.           Passed - Patient is age 18 or older          "

## 2018-11-15 RX ORDER — ATORVASTATIN CALCIUM 20 MG/1
TABLET, FILM COATED ORAL
Qty: 90 TABLET | Refills: 3 | Status: SHIPPED | OUTPATIENT
Start: 2018-11-15 | End: 2019-12-10

## 2018-11-15 NOTE — TELEPHONE ENCOUNTER
Prescription [place] per [facility] [place].  Thanks! Blanka Thomas RN

## 2018-12-26 DIAGNOSIS — I10 HYPERTENSION GOAL BP (BLOOD PRESSURE) < 150/90: ICD-10-CM

## 2018-12-26 NOTE — TELEPHONE ENCOUNTER
"Requested Prescriptions   Pending Prescriptions Disp Refills     amLODIPine (NORVASC) 5 MG tablet [Pharmacy Med Name: AMLODIPINE BESYLATE 5 MG TAB]  Last Written Prescription Date:  6/13/2018  Last Fill Quantity: 90 tablet,  # refills: 1   Last Office Visit: 11/2/2018   Future Office Visit:      90 tablet 1     Sig: TAKE 1 TABLET BY MOUTH EVERY DAY    Calcium Channel Blockers Protocol  Passed - 12/26/2018 11:51 AM       Passed - Blood pressure under 140/90 in past 12 months    BP Readings from Last 3 Encounters:   11/02/18 137/79   07/06/18 132/71   06/13/18 136/69          Passed - Recent (12 mo) or future (30 days) visit within the authorizing provider's specialty    Patient had office visit in the last 12 months or has a visit in the next 30 days with authorizing provider or within the authorizing provider's specialty.  See \"Patient Info\" tab in inbasket, or \"Choose Columns\" in Meds & Orders section of the refill encounter.           Passed - Patient is age 18 or older       Passed - Normal serum creatinine on file in past 12 months    Recent Labs   Lab Test 06/13/18  1359   CR 1.01               "

## 2018-12-27 RX ORDER — AMLODIPINE BESYLATE 5 MG/1
TABLET ORAL
Qty: 90 TABLET | Refills: 1 | Status: SHIPPED | OUTPATIENT
Start: 2018-12-27 | End: 2019-06-21

## 2018-12-28 NOTE — TELEPHONE ENCOUNTER
Prescription approved per [facility] Refill Protocol.    Signed Prescriptions:                        Disp   Refills    amLODIPine (NORVASC) 5 MG tablet           90 tab*1        Sig: TAKE 1 TABLET BY MOUTH EVERY DAY  Authorizing Provider: TOBI GOODE  Ordering User: WAYNE HADDAD      Closing encounter - no further actions needed at this time    Wayne Haddad RN

## 2019-02-14 ENCOUNTER — TRANSFERRED RECORDS (OUTPATIENT)
Dept: HEALTH INFORMATION MANAGEMENT | Facility: CLINIC | Age: 74
End: 2019-02-14

## 2019-05-20 ENCOUNTER — TRANSFERRED RECORDS (OUTPATIENT)
Dept: HEALTH INFORMATION MANAGEMENT | Facility: CLINIC | Age: 74
End: 2019-05-20

## 2019-06-21 DIAGNOSIS — I10 HYPERTENSION GOAL BP (BLOOD PRESSURE) < 150/90: ICD-10-CM

## 2019-06-21 NOTE — TELEPHONE ENCOUNTER
"Requested Prescriptions   Pending Prescriptions Disp Refills     amLODIPine (NORVASC) 5 MG tablet [Pharmacy Med Name: AMLODIPINE BESYLATE 5 MG TAB]  Last Written Prescription Date:  12/27/2018  Last Fill Quantity: 90 tablet,  # refills: 1   Last Office Visit: 11/2/2018   Future Office Visit:     90 tablet 1     Sig: TAKE 1 TABLET BY MOUTH EVERY DAY       Calcium Channel Blockers Protocol  Failed - 6/21/2019 12:54 AM        Failed - Normal serum creatinine on file in past 12 months     Recent Labs   Lab Test 06/13/18  1359   CR 1.01           Passed - Blood pressure under 140/90 in past 12 months     BP Readings from Last 3 Encounters:   11/02/18 137/79   07/06/18 132/71   06/13/18 136/69           Passed - Recent (12 mo) or future (30 days) visit within the authorizing provider's specialty     Patient had office visit in the last 12 months or has a visit in the next 30 days with authorizing provider or within the authorizing provider's specialty.  See \"Patient Info\" tab in inbasket, or \"Choose Columns\" in Meds & Orders section of the refill encounter.            Passed - Medication is active on med list        Passed - Patient is age 18 or older            "

## 2019-06-22 RX ORDER — AMLODIPINE BESYLATE 5 MG/1
TABLET ORAL
Qty: 30 TABLET | Refills: 0 | Status: SHIPPED | OUTPATIENT
Start: 2019-06-22 | End: 2019-07-08

## 2019-06-22 NOTE — TELEPHONE ENCOUNTER
Medication is being filled for 1 time refill only due to:  Patient needs labs CMP. Future labs ordered CMP.     Signed Prescriptions:                        Disp   Refills    amLODIPine (NORVASC) 5 MG tablet           30 tab*0        Sig: TAKE 1 TABLET BY MOUTH EVERY DAY  Authorizing Provider: TOBI GOODE  Ordering User: WAYNE HADDAD     Reception - one month due for lab work please

## 2019-07-08 DIAGNOSIS — I10 HYPERTENSION GOAL BP (BLOOD PRESSURE) < 150/90: ICD-10-CM

## 2019-07-08 NOTE — TELEPHONE ENCOUNTER
"Requested Prescriptions   Pending Prescriptions Disp Refills     amLODIPine (NORVASC) 5 MG tablet 30 tablet 0     Sig: Take 1 tablet (5 mg) by mouth daily  Last Written Prescription Date:  6/22/2019  Last Fill Quantity: 30 tablet,  # refills: 0   Last Office Visit: 11/2/2018   Future Office Visit:            Calcium Channel Blockers Protocol  Failed - 7/8/2019  1:35 PM        Failed - Normal serum creatinine on file in past 12 months     Recent Labs   Lab Test 06/13/18  1359   CR 1.01           Passed - Blood pressure under 140/90 in past 12 months     BP Readings from Last 3 Encounters:   11/02/18 137/79   07/06/18 132/71   06/13/18 136/69           Passed - Recent (12 mo) or future (30 days) visit within the authorizing provider's specialty     Patient had office visit in the last 12 months or has a visit in the next 30 days with authorizing provider or within the authorizing provider's specialty.  See \"Patient Info\" tab in inbasket, or \"Choose Columns\" in Meds & Orders section of the refill encounter.            Passed - Medication is active on med list        Passed - Patient is age 18 or older          "

## 2019-07-11 RX ORDER — AMLODIPINE BESYLATE 5 MG/1
5 TABLET ORAL DAILY
Qty: 30 TABLET | Refills: 0 | Status: SHIPPED | OUTPATIENT
Start: 2019-07-11 | End: 2019-09-23

## 2019-08-12 DIAGNOSIS — I10 HYPERTENSION GOAL BP (BLOOD PRESSURE) < 150/90: ICD-10-CM

## 2019-08-12 NOTE — TELEPHONE ENCOUNTER
"Requested Prescriptions   Pending Prescriptions Disp Refills     hydrochlorothiazide (HYDRODIURIL) 25 MG tablet [Pharmacy Med Name: HYDROCHLOROTHIAZIDE 25 MG TAB] 90 tablet 1     Sig: TAKE 1 TABLET BY MOUTH EVERY DAY  Last Written Prescription Date:  6/13/2018  Last Fill Quantity: 90 tablet,  # refills: 1   Last Office Visit: 11/2/2018   Future Office Visit:            Diuretics (Including Combos) Protocol Failed - 8/12/2019  1:12 AM        Failed - Normal serum creatinine on file in past 12 months     Recent Labs   Lab Test 06/13/18  1359   CR 1.01            Failed - Normal serum potassium on file in past 12 months     Recent Labs   Lab Test 06/13/18  1359   POTASSIUM 4.1            Failed - Normal serum sodium on file in past 12 months     Recent Labs   Lab Test 06/13/18  1359               Passed - Blood pressure under 140/90 in past 12 months     BP Readings from Last 3 Encounters:   11/02/18 137/79   07/06/18 132/71   06/13/18 136/69           Passed - Recent (12 mo) or future (30 days) visit within the authorizing provider's specialty     Patient had office visit in the last 12 months or has a visit in the next 30 days with authorizing provider or within the authorizing provider's specialty.  See \"Patient Info\" tab in inbasket, or \"Choose Columns\" in Meds & Orders section of the refill encounter.            Passed - Medication is active on med list        Passed - Patient is age 18 or older          "

## 2019-08-13 RX ORDER — HYDROCHLOROTHIAZIDE 25 MG/1
TABLET ORAL
Qty: 30 TABLET | Refills: 0 | Status: SHIPPED | OUTPATIENT
Start: 2019-08-13 | End: 2019-09-10

## 2019-08-13 NOTE — TELEPHONE ENCOUNTER
,  --Please contact patient and ask him to schedule an annual preventive office visit.    --A refill order for below medication was sent to the pharmacy..          Prescription approved per [facility] Refill Protocol.  Lizette Ramirez RN

## 2019-08-19 ENCOUNTER — OFFICE VISIT (OUTPATIENT)
Dept: URGENT CARE | Facility: URGENT CARE | Age: 74
End: 2019-08-19
Payer: COMMERCIAL

## 2019-08-19 VITALS
RESPIRATION RATE: 18 BRPM | HEART RATE: 71 BPM | TEMPERATURE: 98.4 F | DIASTOLIC BLOOD PRESSURE: 70 MMHG | SYSTOLIC BLOOD PRESSURE: 132 MMHG | OXYGEN SATURATION: 96 % | WEIGHT: 203 LBS | BODY MASS INDEX: 30.07 KG/M2 | HEIGHT: 69 IN

## 2019-08-19 DIAGNOSIS — Z20.818 STREP THROAT EXPOSURE: ICD-10-CM

## 2019-08-19 DIAGNOSIS — R07.0 THROAT PAIN: ICD-10-CM

## 2019-08-19 DIAGNOSIS — J06.9 VIRAL URI: Primary | ICD-10-CM

## 2019-08-19 LAB
DEPRECATED S PYO AG THROAT QL EIA: NORMAL
SPECIMEN SOURCE: NORMAL

## 2019-08-19 PROCEDURE — 87081 CULTURE SCREEN ONLY: CPT | Performed by: FAMILY MEDICINE

## 2019-08-19 PROCEDURE — 99213 OFFICE O/P EST LOW 20 MIN: CPT | Performed by: FAMILY MEDICINE

## 2019-08-19 PROCEDURE — 87880 STREP A ASSAY W/OPTIC: CPT | Performed by: FAMILY MEDICINE

## 2019-08-19 ASSESSMENT — MIFFLIN-ST. JEOR: SCORE: 1658.24

## 2019-08-19 NOTE — PROGRESS NOTES
"SUBJECTIVE: Saul Hairston is a 73 year old male presenting with a chief complaint of cough  and sore throat.  Onset of symptoms was 3 day(s) ago.  Course of illness is same.    Severity moderate  Current and Associated symptoms: stuffy nose and cough - non-productive  Treatment measures tried include Tylenol/Ibuprofen.  Predisposing factors include exposure to strep.    Past Medical History:   Diagnosis Date     Arthritis     Gout     CARDIOVASCULAR SCREENING; LDL GOAL LESS THAN 160 8/14/2006     Elevated PSA      Gout, unspecified      Hypertension      Kidney stone 2009    Doing ok now     Seasonal allergic rhinitis     Spring and Fall     Umbilical hernia without mention of obstruction or gangrene 6/2013     Allergies   Allergen Reactions     No Known Drug Allergies      Social History     Tobacco Use     Smoking status: Never Smoker     Smokeless tobacco: Never Used   Substance Use Topics     Alcohol use: No       ROS:  SKIN: no rash  GI: no vomiting    OBJECTIVE:  /70 (BP Location: Left arm, Patient Position: Sitting, Cuff Size: Adult Regular)   Pulse 71   Temp 98.4  F (36.9  C) (Oral)   Resp 18   Ht 1.74 m (5' 8.5\")   Wt 92.1 kg (203 lb)   SpO2 96%   BMI 30.42 kg/m  GENERAL APPEARANCE: healthy, alert and no distress  EYES: EOMI,  PERRL, conjunctiva clear  HENT: ear canals and TM's normal.  Nose and mouth without ulcers, erythema or lesions  NECK: supple, nontender, no lymphadenopathy  RESP: lungs clear to auscultation - no rales, rhonchi or wheezes  SKIN: no suspicious lesions or rashes      ICD-10-CM    1. Viral URI J06.9    2. Throat pain R07.0 Rapid strep screen     Beta strep group A culture   3. Strep throat exposure Z20.818 Rapid strep screen     Beta strep group A culture     OTC meds  Fluids/Rest, f/u if worse/not any better    "

## 2019-08-20 LAB
BACTERIA SPEC CULT: NORMAL
SPECIMEN SOURCE: NORMAL

## 2019-08-28 ENCOUNTER — TRANSFERRED RECORDS (OUTPATIENT)
Dept: HEALTH INFORMATION MANAGEMENT | Facility: CLINIC | Age: 74
End: 2019-08-28

## 2019-09-10 DIAGNOSIS — I10 HYPERTENSION GOAL BP (BLOOD PRESSURE) < 150/90: ICD-10-CM

## 2019-09-10 NOTE — TELEPHONE ENCOUNTER
"Requested Prescriptions   Pending Prescriptions Disp Refills     hydrochlorothiazide (HYDRODIURIL) 25 MG tablet [Pharmacy Med Name: HYDROCHLOROTHIAZIDE 25 MG TAB] 30 tablet 0     Sig: TAKE 1 TABLET BY MOUTH EVERY DAY *NEED ANNUAL APPT FOR REFILLS  Last Written Prescription Date:  8/13/2019  Last Fill Quantity: 30 tablet,  # refills: 0   Last Office Visit: 11/2/2018   Future Office Visit:            Diuretics (Including Combos) Protocol Failed - 9/10/2019  7:33 AM        Failed - Normal serum creatinine on file in past 12 months     Recent Labs   Lab Test 06/13/18  1359   CR 1.01            Failed - Normal serum potassium on file in past 12 months     Recent Labs   Lab Test 06/13/18  1359   POTASSIUM 4.1            Failed - Normal serum sodium on file in past 12 months     Recent Labs   Lab Test 06/13/18  1359               Passed - Blood pressure under 140/90 in past 12 months     BP Readings from Last 3 Encounters:   08/19/19 132/70   11/02/18 137/79   07/06/18 132/71           Passed - Recent (12 mo) or future (30 days) visit within the authorizing provider's specialty     Patient had office visit in the last 12 months or has a visit in the next 30 days with authorizing provider or within the authorizing provider's specialty.  See \"Patient Info\" tab in inbasket, or \"Choose Columns\" in Meds & Orders section of the refill encounter.            Passed - Medication is active on med list        Passed - Patient is age 18 or older          "

## 2019-09-12 NOTE — TELEPHONE ENCOUNTER
Routing refill request to provider for review/approval because:  Mendy given x1 and patient did not follow up, please advise

## 2019-09-13 RX ORDER — HYDROCHLOROTHIAZIDE 25 MG/1
TABLET ORAL
Qty: 30 TABLET | Refills: 0 | Status: SHIPPED | OUTPATIENT
Start: 2019-09-13 | End: 2019-09-23

## 2019-09-22 ASSESSMENT — ENCOUNTER SYMPTOMS
FEVER: 0
FREQUENCY: 0
WEAKNESS: 0
JOINT SWELLING: 0
HEARTBURN: 0
EYE PAIN: 0
CHILLS: 0
ARTHRALGIAS: 0
HEADACHES: 0
COUGH: 1
HEMATURIA: 0
PARESTHESIAS: 0
HEMATOCHEZIA: 0
SORE THROAT: 0
NERVOUS/ANXIOUS: 0
DYSURIA: 0
DIZZINESS: 0
ABDOMINAL PAIN: 0
CONSTIPATION: 0
NAUSEA: 0
MYALGIAS: 0
DIARRHEA: 0
PALPITATIONS: 0

## 2019-09-22 ASSESSMENT — ACTIVITIES OF DAILY LIVING (ADL): CURRENT_FUNCTION: NO ASSISTANCE NEEDED

## 2019-09-23 ENCOUNTER — OFFICE VISIT (OUTPATIENT)
Dept: FAMILY MEDICINE | Facility: CLINIC | Age: 74
End: 2019-09-23
Payer: COMMERCIAL

## 2019-09-23 VITALS
SYSTOLIC BLOOD PRESSURE: 124 MMHG | HEART RATE: 58 BPM | TEMPERATURE: 97.8 F | HEIGHT: 69 IN | BODY MASS INDEX: 30.14 KG/M2 | RESPIRATION RATE: 16 BRPM | WEIGHT: 203.5 LBS | OXYGEN SATURATION: 97 % | DIASTOLIC BLOOD PRESSURE: 64 MMHG

## 2019-09-23 DIAGNOSIS — I10 HYPERTENSION GOAL BP (BLOOD PRESSURE) < 150/90: ICD-10-CM

## 2019-09-23 DIAGNOSIS — E78.5 HYPERLIPIDEMIA LDL GOAL <130: ICD-10-CM

## 2019-09-23 DIAGNOSIS — M1A.0720 IDIOPATHIC CHRONIC GOUT OF LEFT FOOT WITHOUT TOPHUS: ICD-10-CM

## 2019-09-23 DIAGNOSIS — Z00.00 ENCOUNTER FOR MEDICARE ANNUAL WELLNESS EXAM: Primary | ICD-10-CM

## 2019-09-23 DIAGNOSIS — E87.6 HYPOKALEMIA: ICD-10-CM

## 2019-09-23 DIAGNOSIS — D69.59 THROMBOCYTOPENIA, SECONDARY: ICD-10-CM

## 2019-09-23 LAB
ALBUMIN SERPL-MCNC: 3.5 G/DL (ref 3.4–5)
ALP SERPL-CCNC: 99 U/L (ref 40–150)
ALT SERPL W P-5'-P-CCNC: 47 U/L (ref 0–70)
ANION GAP SERPL CALCULATED.3IONS-SCNC: 7 MMOL/L (ref 3–14)
AST SERPL W P-5'-P-CCNC: 28 U/L (ref 0–45)
BILIRUB SERPL-MCNC: 0.4 MG/DL (ref 0.2–1.3)
BUN SERPL-MCNC: 18 MG/DL (ref 7–30)
CALCIUM SERPL-MCNC: 8.8 MG/DL (ref 8.5–10.1)
CHLORIDE SERPL-SCNC: 105 MMOL/L (ref 94–109)
CHOLEST SERPL-MCNC: 132 MG/DL
CO2 SERPL-SCNC: 26 MMOL/L (ref 20–32)
CREAT SERPL-MCNC: 1.01 MG/DL (ref 0.66–1.25)
ERYTHROCYTE [DISTWIDTH] IN BLOOD BY AUTOMATED COUNT: 13.4 % (ref 10–15)
GFR SERPL CREATININE-BSD FRML MDRD: 74 ML/MIN/{1.73_M2}
GLUCOSE SERPL-MCNC: 114 MG/DL (ref 70–99)
HCT VFR BLD AUTO: 47.3 % (ref 40–53)
HDLC SERPL-MCNC: 31 MG/DL
HGB BLD-MCNC: 15.9 G/DL (ref 13.3–17.7)
LDLC SERPL CALC-MCNC: 72 MG/DL
MCH RBC QN AUTO: 29.2 PG (ref 26.5–33)
MCHC RBC AUTO-ENTMCNC: 33.6 G/DL (ref 31.5–36.5)
MCV RBC AUTO: 87 FL (ref 78–100)
NONHDLC SERPL-MCNC: 101 MG/DL
PLATELET # BLD AUTO: 148 10E9/L (ref 150–450)
POTASSIUM SERPL-SCNC: 3.3 MMOL/L (ref 3.4–5.3)
PROT SERPL-MCNC: 6.4 G/DL (ref 6.8–8.8)
RBC # BLD AUTO: 5.44 10E12/L (ref 4.4–5.9)
SODIUM SERPL-SCNC: 138 MMOL/L (ref 133–144)
TRIGL SERPL-MCNC: 147 MG/DL
URATE SERPL-MCNC: 6 MG/DL (ref 3.5–7.2)
WBC # BLD AUTO: 6.4 10E9/L (ref 4–11)

## 2019-09-23 PROCEDURE — 80061 LIPID PANEL: CPT | Performed by: FAMILY MEDICINE

## 2019-09-23 PROCEDURE — 85027 COMPLETE CBC AUTOMATED: CPT | Performed by: FAMILY MEDICINE

## 2019-09-23 PROCEDURE — 99397 PER PM REEVAL EST PAT 65+ YR: CPT | Performed by: FAMILY MEDICINE

## 2019-09-23 PROCEDURE — 80053 COMPREHEN METABOLIC PANEL: CPT | Performed by: FAMILY MEDICINE

## 2019-09-23 PROCEDURE — 36415 COLL VENOUS BLD VENIPUNCTURE: CPT | Performed by: FAMILY MEDICINE

## 2019-09-23 PROCEDURE — 84550 ASSAY OF BLOOD/URIC ACID: CPT | Performed by: FAMILY MEDICINE

## 2019-09-23 RX ORDER — ALLOPURINOL 100 MG/1
TABLET ORAL
Qty: 270 TABLET | Refills: 1 | Status: SHIPPED | OUTPATIENT
Start: 2019-09-23 | End: 2020-03-25

## 2019-09-23 RX ORDER — MULTIVIT-MIN/IRON/FOLIC ACID/K 18-600-40
CAPSULE ORAL
COMMUNITY
End: 2020-12-22

## 2019-09-23 RX ORDER — HYDROCHLOROTHIAZIDE 25 MG/1
TABLET ORAL
Qty: 90 TABLET | Refills: 3 | Status: SHIPPED | OUTPATIENT
Start: 2019-09-23 | End: 2020-11-16

## 2019-09-23 RX ORDER — MAGNESIUM OXIDE/MAG AA CHELATE 300 MG
CAPSULE ORAL
COMMUNITY
End: 2020-12-22

## 2019-09-23 RX ORDER — FINASTERIDE 5 MG/1
5 TABLET, FILM COATED ORAL DAILY
Status: ON HOLD | COMMUNITY
End: 2020-12-01

## 2019-09-23 ASSESSMENT — ENCOUNTER SYMPTOMS
PARESTHESIAS: 0
CONSTIPATION: 0
HEMATOCHEZIA: 0
FREQUENCY: 0
CHILLS: 0
ARTHRALGIAS: 0
JOINT SWELLING: 0
NERVOUS/ANXIOUS: 0
DIARRHEA: 0
SORE THROAT: 0
DYSURIA: 0
PALPITATIONS: 0
MYALGIAS: 0
HEARTBURN: 0
HEADACHES: 0
HEMATURIA: 0
DIZZINESS: 0
FEVER: 0
EYE PAIN: 0
ABDOMINAL PAIN: 0
COUGH: 1
WEAKNESS: 0
NAUSEA: 0

## 2019-09-23 ASSESSMENT — ACTIVITIES OF DAILY LIVING (ADL): CURRENT_FUNCTION: NO ASSISTANCE NEEDED

## 2019-09-23 ASSESSMENT — MIFFLIN-ST. JEOR: SCORE: 1660.51

## 2019-09-23 NOTE — PROGRESS NOTES
"SUBJECTIVE:   Saul Hairston is a 73 year old male who presents for Preventive Visit.  Are you in the first 12 months of your Medicare coverage?  No    Healthy Habits:     In general, how would you rate your overall health?  Good    Frequency of exercise:  1 day/week    Duration of exercise:  Less than 15 minutes    Do you usually eat at least 4 servings of fruit and vegetables a day, include whole grains    & fiber and avoid regularly eating high fat or \"junk\" foods?  No    Taking medications regularly:  Yes    Medication side effects:  None    Ability to successfully perform activities of daily living:  No assistance needed    Home Safety:  No safety concerns identified    Hearing Impairment:  Difficulty following a conversation in a noisy restaurant or crowded room, feel that people are mumbling or not speaking clearly and difficulty understanding soft or whispered speech    In the past 6 months, have you been bothered by leaking of urine?  No    In general, how would you rate your overall mental or emotional health?  Good      PHQ-2 Total Score: 0    Additional concerns today:  Yes (feels congested and coughing since Aug 23.  and 1 mole in middle of buttcrack )    Do you feel safe in your environment? Yes    Do you have a Health Care Directive? No: Advance care planning was reviewed with patient; patient declined at this time.  Fall risk  Fallen 2 or more times in the past year?: No  Any fall with injury in the past year?: No    Cognitive Screening   1) Repeat 3 items (Leader, Season, Table)    2) Clock draw: NORMAL  3) 3 item recall: Recalls 3 objects  Results: 3 items recalled: COGNITIVE IMPAIRMENT LESS LIKELY    Mini-CogTM Copyright BETTIE Lord. Licensed by the author for use in [facility]; reprinted with permission (minna@.[facility]). All rights reserved.      Do you have sleep apnea, excessive snoring or daytime drowsiness?: no    Reviewed and updated as needed this visit by clinical staff  Tobacco  " Allergies  Meds  Med Hx  Surg Hx  Fam Hx  Soc Hx        Reviewed and updated as needed this visit by Provider        Social History     Tobacco Use     Smoking status: Never Smoker     Smokeless tobacco: Never Used   Substance Use Topics     Alcohol use: No     If you drink alcohol do you typically have >3 drinks per day or >7 drinks per week? No     No flowsheet data found.        Current providers sharing in care for this patient include:   Patient Care Team:  Waldo Matson MD as PCP - General (Family Practice)  Waldo Matson MD as Assigned PCP    The following health maintenance items are reviewed in Epic and correct as of today:  Health Maintenance   Topic Date Due     ZOSTER IMMUNIZATION (1 of 2) 09/25/1997     MEDICARE ANNUAL WELLNESS VISIT  09/26/2018     FALL RISK ASSESSMENT  12/04/2018     CMP  06/13/2019     CBC  06/13/2019     INFLUENZA VACCINE (1) 09/01/2019     LIPID  10/15/2019     A1C  11/02/2019     ADVANCE CARE PLANNING  05/23/2023     DTAP/TDAP/TD IMMUNIZATION (3 - Td) 05/28/2023     COLONOSCOPY  06/15/2026     HEPATITIS C SCREENING  Completed     PHQ-2  Completed     PNEUMOCOCCAL IMMUNIZATION 65+ LOW/MEDIUM RISK  Completed     AORTIC ANEURYSM SCREENING (SYSTEM ASSIGNED)  Completed     IPV IMMUNIZATION  Aged Out     MENINGITIS IMMUNIZATION  Aged Out     Still congested. Was seen in urgent care. Its been around a month or so.   Hard to bring out mucus.   No sick contact. Was checked for strep - negative.   Some mold in basement.   No wheezing. No chest tightness.   Amlodipine - quit taking as he had leg swelling.     Urologist started on finasteride. Already on tamsulosin.     Review of Systems   Constitutional: Negative for chills and fever.   HENT: Positive for hearing loss. Negative for congestion, ear pain and sore throat.    Eyes: Negative for pain and visual disturbance.   Respiratory: Positive for cough.    Cardiovascular: Positive for peripheral edema. Negative  "for chest pain and palpitations.   Gastrointestinal: Negative for abdominal pain, constipation, diarrhea, heartburn, hematochezia and nausea.   Genitourinary: Positive for impotence. Negative for discharge, dysuria, frequency, genital sores, hematuria and urgency.   Musculoskeletal: Negative for arthralgias, joint swelling and myalgias.   Skin: Negative for rash.   Neurological: Negative for dizziness, weakness, headaches and paresthesias.   Psychiatric/Behavioral: Negative for mood changes. The patient is not nervous/anxious.      OBJECTIVE:   /64 (BP Location: Right arm, Patient Position: Sitting, Cuff Size: Adult Large)   Pulse 58   Temp 97.8  F (36.6  C) (Oral)   Resp 16   Ht 1.74 m (5' 8.5\")   Wt 92.3 kg (203 lb 8 oz)   SpO2 97%   BMI 30.49 kg/m   Estimated body mass index is 30.49 kg/m  as calculated from the following:    Height as of this encounter: 1.74 m (5' 8.5\").    Weight as of this encounter: 92.3 kg (203 lb 8 oz).  Physical Exam  GENERAL: healthy, alert and no distress  EYES: Eyes grossly normal to inspection, PERRL and conjunctivae and sclerae normal  HENT: ear canals and TM's normal, nose and mouth without ulcers or lesions  NECK: no adenopathy, no asymmetry, masses, or scars and thyroid normal to palpation  RESP: lungs clear to auscultation - no rales, rhonchi or wheezes  CV: regular rate and rhythm, normal S1 S2, no S3 or S4, no murmur, click or rub, no peripheral edema and peripheral pulses strong  ABDOMEN: soft, nontender, no hepatosplenomegaly, no masses and bowel sounds normal  MS: no gross musculoskeletal defects noted, no edema  SKIN: no suspicious lesions or rashes  NEURO: Normal strength and tone, mentation intact and speech normal  PSYCH: mentation appears normal, affect normal/bright         ASSESSMENT / PLAN:   1. Encounter for Medicare annual wellness exam       2. Idiopathic chronic gout 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  We can cut back on the dose of allopurinol if his uric " "acid is persistently low.  At this point stick to the 300 mg dose.   - allopurinol (ZYLOPRIM) 100 MG tablet; TAKE 3 TABLETS (300 MG) BY MOUTH DAILY  Dispense: 270 tablet; Refill: 1  - Uric acid  - CBC with platelets    3. Hyperlipidemia LDL goal <130     - Lipid panel reflex to direct LDL Fasting  - Comprehensive metabolic panel    4. Hypertension goal BP (blood pressure) < 150/90     - hydrochlorothiazide (HYDRODIURIL) 25 MG tablet; TAKE 1 TABLET BY MOUTH EVERY DAY  Dispense: 90 tablet; Refill: 3    End of Life Planning:  Patient currently has an advanced directive: No.  I have verified the patient's ablity to prepare an advanced directive/make health care decisions.  Literature was provided to assist patient in preparing an advanced directive.    COUNSELING:  Reviewed preventive health counseling, as reflected in patient instructions  Special attention given to:       Regular exercise       Healthy diet/nutrition       Vision screening       Hearing screening       Dental care       Bladder control       Fall risk prevention    Estimated body mass index is 30.49 kg/m  as calculated from the following:    Height as of this encounter: 1.74 m (5' 8.5\").    Weight as of this encounter: 92.3 kg (203 lb 8 oz).    Weight management plan: Discussed healthy diet and exercise guidelines     reports that he has never smoked. He has never used smokeless tobacco.      Appropriate preventive services were discussed with this patient, including applicable screening as appropriate for cardiovascular disease, diabetes, osteopenia/osteoporosis, and glaucoma.  As appropriate for age/gender, discussed screening for colorectal cancer, prostate cancer, breast cancer, and cervical cancer. Checklist reviewing preventive services available has been given to the patient.    Reviewed patients plan of care and provided an AVS. The Basic Care Plan (routine screening as documented in Health Maintenance) for Saul meets the Care Plan requirement. " This Care Plan has been established and reviewed with the Patient.    Counseling Resources:  ATP IV Guidelines  Pooled Cohorts Equation Calculator  Breast Cancer Risk Calculator  FRAX Risk Assessment  ICSI Preventive Guidelines  Dietary Guidelines for Americans, 2010  USDA's MyPlate  ASA Prophylaxis  Lung CA Screening    Waldo Matson MD, MD  [facility]    Identified Health Risks:

## 2019-09-23 NOTE — RESULT ENCOUNTER NOTE
Your uric acid (gout test) is slightly worse than the last time.  We do not want to cut down the dose of allopurinol and you should stick to the same dose.  Your good cholesterol HDL is low and it is lower than the last time.  Physical activity improves your good cholesterol.  Your potassium is low which can be from hydrochlorothiazide.  Adding more potassium in diet (banana, green vegetables) can improve it.  We should recheck your potassium in a month.  You do not have to see me back and you just have to schedule a lab only appointment.  You do not need to come fasting for this tests.  Your liver enzymes are normal.  Your hemoglobin is normal.  Your platelet count is slightly low but not concerning but we will recheck it along with your potassium.    Waldo Matson MD  [facility]

## 2019-09-23 NOTE — PATIENT INSTRUCTIONS
Patient Education   Personalized Prevention Plan  You are due for the preventive services outlined below.  Your care team is available to assist you in scheduling these services.  If you have already completed any of these items, please share that information with your care team to update in your medical record.  Health Maintenance Due   Topic Date Due     Zoster (Shingles) Vaccine (1 of 2) 09/25/1997     Annual Wellness Visit  09/26/2018     FALL RISK ASSESSMENT  12/04/2018     Comprehensive Metabolic Panel  06/13/2019     Complete Blood Count  06/13/2019     Flu Vaccine (1) 09/01/2019     Cholesterol Lab  10/15/2019         Check your insurance coverage for SHINGRIX vaccine. It is a new shingles vaccine. It is very effective in cutting down chances of shingles. It is  2 shot series that is administered atleast 2 months apart. If it is covered, you may need to check where it is available as there is nation wide shortage of that vaccine. Some insurance covers it if it is given in the pharmacy. Check with your pharmacy if that is the case.

## 2019-09-29 ENCOUNTER — HEALTH MAINTENANCE LETTER (OUTPATIENT)
Age: 74
End: 2019-09-29

## 2019-12-10 DIAGNOSIS — E78.5 HYPERLIPIDEMIA LDL GOAL <130: ICD-10-CM

## 2019-12-10 NOTE — TELEPHONE ENCOUNTER
"Requested Prescriptions   Pending Prescriptions Disp Refills     atorvastatin (LIPITOR) 20 MG tablet [Pharmacy Med Name: ATORVASTATIN 20 MG TABLET] 90 tablet 3     Sig: TAKE 1 TABLET BY MOUTH EVERY DAY  Last Written Prescription Date:  11/15/2018  Last Fill Quantity: 90 tablet,  # refills: 3   Last Office Visit: 9/23/2019   Future Office Visit:            Statins Protocol Passed - 12/10/2019  2:35 AM        Passed - LDL on file in past 12 months     Recent Labs   Lab Test 09/23/19  0953   LDL 72           Passed - No abnormal creatine kinase in past 12 months     No lab results found.           Passed - Recent (12 mo) or future (30 days) visit within the authorizing provider's specialty     Patient has had an office visit with the authorizing provider or a provider within the authorizing providers department within the previous 12 mos or has a future within next 30 days. See \"Patient Info\" tab in inbasket, or \"Choose Columns\" in Meds & Orders section of the refill encounter.            Passed - Medication is active on med list        Passed - Patient is age 18 or older          "

## 2019-12-11 RX ORDER — ATORVASTATIN CALCIUM 20 MG/1
TABLET, FILM COATED ORAL
Qty: 90 TABLET | Refills: 3 | Status: SHIPPED | OUTPATIENT
Start: 2019-12-11 | End: 2020-12-21

## 2019-12-12 ENCOUNTER — TRANSFERRED RECORDS (OUTPATIENT)
Dept: HEALTH INFORMATION MANAGEMENT | Facility: CLINIC | Age: 74
End: 2019-12-12

## 2019-12-12 NOTE — TELEPHONE ENCOUNTER
Signed Prescriptions:                        Disp   Refills    atorvastatin (LIPITOR) 20 MG tablet        90 tab*3        Sig: TAKE 1 TABLET BY MOUTH EVERY DAY  Authorizing Provider: TOBI GOODE  Ordering User: WAYNE HADDAD

## 2020-02-24 ENCOUNTER — TRANSFERRED RECORDS (OUTPATIENT)
Dept: HEALTH INFORMATION MANAGEMENT | Facility: CLINIC | Age: 75
End: 2020-02-24

## 2020-03-15 ENCOUNTER — HEALTH MAINTENANCE LETTER (OUTPATIENT)
Age: 75
End: 2020-03-15

## 2020-08-24 ENCOUNTER — TRANSFERRED RECORDS (OUTPATIENT)
Dept: HEALTH INFORMATION MANAGEMENT | Facility: CLINIC | Age: 75
End: 2020-08-24

## 2020-09-10 ENCOUNTER — TRANSFERRED RECORDS (OUTPATIENT)
Dept: HEALTH INFORMATION MANAGEMENT | Facility: CLINIC | Age: 75
End: 2020-09-10

## 2020-09-27 DIAGNOSIS — M1A.0720 IDIOPATHIC CHRONIC GOUT OF LEFT FOOT WITHOUT TOPHUS: ICD-10-CM

## 2020-09-27 NOTE — LETTER
October 3, 2020      Saul Hairston  [address] [facility] [place] [address]        Karolina Saul,      We received a refill request for allopurinol (ZYLOPRIM) 100 MG tablet. We were able to approve the request and sent it to your pharmacy; however, you are due for a follow-up visit to receive further refills. Please call us at [phone] at your earliest convenience to schedule an appointment.       We greatly appreciate the opportunity to serve you and thank you for trusting us with your health care.        Your health care team at [facility]             Sincerely,        Waldo Matson MD, MD

## 2020-09-28 ENCOUNTER — TRANSFERRED RECORDS (OUTPATIENT)
Dept: HEALTH INFORMATION MANAGEMENT | Facility: CLINIC | Age: 75
End: 2020-09-28

## 2020-09-30 NOTE — TELEPHONE ENCOUNTER
Routing refill request to provider for review/approval because:  Labs not current:Gout Agents Protocol [id number] 09:15 AM   CBC on file in past 12 months Protocol Details    ALT on file in past 12 months     Has Uric Acid on file in past 12 months and value is less than 6     Recent (12 mo) or future (30 days) visit within the authorizing provider's specialty     Normal serum creatinine on file in the past 12 months        Patient needs to be seen because it has been more than 1 year since last office visit.

## 2020-10-01 RX ORDER — ALLOPURINOL 100 MG/1
TABLET ORAL
Qty: 270 TABLET | Refills: 0 | Status: SHIPPED | OUTPATIENT
Start: 2020-10-01 | End: 2020-12-29

## 2020-10-01 NOTE — TELEPHONE ENCOUNTER
Needs follow up visit before further refill. Refilled for 3 months.   In person visit preferred but virtual visit is OK if he is unable to come.

## 2020-10-03 NOTE — TELEPHONE ENCOUNTER
LM to call back and schedule a f/u visit. Informed pt of approved med and sent to pharmacy. Sent [email or web address] message and letter.      Sofy TROTTER  dor[facility]

## 2020-10-15 ENCOUNTER — HOSPITAL ENCOUNTER (OUTPATIENT)
Dept: NUCLEAR MEDICINE | Facility: CLINIC | Age: 75
Setting detail: NUCLEAR MEDICINE
End: 2020-10-15
Attending: UROLOGY
Payer: COMMERCIAL

## 2020-10-15 ENCOUNTER — HOSPITAL ENCOUNTER (OUTPATIENT)
Dept: CT IMAGING | Facility: CLINIC | Age: 75
Discharge: HOME | End: 2020-10-15
Attending: UROLOGY | Admitting: UROLOGY
Payer: COMMERCIAL

## 2020-10-15 DIAGNOSIS — C61 MALIGNANT NEOPLASM OF PROSTATE (H): ICD-10-CM

## 2020-10-15 LAB
CREAT BLD-MCNC: 1 MG/DL (ref 0.66–1.25)
GFR SERPL CREATININE-BSD FRML MDRD: 73 ML/MIN/{1.73_M2}

## 2020-10-15 PROCEDURE — 250N000011 HC RX IP 250 OP 636: Performed by: UROLOGY

## 2020-10-15 PROCEDURE — 343N000001 HC RX 343: Performed by: UROLOGY

## 2020-10-15 PROCEDURE — 78306 BONE IMAGING WHOLE BODY: CPT

## 2020-10-15 PROCEDURE — 82565 ASSAY OF CREATININE: CPT

## 2020-10-15 PROCEDURE — 74177 CT ABD & PELVIS W/CONTRAST: CPT

## 2020-10-15 PROCEDURE — A9503 TC99M MEDRONATE: HCPCS | Performed by: UROLOGY

## 2020-10-15 PROCEDURE — 250N000009 HC RX 250: Performed by: UROLOGY

## 2020-10-15 RX ORDER — IOPAMIDOL 755 MG/ML
102 INJECTION, SOLUTION INTRAVASCULAR ONCE
Status: COMPLETED | OUTPATIENT
Start: 2020-10-15 | End: 2020-10-15

## 2020-10-15 RX ORDER — TC 99M MEDRONATE 20 MG/10ML
25 INJECTION, POWDER, LYOPHILIZED, FOR SOLUTION INTRAVENOUS ONCE
Status: COMPLETED | OUTPATIENT
Start: 2020-10-15 | End: 2020-10-15

## 2020-10-15 RX ADMIN — TC 99M MEDRONATE 26.5 MCI.: 20 INJECTION, POWDER, LYOPHILIZED, FOR SOLUTION INTRAVENOUS at 12:00

## 2020-10-15 RX ADMIN — IOPAMIDOL 102 ML: 755 INJECTION, SOLUTION INTRAVENOUS at 12:29

## 2020-10-15 RX ADMIN — SODIUM CHLORIDE 69 ML: 9 INJECTION, SOLUTION INTRAVENOUS at 12:29

## 2020-11-03 DIAGNOSIS — Z11.59 ENCOUNTER FOR SCREENING FOR OTHER VIRAL DISEASES: Primary | ICD-10-CM

## 2020-11-12 ENCOUNTER — TRANSFERRED RECORDS (OUTPATIENT)
Dept: HEALTH INFORMATION MANAGEMENT | Facility: CLINIC | Age: 75
End: 2020-11-12

## 2020-11-12 DIAGNOSIS — I10 HYPERTENSION GOAL BP (BLOOD PRESSURE) < 150/90: ICD-10-CM

## 2020-11-16 RX ORDER — HYDROCHLOROTHIAZIDE 25 MG/1
TABLET ORAL
Qty: 90 TABLET | Refills: 0 | Status: SHIPPED | OUTPATIENT
Start: 2020-11-16 | End: 2020-12-21

## 2020-11-17 ENCOUNTER — OFFICE VISIT (OUTPATIENT)
Dept: FAMILY MEDICINE | Facility: CLINIC | Age: 75
End: 2020-11-17
Payer: COMMERCIAL

## 2020-11-17 VITALS
TEMPERATURE: 97.7 F | BODY MASS INDEX: 29.62 KG/M2 | HEIGHT: 69 IN | SYSTOLIC BLOOD PRESSURE: 132 MMHG | RESPIRATION RATE: 16 BRPM | DIASTOLIC BLOOD PRESSURE: 68 MMHG | WEIGHT: 200 LBS

## 2020-11-17 DIAGNOSIS — K86.2 PANCREATIC CYST: ICD-10-CM

## 2020-11-17 DIAGNOSIS — G47.33 OSA (OBSTRUCTIVE SLEEP APNEA): ICD-10-CM

## 2020-11-17 DIAGNOSIS — E66.9 NON MORBID OBESITY, UNSPECIFIED OBESITY TYPE: ICD-10-CM

## 2020-11-17 DIAGNOSIS — I10 HYPERTENSION GOAL BP (BLOOD PRESSURE) < 150/90: ICD-10-CM

## 2020-11-17 DIAGNOSIS — M1A.0720 IDIOPATHIC CHRONIC GOUT OF LEFT FOOT WITHOUT TOPHUS: ICD-10-CM

## 2020-11-17 DIAGNOSIS — E78.5 HYPERLIPIDEMIA LDL GOAL <130: ICD-10-CM

## 2020-11-17 DIAGNOSIS — R97.20 ELEVATED PSA: ICD-10-CM

## 2020-11-17 DIAGNOSIS — Z01.818 PREOP GENERAL PHYSICAL EXAM: Primary | ICD-10-CM

## 2020-11-17 LAB
ALBUMIN SERPL-MCNC: 3.7 G/DL (ref 3.4–5)
ALP SERPL-CCNC: 95 U/L (ref 40–150)
ALT SERPL W P-5'-P-CCNC: 62 U/L (ref 0–70)
ANION GAP SERPL CALCULATED.3IONS-SCNC: 6 MMOL/L (ref 3–14)
AST SERPL W P-5'-P-CCNC: 30 U/L (ref 0–45)
BILIRUB SERPL-MCNC: 0.6 MG/DL (ref 0.2–1.3)
BUN SERPL-MCNC: 17 MG/DL (ref 7–30)
CALCIUM SERPL-MCNC: 9.5 MG/DL (ref 8.5–10.1)
CHLORIDE SERPL-SCNC: 104 MMOL/L (ref 94–109)
CHOLEST SERPL-MCNC: 139 MG/DL
CO2 SERPL-SCNC: 28 MMOL/L (ref 20–32)
CREAT SERPL-MCNC: 0.99 MG/DL (ref 0.66–1.25)
ERYTHROCYTE [DISTWIDTH] IN BLOOD BY AUTOMATED COUNT: 13.3 % (ref 10–15)
GFR SERPL CREATININE-BSD FRML MDRD: 76 ML/MIN/{1.73_M2}
GLUCOSE SERPL-MCNC: 111 MG/DL (ref 70–99)
HCT VFR BLD AUTO: 50 % (ref 40–53)
HDLC SERPL-MCNC: 33 MG/DL
HGB BLD-MCNC: 16.5 G/DL (ref 13.3–17.7)
LDLC SERPL CALC-MCNC: 81 MG/DL
MCH RBC QN AUTO: 28.9 PG (ref 26.5–33)
MCHC RBC AUTO-ENTMCNC: 33 G/DL (ref 31.5–36.5)
MCV RBC AUTO: 88 FL (ref 78–100)
NONHDLC SERPL-MCNC: 106 MG/DL
PLATELET # BLD AUTO: 169 10E9/L (ref 150–450)
POTASSIUM SERPL-SCNC: 3.4 MMOL/L (ref 3.4–5.3)
PROT SERPL-MCNC: 7 G/DL (ref 6.8–8.8)
RBC # BLD AUTO: 5.71 10E12/L (ref 4.4–5.9)
SODIUM SERPL-SCNC: 138 MMOL/L (ref 133–144)
TRIGL SERPL-MCNC: 125 MG/DL
WBC # BLD AUTO: 7.6 10E9/L (ref 4–11)

## 2020-11-17 PROCEDURE — 80053 COMPREHEN METABOLIC PANEL: CPT | Performed by: FAMILY MEDICINE

## 2020-11-17 PROCEDURE — 36415 COLL VENOUS BLD VENIPUNCTURE: CPT | Performed by: FAMILY MEDICINE

## 2020-11-17 PROCEDURE — 99215 OFFICE O/P EST HI 40 MIN: CPT | Performed by: FAMILY MEDICINE

## 2020-11-17 PROCEDURE — 93000 ELECTROCARDIOGRAM COMPLETE: CPT | Performed by: FAMILY MEDICINE

## 2020-11-17 PROCEDURE — 80061 LIPID PANEL: CPT | Performed by: FAMILY MEDICINE

## 2020-11-17 PROCEDURE — 85027 COMPLETE CBC AUTOMATED: CPT | Performed by: FAMILY MEDICINE

## 2020-11-17 RX ORDER — INDOMETHACIN 50 MG/1
50 CAPSULE ORAL 3 TIMES DAILY PRN
Qty: 40 CAPSULE | Refills: 2 | Status: SHIPPED | OUTPATIENT
Start: 2020-11-17 | End: 2022-05-17

## 2020-11-17 ASSESSMENT — MIFFLIN-ST. JEOR: SCORE: 1634.63

## 2020-11-17 NOTE — TELEPHONE ENCOUNTER
Medication is being filled for 1 time refill only due to:  Patient needs to be seen because it has been more than one year since last visit.   Pt has upcoming appt.  Mikala Henry RN

## 2020-11-17 NOTE — PATIENT INSTRUCTIONS

## 2020-11-17 NOTE — PROGRESS NOTES
[facility] [address] [place] [address]  [phone]  Dept: [phone]    PRE-OP EVALUATION:  Today's date: 2020    Saul Hairston (: 1945) presents for pre-operative evaluation assessment as requested by Aj Celis MD.  He requires evaluation and anesthesia risk assessment prior to undergoing surgery/procedure for treatment of CRYOTHERAPY OF PROSTATE .    Fax number for surgical facility: [phone]  Primary Physician: Waldo Matson  Type of Anesthesia Anticipated: General    Preop Questionnnaire:  Pre-op Questionnaire 2020   Surgery Location: -   Surgeon: -   Surgery/Procedure: -   Surgery Date: -   Time of Surgery: -   1. Have you ever had a heart attack or stroke? No   2. Have you ever had surgery on your heart or blood vessels, such as a stent placement, a coronary artery bypass, or surgery on an artery in your head, neck, heart, or legs? No   3. Do you have chest pain with activity? No   4. Do you have a history of  heart failure? No   5. Do you currently have a cold, bronchitis or symptoms of other infection? No   6. Do you have a cough, shortness of breath, or wheezing? No   7. Do you or anyone in your family have previous history of blood clots? No   8. Do you or does anyone in your family have a serious bleeding problem such as prolonged bleeding following surgeries or cuts? No   9. Have you ever had problems with anemia or been told to take iron pills? No   10. Have you had any abnormal blood loss such as black, tarry or bloody stools? No   11. Have you ever had a blood transfusion? No   12. Are you willing to have a blood transfusion if it is medically needed before, during, or after your surgery? Yes   13. Have you or any of your relatives ever had problems with anesthesia? No   14. Do you have sleep apnea, excessive snoring or daytime drowsiness? YES - not using it anymore.sleeping fine without it.    14a. Do you  have a CPAP machine? Yes   15. Do you have any artifical heart valves or other implanted medical devices like a pacemaker, defibrillator, or continuous glucose monitor? No   16. Do you have artificial joints? No   17. Are you allergic to latex? No     HPI:     HPI related to upcoming procedure: had similar surgery 2 yrs ago and this must be small remnant.   Needs cryotherapy again.     HYPERLIPIDEMIA - Patient has a long history of significant Hyperlipidemia requiring medication for treatment with recent good control. Patient reports no problems or side effects with the medication.     HYPERTENSION - Patient has longstanding history of HTN , currently denies any symptoms referable to elevated blood pressure. Specifically denies chest pain, palpitations, dyspnea, orthopnea, PND or peripheral edema. Blood pressure readings have been in normal range. Current medication regimen is as listed below. Patient denies any side effects of medication.     SLEEP PROBLEM - Patient has a longstanding history of sleep apnea.. no using cpap. Feels sleep is fine. Mood is fine.     No issue with gout.     MEDICAL HISTORY:     Patient Active Problem List    Diagnosis Date Noted     MUNIRA (obstructive sleep apnea) 01/15/2018     Priority: Medium     Non morbid obesity, unspecified obesity type 09/26/2017     Priority: Medium     Hypertension goal BP (blood pressure) < 150/90 04/03/2017     Priority: Medium     Umbilical hernia 06/01/2013     Priority: Medium     Problem list name updated by automated process. Provider to review       Elevated PSA      Priority: Medium     Seasonal allergic rhinitis      Priority: Medium     Spring and Fall       Kidney stone      Priority: Medium     Doing ok now       Rotator cuff tear 12/16/2010     Priority: Medium     Hyperlipidemia LDL goal <130 08/14/2006     Priority: Medium     Idiopathic chronic gout 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      Priority: Medium     Problem list name updated by automated  process. Provider to review        Past Medical History:   Diagnosis Date     Arthritis     Gout     CARDIOVASCULAR SCREENING; LDL GOAL LESS THAN 160 8/14/2006     Elevated PSA      Gout, unspecified      Hypertension      Kidney stone 2009    Doing ok now     Seasonal allergic rhinitis     Spring and Fall     Umbilical hernia without mention of obstruction or gangrene 6/2013     Past Surgical History:   Procedure Laterality Date     COLONOSCOPY  6/16/2006     COLONOSCOPY N/A 6/15/2016    Procedure: COLONOSCOPY;  Surgeon: Poly Sanchez MD;  Location:  GI     CYSTOSCOPY FLEXIBLE, CYOABLATION PROSTATE N/A 2/13/2018    Procedure: CYSTOSCOPY FLEXIBLE, CRYOABLATION PROSTATE;  FLEXIBLE CYSTOSCOPY, CYROABLATION OF PROSTATE ;  Surgeon: Aj Caal MD;  Location: [facility] OR     HERNIORRHAPHY UMBILICAL  7/11/2013    Procedure: HERNIORRHAPHY UMBILICAL;  Open Umbilical Hernia Repair With Mesh ;  Surgeon: Sergio Tomas MD;  Location: [facility] OR     ROTATOR CUFF REPAIR RT/LT  5/19/2011    Left Shoulder     SURGICAL HISTORY OF -       ear operation as child     SURGICAL HISTORY OF -       removal of pseudogout deposits - Right knee     TONSILLECTOMY      Child     Current Outpatient Medications   Medication Sig Dispense Refill     allopurinol (ZYLOPRIM) 100 MG tablet TAKE 3 TABLETS (300 MG) BY MOUTH DAILY 270 tablet 0     Ascorbic Acid (VITAMIN C) 500 MG CAPS        atorvastatin (LIPITOR) 20 MG tablet TAKE 1 TABLET BY MOUTH EVERY DAY 90 tablet 3     CO-ENZYME Q10 PO        hydrochlorothiazide (HYDRODIURIL) 25 MG tablet ++NEED APPOINTMENT++TAKE 1 TABLET BY MOUTH EVERY DAY 90 tablet 0     indomethacin (INDOCIN) 50 MG capsule Take 1 capsule (50 mg) by mouth 3 times daily as needed for moderate pain 40 capsule 2     Magnesium 300 MG CAPS        Multiple Vitamins-Minerals (MULTIVITAMIN PO)        Omega-3 Fatty Acids (OMEGA-3 FISH OIL PO)        TAMSULOSIN HCL PO Take by mouth daily       finasteride (PROSCAR) 5 MG tablet  "Take 5 mg by mouth daily       OTC products: None, except as noted above    Allergies   Allergen Reactions     No Known Drug Allergies       Latex Allergy: NO    Social History     Tobacco Use     Smoking status: Never Smoker     Smokeless tobacco: Never Used   Substance Use Topics     Alcohol use: No     History   Drug Use No       REVIEW OF SYSTEMS:   Constitutional, neuro, ENT, endocrine, pulmonary, cardiac, gastrointestinal, genitourinary, musculoskeletal, integument and psychiatric systems are negative, except as otherwise noted.    EXAM:   /68 (BP Location: Right arm, Patient Position: Sitting, Cuff Size: Adult Regular)   Temp 97.7  F (36.5  C) (Oral)   Resp 16   Ht 1.74 m (5' 8.5\")   Wt 90.7 kg (200 lb)   BMI 29.97 kg/m      GENERAL APPEARANCE: healthy, alert and no distress     EYES: EOMI,  PERRL     RESP: lungs clear to auscultation - no rales, rhonchi or wheezes     CV: regular rates and rhythm, normal S1 S2, no S3 or S4 and no murmur, click or rub     SKIN: no suspicious lesions or rashes on visible parts      NEURO: Normal strength and tone      PSYCH: mentation appears normal. and affect normal/bright      LYMPHATICS: No cervical adenopathy    DIAGNOSTICS:     EKG: Normal Sinus Rhythm, Right Bundle Branch Block  Labs Resulted Today:   Results for orders placed or performed in visit on 11/17/20   Comprehensive metabolic panel     Status: None (In process)   Result Value Ref Range    Sodium 138 133 - 144 mmol/L    Potassium 3.4 3.4 - 5.3 mmol/L    Chloride 104 94 - 109 mmol/L    Carbon Dioxide PENDING 20 - 32 mmol/L    Anion Gap PENDING 3 - 14 mmol/L    Glucose PENDING 70 - 99 mg/dL    Urea Nitrogen PENDING 7 - 30 mg/dL    Creatinine PENDING 0.66 - 1.25 mg/dL    GFR Estimate PENDING >60 mL/min/[1.73_m2]    GFR Estimate If Black PENDING >60 mL/min/[1.73_m2]    Calcium PENDING 8.5 - 10.1 mg/dL    Bilirubin Total PENDING 0.2 - 1.3 mg/dL    Albumin PENDING 3.4 - 5.0 g/dL    Protein Total PENDING " 6.8 - 8.8 g/dL    Alkaline Phosphatase PENDING 40 - 150 U/L    ALT PENDING 0 - 70 U/L    AST PENDING 0 - 45 U/L   CBC with platelets     Status: None   Result Value Ref Range    WBC 7.6 4.0 - 11.0 10e9/L    RBC Count 5.71 4.4 - 5.9 10e12/L    Hemoglobin 16.5 13.3 - 17.7 g/dL    Hematocrit 50.0 40.0 - 53.0 %    MCV 88 78 - 100 fl    MCH 28.9 26.5 - 33.0 pg    MCHC 33.0 31.5 - 36.5 g/dL    RDW 13.3 10.0 - 15.0 %    Platelet Count 169 150 - 450 10e9/L     Labs Drawn and in Process:   Unresulted Labs Ordered in the Past 30 Days of this Admission     Date and Time Order Name Status Description    11/17/2020 1043 COMPREHENSIVE METABOLIC PANEL In process     11/17/2020 1043 LIPID REFLEX TO DIRECT LDL PANEL In process           Recent Labs   Lab Test 09/23/19  0953 11/02/18  1416 06/13/18  1359   HGB 15.9  --  16.0   *  --  174     --  142   POTASSIUM 3.3*  --  4.1   CR 1.01  --  1.01   A1C  --  5.5  --         IMPRESSION:   Reason for surgery/procedure: elevated psa, history of prostate cancer  Diagnosis/reason for consult: preop    The proposed surgical procedure is considered INTERMEDIATE risk.    REVISED CARDIAC RISK INDEX  The patient has the following serious cardiovascular risks for perioperative complications such as (MI, PE, VFib and 3  AV Block):  No serious cardiac risks  INTERPRETATION: 0 risks: Class I (very low risk - 0.4% complication rate)    The patient has the following additional risks for perioperative complications:  No identified additional risks      ICD-10-CM    1. Preop general physical exam  Z01.818 EKG 12-lead complete w/read - Clinics   2. Elevated PSA  R97.20    3. Pancreatic cyst  K86.2 GASTROENTEROLOGY ADULT REF CONSULT ONLY   4. Hyperlipidemia LDL goal <130  E78.5 Lipid panel reflex to direct LDL Fasting     Comprehensive metabolic panel   5. Hypertension goal BP (blood pressure) < 150/90  I10 CBC with platelets   6. Idiopathic chronic gout 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  M1A.0720  CBC with platelets     indomethacin (INDOCIN) 50 MG capsule   7. Non morbid obesity, unspecified obesity type  E66.9    8. MUNIRA (obstructive sleep apnea)  G47.33        RECOMMENDATIONS:     --Consult hospital rounder / IM to assist post-op medical management if complication isabel.     --Patient is to take all scheduled medications on the day of surgery EXCEPT for modifications listed below.  Skip supplement and fish oil 1 week before surgery. He has been given written instructions by surgery team. Going to avoid indomethacine.    -- no labs needed for surgery but overdue for routine labs. Obtain it today.     -- bp stable on current rx.    -- munira - not using any cpap or other intervention.    APPROVAL GIVEN to proceed with proposed procedure, without further diagnostic evaluation       Signed Electronically by: Waldo Matson MD, MD    Copy of this evaluation report is provided to requesting physician.    [facility] Preop Guidelines    Revised Cardiac Risk Index

## 2020-11-27 DIAGNOSIS — Z11.59 ENCOUNTER FOR SCREENING FOR OTHER VIRAL DISEASES: ICD-10-CM

## 2020-11-27 PROCEDURE — U0003 INFECTIOUS AGENT DETECTION BY NUCLEIC ACID (DNA OR RNA); SEVERE ACUTE RESPIRATORY SYNDROME CORONAVIRUS 2 (SARS-COV-2) (CORONAVIRUS DISEASE [COVID-19]), AMPLIFIED PROBE TECHNIQUE, MAKING USE OF HIGH THROUGHPUT TECHNOLOGIES AS DESCRIBED BY CMS-2020-01-R: HCPCS | Performed by: UROLOGY

## 2020-11-28 LAB
SARS-COV-2 RNA SPEC QL NAA+PROBE: NOT DETECTED
SPECIMEN SOURCE: NORMAL

## 2020-12-01 ENCOUNTER — HOSPITAL ENCOUNTER (OUTPATIENT)
Facility: CLINIC | Age: 75
Discharge: HOME | End: 2020-12-01
Attending: UROLOGY | Admitting: UROLOGY
Payer: COMMERCIAL

## 2020-12-01 ENCOUNTER — ANESTHESIA EVENT (OUTPATIENT)
Dept: SURGERY | Facility: CLINIC | Age: 75
End: 2020-12-01
Payer: COMMERCIAL

## 2020-12-01 ENCOUNTER — ANESTHESIA (OUTPATIENT)
Dept: SURGERY | Facility: CLINIC | Age: 75
End: 2020-12-01
Payer: COMMERCIAL

## 2020-12-01 VITALS
BODY MASS INDEX: 29.47 KG/M2 | WEIGHT: 199 LBS | OXYGEN SATURATION: 95 % | HEART RATE: 68 BPM | DIASTOLIC BLOOD PRESSURE: 68 MMHG | TEMPERATURE: 97.2 F | HEIGHT: 69 IN | RESPIRATION RATE: 14 BRPM | SYSTOLIC BLOOD PRESSURE: 138 MMHG

## 2020-12-01 DIAGNOSIS — C61 PROSTATE CA (H): Primary | ICD-10-CM

## 2020-12-01 PROCEDURE — 761N000007 HC RECOVERY PHASE 2 EACH 15 MINS: Performed by: UROLOGY

## 2020-12-01 PROCEDURE — C1769 GUIDE WIRE: HCPCS | Performed by: UROLOGY

## 2020-12-01 PROCEDURE — 360N000038 HC SURGERY LEVEL 6 1ST 30 MIN: Performed by: UROLOGY

## 2020-12-01 PROCEDURE — 250N000003 HC SEVOFLURANE, EA 15 MIN: Performed by: UROLOGY

## 2020-12-01 PROCEDURE — 761N000002 HC RECOVERY PHASE 1 LEVEL 1 EA ADDTL HR: Performed by: UROLOGY

## 2020-12-01 PROCEDURE — 258N000003 HC RX IP 258 OP 636: Performed by: NURSE ANESTHETIST, CERTIFIED REGISTERED

## 2020-12-01 PROCEDURE — 360N000039 HC SURGERY LEVEL 6 EA 15 ADDTL MIN: Performed by: UROLOGY

## 2020-12-01 PROCEDURE — 370N000001 HC ANESTHESIA TECHNICAL FEE, 1ST 30 MIN: Performed by: UROLOGY

## 2020-12-01 PROCEDURE — 272N000001 HC OR GENERAL SUPPLY STERILE: Performed by: UROLOGY

## 2020-12-01 PROCEDURE — 250N000009 HC RX 250: Performed by: UROLOGY

## 2020-12-01 PROCEDURE — 761N000001 HC RECOVERY PHASE 1 LEVEL 1 FIRST HR: Performed by: UROLOGY

## 2020-12-01 PROCEDURE — 250N000011 HC RX IP 250 OP 636: Performed by: UROLOGY

## 2020-12-01 PROCEDURE — 258N000001 HC RX 258: Performed by: UROLOGY

## 2020-12-01 PROCEDURE — C2618 PROBE/NEEDLE, CRYO: HCPCS | Performed by: UROLOGY

## 2020-12-01 PROCEDURE — 250N000009 HC RX 250: Performed by: ANESTHESIOLOGY

## 2020-12-01 PROCEDURE — 250N000009 HC RX 250: Performed by: NURSE ANESTHETIST, CERTIFIED REGISTERED

## 2020-12-01 PROCEDURE — 370N000002 HC ANESTHESIA TECHNICAL FEE, EACH ADDTL 15 MIN: Performed by: UROLOGY

## 2020-12-01 PROCEDURE — 250N000011 HC RX IP 250 OP 636: Performed by: NURSE ANESTHETIST, CERTIFIED REGISTERED

## 2020-12-01 PROCEDURE — 999N000139 HC STATISTIC PRE-PROCEDURE ASSESSMENT II: Performed by: UROLOGY

## 2020-12-01 RX ORDER — ONDANSETRON 4 MG/1
4 TABLET, ORALLY DISINTEGRATING ORAL EVERY 30 MIN PRN
Status: DISCONTINUED | OUTPATIENT
Start: 2020-12-01 | End: 2020-12-01 | Stop reason: HOSPADM

## 2020-12-01 RX ORDER — NALOXONE HYDROCHLORIDE 0.4 MG/ML
0.4 INJECTION, SOLUTION INTRAMUSCULAR; INTRAVENOUS; SUBCUTANEOUS
Status: DISCONTINUED | OUTPATIENT
Start: 2020-12-01 | End: 2020-12-01 | Stop reason: HOSPADM

## 2020-12-01 RX ORDER — ONDANSETRON 2 MG/ML
4 INJECTION INTRAMUSCULAR; INTRAVENOUS EVERY 30 MIN PRN
Status: DISCONTINUED | OUTPATIENT
Start: 2020-12-01 | End: 2020-12-01 | Stop reason: HOSPADM

## 2020-12-01 RX ORDER — MEPERIDINE HYDROCHLORIDE 25 MG/ML
12.5 INJECTION INTRAMUSCULAR; INTRAVENOUS; SUBCUTANEOUS
Status: DISCONTINUED | OUTPATIENT
Start: 2020-12-01 | End: 2020-12-01 | Stop reason: HOSPADM

## 2020-12-01 RX ORDER — PROPOFOL 10 MG/ML
INJECTION, EMULSION INTRAVENOUS PRN
Status: DISCONTINUED | OUTPATIENT
Start: 2020-12-01 | End: 2020-12-01

## 2020-12-01 RX ORDER — TRAMADOL HYDROCHLORIDE 50 MG/1
50 TABLET ORAL EVERY 6 HOURS PRN
Qty: 10 TABLET | Refills: 0 | Status: SHIPPED | OUTPATIENT
Start: 2020-12-01 | End: 2020-12-04

## 2020-12-01 RX ORDER — ONDANSETRON 2 MG/ML
INJECTION INTRAMUSCULAR; INTRAVENOUS PRN
Status: DISCONTINUED | OUTPATIENT
Start: 2020-12-01 | End: 2020-12-01

## 2020-12-01 RX ORDER — NALOXONE HYDROCHLORIDE 0.4 MG/ML
0.2 INJECTION, SOLUTION INTRAMUSCULAR; INTRAVENOUS; SUBCUTANEOUS
Status: DISCONTINUED | OUTPATIENT
Start: 2020-12-01 | End: 2020-12-01 | Stop reason: HOSPADM

## 2020-12-01 RX ORDER — DEXAMETHASONE SODIUM PHOSPHATE 4 MG/ML
INJECTION, SOLUTION INTRA-ARTICULAR; INTRALESIONAL; INTRAMUSCULAR; INTRAVENOUS; SOFT TISSUE PRN
Status: DISCONTINUED | OUTPATIENT
Start: 2020-12-01 | End: 2020-12-01

## 2020-12-01 RX ORDER — FUROSEMIDE 10 MG/ML
INJECTION INTRAMUSCULAR; INTRAVENOUS PRN
Status: DISCONTINUED | OUTPATIENT
Start: 2020-12-01 | End: 2020-12-01

## 2020-12-01 RX ORDER — HYDROCODONE BITARTRATE AND ACETAMINOPHEN 5; 325 MG/1; MG/1
1 TABLET ORAL ONCE
Status: DISCONTINUED | OUTPATIENT
Start: 2020-12-01 | End: 2020-12-01 | Stop reason: HOSPADM

## 2020-12-01 RX ORDER — SODIUM CHLORIDE, SODIUM LACTATE, POTASSIUM CHLORIDE, CALCIUM CHLORIDE 600; 310; 30; 20 MG/100ML; MG/100ML; MG/100ML; MG/100ML
INJECTION, SOLUTION INTRAVENOUS CONTINUOUS PRN
Status: DISCONTINUED | OUTPATIENT
Start: 2020-12-01 | End: 2020-12-01

## 2020-12-01 RX ORDER — LIDOCAINE HYDROCHLORIDE 20 MG/ML
JELLY TOPICAL PRN
Status: DISCONTINUED | OUTPATIENT
Start: 2020-12-01 | End: 2020-12-01 | Stop reason: HOSPADM

## 2020-12-01 RX ORDER — LIDOCAINE HYDROCHLORIDE 20 MG/ML
INJECTION, SOLUTION INFILTRATION; PERINEURAL PRN
Status: DISCONTINUED | OUTPATIENT
Start: 2020-12-01 | End: 2020-12-01

## 2020-12-01 RX ORDER — FENTANYL CITRATE 50 UG/ML
INJECTION, SOLUTION INTRAMUSCULAR; INTRAVENOUS PRN
Status: DISCONTINUED | OUTPATIENT
Start: 2020-12-01 | End: 2020-12-01

## 2020-12-01 RX ORDER — MAGNESIUM HYDROXIDE 1200 MG/15ML
LIQUID ORAL PRN
Status: DISCONTINUED | OUTPATIENT
Start: 2020-12-01 | End: 2020-12-01 | Stop reason: HOSPADM

## 2020-12-01 RX ORDER — EPHEDRINE SULFATE 50 MG/ML
INJECTION, SOLUTION INTRAMUSCULAR; INTRAVENOUS; SUBCUTANEOUS PRN
Status: DISCONTINUED | OUTPATIENT
Start: 2020-12-01 | End: 2020-12-01

## 2020-12-01 RX ORDER — SODIUM CHLORIDE, SODIUM LACTATE, POTASSIUM CHLORIDE, CALCIUM CHLORIDE 600; 310; 30; 20 MG/100ML; MG/100ML; MG/100ML; MG/100ML
INJECTION, SOLUTION INTRAVENOUS CONTINUOUS
Status: DISCONTINUED | OUTPATIENT
Start: 2020-12-01 | End: 2020-12-01 | Stop reason: HOSPADM

## 2020-12-01 RX ORDER — PROPOFOL 10 MG/ML
INJECTION, EMULSION INTRAVENOUS CONTINUOUS PRN
Status: DISCONTINUED | OUTPATIENT
Start: 2020-12-01 | End: 2020-12-01

## 2020-12-01 RX ORDER — SULFAMETHOXAZOLE/TRIMETHOPRIM 800-160 MG
1 TABLET ORAL 2 TIMES DAILY
Qty: 14 TABLET | Refills: 0 | Status: SHIPPED | OUTPATIENT
Start: 2020-12-01 | End: 2020-12-08

## 2020-12-01 RX ORDER — FENTANYL CITRATE 50 UG/ML
25-50 INJECTION, SOLUTION INTRAMUSCULAR; INTRAVENOUS
Status: DISCONTINUED | OUTPATIENT
Start: 2020-12-01 | End: 2020-12-01 | Stop reason: HOSPADM

## 2020-12-01 RX ORDER — HYDROMORPHONE HYDROCHLORIDE 1 MG/ML
.3-.5 INJECTION, SOLUTION INTRAMUSCULAR; INTRAVENOUS; SUBCUTANEOUS EVERY 10 MIN PRN
Status: DISCONTINUED | OUTPATIENT
Start: 2020-12-01 | End: 2020-12-01 | Stop reason: HOSPADM

## 2020-12-01 RX ORDER — ATROPA BELLADONNA AND OPIUM 16.2; 3 MG/1; MG/1
SUPPOSITORY RECTAL PRN
Status: DISCONTINUED | OUTPATIENT
Start: 2020-12-01 | End: 2020-12-01 | Stop reason: HOSPADM

## 2020-12-01 RX ORDER — CEFAZOLIN SODIUM 2 G/100ML
2 INJECTION, SOLUTION INTRAVENOUS
Status: COMPLETED | OUTPATIENT
Start: 2020-12-01 | End: 2020-12-01

## 2020-12-01 RX ADMIN — SODIUM CHLORIDE, POTASSIUM CHLORIDE, SODIUM LACTATE AND CALCIUM CHLORIDE: 600; 310; 30; 20 INJECTION, SOLUTION INTRAVENOUS at 09:48

## 2020-12-01 RX ADMIN — Medication 5 MG: at 10:28

## 2020-12-01 RX ADMIN — FENTANYL CITRATE 25 MCG: 50 INJECTION, SOLUTION INTRAMUSCULAR; INTRAVENOUS at 10:15

## 2020-12-01 RX ADMIN — PROPOFOL 25 MCG/KG/MIN: 10 INJECTION, EMULSION INTRAVENOUS at 09:58

## 2020-12-01 RX ADMIN — FUROSEMIDE 10 MG: 10 INJECTION, SOLUTION INTRAVENOUS at 11:11

## 2020-12-01 RX ADMIN — SODIUM CHLORIDE, POTASSIUM CHLORIDE, SODIUM LACTATE AND CALCIUM CHLORIDE: 600; 310; 30; 20 INJECTION, SOLUTION INTRAVENOUS at 10:49

## 2020-12-01 RX ADMIN — PROPOFOL 200 MG: 10 INJECTION, EMULSION INTRAVENOUS at 09:52

## 2020-12-01 RX ADMIN — PROPOFOL 30 MG: 10 INJECTION, EMULSION INTRAVENOUS at 10:15

## 2020-12-01 RX ADMIN — ONDANSETRON 4 MG: 2 INJECTION INTRAMUSCULAR; INTRAVENOUS at 10:45

## 2020-12-01 RX ADMIN — PHENYLEPHRINE HYDROCHLORIDE 100 MCG: 10 INJECTION INTRAVENOUS at 10:46

## 2020-12-01 RX ADMIN — CEFAZOLIN SODIUM 2 G: 2 INJECTION, SOLUTION INTRAVENOUS at 09:56

## 2020-12-01 RX ADMIN — DEXAMETHASONE SODIUM PHOSPHATE 4 MG: 4 INJECTION, SOLUTION INTRA-ARTICULAR; INTRALESIONAL; INTRAMUSCULAR; INTRAVENOUS; SOFT TISSUE at 10:00

## 2020-12-01 RX ADMIN — Medication 5 MG: at 10:34

## 2020-12-01 RX ADMIN — Medication 5 MG: at 10:25

## 2020-12-01 RX ADMIN — Medication 5 MG: at 10:31

## 2020-12-01 RX ADMIN — Medication 5 MG: at 10:40

## 2020-12-01 RX ADMIN — FENTANYL CITRATE 50 MCG: 50 INJECTION, SOLUTION INTRAMUSCULAR; INTRAVENOUS at 09:56

## 2020-12-01 RX ADMIN — LIDOCAINE HYDROCHLORIDE 0.1 ML: 10 INJECTION, SOLUTION EPIDURAL; INFILTRATION; INTRACAUDAL; PERINEURAL at 09:39

## 2020-12-01 RX ADMIN — PHENYLEPHRINE HYDROCHLORIDE 100 MCG: 10 INJECTION INTRAVENOUS at 10:56

## 2020-12-01 RX ADMIN — FENTANYL CITRATE 25 MCG: 50 INJECTION, SOLUTION INTRAMUSCULAR; INTRAVENOUS at 10:58

## 2020-12-01 RX ADMIN — LIDOCAINE HYDROCHLORIDE 50 MG: 20 INJECTION, SOLUTION INFILTRATION; PERINEURAL at 09:52

## 2020-12-01 ASSESSMENT — ENCOUNTER SYMPTOMS: ORTHOPNEA: 0

## 2020-12-01 ASSESSMENT — MIFFLIN-ST. JEOR: SCORE: 1630.1

## 2020-12-01 NOTE — ANESTHESIA CARE TRANSFER NOTE
Patient: Saul Hairston    Procedure(s):  CRYOTHERAPY OF PROSTATE    Diagnosis: Prostate cancer (H) [C61]  Diagnosis Additional Information: No value filed.    Anesthesia Type:   General     Note:  Airway :Face Mask  Patient transferred to:PACU  Comments: Anesthesia Care Note    Patient: Saul Hairston    Transferred to: PACU    Patient vital signs: Stable    Airway: FM    Monitors placed. VSS. PIV patent. No change in dentition. Report given to ASHELY Torres CRNA   12/1/2020  Handoff Report: Identifed the Patient, Identified the Reponsible Provider, Reviewed the pertinent medical history, Discussed the surgical course, Reviewed Intra-OP anesthesia mangement and issues during anesthesia, Set expectations for post-procedure period and Allowed opportunity for questions and acknowledgement of understanding      Vitals: (Last set prior to Anesthesia Care Transfer)    CRNA VITALS  12/1/2020 1050 - 12/1/2020 1126      12/1/2020             Resp Rate (observed):  (!) 1    Resp Rate (set):  10                Electronically Signed By: LASHA Johnson CRNA  December 1, 2020  11:26 AM

## 2020-12-01 NOTE — OR NURSING
Brown teaching done with patient, urine clear pink red.  Pt verbalized understanding of how to empty the catheter or change the bag.  Wife updated by pacu rn and dr. Caal.

## 2020-12-01 NOTE — BRIEF OP NOTE
BRIEF OP NOTE  PREOP DX CAPG7 (4,3)  POSTOP DX SAME   PROCEDURE - CRYO OF THE PROSTATE, FLEX CYSTO  ANES-GEN.   SURGEON - JAMIN  EBL 5CC  FINDINGS - 8CRYO ICE RODS ,3 SENSORS

## 2020-12-01 NOTE — ANESTHESIA PROCEDURE NOTES
Airway   Date/Time: 12/1/2020 9:54 AM   Patient location during procedure: OR    Staff -   CRNA: Devi Hernandez APRN CRNA  Performed By: CRNA    Consent for Airway   Urgency: elective    Indications and Patient Condition  Indications for airway management: ina-procedural  Induction type:intravenousMask difficulty assessment: 1 - vent by mask    Final Airway Details  Final airway type: supraglottic airway        Post intubation assessment   Placement verified by: capnometry, equal breath sounds and chest rise   Number of attempts at approach: 1  Number of other approaches attempted: 0  Ease of procedure: easy  Dentition: Intact and Unchanged

## 2020-12-01 NOTE — ANESTHESIA PROCEDURE NOTES
Airway   Date/Time: 12/1/2020 9:54 AM   Patient location during procedure: OR    Staff -   CRNA: Devi Hernandez APRN CRNA  Performed By: CRNA    Consent for Airway   Urgency: elective    Indications and Patient Condition  Indications for airway management: ina-procedural  Induction type:intravenous    Final Airway Details  Final airway type: supraglottic airway    Endotracheal Airway Details   Secured with: commercial tube mulligan    Post intubation assessment   Placement verified by: capnometry, equal breath sounds and chest rise   Number of attempts at approach: 1  Number of other approaches attempted: 0  Secured with:commercial tube mulligan  Ease of procedure: easy  Dentition: Intact and Unchanged

## 2020-12-01 NOTE — DISCHARGE INSTRUCTIONS
**If you have questions or concerns about your procedure,  call Dr. Caal at [phone]**          Same Day Surgery Discharge Instructions for  Sedation and General Anesthesia       It's not unusual to feel dizzy, light-headed or faint for up to 24 hours after surgery or while taking pain medication.  If you have these symptoms: sit for a few minutes before standing and have someone assist you when you get up to walk or use the bathroom.      You should rest and relax for the next 24 hours. We recommend you make arrangements to have an adult stay with you for at least 24 hours after your discharge.  Avoid hazardous and strenuous activity.      DO NOT DRIVE any vehicle or operate mechanical equipment for 24 hours following the end of your surgery.  Even though you may feel normal, your reactions may be affected by the medication you have received.      Do not drink alcoholic beverages for 24 hours following surgery.       Slowly progress to your regular diet as you feel able. It's not unusual to feel nauseated and/or vomit after receiving anesthesia.  If you develop these symptoms, drink clear liquids (apple juice, ginger ale, broth, 7-up, etc. ) until you feel better.  If your nausea and vomiting persists for 24 hours, please notify your surgeon.        All narcotic pain medications, along with inactivity and anesthesia, can cause constipation. Drinking plenty of liquids and increasing fiber intake will help.      For any questions of a medical nature, call your surgeon.      Do not make important decisions for 24 hours.      If you had general anesthesia, you may have a sore throat for a couple of days related to the breathing tube used during surgery.  You may use Cepacol lozenges to help with this discomfort.  If it worsens or if you develop a fever, contact your surgeon.       If you feel your pain is not well managed with the pain medications prescribed by your surgeon, please contact your surgeon's office  to let them know so they can address your concerns.       CoVid 19 Information    We want to give you information regarding Covid. Please consult your primary care provider with any questions you might have.     Patient who have symptoms (cough, fever, or shortness of breath), need to isolate for 7 days from when symptoms started OR 72 hours after fever resolves (without fever reducing medications) AND improvement of respiratory symptoms (whichever is longer).      Isolate yourself at home (in own room/own bathroom if possible)    Do Not allow any visitors    Do Not go to work or school    Do Not go to [employer],  centers, shopping, or other public places.    Do Not shake hands.    Avoid close and intimate contact with others (hugging, kissing).    Follow CDC recommendations for household cleaning of frequently touched services.     After the initial 7 days, continue to isolate yourself from household members as much as possible. To continue decrease the risk of community spread and exposure, you and any members of your household should limit activities in public for 14 days after starting home isolation.     You can reference the following CDC link for helpful home isolation/care tips:  https://www.cdc.gov/coronavirus/2019-ncov/downloads/10Things.pdf    Protect Others:    Cover Your Mouth and Nose with a mask, disposable tissue or wash cloth to avoid spreading germs to others.    Wash your hands and face frequently with soap and water    Call Your Primary Doctor If: Breathing difficulty develops or you become worse.    For more information about COVID19 and options for caring for yourself at home, please visit the CDC website at https://www.cdc.gov/coronavirus/2019-ncov/about/steps-when-sick.html  For more options for care at [facility], please visit our website at [email or web address]        Discharge Instructions following [facility] of the [facility]    Diet:    Diet as tolerated.  Drink at least 6 glasses of liquid per day.  Activity:    No heavy lifting or strenuous activity until approved by surgeon.    Short walks and stair climbing are permissible.  Care After Surgery    Do not hold urine in your bladder.  Always empty your bladder when you have the urge to urinate.    Do not strain to have a bowel movement.  If constipated, take over-the-counter stool softeners (follow directions on package).    Do not drive a car or have intercourse until approved by your surgeon.    It is not unusual to pass small clots or to have red-tinged urine.  If this occurs, decrease activity and increase your fluid intake.  o You may expect to have some blood for at least 3-4 weeks, especially at the beginning or end of urination.  If there is dark, thick blood with difficulty urinating call your surgeon.  DISCHARGE INSTRUCTIONS FOR   CATHETER CARE AT HOME      .      Basic Catheter Care  1. Always wash hands before and after handling your catheter.  2. Use soap and water to wash the area around your catheter.  3. Do this procedure twice a day.  4. Proper cleansing will help keep the area from becoming irritated or infected.    Leg Bag  This is a small plastic bag that collects urine draining from your catheter and then strapped around your thigh. It will need to be emptied when the bag is 1/2 to 3/4 full.     Large Drainage Bag  1. This bag is larger than the leg bag and holds more urine.  It is to be used while at home, especially at night.    2. Before you go to bed, change the leg bag to the large drainage bag.  3. Pinch off the catheter with your fingers and swab the connection between the catheter and leg bag with alcohol sponge.  4. Disconnect the leg bag and connect the large drainage bag to your catheter.  5. When you get into bed, arrange the drainage tubing so that it doesn t kink.  6. Be sure to keep the bag below the level of your bladder and allow enough  slack for turning.    Cleaning Your Drainage Bags    1. Wash hands.  2. Using funnel or syringe, fill the bag half full with a solution of 1/2  vinegar and 1/2 water.  3. Shake bag, allowing mixture to cleanse inside of bag.  4. Empty out all vinegar and water mixture from your bag.  5. Hang bag to dry when not in use.  6. Clean your bags anytime you change them.      Helpful Hints  1. Always keep drainage bags below bladder level to insure adequate drainage.  2. Drink 4-6 glasses of water daily along with other fluids you normally drink to keep urine free of infection and / or clots.  3. If you notice no urine in your bag for 2 to 4 hours or you develop extreme discomfort in bladder area, your catheter maybe plugged.  Notify your doctor.  4. If you notice your urine becomes foul smelling and cloudy, notify your doctor.  Also notify your doctor if you develop fever or chills.  5. If you notice urine leaking around the outside of the catheter, check to be sure catheter or tubing is not kinked.  6. Don t use leg bag while in bed.    **If you have questions or concerns about your procedure,  call Dr. Caal at [phone]**

## 2020-12-01 NOTE — ANESTHESIA PREPROCEDURE EVALUATION
Anesthesia Pre-Procedure Evaluation    Patient: Saul Hairston   MRN: [id number] : 1945          Preoperative Diagnosis: Prostate cancer (H) [C61]    Procedure(s):  CRYOTHERAPY OF PROSTATE    Past Medical History:   Diagnosis Date     Arthritis     Gout     CARDIOVASCULAR SCREENING; LDL GOAL LESS THAN 160 2006     Elevated PSA      Gout, unspecified      Hypertension      Kidney stone     Doing ok now     Obese      Seasonal allergic rhinitis     Spring and      Sleep apnea     DOES NOT USE CPAP     Umbilical hernia without mention of obstruction or gangrene 2013     Past Surgical History:   Procedure Laterality Date     COLONOSCOPY  2006     COLONOSCOPY N/A 6/15/2016    Procedure: COLONOSCOPY;  Surgeon: Poly Sanchez MD;  Location:  GI     CYSTOSCOPY FLEXIBLE, CYOABLATION PROSTATE N/A 2018    Procedure: CYSTOSCOPY FLEXIBLE, CRYOABLATION PROSTATE;  FLEXIBLE CYSTOSCOPY, CYROABLATION OF PROSTATE ;  Surgeon: Aj Caal MD;  Location:  OR     GENITOURINARY SURGERY       HERNIORRHAPHY UMBILICAL  2013    Procedure: HERNIORRHAPHY UMBILICAL;  Open Umbilical Hernia Repair With Mesh ;  Surgeon: Sergio Tomas MD;  Location: [facility]     ROTATOR CUFF REPAIR RT/LT  2011    Left Shoulder     SURGICAL HISTORY OF -       ear operation as child     SURGICAL HISTORY OF -       removal of pseudogout deposits - Right knee     TONSILLECTOMY      Child       Anesthesia Evaluation     . Pt has had prior anesthetic.     No history of anesthetic complications          ROS/MED HX    ENT/Pulmonary:     (+)sleep apnea, allergic rhinitis, doesn't use CPAP , . .   (-) asthma   Neurologic:       Cardiovascular: Comment: RBBB    (+) Dyslipidemia, hypertension----. : . . . :. .      (-) REYES, orthopnea/PND and syncope   METS/Exercise Tolerance:  >4 METS   Hematologic:         Musculoskeletal:         GI/Hepatic:        (-) GERD   Renal/Genitourinary:     (+) Nephrolithiasis ,       Endo:   "   (+) Obesity, .      Psychiatric:         Infectious Disease:         Malignancy:   (+) Malignancy History of Prostate          Other: Comment: gout                         Physical Exam      Airway   Mallampati: II  TM distance: >3 FB  Neck ROM: full    Dental   (+) caps    Cardiovascular   Rhythm and rate: regular      Pulmonary    breath sounds clear to auscultation            Lab Results   Component Value Date    WBC 7.6 11/17/2020    HGB 16.5 11/17/2020    HCT 50.0 11/17/2020     11/17/2020    CRP 8.2 (H) 03/30/2017    SED 8 03/30/2017     11/17/2020    POTASSIUM 3.4 11/17/2020    CHLORIDE 104 11/17/2020    CO2 28 11/17/2020    BUN 17 11/17/2020    CR 0.99 11/17/2020     (H) 11/17/2020    MADIHA 9.5 11/17/2020    ALBUMIN 3.7 11/17/2020    PROTTOTAL 7.0 11/17/2020    ALT 62 11/17/2020    AST 30 11/17/2020    ALKPHOS 95 11/17/2020    BILITOTAL 0.6 11/17/2020    TSH 1.26 04/18/2011       Preop Vitals  BP Readings from Last 3 Encounters:   12/01/20 (!) 146/78   11/17/20 132/68   09/23/19 124/64    Pulse Readings from Last 3 Encounters:   09/23/19 58   08/19/19 71   11/02/18 74      Resp Readings from Last 3 Encounters:   12/01/20 18   11/17/20 16   09/23/19 16    SpO2 Readings from Last 3 Encounters:   12/01/20 97%   09/23/19 97%   08/19/19 96%      Temp Readings from Last 1 Encounters:   12/01/20 36.8  C (98.3  F) (Oral)    Ht Readings from Last 1 Encounters:   12/01/20 1.74 m (5' 8.5\")      Wt Readings from Last 1 Encounters:   12/01/20 90.3 kg (199 lb)    Estimated body mass index is 29.82 kg/m  as calculated from the following:    Height as of this encounter: 1.74 m (5' 8.5\").    Weight as of this encounter: 90.3 kg (199 lb).     Allergies   Allergen Reactions     No Known Drug Allergies      Social History     Tobacco Use     Smoking status: Never Smoker     Smokeless tobacco: Never Used   Substance Use Topics     Alcohol use: No     Wt Readings from Last 1 Encounters:   12/01/20 90.3 kg " (199 lb)      Prior to Admission medications    Medication Sig Start Date End Date Taking? Authorizing Provider   allopurinol (ZYLOPRIM) 100 MG tablet TAKE 3 TABLETS (300 MG) BY MOUTH DAILY 10/1/20  Yes Waldo Matson MD   Ascorbic Acid (VITAMIN C) 500 MG CAPS    Yes Reported, Patient   atorvastatin (LIPITOR) 20 MG tablet TAKE 1 TABLET BY MOUTH EVERY DAY 12/11/19  Yes Waldo Matson MD   CO-ENZYME Q10 PO    Yes Reported, Patient   hydrochlorothiazide (HYDRODIURIL) 25 MG tablet ++NEED APPOINTMENT++TAKE 1 TABLET BY MOUTH EVERY DAY 11/16/20  Yes Waldo Matson MD   Magnesium 300 MG CAPS    Yes Reported, Patient   Multiple Vitamins-Minerals (MULTIVITAMIN PO)    Yes Reported, Patient   Omega-3 Fatty Acids (OMEGA-3 FISH OIL PO)    Yes Reported, Patient   TAMSULOSIN HCL PO Take by mouth daily   Yes Reported, Patient   indomethacin (INDOCIN) 50 MG capsule Take 1 capsule (50 mg) by mouth 3 times daily as needed for moderate pain 11/17/20   Waldo Matson MD     Current Facility-Administered Medications Ordered in Epic   Medication Dose Route Frequency Last Rate Last Admin     ceFAZolin (ANCEF) intermittent infusion 2 g in 100 mL dextrose PRE-MIX  2 g Intravenous Pre-Op/Pre-procedure x 1 dose         lidocaine 1 % 0.1-1 mL  0.1-1 mL Other Q1H PRN         sodium chloride (PF) 0.9% PF flush 3 mL  3 mL Intracatheter q1 min prn         No current [facility]-ordered outpatient medications on file.       Recent Labs   Lab Test 11/17/20  1107 09/23/19  0953    138   POTASSIUM 3.4 3.3*   CHLORIDE 104 105   CO2 28 26   ANIONGAP 6 7   * 114*   BUN 17 18   CR 0.99 1.01   MADIHA 9.5 8.8     Recent Labs   Lab Test 11/17/20  1107 09/23/19  0953   WBC 7.6 6.4   HGB 16.5 15.9    148*     No results for input(s): ABO, RH in the last 77367 hours.  Recent Labs   Lab Test 11/27/17  0900 11/27/17  0613   TROPI <0.015 <0.015     No results for input(s): PH, PCO2, PO2, HCO3 in the last 38870  hours.  No results for input(s): HCG in the last 54496 hours.  No results found for this or any previous visit (from the past 744 hour(s)).  No results found for this or any previous visit (from the past 4320 hour(s)).      RECENT LABS:         Anesthesia Plan      History & Physical Review      ASA Status:  2 .        Plan for General   PONV prophylaxis:  Ondansetron (or other 5HT-3) and Dexamethasone or Solumedrol  Propofol infusion        Postoperative Care      Consents  Anesthetic plan, risks, benefits and alternatives discussed with:  Patient..                 Amy Anthony MD

## 2020-12-01 NOTE — OP NOTE
Procedure Date: 12/01/2020      PREOPERATIVE DIAGNOSES:  Prostate cancer, Euclid score 7 (4, 3), PSA 9.8.      POSTOPERATIVE DIAGNOSIS:  Prostate cancer, Liss score 7 (4, 3), PSA 9.8.      PROCEDURES:   1.  Cryotherapy of the prostate.   2.  Flexible cystoscopy.   3.  Prostate volume study.      ANESTHESIA:  General.      SURGEON:  Aj Caal MD      ESTIMATED BLOOD LOSS:  Less than 5 mL      SUMMARY OF FINDINGS:  Placement of 8 cryo ice rods and 3 thermal sensors.      BRIEF HISTORY AND PHYSICAL:  A 75-year-old male with a history of an elevated PSA and abnormal prostate examination.  The patient's MRI showed 2 small lesions.  Biopsy confirmed local prostate cancer, Liss score 7, 4.3 and approximately 25% of 1 core.  CT and bone scan were negative.  The patient has been voiding relatively well.  He does understand the procedure, possible complications and realistic expectations and he has elected to proceed.      DESCRIPTION OF PROCEDURE:  Proper consents were obtained and signed.  The patient was taken to the operating room under general anesthesia.  He was shaved and prepped in the usual sterile fashion.  Flexible cystoscopy demonstrated evidence of a normal urethra.  Prostate measured approximately 5 cm.  Minimal obstruction.  Bladder was essentially negative.  The bladder was left with 200 mL of sterile water.  A Brown catheter was placed and put on light traction.  He underwent a transrectal ultrasound.  The prostate was well visualized in both the sagittal and transverse views.  Prostate measured 26 grams.  The patient underwent placement of 8 prior ice rods, 3 in row 1, two in row 2, one in row 3, one in row 4, one in row 5.  Two thermal sensors were placed in the right and left lobe of the prostate.  He underwent a repeat cystoscopy.  There was no evidence for any perforation to the prostatic urethra.  An Amplatz Super Stiff wire was placed through the scope, and the urethral warmer then was  placed over the wire in good position.  The patient's prostate was somewhat asymmetrical and the urethra was deviated to the right.      All appropriate final checks were made.  Initial freeze cycle lasted a little over 5 minutes, reaching temperatures of -50 degrees Celsius or lower.  He underwent four 1-minute thaw cycles, followed by a second freeze cycle lasting approximately 4-1/2 minutes, again reaching temperatures of -55 degrees Celsius or lower.  The urethral warmer remained in place for an additional 20 minutes.  The final thaw lasted 13 minutes, again returning the prostate to normal temperatures.  The needles then removed.  Betadine and pressure were applied to the perineum.  Tegaderm dressings were applied to the perineum, scrotum, and penis to prevent swelling.  Warmer then was removed, Brown catheter placed and irrigated clear.  He was given 10 mg of Lasix and taken to the recovery room in stable condition.         AJ NEVILLE MD             D: 2020   T: 2020   MT: BENITEZ      Name:     LUIS ALBERTO SHEA   MRN:      [id number]        Account:        [id number]   :      1945           Procedure Date: 2020      Document: [id number]       cc: Aj Matson MD

## 2020-12-01 NOTE — ANESTHESIA POSTPROCEDURE EVALUATION
Patient: Saul Hairston    Procedure(s):  CRYOTHERAPY OF PROSTATE    Diagnosis:Prostate cancer (H) [C61]  Diagnosis Additional Information: No value filed.    Anesthesia Type:  General    Note:  Anesthesia Post Evaluation    Patient location during evaluation: PACU  Patient participation: Able to fully participate in evaluation  Level of consciousness: awake  Pain management: adequate  Airway patency: patent  Cardiovascular status: acceptable  Respiratory status: acceptable  Hydration status: acceptable  PONV: controlled     Anesthetic complications: None          Last vitals:  Vitals:    12/01/20 1330 12/01/20 1345 12/01/20 1500   BP: 121/67 134/69 138/68   Pulse: 61 68    Resp: 11 16 14   Temp: 36.3  C (97.3  F) 36.2  C (97.2  F)    SpO2: 94% 92% 95%         Electronically Signed By: Amy Anthony MD  December 1, 2020  5:29 PM

## 2020-12-18 ENCOUNTER — APPOINTMENT (OUTPATIENT)
Dept: GENERAL RADIOLOGY | Facility: CLINIC | Age: 75
End: 2020-12-18
Attending: EMERGENCY MEDICINE
Payer: COMMERCIAL

## 2020-12-18 ENCOUNTER — NURSE TRIAGE (OUTPATIENT)
Dept: FAMILY MEDICINE | Facility: CLINIC | Age: 75
End: 2020-12-18

## 2020-12-18 ENCOUNTER — HOSPITAL ENCOUNTER (EMERGENCY)
Facility: CLINIC | Age: 75
Discharge: HOME | End: 2020-12-18
Attending: EMERGENCY MEDICINE | Admitting: EMERGENCY MEDICINE
Payer: COMMERCIAL

## 2020-12-18 VITALS
HEIGHT: 70 IN | OXYGEN SATURATION: 95 % | SYSTOLIC BLOOD PRESSURE: 149 MMHG | HEART RATE: 76 BPM | BODY MASS INDEX: 27.2 KG/M2 | WEIGHT: 190 LBS | DIASTOLIC BLOOD PRESSURE: 77 MMHG | RESPIRATION RATE: 27 BRPM | TEMPERATURE: 101.4 F

## 2020-12-18 DIAGNOSIS — Z20.822 SUSPECTED COVID-19 VIRUS INFECTION: ICD-10-CM

## 2020-12-18 DIAGNOSIS — J06.9 UPPER RESPIRATORY TRACT INFECTION, UNSPECIFIED TYPE: ICD-10-CM

## 2020-12-18 LAB
ALBUMIN SERPL-MCNC: 3.2 G/DL (ref 3.4–5)
ALBUMIN UR-MCNC: 30 MG/DL
ALP SERPL-CCNC: 78 U/L (ref 40–150)
ALT SERPL W P-5'-P-CCNC: 49 U/L (ref 0–70)
ANION GAP SERPL CALCULATED.3IONS-SCNC: 6 MMOL/L (ref 3–14)
APPEARANCE UR: CLEAR
AST SERPL W P-5'-P-CCNC: 33 U/L (ref 0–45)
BASOPHILS # BLD AUTO: 0 10E9/L (ref 0–0.2)
BASOPHILS NFR BLD AUTO: 0.2 %
BILIRUB SERPL-MCNC: 0.7 MG/DL (ref 0.2–1.3)
BILIRUB UR QL STRIP: NEGATIVE
BUN SERPL-MCNC: 23 MG/DL (ref 7–30)
CALCIUM SERPL-MCNC: 8.5 MG/DL (ref 8.5–10.1)
CHLORIDE SERPL-SCNC: 98 MMOL/L (ref 94–109)
CO2 SERPL-SCNC: 30 MMOL/L (ref 20–32)
COLOR UR AUTO: YELLOW
CREAT SERPL-MCNC: 1.09 MG/DL (ref 0.66–1.25)
CRP SERPL-MCNC: 117 MG/L (ref 0–8)
DIFFERENTIAL METHOD BLD: ABNORMAL
EOSINOPHIL # BLD AUTO: 0 10E9/L (ref 0–0.7)
EOSINOPHIL NFR BLD AUTO: 0 %
ERYTHROCYTE [DISTWIDTH] IN BLOOD BY AUTOMATED COUNT: 12.7 % (ref 10–15)
GFR SERPL CREATININE-BSD FRML MDRD: 67 ML/MIN/{1.73_M2}
GLUCOSE SERPL-MCNC: 121 MG/DL (ref 70–99)
GLUCOSE UR STRIP-MCNC: NEGATIVE MG/DL
HCT VFR BLD AUTO: 48.8 % (ref 40–53)
HGB BLD-MCNC: 15.9 G/DL (ref 13.3–17.7)
HGB UR QL STRIP: ABNORMAL
IMM GRANULOCYTES # BLD: 0 10E9/L (ref 0–0.4)
IMM GRANULOCYTES NFR BLD: 0.2 %
KETONES UR STRIP-MCNC: 40 MG/DL
LACTATE BLD-SCNC: 1.2 MMOL/L (ref 0.7–2)
LDH SERPL L TO P-CCNC: 263 U/L (ref 85–227)
LEUKOCYTE ESTERASE UR QL STRIP: NEGATIVE
LYMPHOCYTES # BLD AUTO: 0.5 10E9/L (ref 0.8–5.3)
LYMPHOCYTES NFR BLD AUTO: 7.9 %
MAGNESIUM SERPL-MCNC: 2.4 MG/DL (ref 1.6–2.3)
MCH RBC QN AUTO: 28.2 PG (ref 26.5–33)
MCHC RBC AUTO-ENTMCNC: 32.6 G/DL (ref 31.5–36.5)
MCV RBC AUTO: 87 FL (ref 78–100)
MONOCYTES # BLD AUTO: 0.3 10E9/L (ref 0–1.3)
MONOCYTES NFR BLD AUTO: 4.2 %
MUCOUS THREADS #/AREA URNS LPF: PRESENT /LPF
NEUTROPHILS # BLD AUTO: 5.2 10E9/L (ref 1.6–8.3)
NEUTROPHILS NFR BLD AUTO: 87.5 %
NITRATE UR QL: NEGATIVE
NRBC # BLD AUTO: 0 10*3/UL
NRBC BLD AUTO-RTO: 0 /100
PH UR STRIP: 5.5 PH (ref 5–7)
PLATELET # BLD AUTO: 160 10E9/L (ref 150–450)
POTASSIUM SERPL-SCNC: 3.3 MMOL/L (ref 3.4–5.3)
PROT SERPL-MCNC: 7.1 G/DL (ref 6.8–8.8)
RBC # BLD AUTO: 5.64 10E12/L (ref 4.4–5.9)
RBC #/AREA URNS AUTO: 1 /HPF (ref 0–2)
SODIUM SERPL-SCNC: 134 MMOL/L (ref 133–144)
SOURCE: ABNORMAL
SP GR UR STRIP: 1.02 (ref 1–1.03)
TROPONIN I SERPL-MCNC: 0.02 UG/L (ref 0–0.04)
UROBILINOGEN UR STRIP-MCNC: NORMAL MG/DL (ref 0–2)
WBC # BLD AUTO: 5.9 10E9/L (ref 4–11)
WBC #/AREA URNS AUTO: 1 /HPF (ref 0–5)

## 2020-12-18 PROCEDURE — C9803 HOPD COVID-19 SPEC COLLECT: HCPCS

## 2020-12-18 PROCEDURE — 86140 C-REACTIVE PROTEIN: CPT | Performed by: EMERGENCY MEDICINE

## 2020-12-18 PROCEDURE — 250N000011 HC RX IP 250 OP 636: Performed by: EMERGENCY MEDICINE

## 2020-12-18 PROCEDURE — 84484 ASSAY OF TROPONIN QUANT: CPT | Performed by: EMERGENCY MEDICINE

## 2020-12-18 PROCEDURE — U0003 INFECTIOUS AGENT DETECTION BY NUCLEIC ACID (DNA OR RNA); SEVERE ACUTE RESPIRATORY SYNDROME CORONAVIRUS 2 (SARS-COV-2) (CORONAVIRUS DISEASE [COVID-19]), AMPLIFIED PROBE TECHNIQUE, MAKING USE OF HIGH THROUGHPUT TECHNOLOGIES AS DESCRIBED BY CMS-2020-01-R: HCPCS | Performed by: EMERGENCY MEDICINE

## 2020-12-18 PROCEDURE — 87040 BLOOD CULTURE FOR BACTERIA: CPT | Performed by: EMERGENCY MEDICINE

## 2020-12-18 PROCEDURE — 83615 LACTATE (LD) (LDH) ENZYME: CPT | Performed by: EMERGENCY MEDICINE

## 2020-12-18 PROCEDURE — 81001 URINALYSIS AUTO W/SCOPE: CPT | Performed by: EMERGENCY MEDICINE

## 2020-12-18 PROCEDURE — 71045 X-RAY EXAM CHEST 1 VIEW: CPT

## 2020-12-18 PROCEDURE — 83605 ASSAY OF LACTIC ACID: CPT | Performed by: EMERGENCY MEDICINE

## 2020-12-18 PROCEDURE — 99285 EMERGENCY DEPT VISIT HI MDM: CPT | Mod: 25

## 2020-12-18 PROCEDURE — 96374 THER/PROPH/DIAG INJ IV PUSH: CPT

## 2020-12-18 PROCEDURE — 85025 COMPLETE CBC W/AUTO DIFF WBC: CPT | Performed by: EMERGENCY MEDICINE

## 2020-12-18 PROCEDURE — 80053 COMPREHEN METABOLIC PANEL: CPT | Performed by: EMERGENCY MEDICINE

## 2020-12-18 PROCEDURE — 83735 ASSAY OF MAGNESIUM: CPT | Performed by: EMERGENCY MEDICINE

## 2020-12-18 RX ORDER — DEXAMETHASONE SODIUM PHOSPHATE 10 MG/ML
10 INJECTION, SOLUTION INTRAMUSCULAR; INTRAVENOUS ONCE
Status: COMPLETED | OUTPATIENT
Start: 2020-12-18 | End: 2020-12-18

## 2020-12-18 RX ORDER — ALBUTEROL SULFATE 90 UG/1
2 AEROSOL, METERED RESPIRATORY (INHALATION)
Qty: 1 INHALER | Refills: 2 | Status: SHIPPED | OUTPATIENT
Start: 2020-12-18 | End: 2022-05-17

## 2020-12-18 RX ORDER — DEXAMETHASONE 6 MG/1
6 TABLET ORAL DAILY
Qty: 9 TABLET | Refills: 0 | Status: ON HOLD | OUTPATIENT
Start: 2020-12-19 | End: 2020-12-26

## 2020-12-18 RX ADMIN — DEXAMETHASONE SODIUM PHOSPHATE 10 MG: 10 INJECTION, SOLUTION INTRAMUSCULAR; INTRAVENOUS at 14:32

## 2020-12-18 ASSESSMENT — ENCOUNTER SYMPTOMS
FEVER: 1
HEADACHES: 1
DYSURIA: 0
HEMATURIA: 0
FREQUENCY: 0
CHILLS: 1
DIZZINESS: 1

## 2020-12-18 ASSESSMENT — MIFFLIN-ST. JEOR: SCORE: 1613.08

## 2020-12-18 NOTE — ED PROVIDER NOTES
"  History   Chief Complaint:  Fever and Dizziness       The history is provided by the patient.      Saul Hairston is a 75 year old male with history of hypertension, hyperlipidemia, and prostate cancer s/p cryotherapy of prostate 12/1/2020 who presents with fever and dizziness. He reports he had a headache for the past week and then today he developed chills and dizziness. Here, he is febrile to 101.4 F. He has had no urinary symptoms. He called the nursing line today who directed him into the ER. He has had no know recent COVID exposures. His wife is feeling well at this time.     Review of Systems   Constitutional: Positive for chills and fever.   Genitourinary: Negative for dysuria, frequency, hematuria and urgency.   Neurological: Positive for dizziness and headaches.   All other systems reviewed and are negative.    Allergies:  Norco [Hydrocodone-Acetaminophen]    Medications:  Allopurinol  Atorvastatin  hydrochlorothiazide   Indocin     Past Medical History:    Arthritis   Gout  Hypertension  Obese  Kidney stone  Sleep apnea  Umbilical hernia  Rotator cuff tear  Hyperlipidemia    Prostate cancer     Past Surgical History:    cryoablation prostate   Herniorrhaphy umbilical   Ear operation  Tonsillectomy    Family History:    Hypertension  Alcohol/drug  Cerebrovascular disease   Coronary artery disease    Alzheimer's disease     Social History:  Presents to the ER alone.   Lives with wife.     Physical Exam     Patient Vitals for the past 24 hrs:   BP Temp Temp src Pulse Resp SpO2 Height Weight   12/18/20 1430 (!) 149/77 -- -- 76 -- 95 % -- --   12/18/20 1400 (!) 146/70 -- -- 71 -- 95 % -- --   12/18/20 1330 (!) 140/69 -- -- 73 -- 96 % -- --   12/18/20 1300 138/65 -- -- 73 -- 93 % -- --   12/18/20 1230 (!) 148/73 -- -- 76 27 92 % -- --   12/18/20 1120 (!) 168/81 101.4  F (38.6  C) Temporal 84 14 95 % 1.778 m (5' 10\") 86.2 kg (190 lb)       Physical Exam  Nursing note and vitals reviewed.  General: Oriented to " person, place, and time. Appears well-developed and well-nourished. Warm to touch.   Head: No signs of trauma.   Mouth/Throat: Oropharynx is clear and moist.   Eyes: Conjunctivae are injected and erythematous. Pupils are equal, round, and reactive to light.   Neck: Normal range of motion. No nuchal rigidity.   Cardiovascular: Normal rate and regular rhythm.    Respiratory: Effort normal and breath sounds normal. No respiratory distress.   Abdominal: Soft. There is no tenderness. There is no guarding.   Musculoskeletal: Normal range of motion. no edema.   Neurological: The patient is alert and oriented to person, place, and time.  PERRLA, EOMI, visual fields intact, strength in upper/lower extremities normal and symmetrical.   Sensation normal. Speech normal  GCS eye subscore is 4. GCS verbal subscore is 5. GCS motor subscore is 6.   Skin: Skin is warm and dry. No rash noted.   Psychiatric: normal mood and affect. behavior is normal.     Emergency Department Course     Imaging:  XR Chest Port 1 View  IMPRESSION: There is mild patchy infiltrate in the left lung base. The remainder of the lungs are clear and the heart size is normal.  Reading per radiology     Laboratory:  CBC: WBC 5.9, HGB 15.9,   CMP: potassium 3.3(L), glucose 121(H), albumin 3.2(L) o/w WNL (Creatinine 1.09)   Magnesium: 2.4(H)  Lactic acid whole blood(result time 1221) 1.2   Troponin (Collected 1200): 0.019  Lactate dehydrogenase: 263(H)  CRP inflammation: 117.0(H)  Blood culture pending     Symptomatic COVID19 Virus PCR by nasopharyngeal swab pending     UA with microscopic: urineketon 40, blood trace, protein albumin 30, mucous present o/w WNL     Emergency Department Course:    Reviewed:  I reviewed the patient's nursing notes, vitals, past medical records, and Care Everywhere.     Assessments:    1140 I examined the patient and obtained history as noted above.   1332 I returned to update and check on the patient.   1357 I was informed the  patient was ambulated around the room and passed ambulation trial. He is comfortable with discharge.   1417 I returned to update the patient on plan for discharge. He is amenable to the plan.     Interventions:  1432 Decadron 10 mg IV    Disposition:  The patient was discharged to home.     Impression & Plan   Covid-19  Saul Hairston was evaluated during a global COVID-19 pandemic, which necessitated consideration that the patient might be at risk for infection with the SARS-CoV-2 virus that causes COVID-19.   Applicable protocols for evaluation were followed during the patient's care.   COVID-19 was considered as part of the patient's evaluation. The plan for testing is:  a test was obtained during this visit.    Medical Decision Making:  Saul Hairston is a 75 year old male who presents for evaluation of fever, headache, and dizziness. This may represent COVID-19.  Workup was undertaken and demonstrated minimal change on the chest x-ray. The patient is otherwise hemodynamically stable without significant hypoxia, I do not believe that the patient requires admission here today.  He was able to ambulate throughout the room and did not become hypoxic.   Return to the ED for high fevers > 103 for more than 48 hours more, increasing productive cough, shortness of breath, or confusion.  There is no signs of serious bacterial infection such as bacteremia, meningitis, UTI/pyelonephritis, strep pharyngitis, etc. I discussed my findings and plan with the patient.  He would prefer to be discharged home will return to the ED if he becomes hypoxic.  The patient was given a pulse oximeter to take home.  Will also be given prescription for albuterol inhaler and dexamethasone x9 days all questions were answered and patient will be discharged home in stable condition.     Diagnosis:    ICD-10-CM    1. Upper respiratory tract infection, unspecified type  J06.9 Blood culture     UA with Microscopic reflex to Culture     COVID-19 Anat  Loop Referral   2. Suspected COVID-19 virus infection  Z20.828 COVID-19 [facility] Loop Referral       Discharge Medications:  Discharge Medication List as of 12/18/2020  2:23 PM      START taking these medications    Details   albuterol (PROAIR HFA) 108 (90 Base) MCG/ACT inhaler Inhale 2 puffs into the lungs every 2 hours as needed for shortness of breath / dyspnea or wheezing, Disp-1 Inhaler, R-2, Local Print      dexamethasone (DECADRON) 6 MG tablet Take 1 tablet (6 mg) by mouth daily for 9 days, Disp-9 tablet, R-0, Local Print             Scribe Disclosure:  I, Hallie Finney, am serving as a scribe at 11:35 AM on 12/18/2020 to document services personally performed by Jameson Gallagher MD based on my observations and the provider's statements to me.        Jameson Gallagher MD  12/18/20 [id number]

## 2020-12-18 NOTE — TELEPHONE ENCOUNTER
"Per wife, patient having \"violent chills, cough, HA, and is so weak he cannot make it to the bathroom and is wetting himself\".   Patient is 17 days post-op prostate cancer surgery. He was tested for covid prior to surgery but not since surgery.   Wife kept saying how weak patient is and is very concerned about patient.   This RN advised ER now. Could be positive for covid since recently in hospital.       Additional Information    Negative: Bluish (or gray) lips or face    Negative: Severe difficulty breathing (e.g., struggling for each breath, speaks in single words)    Negative: Rapid onset of cough and has hives    Negative: Coughing started suddenly after medicine, an allergic food or bee sting    Negative: Difficulty breathing after exposure to flames, smoke, or fumes    Negative: Sounds like a life-threatening emergency to the triager    Negative: Previous asthma attacks and this feels like asthma attack    Patient sounds very sick or weak to the triager    Answer Assessment - Initial Assessment Questions  1. ONSET: \"When did the cough begin?\"       2 days ago  2. SEVERITY: \"How bad is the cough today?\"       consistent  4. FEVER: \"Do you have a fever?\" If so, ask: \"What is your temperature, how was it measured, and when did it start?\"      Wife is unsure since their thermometer is broken, patient is having \"violent chills\"  5. HEMOPTYSIS: \"Are you coughing up any blood?\" If so ask: \"How much?\" (flecks, streaks, tablespoons, etc.)      no  6. TREATMENT: \"What have you done so far to treat the cough?\" (e.g., meds, fluids, humidifier)      nothing  7. CARDIAC HISTORY: \"Do you have any history of heart disease?\" (e.g., heart attack, congestive heart failure)       no  8. LUNG HISTORY: \"Do you have any history of lung disease?\"  (e.g., pulmonary embolus, asthma, emphysema)      no  9. PE RISK FACTORS: \"Do you have a history of blood clots?\" (or: recent major surgery, recent prolonged travel, bedridden)      Recent " "prostate CA surgery 12/1/20  10. OTHER SYMPTOMS: \"Do you have any other symptoms? (e.g., runny nose, wheezing, chest pain)        Headache, chills, cough, patient is so weak he cannot make it to the bathroom and is wetting himself  11. PREGNANCY: \"Is there any chance you are pregnant?\" \"When was your last menstrual period?\"        NA  12. TRAVEL: \"Have you traveled out of the country in the last month?\" (e.g., travel history, exposures)        no    Protocols used: COUGH-A-[place]    Ashli Mcmahon BSN, RN    "

## 2020-12-18 NOTE — ED NOTES
INSTRUCTIONS ON HOW TO USE OXIMETER  GIVEN- PAPERS TO GO WITH PULSE OX SENT HOME WITH PT AS WELL.

## 2020-12-18 NOTE — DISCHARGE INSTRUCTIONS
Discharge Instructions  COVID-19    COVID-19 is the disease caused by a new coronavirus. The virus spreads from person-to-person primarily by droplets when an infected person coughs or sneezes and the droplet either lands on another person or that other person touches a surface with the droplet on it. There are tests available to diagnose COVID-19. There is no specific treatment or medicine for the disease.    You may have been diagnosed with COVID, may be being tested for COVID and have a pending test result, or may have been exposed to COVID.    Symptoms of COVID-19    Many people have no symptoms or mild symptoms.  Symptoms may usually appear 4 to 5 days (up to 14 days) after contact with a person with COVID-19. Some people will get severe symptoms and pneumonia. Usual symptoms are:     ? Fever  ? Cough  ? Trouble breathing    Less common symptoms are: Headache, body aches, sore throat, sneezing, diarrhea, loss of taste or smell.    Isolation and Quarantine    You were seen because you have symptoms, had an exposure, or had some other concern about possible COVID. The best way to stop the spread of the virus is to avoid contact with others.  Isolation refers to sick people staying away from people who are not sick. A person in quarantine is limiting activity because they were exposed and are waiting to see if they might become sick.    If you test positive for COVID, you should stay home (isolation) for at least 10 days after your symptoms began, and for 24 hours with no fever and improvement of symptoms--whichever is longer. (Your fever should be gone for 24 hours without using fever-reducing medicine). If you have no symptoms, you should stay home (isolation) for 10 days from the day of the test.    For example, if you have a fever and cough for 6 days, you need to stay home 4 more days with no fever for a total of 10 days. Or, if you have a fever and cough for 10 days, you need to stay home one more day with  no fever for a total of 11 days.    If you have a high-risk exposure to COVID (you spent 15 minutes or more within six feet of somebody who has COVID), you should stay home (quarantine) for 14 days. Even if you test negative for COVID, the CDC recommends a 14-day quarantine from the time of your last exposure to that individual. There are options for a shortened (<14 day quarantine) you can review at:    https://www.health.[facility].[place]./diseases/coronavirus/close.html#long    If you have symptoms but a negative test, you should stay at home until you are symptom-free and without fever for 24 hours, using the same judgment you would for when it is safe to return to work/school from strep throat, influenza, or the common cold. If you worsen, you should consider being re-evaluated.    If you are being tested for COVID and your test is pending, you should stay home until you know your test result.    How should I protect myself and others?    Do not go to work or school. Have a friend or relative do your shopping. Do not use public transportation (bus, train) or ridesharing (Lyft, Uber).    Separate yourself from other people in your home. As much as possible, you should stay in one room and away from other people in your home. Also, use a separate bathroom, if possible. Avoid handling pets or other animals while sick.     Wear a facemask if you need to be around other people and cover your mouth and nose with a tissue when you cough or sneeze.     Avoid sharing personal household items. You should not share dishes, drinking glasses, forks/knives/spoons, towels, or bedding with other people in your home. After using these items, they should be washed with soap and water. Clean parts of your home that are touched often (doorknobs, faucets, countertops, etc.) daily.     Wash your hands often with soap and water for at least 20 seconds or use an alcohol-based hand  containing at least 60% alcohol.     Avoid touching  your face.    Treat your symptoms. You can take Acetaminophen (Tylenol) to treat body aches and fever as needed for comfort. Ibuprofen (Advil or Motrin) can be used as well if you still have symptoms after taking Tylenol. Drink fluids. Rest.    Watch for worsening symptoms such as shortness of breath/difficulty breathing or very severe weakness.    Employers/workplaces are being asked by the Centers for Disease Control (CDC) to not request notes/documentation for you to return to work or prove that you were ill. You may choose to show your employer this paperwork. Also, repeat testing should not be required to return to work.    Return to the Emergency Department if:    If you are developing worsening breathing, shortness of breath, or feel worse you should seek medical attention.  If you are uncertain, contact your health care provider/clinic. If you need emergency medical attention, call 911 and tell them you have been ill.

## 2020-12-18 NOTE — ED AVS SNAPSHOT
[facility] Emergency Dept  [address] [address]  Phone: [phone]  Fax: [phone]                                    Saul Hairston   MRN: [id number]    Department: [facility] Emergency Dept   Date of Visit: 12/18/2020           After Visit Summary Signature Page    I have received my discharge instructions, and my questions have been answered. I have discussed any challenges I see with this plan with the nurse or doctor.    ..........................................................................................................................................  Patient/Patient Representative Signature      ..........................................................................................................................................  Patient Representative Print Name and Relationship to Patient    ..................................................               ................................................  Date                                   Time    ..........................................................................................................................................  Reviewed by Signature/Title    ...................................................              ..............................................  Date                                               Time          22EPIC Rev 08/18

## 2020-12-18 NOTE — LETTER
December 19, 2020      Saul Hairston  [address]  [place] [address]          COVID-19 Virus PCR to [facility] - Result (no units)   Date Value   12/18/2020 Detected, Abnormal Result (AA)            This letter provides a written record that you were tested for COVID-19. Your result was positive. This means you have COVID-19 (coronavirus).    How can I protect others?If you have symptoms, stay home and away from others (self-isolate) until:You ve had no fever--and no medicine that reduces fever--for 1 full day (24 hours). And      Your other symptoms have gotten better. For example, your cough or breathing has improved. And     At least 10 days have passed since your symptoms started. (If you've been told by a doctor that you have a weak immune system, wait 20 days).    If you don t have symptoms: Stay home and away from others (self-isolate) until at least 10 days have passed since your first positive COVID-19 test. If you have a weak immune system, please self-isolate for 20 days.    During this time:    Stay in your own room, including for meals. Use your own bathroom if you can.    Stay away from others in your home. No hugging, kissing or shaking hands. No visitors.     Don t go to work, school or anywhere else.     Clean  high touch  surfaces often (doorknobs, counters, handles, etc.). Use a household cleaning spray or wipes. You ll find a full list on the EPA website at www.epa.gov/pesticide-registration/list-n-disinfectants-use-against-sars-cov-2.     Cover your mouth and nose with a mask or other face covering to avoid spreading germs.    Wash your hands and face often with soap and water.    Caregivers in these groups are at risk for severe illness due to COVID-19:  o People 65 years and older  o People who live in a nursing home or long-term care facility  o People with chronic disease (lung, heart, cancer, diabetes, kidney, liver, immunologic)  o People who have a weakened immune system, including  those who:  - Are in cancer treatment  - Take medicine that weakens the immune system, such as corticosteroids  - Had a bone marrow or organ transplant  - Have an immune deficiency  - Have poorly controlled HIV or AIDS  - Are obese (body mass index of 40 or higher)  - Smoke regularly    Caregivers should wear gloves while washing dishes, handling laundry and cleaning bedrooms and bathrooms.    Wash and dry laundry with special caution. Don t shake dirty laundry, and use the warmest water setting you can.    If you have a weakened immune system, ask your doctor about other actions you should take.    For more tips, go to www.cdc.gov/coronavirus/2019-ncov/downloads/10Things.pdf.    You should not go back to work until you meet the guidelines above for ending your home isolation. You don't need to be retested for COVID-19 before going back to work- studies show that you won't spread the virus if it's been at least 10 days since your symptoms started (or 20 days, if you have a weak immune system).    Employers: This document serves as formal notice of your employee s medical guidelines for going back to work. They must meet the above guidelines before going back to work in person.    How can I take care of myself?    1. Get lots of rest. Drink extra fluids (unless a doctor has told you not to).    2. Take Tylenol (acetaminophen) for fever or pain. If you have liver or kidney problems, ask your family doctor if it s okay to take Tylenol.     Take either:     650 mg (two 325 mg pills) every 4 to 6 hours, or     1,000 mg (two 500 mg pills) every 8 hours as needed.     Note: Don t take more than 3,000 mg in one day. Acetaminophen is found in many medicines (both prescribed and over-the-counter medicines). Read all labels to be sure you don t take too much.    For children, check the Tylenol bottle for the right dose (based on their age or weight).    3. If you have other health problems (like cancer, heart failure, an organ  transplant or severe kidney disease): Call your specialty clinic if you don t feel better in the next 2 days.    4. Know when to call 911: Emergency warning signs include:    Trouble breathing or shortness of breath    Pain or pressure in the chest that doesn t go away    Feeling confused like you haven t felt before, or not being able to wake up    Bluish-colored lips or face    5. Sign up for [employer]. We know it s scary to hear that you have COVID-19. We want to track your symptoms to make sure you re okay over the next 2 weeks. Please look for an email from [employer]--this is a free, online program that we ll use to keep in touch. To sign up, follow the link in the email. Learn more at [email or web address].      Where can I get more information?    [facility]: [email or web address]    Coronavirus Basics: www.health.[facility] includes the [facility].[place].us/diseases/coronavirus/basics.html    What to Do If You re Sick: www.cdc.gov/coronavirus/2019-ncov/about/steps-when-sick.html    Ending Home Isolation: www.cdc.gov/coronavirus/2019-ncov/hcp/disposition-in-home-patients.html     Caring for Someone with COVID-19: www.cdc.gov/coronavirus/2019-ncov/if-you-are-sick/care-for-someone.html     [facility] clinical trials (COVID-19 research studies): clinicalaffairs.[facility]/n-clinical-trials

## 2020-12-19 LAB
SARS-COV-2 RNA SPEC QL NAA+PROBE: ABNORMAL
SPECIMEN SOURCE: ABNORMAL

## 2020-12-21 ENCOUNTER — PATIENT OUTREACH (OUTPATIENT)
Dept: CARE COORDINATION | Facility: CLINIC | Age: 75
End: 2020-12-21

## 2020-12-21 ENCOUNTER — VIRTUAL VISIT (OUTPATIENT)
Dept: FAMILY MEDICINE | Facility: CLINIC | Age: 75
End: 2020-12-21
Payer: COMMERCIAL

## 2020-12-21 DIAGNOSIS — I10 HYPERTENSION GOAL BP (BLOOD PRESSURE) < 150/90: ICD-10-CM

## 2020-12-21 DIAGNOSIS — U07.1 INFECTION DUE TO 2019 NOVEL CORONAVIRUS: ICD-10-CM

## 2020-12-21 DIAGNOSIS — U07.1 2019 NOVEL CORONAVIRUS DISEASE (COVID-19): ICD-10-CM

## 2020-12-21 DIAGNOSIS — E78.5 HYPERLIPIDEMIA LDL GOAL <130: ICD-10-CM

## 2020-12-21 DIAGNOSIS — U07.1 LAB TEST POSITIVE FOR DETECTION OF COVID-19 VIRUS: Primary | ICD-10-CM

## 2020-12-21 DIAGNOSIS — R32 URINARY INCONTINENCE, UNSPECIFIED TYPE: ICD-10-CM

## 2020-12-21 DIAGNOSIS — Z71.89 OTHER SPECIFIED COUNSELING: ICD-10-CM

## 2020-12-21 DIAGNOSIS — Z00.00 ENCOUNTER FOR MEDICARE ANNUAL WELLNESS EXAM: Primary | ICD-10-CM

## 2020-12-21 PROCEDURE — G0439 PPPS, SUBSEQ VISIT: HCPCS | Mod: 95 | Performed by: FAMILY MEDICINE

## 2020-12-21 RX ORDER — ATORVASTATIN CALCIUM 20 MG/1
20 TABLET, FILM COATED ORAL DAILY
Qty: 90 TABLET | Refills: 3 | Status: SHIPPED | OUTPATIENT
Start: 2020-12-21 | End: 2021-12-18

## 2020-12-21 RX ORDER — HYDROCHLOROTHIAZIDE 25 MG/1
TABLET ORAL
Qty: 90 TABLET | Refills: 2 | Status: ON HOLD | OUTPATIENT
Start: 2021-02-09 | End: 2020-12-26

## 2020-12-21 NOTE — PROGRESS NOTES
Clinic Care Coordination Contact  [facility]/Voicemail    Referral Source: ED Follow-Up  12/18/2020 ED visit fever and dizziness  COVID positive .  Get well loop referral placed 12/18/2020    Clinical Data: Care Coordinator Outreach  Outreach attempted x 1.  Left message on patient's voicemail with call back information and requested return call.  Plan:  Care Coordinator will try to reach patient again in 1-2 business days.  [facility]     Mehreen Mallory  RN Care Coordinator   [facility] / [facility]   Phone: [phone]  Email :  [email or web address]

## 2020-12-21 NOTE — PROGRESS NOTES
"Saul Hairston is a 75 year old male who is being evaluated via a billable telephone visit.      The patient has been notified of following:     \"This telephone visit will be conducted via a call between you and your physician/provider. We have found that certain health care needs can be provided without the need for a physical exam.  This service lets us provide the care you need with a short phone conversation.  If a prescription is necessary we can send it directly to your pharmacy.  If lab work is needed we can place an order for that and you can then stop by our lab to have the test done at a later time.    Telephone visits are billed at different rates depending on your insurance coverage. During this emergency period, for some insurers they may be billed the same as an in-person visit.  Please reach out to your insurance provider with any questions.    If during the course of the call the physician/provider feels a telephone visit is not appropriate, you will not be charged for this service.\"    Patient has given verbal consent for Telephone visit?  Yes    What phone number would you like to be contacted at? [phone]    How would you like to obtain your AVS? Mail a copy    Subjective     Saul Hairston is a 75 year old male who presents via phone visit today for the following health issues:    HPI     Annual Wellness Visit    Patient has been advised of split billing requirements and indicates understanding: Yes     Are you in the first 12 months of your Medicare Part B coverage?  No    Physical Health:    In general, how would you rate your overall physical health? fair    Outside of work, how many days during the week do you exercise?none    Outside of work, approximately how many minutes a day do you exercise?less than 15 minutes    If you drink alcohol do you typically have >3 drinks per day or >7 drinks per week? No    Do you usually eat at least 4 servings of fruit and vegetables a day, include whole grains " "& fiber and avoid regularly eating high fat or \"junk\" foods? NO    Do you have any problems taking medications regularly? No    Do you have any side effects from medications? none    Needs assistance for the following daily activities: no assistance needed    Which of the following safety concerns are present in your home?  none identified     Hearing impairment: Yes, Difficulty following a conversation in a noisy restaurant or crowded room.    Need to ask people to speak up or repeat themselves.    Difficulty understanding soft or whispered speech.    In the past 6 months, have you been bothered by leaking of urine? no    There were no vitals taken for this visit.  Weight: Provided by patient  Height: Provided by patient  BMI: Based on patient-provided information  Blood Pressure: Unable to obtain due to video visit    Mental Health:    In general, how would you rate your overall mental or emotional health? excellent  PHQ-2 Score:  0    Do you feel safe in your environment? Yes    Have you ever done Advance Care Planning? (For example, a Health Directive, POLST, or a discussion with a medical provider or your loved ones about your wishes)? No, advance care planning information given to patient to review.  Advanced care planning was discussed at today's visit.    Fall risk:  Fallen 2 or more times in the past year?: No  Any fall with injury in the past year?: No    Cognitive Screening: Unable to complete due to virtual visit; need for additional assessment in future face-to-face visit    Do you have sleep apnea, excessive snoring or daytime drowsiness?: no    Current providers sharing in care for this patient include:   Patient Care Team:  Waldo Matson MD as PCP - General (Family Practice)  Waldo Matson MD as Assigned PCP    Patient has been advised of split billing requirements and indicates understanding: Yes     Patient went to emergency room 2 days ago and found to have a Covid.  He has " "had no known exposure.  His wife is asymptomatic.  He has SPO2 monitor and his current SPO2 is 92.  He has some cough.  No dyspnea.  Some chills and feverish feeling.      leaking of urine after passing urine. Using wifes pads.    Seeing urologist next month.  Has a prostate cancer history.  Had surgery.    Review of Systems   Constitutional, HEENT, cardiovascular, pulmonary, gi and gu systems are negative, except as otherwise noted.       Objective   Vitals - Patient Reported  Weight (Patient Reported): 83.9 kg (185 lb)  Height (Patient Reported): 177.8 cm (5' 10\")  BMI (Based on Pt Reported Ht/Wt): 26.54        healthy, alert and no distress  PSYCH: Alert and oriented times 3; coherent speech, normal   rate and volume, able to articulate logical thoughts, able   to abstract reason, no tangential thoughts, no hallucinations   or delusions  His affect is normal  RESP: No cough, no audible wheezing, able to talk in full sentences  Remainder of exam unable to be completed due to telephone visits             Assessment/Plan:    Assessment & Plan     Encounter for Medicare annual wellness exam  Wife is also on the phone and helping him out with some of the questions.    2019 novel coronavirus disease (COVID-19)  Reviewed ER notes.  Discussed when to go back to ER.    Urinary incontinence, unspecified type  Reviewed previous urology note.  He is scheduled to see urologist next month.  Further treatment as per them.  Okay to send prescription for incontinence supply.   - Incontinence Supplies Order for DME - ONLY FOR DME    Hypertension goal BP (blood pressure) < 150/90  Reported home readings are fine.  Stick to the same Rx.  - hydrochlorothiazide (HYDRODIURIL) 25 MG tablet; TAKE 1 TABLET BY MOUTH EVERY DAY    Hyperlipidemia LDL goal <130  Blood pressure is stable.  Stick to the same Rx.  - atorvastatin (LIPITOR) 20 MG tablet; Take 1 tablet (20 mg) by mouth daily     BMI:   Estimated body mass index is 27.26 kg/m  as " "calculated from the following:    Height as of 12/18/20: 1.778 m (5' 10\").    Weight as of 12/18/20: 86.2 kg (190 lb).        Waldo Matson MD, MD  [facility]    Phone call duration:  12 minutes              "

## 2020-12-22 ENCOUNTER — NURSE TRIAGE (OUTPATIENT)
Dept: FAMILY MEDICINE | Facility: CLINIC | Age: 75
End: 2020-12-22

## 2020-12-22 ENCOUNTER — APPOINTMENT (OUTPATIENT)
Dept: GENERAL RADIOLOGY | Facility: CLINIC | Age: 75
DRG: 177 | End: 2020-12-22
Attending: EMERGENCY MEDICINE
Payer: COMMERCIAL

## 2020-12-22 ENCOUNTER — PATIENT OUTREACH (OUTPATIENT)
Dept: NURSING | Facility: CLINIC | Age: 75
End: 2020-12-22
Payer: COMMERCIAL

## 2020-12-22 ENCOUNTER — HOSPITAL ENCOUNTER (INPATIENT)
Facility: CLINIC | Age: 75
LOS: 4 days | Discharge: HOME | DRG: 177 | End: 2020-12-26
Attending: EMERGENCY MEDICINE | Admitting: HOSPITALIST
Payer: COMMERCIAL

## 2020-12-22 DIAGNOSIS — J12.82 PNEUMONIA DUE TO 2019 NOVEL CORONAVIRUS: ICD-10-CM

## 2020-12-22 DIAGNOSIS — U07.1 PNEUMONIA DUE TO 2019 NOVEL CORONAVIRUS: ICD-10-CM

## 2020-12-22 DIAGNOSIS — U07.1 LAB TEST POSITIVE FOR DETECTION OF COVID-19 VIRUS: Primary | ICD-10-CM

## 2020-12-22 LAB
ANION GAP SERPL CALCULATED.3IONS-SCNC: 6 MMOL/L (ref 3–14)
ANION GAP SERPL CALCULATED.3IONS-SCNC: NORMAL MMOL/L (ref 6–17)
BASOPHILS # BLD AUTO: 0 10E9/L (ref 0–0.2)
BASOPHILS NFR BLD AUTO: 0.1 %
BUN SERPL-MCNC: 30 MG/DL (ref 7–30)
BUN SERPL-MCNC: NORMAL MG/DL (ref 7–30)
CALCIUM SERPL-MCNC: 8.7 MG/DL (ref 8.5–10.1)
CALCIUM SERPL-MCNC: NORMAL MG/DL (ref 8.5–10.1)
CHLORIDE SERPL-SCNC: 95 MMOL/L (ref 94–109)
CHLORIDE SERPL-SCNC: NORMAL MMOL/L (ref 94–109)
CK SERPL-CCNC: 77 U/L (ref 30–300)
CO2 SERPL-SCNC: 28 MMOL/L (ref 20–32)
CO2 SERPL-SCNC: NORMAL MMOL/L (ref 20–32)
CREAT SERPL-MCNC: 0.91 MG/DL (ref 0.66–1.25)
CREAT SERPL-MCNC: 0.94 MG/DL (ref 0.66–1.25)
CREAT SERPL-MCNC: NORMAL MG/DL (ref 0.66–1.25)
CRP SERPL-MCNC: 147 MG/L (ref 0–8)
D DIMER PPP FEU-MCNC: 2 UG/ML FEU (ref 0–0.5)
DIFFERENTIAL METHOD BLD: ABNORMAL
EOSINOPHIL # BLD AUTO: 0 10E9/L (ref 0–0.7)
EOSINOPHIL NFR BLD AUTO: 0 %
ERYTHROCYTE [DISTWIDTH] IN BLOOD BY AUTOMATED COUNT: 12.9 % (ref 10–15)
GFR SERPL CREATININE-BSD FRML MDRD: 80 ML/MIN/{1.73_M2}
GFR SERPL CREATININE-BSD FRML MDRD: 84 ML/MIN/{1.73_M2}
GFR SERPL CREATININE-BSD FRML MDRD: NORMAL ML/MIN/{1.73_M2}
GLUCOSE BLDC GLUCOMTR-MCNC: 130 MG/DL (ref 70–99)
GLUCOSE SERPL-MCNC: 121 MG/DL (ref 70–99)
GLUCOSE SERPL-MCNC: NORMAL MG/DL (ref 70–99)
HBA1C MFR BLD: 6.1 % (ref 0–5.6)
HCT VFR BLD AUTO: 48.9 % (ref 40–53)
HGB BLD-MCNC: 16.4 G/DL (ref 13.3–17.7)
IMM GRANULOCYTES # BLD: 0.1 10E9/L (ref 0–0.4)
IMM GRANULOCYTES NFR BLD: 0.5 %
LDH SERPL L TO P-CCNC: 522 U/L (ref 85–227)
LYMPHOCYTES # BLD AUTO: 0.5 10E9/L (ref 0.8–5.3)
LYMPHOCYTES NFR BLD AUTO: 3.6 %
MCH RBC QN AUTO: 28.4 PG (ref 26.5–33)
MCHC RBC AUTO-ENTMCNC: 33.5 G/DL (ref 31.5–36.5)
MCV RBC AUTO: 85 FL (ref 78–100)
MONOCYTES # BLD AUTO: 0.4 10E9/L (ref 0–1.3)
MONOCYTES NFR BLD AUTO: 3.2 %
NEUTROPHILS # BLD AUTO: 12.4 10E9/L (ref 1.6–8.3)
NEUTROPHILS NFR BLD AUTO: 92.6 %
NRBC # BLD AUTO: 0 10*3/UL
NRBC BLD AUTO-RTO: 0 /100
PLATELET # BLD AUTO: 207 10E9/L (ref 150–450)
POTASSIUM SERPL-SCNC: 3.3 MMOL/L (ref 3.4–5.3)
POTASSIUM SERPL-SCNC: NORMAL MMOL/L (ref 3.4–5.3)
PROCALCITONIN SERPL-MCNC: 0.74 NG/ML
RBC # BLD AUTO: 5.77 10E12/L (ref 4.4–5.9)
SODIUM SERPL-SCNC: 129 MMOL/L (ref 133–144)
SODIUM SERPL-SCNC: NORMAL MMOL/L (ref 133–144)
TROPONIN I SERPL-MCNC: <0.015 UG/L (ref 0–0.04)
TROPONIN I SERPL-MCNC: <0.015 UG/L (ref 0–0.04)
TROPONIN I SERPL-MCNC: NORMAL UG/L (ref 0–0.04)
WBC # BLD AUTO: 13.4 10E9/L (ref 4–11)

## 2020-12-22 PROCEDURE — 120N000004 HC R&B MS OVERFLOW

## 2020-12-22 PROCEDURE — 99285 EMERGENCY DEPT VISIT HI MDM: CPT | Mod: 25

## 2020-12-22 PROCEDURE — 85025 COMPLETE CBC W/AUTO DIFF WBC: CPT | Performed by: EMERGENCY MEDICINE

## 2020-12-22 PROCEDURE — 999N001017 HC STATISTIC GLUCOSE BY METER IP

## 2020-12-22 PROCEDURE — 82550 ASSAY OF CK (CPK): CPT | Performed by: HOSPITALIST

## 2020-12-22 PROCEDURE — 84484 ASSAY OF TROPONIN QUANT: CPT | Performed by: HOSPITALIST

## 2020-12-22 PROCEDURE — 71045 X-RAY EXAM CHEST 1 VIEW: CPT

## 2020-12-22 PROCEDURE — 85379 FIBRIN DEGRADATION QUANT: CPT | Performed by: HOSPITALIST

## 2020-12-22 PROCEDURE — 86140 C-REACTIVE PROTEIN: CPT | Performed by: HOSPITALIST

## 2020-12-22 PROCEDURE — 250N000013 HC RX MED GY IP 250 OP 250 PS 637: Performed by: HOSPITALIST

## 2020-12-22 PROCEDURE — 250N000009 HC RX 250: Performed by: HOSPITALIST

## 2020-12-22 PROCEDURE — 84145 PROCALCITONIN (PCT): CPT | Performed by: HOSPITALIST

## 2020-12-22 PROCEDURE — 82565 ASSAY OF CREATININE: CPT | Performed by: HOSPITALIST

## 2020-12-22 PROCEDURE — 83520 IMMUNOASSAY QUANT NOS NONAB: CPT | Performed by: HOSPITALIST

## 2020-12-22 PROCEDURE — 99223 1ST HOSP IP/OBS HIGH 75: CPT | Mod: AI | Performed by: HOSPITALIST

## 2020-12-22 PROCEDURE — 250N000011 HC RX IP 250 OP 636: Performed by: HOSPITALIST

## 2020-12-22 PROCEDURE — 258N000003 HC RX IP 258 OP 636: Performed by: HOSPITALIST

## 2020-12-22 PROCEDURE — 83036 HEMOGLOBIN GLYCOSYLATED A1C: CPT | Performed by: HOSPITALIST

## 2020-12-22 PROCEDURE — 80048 BASIC METABOLIC PNL TOTAL CA: CPT | Performed by: EMERGENCY MEDICINE

## 2020-12-22 PROCEDURE — 83615 LACTATE (LD) (LDH) ENZYME: CPT | Performed by: HOSPITALIST

## 2020-12-22 PROCEDURE — 36415 COLL VENOUS BLD VENIPUNCTURE: CPT | Performed by: HOSPITALIST

## 2020-12-22 PROCEDURE — 84484 ASSAY OF TROPONIN QUANT: CPT | Performed by: EMERGENCY MEDICINE

## 2020-12-22 PROCEDURE — XW033E5 INTRODUCTION OF REMDESIVIR ANTI-INFECTIVE INTO PERIPHERAL VEIN, PERCUTANEOUS APPROACH, NEW TECHNOLOGY GROUP 5: ICD-10-PCS | Performed by: HOSPITALIST

## 2020-12-22 RX ORDER — OXYCODONE HYDROCHLORIDE 5 MG/1
5 TABLET ORAL EVERY 6 HOURS PRN
Status: DISCONTINUED | OUTPATIENT
Start: 2020-12-22 | End: 2020-12-26 | Stop reason: HOSPADM

## 2020-12-22 RX ORDER — TAMSULOSIN HYDROCHLORIDE 0.4 MG/1
0.4 CAPSULE ORAL EVERY EVENING
Status: DISCONTINUED | OUTPATIENT
Start: 2020-12-22 | End: 2020-12-26 | Stop reason: HOSPADM

## 2020-12-22 RX ORDER — DEXAMETHASONE SODIUM PHOSPHATE 10 MG/ML
6 INJECTION, SOLUTION INTRAMUSCULAR; INTRAVENOUS DAILY
Status: DISCONTINUED | OUTPATIENT
Start: 2020-12-23 | End: 2020-12-26 | Stop reason: HOSPADM

## 2020-12-22 RX ORDER — LIDOCAINE 40 MG/G
CREAM TOPICAL
Status: DISCONTINUED | OUTPATIENT
Start: 2020-12-22 | End: 2020-12-26 | Stop reason: HOSPADM

## 2020-12-22 RX ORDER — TAMSULOSIN HYDROCHLORIDE 0.4 MG/1
0.4 CAPSULE ORAL EVERY EVENING
COMMUNITY

## 2020-12-22 RX ORDER — NALOXONE HYDROCHLORIDE 0.4 MG/ML
0.4 INJECTION, SOLUTION INTRAMUSCULAR; INTRAVENOUS; SUBCUTANEOUS
Status: DISCONTINUED | OUTPATIENT
Start: 2020-12-22 | End: 2020-12-26 | Stop reason: HOSPADM

## 2020-12-22 RX ORDER — ALLOPURINOL 300 MG/1
300 TABLET ORAL DAILY
Status: DISCONTINUED | OUTPATIENT
Start: 2020-12-22 | End: 2020-12-26 | Stop reason: HOSPADM

## 2020-12-22 RX ORDER — ALBUTEROL SULFATE 90 UG/1
2 AEROSOL, METERED RESPIRATORY (INHALATION)
Status: DISCONTINUED | OUTPATIENT
Start: 2020-12-22 | End: 2020-12-26 | Stop reason: HOSPADM

## 2020-12-22 RX ORDER — HYDROCHLOROTHIAZIDE 25 MG/1
25 TABLET ORAL DAILY
Status: CANCELLED | OUTPATIENT
Start: 2020-12-22

## 2020-12-22 RX ORDER — POTASSIUM CHLORIDE 1.5 G/1.58G
40 POWDER, FOR SOLUTION ORAL ONCE
Status: COMPLETED | OUTPATIENT
Start: 2020-12-22 | End: 2020-12-22

## 2020-12-22 RX ORDER — NALOXONE HYDROCHLORIDE 0.4 MG/ML
0.2 INJECTION, SOLUTION INTRAMUSCULAR; INTRAVENOUS; SUBCUTANEOUS
Status: DISCONTINUED | OUTPATIENT
Start: 2020-12-22 | End: 2020-12-26 | Stop reason: HOSPADM

## 2020-12-22 RX ORDER — BISACODYL 10 MG
10 SUPPOSITORY, RECTAL RECTAL DAILY PRN
Status: DISCONTINUED | OUTPATIENT
Start: 2020-12-22 | End: 2020-12-26 | Stop reason: HOSPADM

## 2020-12-22 RX ORDER — ACETAMINOPHEN 650 MG/1
650 SUPPOSITORY RECTAL EVERY 4 HOURS PRN
Status: DISCONTINUED | OUTPATIENT
Start: 2020-12-22 | End: 2020-12-26 | Stop reason: HOSPADM

## 2020-12-22 RX ORDER — ACETAMINOPHEN 325 MG/1
650 TABLET ORAL EVERY 4 HOURS PRN
Status: DISCONTINUED | OUTPATIENT
Start: 2020-12-22 | End: 2020-12-26 | Stop reason: HOSPADM

## 2020-12-22 RX ORDER — ONDANSETRON 2 MG/ML
4 INJECTION INTRAMUSCULAR; INTRAVENOUS EVERY 6 HOURS PRN
Status: DISCONTINUED | OUTPATIENT
Start: 2020-12-22 | End: 2020-12-26 | Stop reason: HOSPADM

## 2020-12-22 RX ORDER — ATORVASTATIN CALCIUM 20 MG/1
20 TABLET, FILM COATED ORAL EVERY EVENING
Status: DISCONTINUED | OUTPATIENT
Start: 2020-12-22 | End: 2020-12-26 | Stop reason: HOSPADM

## 2020-12-22 RX ORDER — ONDANSETRON 4 MG/1
4 TABLET, ORALLY DISINTEGRATING ORAL EVERY 6 HOURS PRN
Status: DISCONTINUED | OUTPATIENT
Start: 2020-12-22 | End: 2020-12-26 | Stop reason: HOSPADM

## 2020-12-22 RX ADMIN — REMDESIVIR 200 MG: 100 INJECTION, POWDER, LYOPHILIZED, FOR SOLUTION INTRAVENOUS at 17:37

## 2020-12-22 RX ADMIN — ATORVASTATIN CALCIUM 20 MG: 20 TABLET, FILM COATED ORAL at 20:07

## 2020-12-22 RX ADMIN — ENOXAPARIN SODIUM 40 MG: 40 INJECTION SUBCUTANEOUS at 20:07

## 2020-12-22 RX ADMIN — ALLOPURINOL 300 MG: 300 TABLET ORAL at 16:33

## 2020-12-22 RX ADMIN — POTASSIUM CHLORIDE 40 MEQ: 1.5 POWDER, FOR SOLUTION ORAL at 16:33

## 2020-12-22 RX ADMIN — TAMSULOSIN HYDROCHLORIDE 0.4 MG: 0.4 CAPSULE ORAL at 21:15

## 2020-12-22 SDOH — SOCIAL STABILITY: SOCIAL NETWORK
DO YOU BELONG TO ANY CLUBS OR ORGANIZATIONS SUCH AS CHURCH GROUPS UNIONS, FRATERNAL OR ATHLETIC GROUPS, OR SCHOOL GROUPS?: NO

## 2020-12-22 SDOH — SOCIAL STABILITY: SOCIAL NETWORK: HOW OFTEN DO YOU ATTEND CHURCH OR RELIGIOUS SERVICES?: MORE THAN 4 TIMES PER YEAR

## 2020-12-22 SDOH — ECONOMIC STABILITY: FOOD INSECURITY: WITHIN THE PAST 12 MONTHS, THE FOOD YOU BOUGHT JUST DIDN'T LAST AND YOU DIDN'T HAVE MONEY TO GET MORE.: NEVER TRUE

## 2020-12-22 SDOH — SOCIAL STABILITY: SOCIAL NETWORK: IN A TYPICAL WEEK, HOW MANY TIMES DO YOU TALK ON THE PHONE WITH FAMILY, FRIENDS, OR NEIGHBORS?: TWICE A WEEK

## 2020-12-22 SDOH — SOCIAL STABILITY: SOCIAL NETWORK: ARE YOU MARRIED, WIDOWED, DIVORCED, SEPARATED, NEVER MARRIED, OR LIVING WITH A PARTNER?: MARRIED

## 2020-12-22 SDOH — SOCIAL STABILITY: SOCIAL NETWORK: HOW OFTEN DO YOU ATTENT MEETINGS OF THE CLUB OR ORGANIZATION YOU BELONG TO?: NEVER

## 2020-12-22 SDOH — ECONOMIC STABILITY: INCOME INSECURITY: HOW HARD IS IT FOR YOU TO PAY FOR THE VERY BASICS LIKE FOOD, HOUSING, MEDICAL CARE, AND HEATING?: NOT HARD AT ALL

## 2020-12-22 SDOH — SOCIAL STABILITY: SOCIAL NETWORK: HOW OFTEN DO YOU GET TOGETHER WITH FRIENDS OR RELATIVES?: TWICE A WEEK

## 2020-12-22 SDOH — ECONOMIC STABILITY: TRANSPORTATION INSECURITY
IN THE PAST 12 MONTHS, HAS LACK OF TRANSPORTATION KEPT YOU FROM MEETINGS, WORK, OR FROM GETTING THINGS NEEDED FOR DAILY LIVING?: NO

## 2020-12-22 SDOH — HEALTH STABILITY: PHYSICAL HEALTH: ON AVERAGE, HOW MANY MINUTES DO YOU ENGAGE IN EXERCISE AT THIS LEVEL?: 0 MIN

## 2020-12-22 SDOH — HEALTH STABILITY: PHYSICAL HEALTH: ON AVERAGE, HOW MANY DAYS PER WEEK DO YOU ENGAGE IN MODERATE TO STRENUOUS EXERCISE (LIKE A BRISK WALK)?: 0 DAYS

## 2020-12-22 SDOH — ECONOMIC STABILITY: FOOD INSECURITY: WITHIN THE PAST 12 MONTHS, YOU WORRIED THAT YOUR FOOD WOULD RUN OUT BEFORE YOU GOT MONEY TO BUY MORE.: NEVER TRUE

## 2020-12-22 SDOH — ECONOMIC STABILITY: TRANSPORTATION INSECURITY
IN THE PAST 12 MONTHS, HAS THE LACK OF TRANSPORTATION KEPT YOU FROM MEDICAL APPOINTMENTS OR FROM GETTING MEDICATIONS?: NO

## 2020-12-22 ASSESSMENT — ENCOUNTER SYMPTOMS: SHORTNESS OF BREATH: 1

## 2020-12-22 ASSESSMENT — MIFFLIN-ST. JEOR: SCORE: 1590.4

## 2020-12-22 ASSESSMENT — ACTIVITIES OF DAILY LIVING (ADL)
ADLS_ACUITY_SCORE: 14
DEPENDENT_IADLS:: INDEPENDENT
ADLS_ACUITY_SCORE: 17

## 2020-12-22 NOTE — H&P
[facility]    History and Physical  Hospitalist    Saul Hairston MRN# [id number]   Age: 75 year old YOB: 1945     Date of Admission:  12/22/2020    Primary care provider: Waldo Matson          Assessment and Plan:     Saul Hairston is a 75 year old  male with medical history of retention, BPH, sleep apnea presented to the ED for shortness of breath.    Acute hypoxia from Covid infection.  Covid pneumonia.  Leukocytosis likely from steroid therapy.  Patient diagnosed with Covid 19 on ED visit on 12/18/2020, dismissed home with pulse oximeter and prescriptions for albuterol and Decadron.  At home patient has been monitoring his oxygen saturation, this morning oxygen levels were upper 80s to low 90s with shortness of breath.  Had called his doctor and instructed to come into the ED.  In ED blood pressure 163/97, oxygen saturation 85% on room air.  Bilateral decreased breath sounds.  No wheezing, no crackles.  CX-ray showing increased peripheral groundglass infiltrates compared to previous although they remain mild to moderate extent.  WBC 13.4, glucose 121, creatinine 0.91. Troponin undetectable x1.  Admit to inpatient.  Pulse oximetry monitoring.  We will start on IV Decadron, hold PTA oral Decadron.  We will start on IV remdesivir  FOR 5 doses  Subcutaneous Lovenox for pharmacological DVT prophylaxis, follow D-dimer level.  Supplemental nasal oxygen.  Special precautions.  Covid labs.  Follow procalcitonin level, if high or if spikes fever will consider antibiotics.    Hypokalemia likely from diuretic use, poor oral intake..  Potassium 3.3  CL 40 mEq x 1.    Hyponatremia likely from diuretic use, Covid pneumonia, poor oral intake.  Will avoid IV hydration, encourage oral fluid intake.  Recheck sodium levels a.m.  Hold PTA hydrochlorothiazide.    Hypertension.  Hold PTA hydrochlorothiazide overnight, reassess a.m.  As needed IV hydralazine ordered..    Physical   deconditioning in the setting of Covid infection.  Lives at home with his wife.  Encourage ambulation as able to.  Aggressive incentive spirometer.  We will consider rehab team evaluation if needed.    Hyperglycemia likely from steroid therapy  Mild hyperglycemia noted, check hemoglobin A1c.  We will order Accu-Cheks if hemoglobin A1c elevated.    MUNIRA.  Not on CPAP.    BPH.  Continue PTA tamsulosin.    Gout.  Continue PTA allopurinol.    Arthritis.  Hold PTA indomethacin.    DVT Prophylaxis: Enoxaparin (Lovenox) SQ  Code Status: Full Code, discussed with patient.    Disposition: Expected discharge in 2 days pending clinical improvement.  Total time  > 40 minutes. Discussed with patient and ED team.     Jaya Dean MD          Chief Complaint:     History is obtained from patient and medical records.    Saul Hairston is a 75 year old  male with medical history of retention, BPH, sleep apnea presented to the ED for shortness of breath.    Patient diagnosed with Covid 19 on ED visit on 12/18/2020.  Patient was dismissed home with pulse oximeter and prescriptions for albuterol and Decadron.  At home patient has been monitoring his oxygen saturation closely, noticed this morning that his oxygen levels were upper 80s to low 90s.  Had called his doctor and instructed to come into the ED.  Complaining of shortness of breath with minimal exertion.  Denies any chest pain or palpitation.  Denies any tingling or numbness or focal weakness.  Denies any nausea vomiting.  Complaining of generalized weakness.  Complaining of loss of appetite, loss of taste.  Complaining of dizziness.  Admits compliance with distancing, mask.  Denies any exposure to sick contacts.    In ED blood pressure 163/97, oxygen saturation 85% on room air.  Bilateral decreased breath sounds.  No wheezing, no crackles.  X-ray showing increased peripheral groundglass infiltrates compared to previous although they remain mild to moderate extent.  WBC 13.4,  hemoglobin 16.4.  Sodium 129, potassium 3.3, glucose 121, creatinine 0.91.  Troponin undetectable x1.          Review of Systems:     GENERAL:see HPI   EENT:  no difficulty swallowing  PULMONARY: see HPI   CARDIAC: no chest pain, no irregular or fast heart beats   GI: No abdominal pain, nausea, vomiting, diarrhea, constipation, black or bloody stools  : No burning/pain with urination   NEURO: no dizziness, no loss of consciousness  ENDOCRINE: No excessive thirst  MUSCULOSKELETAL: No new joint pain  SKIN: No skin rashes  PSYCHIATRY no new anxiety    Medical History:     Past Medical History:   Diagnosis Date     Arthritis     Gout     CARDIOVASCULAR SCREENING; LDL GOAL LESS THAN 160 8/14/2006     Elevated PSA      Gout, unspecified      Hypertension      Kidney stone 2009    Doing ok now     Obese      Seasonal allergic rhinitis     Spring and Fall     Sleep apnea     DOES NOT USE CPAP     Umbilical hernia without mention of obstruction or gangrene 6/2013        Surgical History:      Past Surgical History:   Procedure Laterality Date     COLONOSCOPY  6/16/2006     COLONOSCOPY N/A 6/15/2016    Procedure: COLONOSCOPY;  Surgeon: Poly Sanchez MD;  Location:  GI     CRYOABLATION PROSTATE N/A 12/1/2020    Procedure: CRYOTHERAPY OF PROSTATE;  Surgeon: Aj Caal MD;  Location:  OR     CYSTOSCOPY FLEXIBLE, CYOABLATION PROSTATE N/A 2/13/2018    Procedure: CYSTOSCOPY FLEXIBLE, CRYOABLATION PROSTATE;  FLEXIBLE CYSTOSCOPY, CYROABLATION OF PROSTATE ;  Surgeon: Aj Caal MD;  Location:  OR     GENITOURINARY SURGERY       HERNIORRHAPHY UMBILICAL  7/11/2013    Procedure: HERNIORRHAPHY UMBILICAL;  Open Umbilical Hernia Repair With Mesh ;  Surgeon: Sergio Tomas MD;  Location:  OR     ROTATOR CUFF REPAIR RT/LT  5/19/2011    Left Shoulder     SURGICAL HISTORY OF -       ear operation as child     SURGICAL HISTORY OF -       removal of pseudogout deposits - Right knee     TONSILLECTOMY      Child              Social History:      Social History     Tobacco Use     Smoking status: Never Smoker     Smokeless tobacco: Never Used   Substance Use Topics     Alcohol use: No             Family History:     Family History   Problem Relation Age of Onset     Hypertension Father      Alcohol/Drug Father      Allergies Father      Respiratory Father      Cerebrovascular Disease Maternal Grandmother      Arthritis Maternal Grandmother      Alzheimer Disease Mother      Arthritis Mother      Eye Disorder Mother      C.A.D. Maternal Uncle      C.A.D. Paternal Uncle      Prostate Cancer Maternal Uncle      Lipids Sister      Lipids Brother      Obesity Brother      C.A.D. Son              Allergies:     Allergies   Allergen Reactions     No Known Drug Allergies      Norco [Hydrocodone-Acetaminophen] Nausea and Vomiting             Medications:   Home meds reviewed          Physical Exam      Admission Weight: 82.1 kg (181 lb)    Vital Signs with Ranges  Temp:  [99.1  F (37.3  C)] 99.1  F (37.3  C)  Pulse:  [69-74] 74  Resp:  [18] 18  BP: (145-163)/(82-97) 145/82  SpO2:  [85 %-95 %] 95 %    PHYSICAL EXAM  GENERAL: Patient is in no distress. Alert and oriented.  HEART: Regular rate and rhythm. S1S2. No murmurs  LUNGS: Bilateral decreased breath sounds, no wheezing no crackles.  Respirations unlabored  ABDOMEN: Soft  NEURO: Moving all extremities.  EXTREMITIES: No pedal edema  SKIN: Warm, dry. No rash or bruising.  PSYCHIATRY Cooperative         Data:   All new lab and imaging data was reviewed.

## 2020-12-22 NOTE — PROGRESS NOTES
Clinic triage RN directed patient to go to the ED.  CC will follow up when discharged from the hospital    [facility]   Mehreen Mallory RN, Care Coordinator   [facility]   E-mail [email or web address]   [phone]

## 2020-12-22 NOTE — LETTER
Health Care Home - Access Care Plan    About Me:    Patient Name:  Saul Hairston    YOB: 1945  Age:                      75 year old   [place] MRN:     [id number] Telephone Information:   Home Phone [phone]   Mobile [phone]       Address:  [address] [place] [id number] Email address:  [email or web address]      Emergency Contact(s)   Name Relationship Lgl Grd Work Phone Home Phone Mobile Phone   Papi HAIRSTON,SANDRINE Spouse   [phone]             Health Maintenance: Routine Health maintenance Reviewed: Not assessed    My Access Plan  Medical Emergency 911   Questions or concerns during clinic hours Primary Clinic Line, I will call the clinic directly: [facility] [phone]   24 Hour Appointment Line [phone] or  [phone]) (toll free)   24 Hour Nurse Line [phone] (toll free)   Questions or concerns outside clinic hours 24 Hour Appointment Line, I will call the after-hours on-call line:   [facility] [phone] or [phone]) (toll-free)   Preferred Urgent Care Other   Preferred Hospital [facility]  [phone]   Preferred Pharmacy [email or web address]     Behavioral Health Crisis Line The National Suicide Prevention Lifeline at 1-197.394.1554 or 912                     My Care Team Members  Patient Care Team       Relationship Specialty Notifications Start End    Waldo Matson MD PCP - General Family Practice  6/7/16     Phone: [phone] Fax: [phone]         [facility] [address] [place] [address]    Waldo Matson MD Assigned PCP   11/22/20     Phone: [phone] Fax: [phone]         [facility] [address] [address]    Mehreen Mallory, RN Clinic Care Coordinator Primary Care - CC Admissions 12/21/20     Phone: [phone]                My Medical and Care Information  Problem List   Patient  Active Problem List   Diagnosis     Idiopathic chronic gout 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     Rotator cuff tear     Kidney stone     Elevated PSA     Seasonal allergic rhinitis     Hyperlipidemia LDL goal <130     Umbilical hernia     Hypertension goal BP (blood pressure) < 150/90     Non morbid obesity, unspecified obesity type     MUNIRA (obstructive sleep apnea)      Current Medications and Allergies:  See printed Medication Report

## 2020-12-22 NOTE — PROGRESS NOTES
RECEIVING UNIT ED HANDOFF REVIEW    ED Nurse Handoff Report was reviewed by: Robert Louise RN on December 22, 2020 at 3:23 PM

## 2020-12-22 NOTE — PHARMACY-ADMISSION MEDICATION HISTORY
Pharmacy Medication History  Admission medication history interview status for the 12/22/2020  admission is complete. See EPIC admission navigator for prior to admission medications       Medication history sources: Patient , sure scripts  Location of interview: Phone  Adherence Assessment: Good    Significant changes made to the medication list:  none      Additional medication history information:   none    Medication reconciliation completed by provider prior to medication history? No    Time spent in this activity: 15 minutes      Prior to Admission medications    Medication Sig Last Dose Taking? Auth Provider   albuterol (PROAIR HFA) 108 (90 Base) MCG/ACT inhaler Inhale 2 puffs into the lungs every 2 hours as needed for shortness of breath / dyspnea or wheezing PRN Yes Jameson Gallagher MD   allopurinol (ZYLOPRIM) 100 MG tablet TAKE 3 TABLETS (300 MG) BY MOUTH DAILY 12/21/2020 at PM Yes Waldo Matson MD   atorvastatin (LIPITOR) 20 MG tablet Take 1 tablet (20 mg) by mouth daily 12/21/2020 at PM Yes Waldo Matson MD   dexamethasone (DECADRON) 6 MG tablet Take 1 tablet (6 mg) by mouth daily for 9 days 12/22/2020 at AM Yes Jameson Gallagher MD   hydrochlorothiazide (HYDRODIURIL) 25 MG tablet TAKE 1 TABLET BY MOUTH EVERY DAY 12/22/2020 at AM Yes Waldo Matson MD   indomethacin (INDOCIN) 50 MG capsule Take 1 capsule (50 mg) by mouth 3 times daily as needed for moderate pain PRN Yes Waldo Matson MD   Omega-3 Fatty Acids (OMEGA-3 FISH OIL PO) Take by mouth daily  12/21/2020 at Unknown time Yes Reported, Patient   tamsulosin (FLOMAX) 0.4 MG capsule Take 0.4 mg by mouth every evening 12/21/2020 at Unknown time Yes Unknown, Entered By History       The information provided in this note is only as accurate as the sources available at the time of the update(s).

## 2020-12-22 NOTE — ED PROVIDER NOTES
History   Chief Complaint:  Covid Concern     HPI   Saul Hairston is a 75 year old male with history of hypertension, gout and hyperlipidemia who presents with shortness of breath after testing positive for COVID-19 4 days ago. The patient was recently seen in the emergency department on 12/18/2020 for a fever and dizziness. He had chest x-ray done and laboratory work including a COVID-19 test. This test returned positive later that day. He was discharged home with a pulse oximeter and Prescriptions for albuterol and Decadron. At home the patient has been monitoring his oxygen saturations as instructed. He noticed yesterday that his levels were in the upper 80s to low 90s and called his doctor as instructed today. His doctor recommended that he come to the emergency department. He denies any other symptoms at this time.     [facility] Emergency Department 12/18/2020  Imaging:  XR Chest Port 1 View  IMPRESSION: There is mild patchy infiltrate in the left lung base. The remainder of the lungs are clear and the heart size is normal.  Reading per radiology      Laboratory:  CBC: WBC 5.9, HGB 15.9,   CMP: potassium 3.3(L), glucose 121(H), albumin 3.2(L) o/w WNL (Creatinine 1.09)   Magnesium: 2.4(H)  Lactic acid whole blood(result time 1221) 1.2   Troponin (Collected 1200): 0.019  Lactate dehydrogenase: 263(H)  CRP inflammation: 117.0(H)  Symptomatic COVID19 Virus PCR by nasopharyngeal swab: Positive  UA with microscopic: urineketon 40, blood trace, protein albumin 30, mucous present o/w WNL     Review of Systems   Respiratory: Positive for shortness of breath.    All other systems reviewed and are negative.        Allergies:  Norco [Hydrocodone-Acetaminophen]    Medications:  Albuterol  Zyloprim  Lipitor  Decadron  Hydrodiuril  Lindocin  Tamsulosin    Past Medical History:    Arthritis  Elevated PSA  Gout  Hypertension  Kidney stone  Obese  Seasonal allergic rhinitis  Sleep apnea  Umbilical  hernia  Hyperlipidemia    Past Surgical History:    Colonoscopy x2  Cryoablation of prostate  Cystoscopy with cryoablation  GI surgery  Herniorrhaphy  Rotator cuff repair  Ear surgery  Pseudogout deposit removal from right knee  Tonsillectomy     Family History:    Father: hypertension, alcohol/durg, allergies, respiratory  Mother: alzheimer disease, arthritis, eye disorder  Sister: lipids  Brother: lipids, obesity    Social History:  The patient presents alone  He lives at home with his wife.     Physical Exam     Patient Vitals for the past 24 hrs:   BP Temp Temp src Pulse Resp SpO2 Weight   12/22/20 1300 -- -- -- 69 -- 95 % --   12/22/20 1230 -- -- -- -- -- 95 % --   12/22/20 1200 (!) 145/82 -- -- 70 -- 93 % --   12/22/20 1150 -- -- -- -- -- 93 % --   12/22/20 1149 (!) 163/97 99.1  F (37.3  C) Oral 73 18 (!) 85 % 82.1 kg (181 lb)       Physical Exam  GENERAL: well developed, pleasant  HEAD: atraumatic  EYES: pupils reactive, extraocular muscles intact, conjunctivae normal  ENT:  mucus membranes moist  NECK:  trachea midline, normal range of motion  RESPIRATORY: no tachypnea, breath sounds clear to auscultation. Diminished lung sounds  CVS: normal S1/S2, no murmurs, intact distal pulses  ABDOMEN: soft, nontender, nondistention  MUSCULOSKELETAL: no deformities. No pitting edema.   SKIN: warm and dry, no acute rashes or ulceration  NEURO: GCS 15, cranial nerves intact, alert and oriented x3  PSYCH:  Mood/affect normal    Emergency Department Course     Imaging:  XR Chest Port 1 View  Increased peripheral ground-glass infiltrates compared to  previous although they remain mild to moderate extent.  Reading per radiology.    Laboratory:  CBC: WBC 13.4 (H) o/w WNL. (HGB 16.4, )   BMP: Sodium 129 (L), Potassium 3.3. (L), Glucose 121 (H) o/w AWNL (Creatinine 0.91)    Troponin (Collected 1310): <0.015     Emergency Department Course:    Reviewed:  1215 I reviewed nursing notes, vitals, past medical history and care  everywhere    Assessments:  1217 I met with the patient and performed a physical examination as documented above.      Consults:   5256 I spoke with Dr. Dean, hospitalist, who agreed to admit the patient.     Disposition:  The patient was admitted to the hospital under the care of Dr. Dean.       Impression & Plan     Covid-19  Saul Hairston was evaluated during a global COVID-19 pandemic, which necessitated consideration that the patient might be at risk for infection with the SARS-CoV-2 virus that causes COVID-19.   Applicable protocols for evaluation were followed during the patient's care.   COVID-19 was considered as part of the patient's evaluation. The plan for testing is:  a test was obtained at a previous visit and reviewed & considered today.    Medical Decision Making:  Patient presents with recent diagnosis of Covid and was given a pulse oximeter and told that if he became hypoxic he would need to return.  He has been on steroids at home.  Patient is hypoxic but stable in 2 L.  Spoke with hospitalist regarding admission.    Diagnosis:    ICD-10-CM    1. Pneumonia due to 2019 novel coronavirus  U07.1 Troponin I    J12.89 Basic metabolic panel     Scribe Disclosure:  Tunde PEGUERO, am serving as a scribe at 12:06 PM on 12/22/2020 to document services personally performed by Ez Guy MD based on my observations and the provider's statements to me.            Ez Guy MD  12/22/20 [id number]

## 2020-12-22 NOTE — TELEPHONE ENCOUNTER
"  Reason for Disposition    Sounds like a life-threatening emergency to the triager    Additional Information    Negative: SEVERE difficulty breathing (e.g., struggling for each breath, speaks in single words, pulse > 120)    Negative: Bluish (or gray) lips or face now    Negative: Difficult to awaken or acting confused (e.g., disoriented, slurred speech)    Answer Assessment - Initial Assessment Questions  1. MAIN CONCERN OR SYMPTOM : \"What's your main concern?\" (e.g., low oxygen level, breathing difficulty) \"What question do you have?\"      Labored breathing and oxygen level of 85%  2. ONSET: \"When did the  Low oxygen and labored breathing  start?\"       today  3. OXYGEN THERAPY:     - \"Do you currently use home oxygen?\" (e.g., yes, no).     - If yes, \"What is your oxygen source?\" (e.g., O2 tank, O2 concentrator).     - If yes, \"How do you get the oxygen?\" (e.g., nasal prongs, face mask).     - If yes, \"How much oxygen are you supposed to use?\" (e.g., 1-2 L NC)      No   4. PULSE OXIMETER:     - \"Do you have a pulse oximeter (pulse ox)?\"  (e.g., yes, no)     - If yes, \"Where do you place the probe?\" (e.g., fingertip, ear lobe)      Yes-finger  5. O2 MONITORING: \"What is the oxygen level (pulse ox reading)?\" (e.g., %) 85% currently  7. BREATHING DIFFICULTY: \"Are you having any difficulty breathing?\" If so, ask \"How bad is it?\"  (e.g., none, mild, moderate, severe)    - MILD: No SOB at rest, mild SOB with walking, speaks normally in sentences, able to lie down, no retractions, pulse < 100.    - MODERATE: SOB at rest, SOB with minimal exertion and prefers to sit, cannot lie down flat, speaks in phrases, mild retractions, audible wheezing, pulse 100-120.    - SEVERE: Very SOB at rest, speaks in single words, struggling to breathe, sitting hunched forward, retractions, pulse > 120       Yes, endorsed labored breathing.  Spoke in complete, short sentences.    Protocols used: COPD OXYGEN MONITORING AND " [id number] Pisano, BSN, RN

## 2020-12-22 NOTE — LETTER
[facility] CARE COORDINATION  [address] [place] [address]    December 22, 2020    Saul Hairston  [address] [facility] [address]      Dear Saul,    I am a clinic care coordinator who works with Waldo Matson MD, MD at [facility] . I wanted to thank you for spending the time to talk with me.  Below is a description of clinic care coordination and how I can further assist you.      The clinic care coordination team is made up of a registered nurse,  and community health worker who understand the health care system. The goal of clinic care coordination is to help you manage your health and improve access to the health care system in the most efficient manner. The team can assist you in meeting your health care goals by providing education, coordinating services, strengthening the communication among your providers and supporting you with any resource needs.    Please feel free to contact me at [phone] with any questions or concerns. We are focused on providing you with the highest-quality healthcare experience possible and that all starts with you.     Sincerely,     [facility]   Mehreen Mallory RN, Care Coordinator   [facility] and [place]   E-mail [email or web address]   [phone]     Enclosed: I have enclosed a copy of a 24 Hour Access Plan. This has helpful phone numbers for you to call when needed. Please keep this in an easy to access place to use as needed.

## 2020-12-22 NOTE — PROGRESS NOTES
Clinic Care Coordination Contact    Clinic Care Coordination Contact  OUTREACH    Referral Information:  Referral Source: ED Follow-Up    Primary Diagnosis: Other (include Comment box)(COVID positive)    Chief Complaint   Patient presents with     Clinic Care Coordination - [facility]     Clinic Care Coordination RN        Universal Utilization: 12/18 2020 Fever dizziness COVID positive   Clinic Utilization  Difficulty keeping appointments:: No  Compliance Concerns: No  No-Show Concerns: No  No PCP office visit in Past Year: No  Utilization    Last refreshed: 12/22/2020  7:55 AM: Hospital Admissions 1           Last refreshed: 12/22/2020  7:55 AM: ED Visits 1           Last refreshed: 12/22/2020  7:55 AM: No Show Count (past year) 0              Current as of: 12/22/2020  7:55 AM            Clinical Concerns:  Current Medical Concerns:  Patient reports low energy level.  Patient had a 88% Oximeter reading and so used the Albuterol inhaler and it went up to 90% Denies any increased SOB episodes and able to talk in full sentences without difficulty    CC encouraged patient to rest,fluids,Tylenol as needed, continue to monitor Oximeter readng isolate,wasing hands, disinfect,masking distancing and to seek medical help if increased symptoms of concern such as increased SOB or fever    Recent Oximeter reading this morning 82-83% temp 100.2  Patient just used his Albuterol inhaler .    CC encouraged patient to call the clinic and report Oximeter reading.  Patient agrees with the plan   Current Behavioral Concerns: Not discussed     Education Provided to patient:CC introductory letter and access plan sent via My Chart   Health Maintenance Reviewed: Not assessed  Clinical Pathway: None    Medication Management:  Medication reconciliation status: Medications reviewed and reconciled.  Changed medications per patient report.    Functional Status: Independent        Living Situation:  Current living arrangement:: I live in a  private home with spouse    Lifestyle & Psychosocial Needs:        Diet:: Regular  Inadequate nutrition (GOAL):: No  Tube Feeding: No  Inadequate activity/exercise (GOAL):: No  Significant changes in sleep pattern (GOAL): No        Advent or spiritual beliefs that impact treatment:: No  Mental health DX:: No  Mental health management concern (GOAL):: No  Chemical Dependency Status: No Current Concerns  Informal Support system:: Spouse   Socioeconomic History     Marital status:      Spouse name: Trang     Number of children: 2     Years of education: Not on file     Highest education level: Not on file   Occupational History     Occupation: Human Resources     Employer: RETIRED     Tobacco Use     Smoking status: Never Smoker     Smokeless tobacco: Never Used   Substance and Sexual Activity     Alcohol use: No     Drug use: No     Sexual activity: Yes     Partners: Female       Resources and Interventions:  Current Resources:      Community Resources: None  Supplies used at home:: None      Advance Care Plan/Directive  Advanced Care Plans/Directives on file:: No       Social Determinants of Health      Tobacco Use  Low Risk      Alcohol Use  Not on file        Financial Resource Strain  Low Risk      Food Insecurity  No Food Insecurity        Transportation Needs  No Transportation Needs     Physical Activity  Inactive        Stress  Not on file     Social Connections  Slightly Isolated        Intimate Partner Violence  Not on file     Depression  Not at risk        Housing Stability  Not on file             Find community resources          Patient/Caregiver understanding: Expresses good understanding of disharge instructions     Outreach Frequency: weekly      Plan:   CC will follow up tomorrow for an update     [facility]   Mehreen Mallory RN, Care Coordinator   [facility] and [place]   E-mail [email or web address]   [phone]

## 2020-12-22 NOTE — ED NOTES
[facility]  ED Nurse Handoff Report    ED Chief complaint: Covid Concern      ED Diagnosis:   Final diagnoses:   Pneumonia due to 2019 novel coronavirus       Code Status: To be addressed by admitting provider    Allergies:   Allergies   Allergen Reactions     No Known Drug Allergies      Norco [Hydrocodone-Acetaminophen] Nausea and Vomiting       Patient Story: Diagnosed with covid on 12/18. Reports home O2 levels of 80-85% on RA starting at 0400 today.    Focused Assessment: A&Ox4. Denies cp. Pt reports cough, SOB and fevers since diagnosis on 12/18. 4L O2 via NC in ED. VSS. Independent with cares and ambulation. Calm, cooperative and resting on cart.    XR Chest Port 1 View   Preliminary Result   IMPRESSION: Increased peripheral ground-glass infiltrates compared to   previous although they remain mild to moderate extent.          Treatments and/or interventions provided: CXR, 4L NC  Patient's response to treatments and/or interventions: na    To be done/followed up on inpatient unit:  Continue with plan of care per admitting MD.    Does this patient have any cognitive concerns?: None    Activity level - Baseline/Home:  Independent  Activity Level - Current:   Independent    Patient's Preferred language: English   Needed?: No    Isolation: Special precautions covid-19  Infection:   COVID r/o and special precautions  Patient tested for COVID 19 prior to admission: NA, positive Result 12/18  Bariatric?: No    Vital Signs:   Vitals:    12/22/20 1150 12/22/20 1200 12/22/20 1230 12/22/20 1300   BP:  (!) 145/82     Pulse:  70  69   Resp:       Temp:       TempSrc:       SpO2: 93% 93% 95% 95%   Weight:           Cardiac Rhythm:     Was the PSS-3 completed:   Yes  What interventions are required if any?  NA             Family Comments: Wife # in epic, gave update per patient request  OBS brochure/video discussed/provided to patient/family: N/A       For the majority of the shift this patient's  behavior was Green.   Behavioral interventions performed were NA.    ED NURSE PHONE NUMBER: *[phone]

## 2020-12-23 ENCOUNTER — PATIENT OUTREACH (OUTPATIENT)
Dept: CARE COORDINATION | Facility: CLINIC | Age: 75
End: 2020-12-23

## 2020-12-23 DIAGNOSIS — J12.82 PNEUMONIA DUE TO 2019 NOVEL CORONAVIRUS: Primary | ICD-10-CM

## 2020-12-23 DIAGNOSIS — U07.1 PNEUMONIA DUE TO 2019 NOVEL CORONAVIRUS: Primary | ICD-10-CM

## 2020-12-23 LAB
ANION GAP SERPL CALCULATED.3IONS-SCNC: 5 MMOL/L (ref 3–14)
BASOPHILS # BLD AUTO: 0 10E9/L (ref 0–0.2)
BASOPHILS NFR BLD AUTO: 0.1 %
BUN SERPL-MCNC: 34 MG/DL (ref 7–30)
CALCIUM SERPL-MCNC: 8.9 MG/DL (ref 8.5–10.1)
CHLORIDE SERPL-SCNC: 100 MMOL/L (ref 94–109)
CO2 SERPL-SCNC: 29 MMOL/L (ref 20–32)
CREAT SERPL-MCNC: 0.91 MG/DL (ref 0.66–1.25)
CRP SERPL-MCNC: 102 MG/L (ref 0–8)
D DIMER PPP FEU-MCNC: 1.6 UG/ML FEU (ref 0–0.5)
DIFFERENTIAL METHOD BLD: NORMAL
EOSINOPHIL # BLD AUTO: 0 10E9/L (ref 0–0.7)
EOSINOPHIL NFR BLD AUTO: 0 %
ERYTHROCYTE [DISTWIDTH] IN BLOOD BY AUTOMATED COUNT: 12.7 % (ref 10–15)
GFR SERPL CREATININE-BSD FRML MDRD: 83 ML/MIN/{1.73_M2}
GLUCOSE BLDC GLUCOMTR-MCNC: 120 MG/DL (ref 70–99)
GLUCOSE BLDC GLUCOMTR-MCNC: 140 MG/DL (ref 70–99)
GLUCOSE BLDC GLUCOMTR-MCNC: 161 MG/DL (ref 70–99)
GLUCOSE BLDC GLUCOMTR-MCNC: 172 MG/DL (ref 70–99)
GLUCOSE SERPL-MCNC: 117 MG/DL (ref 70–99)
HCT VFR BLD AUTO: 46.5 % (ref 40–53)
HGB BLD-MCNC: 15.4 G/DL (ref 13.3–17.7)
IL6 SERPL-MCNC: 4.86 PG/ML
IMM GRANULOCYTES # BLD: 0.1 10E9/L (ref 0–0.4)
IMM GRANULOCYTES NFR BLD: 0.6 %
LDH SERPL L TO P-CCNC: 457 U/L (ref 85–227)
LYMPHOCYTES # BLD AUTO: 0.9 10E9/L (ref 0.8–5.3)
LYMPHOCYTES NFR BLD AUTO: 9.5 %
MAGNESIUM SERPL-MCNC: 2.8 MG/DL (ref 1.6–2.3)
MCH RBC QN AUTO: 28.3 PG (ref 26.5–33)
MCHC RBC AUTO-ENTMCNC: 33.1 G/DL (ref 31.5–36.5)
MCV RBC AUTO: 86 FL (ref 78–100)
MONOCYTES # BLD AUTO: 0.4 10E9/L (ref 0–1.3)
MONOCYTES NFR BLD AUTO: 4.9 %
NEUTROPHILS # BLD AUTO: 7.6 10E9/L (ref 1.6–8.3)
NEUTROPHILS NFR BLD AUTO: 84.9 %
NRBC # BLD AUTO: 0 10*3/UL
NRBC BLD AUTO-RTO: 0 /100
PHOSPHATE SERPL-MCNC: 4.5 MG/DL (ref 2.5–4.5)
PLATELET # BLD AUTO: 207 10E9/L (ref 150–450)
POTASSIUM SERPL-SCNC: 4.2 MMOL/L (ref 3.4–5.3)
RBC # BLD AUTO: 5.44 10E12/L (ref 4.4–5.9)
SODIUM SERPL-SCNC: 134 MMOL/L (ref 133–144)
WBC # BLD AUTO: 8.9 10E9/L (ref 4–11)

## 2020-12-23 PROCEDURE — 86140 C-REACTIVE PROTEIN: CPT | Performed by: HOSPITALIST

## 2020-12-23 PROCEDURE — 250N000009 HC RX 250: Performed by: HOSPITALIST

## 2020-12-23 PROCEDURE — 120N000004 HC R&B MS OVERFLOW

## 2020-12-23 PROCEDURE — 84100 ASSAY OF PHOSPHORUS: CPT | Performed by: HOSPITALIST

## 2020-12-23 PROCEDURE — 80048 BASIC METABOLIC PNL TOTAL CA: CPT | Performed by: HOSPITALIST

## 2020-12-23 PROCEDURE — 85025 COMPLETE CBC W/AUTO DIFF WBC: CPT | Performed by: HOSPITALIST

## 2020-12-23 PROCEDURE — 85379 FIBRIN DEGRADATION QUANT: CPT | Performed by: HOSPITALIST

## 2020-12-23 PROCEDURE — 36415 COLL VENOUS BLD VENIPUNCTURE: CPT | Performed by: HOSPITALIST

## 2020-12-23 PROCEDURE — 250N000013 HC RX MED GY IP 250 OP 250 PS 637: Performed by: HOSPITALIST

## 2020-12-23 PROCEDURE — 999N001017 HC STATISTIC GLUCOSE BY METER IP

## 2020-12-23 PROCEDURE — 93005 ELECTROCARDIOGRAM TRACING: CPT

## 2020-12-23 PROCEDURE — 83615 LACTATE (LD) (LDH) ENZYME: CPT | Performed by: HOSPITALIST

## 2020-12-23 PROCEDURE — 83735 ASSAY OF MAGNESIUM: CPT | Performed by: HOSPITALIST

## 2020-12-23 PROCEDURE — 258N000003 HC RX IP 258 OP 636: Performed by: HOSPITALIST

## 2020-12-23 PROCEDURE — 99233 SBSQ HOSP IP/OBS HIGH 50: CPT | Performed by: INTERNAL MEDICINE

## 2020-12-23 PROCEDURE — 250N000011 HC RX IP 250 OP 636: Performed by: HOSPITALIST

## 2020-12-23 RX ADMIN — ONDANSETRON 4 MG: 2 INJECTION INTRAMUSCULAR; INTRAVENOUS at 13:31

## 2020-12-23 RX ADMIN — ATORVASTATIN CALCIUM 20 MG: 20 TABLET, FILM COATED ORAL at 19:37

## 2020-12-23 RX ADMIN — ACETAMINOPHEN 650 MG: 325 TABLET, FILM COATED ORAL at 01:58

## 2020-12-23 RX ADMIN — DEXAMETHASONE SODIUM PHOSPHATE 6 MG: 10 INJECTION, SOLUTION INTRAMUSCULAR; INTRAVENOUS at 09:18

## 2020-12-23 RX ADMIN — TAMSULOSIN HYDROCHLORIDE 0.4 MG: 0.4 CAPSULE ORAL at 19:37

## 2020-12-23 RX ADMIN — ACETAMINOPHEN 650 MG: 325 TABLET, FILM COATED ORAL at 09:18

## 2020-12-23 RX ADMIN — ENOXAPARIN SODIUM 40 MG: 40 INJECTION SUBCUTANEOUS at 19:37

## 2020-12-23 RX ADMIN — REMDESIVIR 100 MG: 100 INJECTION, POWDER, LYOPHILIZED, FOR SOLUTION INTRAVENOUS at 16:26

## 2020-12-23 RX ADMIN — ALLOPURINOL 300 MG: 300 TABLET ORAL at 09:18

## 2020-12-23 ASSESSMENT — ACTIVITIES OF DAILY LIVING (ADL)
ADLS_ACUITY_SCORE: 20
ADLS_ACUITY_SCORE: 16
ADLS_ACUITY_SCORE: 20
DEPENDENT_IADLS:: INDEPENDENT
ADLS_ACUITY_SCORE: 20

## 2020-12-23 ASSESSMENT — MIFFLIN-ST. JEOR: SCORE: 1576.34

## 2020-12-23 NOTE — PROVIDER NOTIFICATION
MD Notification    Notified Person: MD    Notified Person Name: Dr Davalos     Notification Date/Time: 12/23/20 @0243    Notification Interaction: Amcom    Purpose of Notification: FYI bradycardic when sleeping, low as 47. Goes to low 50s when he wakes up. reports no symptoms    Orders Received: continue to monitor    Comments:

## 2020-12-23 NOTE — PROGRESS NOTES
Clinic Care Coordination Contact  Ambulatory Care Coordination to Inpatient Care Management   Hand-In Communication    Date:  December 23, 2020  Name: Saul Hairston is enrolled in Ambulatory Care Coordination program and I am the Lead Care Coordinator.  CC Contact Information: Epic [email or web address].comsket + phone  Payor Source: Payor: [facility] / Plan: [facility] MEDICARE ADVANTAGE / Product Type: Medicare /   Current services in place: None   Please see the CC Snaphot and Care Management Flowsheets for specific  details of this Saul Hairston care plan.   Additional details/specific concerns r/t this admission:    Goals of Care Increase strength after recovery of COVID/Pneumonia     I will follow this admission in Epic. Please feel free to contact me with questions or for further collaboration in discharge planning.  [facility]   Mehreen Mallory RN, Care Coordinator   [facility] and [place]   E-mail [email or web address]   [phone]

## 2020-12-23 NOTE — PROVIDER NOTIFICATION
Brief update:    Paged regarding bradycardia: Heart rate in the 40s while sleeping, 50s while awake.  Has had bradycardia since admission.    I was also paged regarding increasing nasal cannula oxygen needs.  Was on 3 L during the day, 5 L on evening, paged when he was increased to 6 L oxygen with oxygen saturations in the 92-93%.  Required increase in oxygen as he was oxygenating better on his side, though was becoming uncomfortable laying on his side and has increased oxygen needs when he is lying on his back.    Not on rate controlling agents.  Asymptomatic from a heart rate standpoint    Continue to monitor    Eduar Davalos MD  2:46 AM

## 2020-12-23 NOTE — PROGRESS NOTES
COVID-19 Positive .Pt arrived from ED at around 1530hrs. A/OX5. VSS on 3L nc. Denies pain. 1st dose of Remdesivir given on this shift, pt tolerated well. Dusty cardiac diet well. SBA.

## 2020-12-23 NOTE — PLAN OF CARE
Covid positive, special precautions maintained. A&Ox4. O2 sats 89% on 3L oxymask when laying on his back, when he is side lying, sats go to 92-93%. Does report some back/hip discomfort when laying on his side, O2 increased to 6L and his saturations were 93%. Decreased back down to 3L oxymask due to sats being 97-98%. Reports SOB. Pt is also bradycardic when sleeping, asymptomatic. All other VSS. PRN Tylenol given for back/hip pain. Using incentive spirometer at bedside. Up SBA to bathroom, pt reports difficulty urinating and has some incontinence at times. Tolerating cardiac diet. Hemoglobin A1C was 6.1, bedtime BG was 130. IV-SL. Reports some nausea, declines interventions. Denies dizziness and lightheadedness. CM/SW consults in place.

## 2020-12-23 NOTE — PROVIDER NOTIFICATION
MD Notification    Notified Person: MD    Notified Person Name: Dr Davalos     Notification Date/Time: 12/23/20 @0208     Notification Interaction: Amcom    Purpose of Notification: FYI O2 needs have increased from 3L NC to 6L oxymask. Sats currently 92-93, they get better when he is side lying.     Orders Received: continue to monitor    Comments:

## 2020-12-23 NOTE — CONSULTS
Care Management Initial Consult    General Information  Assessment completed with: patient via telephone       Primary Care Provider verified and updated as needed:     Readmission within the last 30 days:           Advance Care Planning:            Communication Assessment  Patient's communication style: spoken language (English or Bilingual)    Hearing Difficulty or Deaf: no   Wear Glasses or Blind: no    Cognitive  Cognitive/Neuro/Behavioral: WDL                      Living Environment:   People in home:     Patient and his wife  Current living Arrangements:      Own home  Able to return to prior arrangements:  yes       Family/Social Support:  Care provided by:    Provides care for:                  Description of Support System:    wife       Current Resources:   Skilled Home Care Services:  n/a  Community Resources:  n/a  Equipment currently used at home: none  Supplies currently used at home:  n/a    Employment/Financial:  Employment Status:          Financial Concerns:             Lifestyle & Psychosocial Needs:  Lifestyle     Physical activity     Days per week: 0 days     Minutes per session: 0 min     Stress: Not on file     Social Needs     Financial resource strain: Not hard at all     Food insecurity     Worry: Never true     Inability: Never true     Transportation needs     Medical: No     Non-medical: No     Socioeconomic History     Marital status:      Spouse name: Trang     Number of children: 2     Years of education: Not on file     Highest education level: Not on file   Occupational History     Occupation: Human Resources     Employer: RETIRED   Relationships     Social connections     Talks on phone: Twice a week     Gets together: Twice a week     Attends Zoroastrianism service: More than 4 times per year     Active member of club or organization: No     Attends meetings of clubs or organizations: Never     Relationship status:      Intimate partner violence     Fear of current or ex  partner: Not on file     Emotionally abused: Not on file     Physically abused: Not on file     Forced sexual activity: Not on file     Tobacco Use     Smoking status: Never Smoker     Smokeless tobacco: Never Used   Substance and Sexual Activity     Alcohol use: No     Drug use: No     Sexual activity: Yes     Partners: Female       Functional Status:  Prior to admission patient needed assistance: none             Mental Health Status: stable          Chemical Dependency Status:                Values/Beliefs:  Spiritual, Cultural Beliefs, Zoroastrian Practices, Values that affect care:                 Additional Information:  SW consulted to assist with discharge planning, emotional support and transportation arrangements.  SW reviewed patient chart and discussed in hospital rounds.  SW introduced self/role.  Patient is a 75 yr old  male who admitted on 12/22/20 with COVID. Patient previously independent in all ADL's/IADL's, has never had services of any kind. If needed his wife is available for supports upon discharge. Patient does not anticipate any service needs upon discharge and states that he is transferring and walking independently since in hospital.     SW to continue to follow and assist with discharge planning.    DALTON Lyles  Daytime (8:00am-4:30pm): [phone]  After-Hours SW Pager (4:30pm-11:30pm): [phone]         DALTON Hernandez

## 2020-12-23 NOTE — PROVIDER NOTIFICATION
MD Notification    Notified Person: PA    Notified Person Name: Chase Lundberg    Notification Date/Time: 12/22/20 @1946    Notification Interaction: Amcom    Purpose of Notification: A1C is 6.1, per H&P, can you order accu- checks?     Orders Received: glucose checks before meals and bedtime    Comments:

## 2020-12-23 NOTE — PROGRESS NOTES
[facility]    Hospitalist Progress Note    Assessment & Plan   Saul Hairston is a 75 year old  male with medical history of retention, BPH, sleep apnea presented to the ED for shortness of breath.     Acute hypoxia from Covid infection.  Covid pneumonia.  -Leukocytosis likely from steroid therapy.  -Patient diagnosed with Covid 19 on ED visit on 12/18/2020, dismissed home with pulse oximeter and prescriptions for albuterol and Decadron.  At home patient has been monitoring his oxygen saturation, this morning oxygen levels were upper 80s to low 90s with shortness of breath.  Had called his doctor and instructed to come into the ED.  -In ED blood pressure 163/97, oxygen saturation 85% on room air.  Bilateral decreased breath sounds.  No wheezing, no crackles.  -CX-ray showing increased peripheral groundglass infiltrates compared to previous although they remain mild to moderate extent.  -WBC 13.4, glucose 121, creatinine 0.91. Troponin undetectable x1.  ----> 102, --> 457  -procal 0.74  -today. Some nausea. On 3L oximizer, feeling better  HR 40s. Currently in 50-60s. Asymptomatic.   Other viral sxs other than nausea abated.     Plan;   -start proning qid for 30 min per session as able.   -IS  Admit to inpatient.  Pulse oximetry monitoring.  started on IV Decadron, hold PTA oral Decadron.  start on IV remdesivir  FOR 5 doses  Subcutaneous Lovenox for pharmacological DVT prophylaxis, follow D-dimer level.  Supplemental nasal oxygen. Wean as able  Special precautions.  Covid labs.follow LDH, CRP  Follow procalcitonin level, if high or if spikes fever will consider antibiotics.     Hypokalemia likely from diuretic use, poor oral intake. And hctz  Potassium 3.3  CL 40 mEq x 1.  Resolved. follow       Hyponatremia likely from diuretic use, Covid pneumonia, poor oral intake.  Will avoid IV hydration, encourage oral fluid intake.  Recheck sodium levels a.m.  Holding PTA  hydrochlorothiazide.  Na 134 today  Am bmp     Bradycardia  Asymptomatic.  No bber or CCBer  HR 70s on admission  Down to 40s.   Plan;   Ekg, start tele, follow    ekg shows NSR. HR 50s. RBBB. No ischemic changes.     Hypertension.  Hold PTA hydrochlorothiazide overnight, reassess a.m.  As needed IV hydralazine ordered..  Na is up. Hold hydrochlorothiazide for now  Good control     Physical  deconditioning in the setting of Covid infection.  Lives at home with his wife.  Encourage ambulation as able to.  Aggressive incentive spirometer.  We will consider rehab team evaluation if needed.     Hyperglycemia likely from steroid therapy  Mild hyperglycemia noted,  hemoglobin A1c 6.1%  - range  We will order Accu-Cheks if hemoglobin A1c elevated.  Follow for need of insulin     MUNIRA.  Not on CPAP.     BPH.  Continue PTA tamsulosin.     Gout.  Continue PTA allopurinol.     Arthritis.  Hold PTA indomethacin.     DVT Prophylaxis: Enoxaparin (Lovenox) SQ  Code Status: Full Code, discussed with patient.     Disposition: Expected discharge in 2 days pending clinical improvement.    Discussed care plan with pt, rn, pt's wife    Rodrigo DARRIAN Osman MD  Text Page  (7am to 6pm)  Interval History   Feels a little nausea  3L oximizer  HR 40s-50s overnight. Nl sbp  Feeling better today    -Data reviewed today: I reviewed all new labs and imaging results over the last 24 hours. I personally reviewed labs and imaging today.     Physical Exam   Temp: 97.1  F (36.2  C) Temp src: Oral BP: 137/71 Pulse: 50   Resp: 16 SpO2: 94 % O2 Device: Oxymask Oxygen Delivery: 3 LPM  Vitals:    12/22/20 1149 12/22/20 1625 12/23/20 0613   Weight: 82.1 kg (181 lb) 83.9 kg (185 lb) 82.5 kg (181 lb 14.4 oz)     Vital Signs with Ranges  Temp:  [96.3  F (35.7  C)-99.1  F (37.3  C)] 97.1  F (36.2  C)  Pulse:  [47-74] 50  Resp:  [16-20] 16  BP: (133-163)/(71-97) 137/71  SpO2:  [85 %-98 %] 94 %  I/O last 3 completed shifts:  In: 360 [P.O.:360]  Out: -      Constitutional: Nad, up in bed eating  Respiratory: Breathing easily, slight bibasilar crackles  Cardiovascular: RRR no r/g/m  GI: soft, nt, nd  Skin/Integumen: no rash or edema  Psych: nl affect  Neuro: nl speech and mentation      Medications     - MEDICATION INSTRUCTIONS -         allopurinol  300 mg Oral Daily     atorvastatin  20 mg Oral QPM     dexamethasone  6 mg Intravenous Daily     enoxaparin ANTICOAGULANT  40 mg Subcutaneous Q24H     remdesivir  100 mg Intravenous Q24H     sodium chloride (PF)  3 mL Intracatheter Q8H     tamsulosin  0.4 mg Oral QPM       Data   Recent Labs   Lab 12/23/20  0714 12/22/20  1558 12/22/20  1310 12/22/20  1218 12/18/20  1200   WBC 8.9  --   --  13.4* 5.9   HGB 15.4  --   --  16.4 15.9   MCV 86  --   --  85 87     --   --  207 160     --  129* Canceled, Test credited 134   POTASSIUM 4.2  --  3.3* Canceled, Test credited 3.3*   CHLORIDE 100  --  95 Canceled, Test credited 98   CO2 29  --  28 Canceled, Test credited 30   BUN 34*  --  30 Canceled, Test credited 23   CR 0.91 0.94 0.91 Canceled, Test credited 1.09   ANIONGAP 5  --  6 Canceled, Test credited 6   MADIHA 8.9  --  8.7 Canceled, Test credited 8.5   *  --  121* Canceled, Test credited 121*   ALBUMIN  --   --   --   --  3.2*   PROTTOTAL  --   --   --   --  7.1   BILITOTAL  --   --   --   --  0.7   ALKPHOS  --   --   --   --  78   ALT  --   --   --   --  49   AST  --   --   --   --  33   TROPI  --  <0.015 <0.015 Canceled, Test credited 0.019       Imaging:   Recent Results (from the past 24 hour(s))   XR Chest Port 1 View    Narrative    CHEST ONE VIEW December 22, 2020 12:45 PM     HISTORY: COVID. Hypoxia.    COMPARISON: December 18, 2020.      Impression    IMPRESSION: Increased peripheral ground-glass infiltrates compared to  previous although they remain mild to moderate extent.    DIANA NAILS MD

## 2020-12-24 ENCOUNTER — APPOINTMENT (OUTPATIENT)
Dept: GENERAL RADIOLOGY | Facility: CLINIC | Age: 75
DRG: 177 | End: 2020-12-24
Attending: INTERNAL MEDICINE
Payer: COMMERCIAL

## 2020-12-24 LAB
ALT SERPL W P-5'-P-CCNC: 193 U/L (ref 0–70)
ANION GAP SERPL CALCULATED.3IONS-SCNC: 5 MMOL/L (ref 3–14)
AST SERPL W P-5'-P-CCNC: 79 U/L (ref 0–45)
BACTERIA SPEC CULT: NO GROWTH
BASOPHILS # BLD AUTO: 0 10E9/L (ref 0–0.2)
BASOPHILS NFR BLD AUTO: 0.1 %
BUN SERPL-MCNC: 33 MG/DL (ref 7–30)
CALCIUM SERPL-MCNC: 8.5 MG/DL (ref 8.5–10.1)
CHLORIDE SERPL-SCNC: 102 MMOL/L (ref 94–109)
CO2 SERPL-SCNC: 28 MMOL/L (ref 20–32)
CREAT SERPL-MCNC: 0.88 MG/DL (ref 0.66–1.25)
CRP SERPL-MCNC: 48.7 MG/L (ref 0–8)
D DIMER PPP FEU-MCNC: 1.4 UG/ML FEU (ref 0–0.5)
DIFFERENTIAL METHOD BLD: NORMAL
EOSINOPHIL # BLD AUTO: 0 10E9/L (ref 0–0.7)
EOSINOPHIL NFR BLD AUTO: 0 %
ERYTHROCYTE [DISTWIDTH] IN BLOOD BY AUTOMATED COUNT: 12.7 % (ref 10–15)
GFR SERPL CREATININE-BSD FRML MDRD: 85 ML/MIN/{1.73_M2}
GLUCOSE BLDC GLUCOMTR-MCNC: 122 MG/DL (ref 70–99)
GLUCOSE BLDC GLUCOMTR-MCNC: 125 MG/DL (ref 70–99)
GLUCOSE BLDC GLUCOMTR-MCNC: 138 MG/DL (ref 70–99)
GLUCOSE BLDC GLUCOMTR-MCNC: 138 MG/DL (ref 70–99)
GLUCOSE BLDC GLUCOMTR-MCNC: 139 MG/DL (ref 70–99)
GLUCOSE BLDC GLUCOMTR-MCNC: 171 MG/DL (ref 70–99)
GLUCOSE SERPL-MCNC: 121 MG/DL (ref 70–99)
HCT VFR BLD AUTO: 46 % (ref 40–53)
HGB BLD-MCNC: 15.2 G/DL (ref 13.3–17.7)
IMM GRANULOCYTES # BLD: 0.1 10E9/L (ref 0–0.4)
IMM GRANULOCYTES NFR BLD: 0.7 %
LDH SERPL L TO P-CCNC: 368 U/L (ref 85–227)
LYMPHOCYTES # BLD AUTO: 0.8 10E9/L (ref 0.8–5.3)
LYMPHOCYTES NFR BLD AUTO: 11 %
Lab: NORMAL
MCH RBC QN AUTO: 28.1 PG (ref 26.5–33)
MCHC RBC AUTO-ENTMCNC: 33 G/DL (ref 31.5–36.5)
MCV RBC AUTO: 85 FL (ref 78–100)
MONOCYTES # BLD AUTO: 0.4 10E9/L (ref 0–1.3)
MONOCYTES NFR BLD AUTO: 5.8 %
NEUTROPHILS # BLD AUTO: 6.3 10E9/L (ref 1.6–8.3)
NEUTROPHILS NFR BLD AUTO: 82.4 %
NRBC # BLD AUTO: 0 10*3/UL
NRBC BLD AUTO-RTO: 0 /100
PLATELET # BLD AUTO: 232 10E9/L (ref 150–450)
POTASSIUM SERPL-SCNC: 4 MMOL/L (ref 3.4–5.3)
RBC # BLD AUTO: 5.4 10E12/L (ref 4.4–5.9)
SODIUM SERPL-SCNC: 135 MMOL/L (ref 133–144)
SPECIMEN SOURCE: NORMAL
WBC # BLD AUTO: 7.6 10E9/L (ref 4–11)

## 2020-12-24 PROCEDURE — 99233 SBSQ HOSP IP/OBS HIGH 50: CPT | Performed by: INTERNAL MEDICINE

## 2020-12-24 PROCEDURE — 258N000003 HC RX IP 258 OP 636: Performed by: HOSPITALIST

## 2020-12-24 PROCEDURE — 83615 LACTATE (LD) (LDH) ENZYME: CPT | Performed by: HOSPITALIST

## 2020-12-24 PROCEDURE — 86140 C-REACTIVE PROTEIN: CPT | Performed by: HOSPITALIST

## 2020-12-24 PROCEDURE — 120N000004 HC R&B MS OVERFLOW

## 2020-12-24 PROCEDURE — 36415 COLL VENOUS BLD VENIPUNCTURE: CPT | Performed by: HOSPITALIST

## 2020-12-24 PROCEDURE — 80048 BASIC METABOLIC PNL TOTAL CA: CPT | Performed by: HOSPITALIST

## 2020-12-24 PROCEDURE — 71045 X-RAY EXAM CHEST 1 VIEW: CPT

## 2020-12-24 PROCEDURE — 250N000013 HC RX MED GY IP 250 OP 250 PS 637: Performed by: HOSPITALIST

## 2020-12-24 PROCEDURE — 84460 ALANINE AMINO (ALT) (SGPT): CPT | Performed by: HOSPITALIST

## 2020-12-24 PROCEDURE — 250N000011 HC RX IP 250 OP 636: Performed by: HOSPITALIST

## 2020-12-24 PROCEDURE — 85025 COMPLETE CBC W/AUTO DIFF WBC: CPT | Performed by: HOSPITALIST

## 2020-12-24 PROCEDURE — 250N000009 HC RX 250: Performed by: HOSPITALIST

## 2020-12-24 PROCEDURE — 85379 FIBRIN DEGRADATION QUANT: CPT | Performed by: HOSPITALIST

## 2020-12-24 PROCEDURE — 999N001017 HC STATISTIC GLUCOSE BY METER IP

## 2020-12-24 PROCEDURE — 84450 TRANSFERASE (AST) (SGOT): CPT | Performed by: HOSPITALIST

## 2020-12-24 RX ADMIN — TAMSULOSIN HYDROCHLORIDE 0.4 MG: 0.4 CAPSULE ORAL at 19:32

## 2020-12-24 RX ADMIN — ENOXAPARIN SODIUM 40 MG: 40 INJECTION SUBCUTANEOUS at 19:33

## 2020-12-24 RX ADMIN — ALLOPURINOL 300 MG: 300 TABLET ORAL at 08:39

## 2020-12-24 RX ADMIN — DEXAMETHASONE SODIUM PHOSPHATE 6 MG: 10 INJECTION, SOLUTION INTRAMUSCULAR; INTRAVENOUS at 08:37

## 2020-12-24 RX ADMIN — ATORVASTATIN CALCIUM 20 MG: 20 TABLET, FILM COATED ORAL at 19:32

## 2020-12-24 RX ADMIN — REMDESIVIR 100 MG: 100 INJECTION, POWDER, LYOPHILIZED, FOR SOLUTION INTRAVENOUS at 16:23

## 2020-12-24 ASSESSMENT — ACTIVITIES OF DAILY LIVING (ADL)
ADLS_ACUITY_SCORE: 20
ADLS_ACUITY_SCORE: 22
ADLS_ACUITY_SCORE: 20
ADLS_ACUITY_SCORE: 20

## 2020-12-24 NOTE — PROGRESS NOTES
COVID-19 Positive.Tele:Sinus adan as low as 45's. A/OX5. VSS on 3-4L nc. REYES.Denies pain. 2nd dose of Remdesivir given on this shift, pt tolerating well. Zofran given for nausea, effective. Dusty cardiac diet well. Pain managed w/ Tylenol.SBA.

## 2020-12-24 NOTE — PLAN OF CARE
Covid positive ,Special precaution maintained. A&O X4.VSS on 5L oxymask. Tele SB. PIV SL. Cardiac diet. Denied pain/SOB/ nausea. UP stand by assist. BG @0200 . Continue to monitor.

## 2020-12-24 NOTE — PLAN OF CARE
A&O, calm and cooperative, continent x 2, LS diminished, on Regular diet with good appetite,  VSS on 6 ltrs oxymizer, tried reducing O2 supplement to 5 ltrs but pt. Desaturated to mid to high 80's, denies pain, continent x 2 with 1 small BM during the shift,  continue to encourage doing prone position and IS.

## 2020-12-24 NOTE — PLAN OF CARE
RN:  Patient A/O x4.  Sats 91%- 93% on 6 liters oxymizer.  SOB with activity.  Other VSS.  Denies pain.  States he is lightheaded and dizzy a bit since diagnosed with Covid.  Up with SBA.  Tolerating regular diet. Instructed on how to prone and the benefits of proning this morning.  Patient IND with getting into a prone position. Proned x1 this morning a1030.  Chest xray completed.  Lungs diminished at bases.  Denies coughing. + BS.  Good urine output.  Skin intact.  Will have 4th dose of remdesivir today.  IV saline locked and intact. Discharge pending.

## 2020-12-24 NOTE — PROGRESS NOTES
[facility]    Hospitalist Progress Note    Assessment & Plan   Saul Hairston is a 75 year old  male with medical history of retention, BPH, sleep apnea presented to the ED for shortness of breath.     Acute hypoxia from Covid infection.  Covid pneumonia.  -Leukocytosis likely from steroid therapy.  -Patient diagnosed with Covid 19 on ED visit on 12/18/2020, dismissed home with pulse oximeter and prescriptions for albuterol and Decadron.  At home patient has been monitoring his oxygen saturation, this morning oxygen levels were upper 80s to low 90s with shortness of breath.  Had called his doctor and instructed to come into the ED.  -In ED blood pressure 163/97, oxygen saturation 85% on room air.  Bilateral decreased breath sounds.  No wheezing, no crackles.  -CX-ray showing increased peripheral groundglass infiltrates compared to previous although they remain mild to moderate extent.  -WBC 13.4, glucose 121, creatinine 0.91. Troponin undetectable x1.  ----> 102, --> 457  -procal 0.74  12/23: Some nausea. On 3L oximizer, feeling better  HR 40s. Currently in 50-60s. Asymptomatic.   Other viral sxs other than nausea abated.   ekg with RBBB and NSR HR 50s    Today:   Increase in oxygen needs to 5L oximizer---> 88% on 5L this am so increased to 7L oximizer  Will start proning today  Feeling better- less sob. Mild kelley in room. Mild fatigue  No change in exam. Breathing easily    Plan;   -start proning qid for 30 min per session as able 12/23- not yet start for unclear reasons so RN today will work with pt on this  -am labs  - pCXR today given increase oxygen nees  -IS  Admit to inpatient.  Pulse oximetry monitoring.  started on IV Decadron (d2) hold PTA oral Decadron.  start on IV remdesivir (d3) FOR 5 doses  Subcutaneous Lovenox for pharmacological DVT prophylaxis, follow D-dimer level.  Supplemental nasal oxygen. Wean as able  Special precautions.  Covid labs.follow LDH, CRP  Follow  procalcitonin level, if high or if spikes fever will consider antibiotics.     Hypokalemia likely from diuretic use, poor oral intake. And hctz  Potassium 3.3  CL 40 mEq x 1.  Resolved. Follow  Consider stopping hydrochlorothiazide and replace with alternative agent at discharge       Hyponatremia likely from diuretic use, Covid pneumonia, poor oral intake.  Will avoid IV hydration, encourage oral fluid intake.  Recheck sodium levels a.m.  Holding PTA hydrochlorothiazide.  Up to 134.   Bmp pending     Bradycardia  Asymptomatic.  No bber or CCBer  HR 70s on admission  Down to 40s.   EKG showed HR 50s NSR, RBBB, no ischemic changes.   Nl tele overnight  Plan;   Tele         Hypertension.  Hold PTA hydrochlorothiazide overnight, reassess a.m.  As needed IV hydralazine ordered..  Na is up. Hold hydrochlorothiazide for now  Good control     Physical  deconditioning in the setting of Covid infection.  Lives at home with his wife.  Encourage ambulation as able to.  Aggressive incentive spirometer.  We will consider rehab team evaluation if needed.     Hyperglycemia likely from steroid therapy  Mild hyperglycemia noted,  hemoglobin A1c 6.1%  - range  We will order Accu-Cheks if hemoglobin A1c elevated.  Follow for need of insulin     MUNIRA.  Not on CPAP.     BPH.  Continue PTA tamsulosin.     Gout.  Continue PTA allopurinol.     Arthritis.  Hold PTA indomethacin.     DVT Prophylaxis: Enoxaparin (Lovenox) SQ  Code Status: Full Code, discussed with patient.     Disposition: Expected discharge in 2-3 days pending clinical improvement.        Rodrigo Osman MD  Text Page  (7am to 6pm)  Interval History   Up to 5L oximizer today/overnight  No n/v/d  No cp. Mild kelley. No sob at rest  Feels better again today. Less sob  Mild fatigue  Taking po  Ambulating in room        -Data reviewed today: I reviewed all new labs and imaging results over the last 24 hours. I personally reviewed labs and imaging today.     Physical Exam    Temp: 96.2  F (35.7  C) Temp src: Oral BP: 130/70 Pulse: (!) 48   Resp: 17 SpO2: 91 % O2 Device: Oxymask Oxygen Delivery: 5 LPM  Vitals:    12/22/20 1149 12/22/20 1625 12/23/20 0613   Weight: 82.1 kg (181 lb) 83.9 kg (185 lb) 82.5 kg (181 lb 14.4 oz)     Vital Signs with Ranges  Temp:  [95.5  F (35.3  C)-96.2  F (35.7  C)] 95.8  F (35.4  C)  Pulse:  [48-60] 59  Resp:  [16-17] 16  BP: (130-146)/(69-76) 132/76  SpO2:  [91 %-95 %] 92 %  I/O last 3 completed shifts:  In: 600 [P.O.:600]  Out: -     Constitutional: Nad, up on side of bed   Respiratory: Breathing easily, slight bibasilar crackles- no change  Cardiovascular: RRR no r/g/m  GI: soft, nt, nd  Skin/Integumen: no rash or edema  Psych: nl affect  Neuro: nl speech and mentation      Medications     - MEDICATION INSTRUCTIONS -         allopurinol  300 mg Oral Daily     atorvastatin  20 mg Oral QPM     dexamethasone  6 mg Intravenous Daily     enoxaparin ANTICOAGULANT  40 mg Subcutaneous Q24H     remdesivir  100 mg Intravenous Q24H     sodium chloride (PF)  3 mL Intracatheter Q8H     tamsulosin  0.4 mg Oral QPM       Data   Recent Labs   Lab 12/23/20  0714 12/22/20  1558 12/22/20  1310 12/22/20  1218 12/18/20  1200   WBC 8.9  --   --  13.4* 5.9   HGB 15.4  --   --  16.4 15.9   MCV 86  --   --  85 87     --   --  207 160     --  129* Canceled, Test credited 134   POTASSIUM 4.2  --  3.3* Canceled, Test credited 3.3*   CHLORIDE 100  --  95 Canceled, Test credited 98   CO2 29  --  28 Canceled, Test credited 30   BUN 34*  --  30 Canceled, Test credited 23   CR 0.91 0.94 0.91 Canceled, Test credited 1.09   ANIONGAP 5  --  6 Canceled, Test credited 6   MADIHA 8.9  --  8.7 Canceled, Test credited 8.5   *  --  121* Canceled, Test credited 121*   ALBUMIN  --   --   --   --  3.2*   PROTTOTAL  --   --   --   --  7.1   BILITOTAL  --   --   --   --  0.7   ALKPHOS  --   --   --   --  78   ALT  --   --   --   --  49   AST  --   --   --   --  33   TROPI  --   <0.015 <0.015 Canceled, Test credited 0.019       Imaging:   No results found for this or any previous visit (from the past 24 hour(s)).

## 2020-12-25 LAB
ALBUMIN SERPL-MCNC: 2.6 G/DL (ref 3.4–5)
ALP SERPL-CCNC: 73 U/L (ref 40–150)
ALT SERPL W P-5'-P-CCNC: 199 U/L (ref 0–70)
ANION GAP SERPL CALCULATED.3IONS-SCNC: 6 MMOL/L (ref 3–14)
AST SERPL W P-5'-P-CCNC: 76 U/L (ref 0–45)
BASOPHILS # BLD AUTO: 0 10E9/L (ref 0–0.2)
BASOPHILS NFR BLD AUTO: 0.1 %
BILIRUB SERPL-MCNC: 0.9 MG/DL (ref 0.2–1.3)
BUN SERPL-MCNC: 32 MG/DL (ref 7–30)
CALCIUM SERPL-MCNC: 8.8 MG/DL (ref 8.5–10.1)
CHLORIDE SERPL-SCNC: 100 MMOL/L (ref 94–109)
CO2 SERPL-SCNC: 28 MMOL/L (ref 20–32)
CREAT SERPL-MCNC: 0.78 MG/DL (ref 0.66–1.25)
CRP SERPL-MCNC: 29.2 MG/L (ref 0–8)
D DIMER PPP FEU-MCNC: 1.5 UG/ML FEU (ref 0–0.5)
DIFFERENTIAL METHOD BLD: NORMAL
EOSINOPHIL # BLD AUTO: 0 10E9/L (ref 0–0.7)
EOSINOPHIL NFR BLD AUTO: 0 %
ERYTHROCYTE [DISTWIDTH] IN BLOOD BY AUTOMATED COUNT: 12.5 % (ref 10–15)
GFR SERPL CREATININE-BSD FRML MDRD: 90 ML/MIN/{1.73_M2}
GLUCOSE BLDC GLUCOMTR-MCNC: 107 MG/DL (ref 70–99)
GLUCOSE BLDC GLUCOMTR-MCNC: 113 MG/DL (ref 70–99)
GLUCOSE BLDC GLUCOMTR-MCNC: 129 MG/DL (ref 70–99)
GLUCOSE BLDC GLUCOMTR-MCNC: 131 MG/DL (ref 70–99)
GLUCOSE BLDC GLUCOMTR-MCNC: 131 MG/DL (ref 70–99)
GLUCOSE SERPL-MCNC: 107 MG/DL (ref 70–99)
HCT VFR BLD AUTO: 48.4 % (ref 40–53)
HGB BLD-MCNC: 16.1 G/DL (ref 13.3–17.7)
IMM GRANULOCYTES # BLD: 0.1 10E9/L (ref 0–0.4)
IMM GRANULOCYTES NFR BLD: 0.9 %
LDH SERPL L TO P-CCNC: 404 U/L (ref 85–227)
LYMPHOCYTES # BLD AUTO: 0.8 10E9/L (ref 0.8–5.3)
LYMPHOCYTES NFR BLD AUTO: 11.8 %
MCH RBC QN AUTO: 28 PG (ref 26.5–33)
MCHC RBC AUTO-ENTMCNC: 33.3 G/DL (ref 31.5–36.5)
MCV RBC AUTO: 84 FL (ref 78–100)
MONOCYTES # BLD AUTO: 0.4 10E9/L (ref 0–1.3)
MONOCYTES NFR BLD AUTO: 5.5 %
NEUTROPHILS # BLD AUTO: 5.7 10E9/L (ref 1.6–8.3)
NEUTROPHILS NFR BLD AUTO: 81.7 %
NRBC # BLD AUTO: 0 10*3/UL
NRBC BLD AUTO-RTO: 0 /100
PLATELET # BLD AUTO: 256 10E9/L (ref 150–450)
POTASSIUM SERPL-SCNC: 3.9 MMOL/L (ref 3.4–5.3)
PROT SERPL-MCNC: 6.2 G/DL (ref 6.8–8.8)
RBC # BLD AUTO: 5.74 10E12/L (ref 4.4–5.9)
SODIUM SERPL-SCNC: 134 MMOL/L (ref 133–144)
WBC # BLD AUTO: 7 10E9/L (ref 4–11)

## 2020-12-25 PROCEDURE — 85025 COMPLETE CBC W/AUTO DIFF WBC: CPT | Performed by: HOSPITALIST

## 2020-12-25 PROCEDURE — 250N000009 HC RX 250: Performed by: HOSPITALIST

## 2020-12-25 PROCEDURE — 250N000013 HC RX MED GY IP 250 OP 250 PS 637: Performed by: HOSPITALIST

## 2020-12-25 PROCEDURE — 120N000004 HC R&B MS OVERFLOW

## 2020-12-25 PROCEDURE — 36415 COLL VENOUS BLD VENIPUNCTURE: CPT | Performed by: HOSPITALIST

## 2020-12-25 PROCEDURE — 85379 FIBRIN DEGRADATION QUANT: CPT | Performed by: HOSPITALIST

## 2020-12-25 PROCEDURE — 999N001017 HC STATISTIC GLUCOSE BY METER IP

## 2020-12-25 PROCEDURE — 83615 LACTATE (LD) (LDH) ENZYME: CPT | Performed by: HOSPITALIST

## 2020-12-25 PROCEDURE — 80053 COMPREHEN METABOLIC PANEL: CPT | Performed by: HOSPITALIST

## 2020-12-25 PROCEDURE — 86140 C-REACTIVE PROTEIN: CPT | Performed by: HOSPITALIST

## 2020-12-25 PROCEDURE — 99232 SBSQ HOSP IP/OBS MODERATE 35: CPT | Performed by: STUDENT IN AN ORGANIZED HEALTH CARE EDUCATION/TRAINING PROGRAM

## 2020-12-25 PROCEDURE — 250N000011 HC RX IP 250 OP 636: Performed by: HOSPITALIST

## 2020-12-25 PROCEDURE — 258N000003 HC RX IP 258 OP 636: Performed by: HOSPITALIST

## 2020-12-25 RX ADMIN — ATORVASTATIN CALCIUM 20 MG: 20 TABLET, FILM COATED ORAL at 19:38

## 2020-12-25 RX ADMIN — DEXAMETHASONE SODIUM PHOSPHATE 6 MG: 10 INJECTION, SOLUTION INTRAMUSCULAR; INTRAVENOUS at 08:27

## 2020-12-25 RX ADMIN — TAMSULOSIN HYDROCHLORIDE 0.4 MG: 0.4 CAPSULE ORAL at 19:38

## 2020-12-25 RX ADMIN — ALLOPURINOL 300 MG: 300 TABLET ORAL at 08:26

## 2020-12-25 RX ADMIN — REMDESIVIR 100 MG: 100 INJECTION, POWDER, LYOPHILIZED, FOR SOLUTION INTRAVENOUS at 16:29

## 2020-12-25 RX ADMIN — ACETAMINOPHEN 650 MG: 325 TABLET, FILM COATED ORAL at 08:26

## 2020-12-25 RX ADMIN — ENOXAPARIN SODIUM 40 MG: 40 INJECTION SUBCUTANEOUS at 19:38

## 2020-12-25 RX ADMIN — ONDANSETRON 4 MG: 4 TABLET, ORALLY DISINTEGRATING ORAL at 13:14

## 2020-12-25 ASSESSMENT — ACTIVITIES OF DAILY LIVING (ADL)
ADLS_ACUITY_SCORE: 20
ADLS_ACUITY_SCORE: 16

## 2020-12-25 ASSESSMENT — MIFFLIN-ST. JEOR: SCORE: 1580.88

## 2020-12-25 NOTE — PLAN OF CARE
Pt A&O. Covid positve. BP mildly elevated. Reduced to 3L NC. Denies pain. Using IS up to 1000. Mild REYES. Voiding via urinal.  BG checks 107 and 129. Tolerating reg diet. Up SBA. IV SL. Receiving remdisivir.

## 2020-12-25 NOTE — PLAN OF CARE
Covid positive. Special precaution maintained.   A&OX4. VSS on 5L Oxymask, REYES. Denies pain/N/V. No chest pain.  & 171. Tele SB w/BBB. Cardiac diet, good apetitte. Using IS intermittently. Prone position per self. Up with SBA. PIV SL. Continue to monitor.

## 2020-12-25 NOTE — PLAN OF CARE
Shift report 2300- 0700. COVID positive. A&O x4, VSS except bradycardic, afebrile on 5LPM/oxymask stable saturations. Pt requested to use the oxymask overnight d/t discomfort in nostrils. BG at . No c/o chest pain, N/V. REYES, LS diminished, dry cough. Using IS up to 1000. Tolerating reg diet, SBA. IV site SL for intermittent ABX. Tele SB. Possible discharge 1-2 days, once O2 needs is improving.

## 2020-12-26 ENCOUNTER — APPOINTMENT (OUTPATIENT)
Dept: OCCUPATIONAL THERAPY | Facility: CLINIC | Age: 75
DRG: 177 | End: 2020-12-26
Attending: STUDENT IN AN ORGANIZED HEALTH CARE EDUCATION/TRAINING PROGRAM
Payer: COMMERCIAL

## 2020-12-26 VITALS
DIASTOLIC BLOOD PRESSURE: 64 MMHG | TEMPERATURE: 95.5 F | OXYGEN SATURATION: 96 % | BODY MASS INDEX: 26.56 KG/M2 | WEIGHT: 185.5 LBS | SYSTOLIC BLOOD PRESSURE: 126 MMHG | HEART RATE: 68 BPM | RESPIRATION RATE: 16 BRPM | HEIGHT: 70 IN

## 2020-12-26 LAB
ALBUMIN SERPL-MCNC: 2.3 G/DL (ref 3.4–5)
ALP SERPL-CCNC: 67 U/L (ref 40–150)
ALT SERPL W P-5'-P-CCNC: 188 U/L (ref 0–70)
ANION GAP SERPL CALCULATED.3IONS-SCNC: 2 MMOL/L (ref 3–14)
AST SERPL W P-5'-P-CCNC: 68 U/L (ref 0–45)
BASOPHILS # BLD AUTO: 0 10E9/L (ref 0–0.2)
BASOPHILS NFR BLD AUTO: 0.3 %
BILIRUB SERPL-MCNC: 0.8 MG/DL (ref 0.2–1.3)
BUN SERPL-MCNC: 27 MG/DL (ref 7–30)
CALCIUM SERPL-MCNC: 8.4 MG/DL (ref 8.5–10.1)
CHLORIDE SERPL-SCNC: 102 MMOL/L (ref 94–109)
CO2 SERPL-SCNC: 30 MMOL/L (ref 20–32)
CREAT SERPL-MCNC: 0.79 MG/DL (ref 0.66–1.25)
DIFFERENTIAL METHOD BLD: NORMAL
EOSINOPHIL # BLD AUTO: 0 10E9/L (ref 0–0.7)
EOSINOPHIL NFR BLD AUTO: 0 %
ERYTHROCYTE [DISTWIDTH] IN BLOOD BY AUTOMATED COUNT: 12.4 % (ref 10–15)
GFR SERPL CREATININE-BSD FRML MDRD: 89 ML/MIN/{1.73_M2}
GLUCOSE BLDC GLUCOMTR-MCNC: 113 MG/DL (ref 70–99)
GLUCOSE BLDC GLUCOMTR-MCNC: 209 MG/DL (ref 70–99)
GLUCOSE BLDC GLUCOMTR-MCNC: 209 MG/DL (ref 70–99)
GLUCOSE SERPL-MCNC: 111 MG/DL (ref 70–99)
HCT VFR BLD AUTO: 44 % (ref 40–53)
HGB BLD-MCNC: 14.8 G/DL (ref 13.3–17.7)
IMM GRANULOCYTES # BLD: 0.1 10E9/L (ref 0–0.4)
IMM GRANULOCYTES NFR BLD: 1.3 %
LYMPHOCYTES # BLD AUTO: 0.9 10E9/L (ref 0.8–5.3)
LYMPHOCYTES NFR BLD AUTO: 12.7 %
MCH RBC QN AUTO: 28.4 PG (ref 26.5–33)
MCHC RBC AUTO-ENTMCNC: 33.6 G/DL (ref 31.5–36.5)
MCV RBC AUTO: 85 FL (ref 78–100)
MONOCYTES # BLD AUTO: 0.4 10E9/L (ref 0–1.3)
MONOCYTES NFR BLD AUTO: 6.2 %
NEUTROPHILS # BLD AUTO: 5.6 10E9/L (ref 1.6–8.3)
NEUTROPHILS NFR BLD AUTO: 79.5 %
NRBC # BLD AUTO: 0 10*3/UL
NRBC BLD AUTO-RTO: 0 /100
PLATELET # BLD AUTO: 226 10E9/L (ref 150–450)
POTASSIUM SERPL-SCNC: 4.5 MMOL/L (ref 3.4–5.3)
PROT SERPL-MCNC: 5.6 G/DL (ref 6.8–8.8)
RBC # BLD AUTO: 5.21 10E12/L (ref 4.4–5.9)
SODIUM SERPL-SCNC: 134 MMOL/L (ref 133–144)
WBC # BLD AUTO: 7 10E9/L (ref 4–11)

## 2020-12-26 PROCEDURE — 97535 SELF CARE MNGMENT TRAINING: CPT | Mod: GO

## 2020-12-26 PROCEDURE — 250N000011 HC RX IP 250 OP 636: Performed by: HOSPITALIST

## 2020-12-26 PROCEDURE — 97165 OT EVAL LOW COMPLEX 30 MIN: CPT | Mod: GO

## 2020-12-26 PROCEDURE — 36415 COLL VENOUS BLD VENIPUNCTURE: CPT | Performed by: HOSPITALIST

## 2020-12-26 PROCEDURE — 99239 HOSP IP/OBS DSCHRG MGMT >30: CPT | Performed by: STUDENT IN AN ORGANIZED HEALTH CARE EDUCATION/TRAINING PROGRAM

## 2020-12-26 PROCEDURE — 258N000003 HC RX IP 258 OP 636: Performed by: HOSPITALIST

## 2020-12-26 PROCEDURE — 250N000009 HC RX 250: Performed by: HOSPITALIST

## 2020-12-26 PROCEDURE — 80053 COMPREHEN METABOLIC PANEL: CPT | Performed by: HOSPITALIST

## 2020-12-26 PROCEDURE — 999N001017 HC STATISTIC GLUCOSE BY METER IP

## 2020-12-26 PROCEDURE — 250N000013 HC RX MED GY IP 250 OP 250 PS 637: Performed by: HOSPITALIST

## 2020-12-26 PROCEDURE — 85025 COMPLETE CBC W/AUTO DIFF WBC: CPT | Performed by: HOSPITALIST

## 2020-12-26 RX ADMIN — REMDESIVIR 100 MG: 100 INJECTION, POWDER, LYOPHILIZED, FOR SOLUTION INTRAVENOUS at 17:25

## 2020-12-26 RX ADMIN — ALLOPURINOL 300 MG: 300 TABLET ORAL at 08:04

## 2020-12-26 RX ADMIN — DEXAMETHASONE SODIUM PHOSPHATE 6 MG: 10 INJECTION, SOLUTION INTRAMUSCULAR; INTRAVENOUS at 08:04

## 2020-12-26 ASSESSMENT — ACTIVITIES OF DAILY LIVING (ADL)
ADLS_ACUITY_SCORE: 16
ADLS_ACUITY_SCORE: 17
PREVIOUS_RESPONSIBILITIES: MEAL PREP;HOUSEKEEPING;LAUNDRY;SHOPPING;YARDWORK;MEDICATION MANAGEMENT;FINANCES;DRIVING
ADLS_ACUITY_SCORE: 17
ADLS_ACUITY_SCORE: 16
ADLS_ACUITY_SCORE: 16

## 2020-12-26 ASSESSMENT — MIFFLIN-ST. JEOR: SCORE: 1592.67

## 2020-12-26 NOTE — PROGRESS NOTES
12/26/20 1100   Quick Adds   Type of Visit Initial Occupational Therapy Evaluation       Present no   Living Environment   People in home spouse   Current Living Arrangements house   Home Accessibility stairs to enter home   Number of Stairs, Main Entrance 3   Stair Railings, Main Entrance none   Transportation Anticipated car, drives self;family or friend will provide   Living Environment Comments Lives in [address] in [place] with his wife, all needs met on main level. Has tub shower.    Self-Care   Usual Activity Tolerance excellent   Current Activity Tolerance good   Regular Exercise Yes   Activity/Exercise Type walking   Exercise Amount/Frequency daily  (walks 1.5 miles daily with wife when it's not cold/icy)   Equipment Currently Used at Home none   Activity/Exercise/Self-Care Comment Pt walks 1-2 miles with wife daily, independent with all ADL/IADL   Disability/Function   Hearing Difficulty or Deaf no   Wear Glasses or Blind yes   Vision Management glasses    Doing Errands Independently Difficulty (such as shopping) yes   Fall history within last six months no   Change in Functional Status Since Onset of Current Illness/Injury yes   General Information   Onset of Illness/Injury or Date of Surgery 12/22/20   Referring Physician Robert Cortez MD   Patient/Family Therapy Goal Statement (OT) to return home when able    Additional Occupational Profile Info/Pertinent History of Current Problem Saul Hairston is a 75 year old  male with medical history of retention, BPH, sleep apnea presented to the ED for shortness of breath, found to have acute hypoxia from Covid infection    Performance Patterns (Routines, Roles, Habits) Pt independent with all ADL/IADL at baseline    Existing Precautions/Restrictions no known precautions/restrictions   Limitations/Impairments other (see comments)  (O2 needs (2L))   General Observations and Info At this time pt is mobilizing with SBA, no balance concerns noted. Pt  completing out of bed ADLs with distant supervision with good activity tolerance noted. Pt on 2L O2 nasal cannula, sats 92-94% seated, HR 68, decrease to 89 with seated activity, recovers with seated rest break. Pt ambulates to bathroom for OOB ADLS, takes O2 off for washing face and shaving, stands for approx 15 mins, O2 back on and sats 90% when returning to sitting EOB, increase to 94 with rest break. OT educated pt on energy conservation strategies for home and need for rest breaks with O2 monitoring, pt verbalized understanding. Pt reports he will ask a neighbor for assist with snow shoveling if necessary as pt states he has a tendency to overdo things.    Cognitive Status Examination   Orientation Status orientation to person, place and time   Affect/Mental Status (Cognitive) WNL   Follows Commands WNL   Cognitive Status Comments Cognition appears intact    Visual Perception   Visual Impairment/Limitations corrective lenses full-time   Impact of Vision Impairment on Function (Vision) Reports no visual changes    Sensory   Sensory Quick Adds No deficits were identified   Pain Assessment   Patient Currently in Pain No   Integumentary/Edema   Integumentary/Edema no deficits were identifed   Posture   Posture not impaired   Range of Motion Comprehensive   Comment, General Range of Motion Pt with AROM shoulder flexion approx 90 degrees in BUE, reports he has rotator cuff issues.    Strength Comprehensive (MMT)   General Manual Muscle Testing (MMT) Assessment no strength deficits identified   Comment, General Manual Muscle Testing (MMT) Assessment WFL BUE    Muscle Tone Assessment   Muscle Tone Quick Adds No deficits were identified   Coordination   Upper Extremity Coordination No deficits were identified   Coordination Comments WNL   Bed Mobility   Comment (Bed Mobility) Independent    Transfers   Transfer Comments SBA, no AD   Balance   Balance Comments No LOB noted during functional mobility in room/bathroom     Activities of Daily Living   BADL Assessment/Intervention lower body dressing;grooming;toileting   Lower Body Dressing Assessment/Training   [place] Level (Lower Body Dressing) modified independence   Position (Lower Body Dressing) edge of bed sitting   Comment (Lower Body Dressing) Doffs/dons socks while seated EOB mod I    Grooming Assessment/Training   [place] Level (Grooming) supervision   Position (Grooming) unsupported standing   Comment (Grooming) Pt completes oral hygiene, washing face, shaving while standing at sink, stands approx 15 mins with SBA   Toileting   [place] Level (Toileting) supervision   Assistive Devices (Toileting) grab bar, toilet   Comment (Toileting) distant SBA   Instrumental Activities of Daily Living (IADL)   Previous Responsibilities meal prep;housekeeping;laundry;shopping;yardwork;medication management;finances;driving   IADL Comments Independent with IADL    Clinical Impression   Criteria for Skilled Therapeutic Interventions Met (OT) yes;meets criteria   OT Diagnosis Decreased independence with ADL/IADL   OT Problem List-Impairments impacting ADL activity tolerance impaired   Assessment of Occupational Performance 1-3 Performance Deficits   Identified Performance Deficits O2 needs with activity tolerance   Planned Therapy Interventions (OT) ADL retraining;progressive activity/exercise;risk factor education   Intervention Comments 1 session eval and treat    Clinical Decision Making Complexity (OT) low complexity   Therapy Frequency (OT) 1x eval and treat   Predicted Duration of Therapy d/c OT   Anticipated Equipment Needs Upon Discharge (OT) shower chair   Risks and Benefits of Treatment have been explained. Yes   Patient, Family & other staff in agreement with plan of care Yes   Comment-Clinical Impression Pt completing ADLs safely in room with extended time. OT and pt discussed ADL/IADL needs at home including potential for shower chair, pt declined informational  handout and verbalized understanding of where to purchase. At this time pt mobilizing well and O2 sats are stable with intermittent 2L O2 during activity (removed for ADLs). Anticipate safe dc home with wife assist for IADL as necessary when medically stable.    OT Discharge Planning    OT Discharge Recommendation (DC Rec) home;Home with assist   OT Rationale for DC Rec Pt near baseline with ADL and functional mobility, O2 sats are limiting factor at this time. Anticipate that pt will be able to safely dc home with wife assist for IADL as necessary. Pt reports a neighbor can assist with snow shoveling if necessary.    Total Evaluation Time (Minutes)   Total Evaluation Time (Minutes) 20     Kristen Eason, OT

## 2020-12-26 NOTE — PLAN OF CARE
Covid positive. Special precaution maintained.   A&OX4. VSS on 2L NC, Mildy REYES. Denies pain/N/V. No chest pain.  & 113. Tele SB. Cardiac diet, good apetitte. Using IS intermittently. Prone position per self once this shift. Up with SBA. PIV SL. Continue to monitor.

## 2020-12-26 NOTE — PLAN OF CARE
Occupational Therapy Discharge Summary    Reason for therapy discharge:    All goals and outcomes met, no further needs identified.    Progress towards therapy goal(s). See goals on Care Plan in [facility] electronic health record for goal details.  Goals met    Therapy recommendation(s):    No further therapy is recommended.  Anticipate safe discharge home with wife assist for IADL as necessary.

## 2020-12-26 NOTE — PLAN OF CARE
Covid positive ,Special precaution maintained. A&O X4.VSS on 2L oxymask. Tele SB. PIV SL. Cardiac diet. Denied pain/SOB/ nausea. UP stand by assist. Possible discharge in 1-2 days pending clinical improvement. Continue to monitor.

## 2020-12-26 NOTE — CONSULTS
Care Management Discharge Note    Discharge Date: 12/26/20       Discharge Disposition: Home    Discharge Services:      Discharge DME:  Home oxygen    Discharge Transportation: car, drives self, family or friend will provide    Private pay costs discussed: Not applicable    PAS Confirmation Code:    Patient/family educated on Medicare website which has current facility and service quality ratings:      Education Provided on the Discharge Plan:    Persons Notified of Discharge Plans: yes  Patient/Family in Agreement with the Plan: yes    Handoff Referral Completed: No    Additional Information:    Discharge orders in for home oxygen. Referral sent to [facility], [facility].

## 2020-12-26 NOTE — PROGRESS NOTES
HOME O2 FACE TO FACE    Date of Service: 12/26/2020    I certify that this patient, Saul Hairston has been under my care (or a nurse practitioner or physican's assistant working with me). This is the face-to-face encounter for oxygen medical necessity.      Saul Hairston is now in a chronic stable state and continues to require supplemental oxygen. Patient has continued oxygen desaturation due to Chronic Respiratory Failure with Hypoxia J93.11.    Alternative treatment(s) tried or considered and deemed clinically infective for treatment of Chronic Respiratory Failure with Hypoxia J93.11 include nebulizers, inhalers, steroids and pulmonary toileting.  If portability is ordered, is the patient mobile within the home? yes    Robert Cortez MD on 12/26/2020 at 3:51 PM

## 2020-12-26 NOTE — PROGRESS NOTES
[facility]    Hospitalist Progress Note    Assessment & Plan   Saul Hairston is a 75 year old  male with medical history of retention, BPH, sleep apnea presented to the ED for shortness of breath.     Acute hypoxia from Covid infection.  Covid pneumonia.  -Leukocytosis likely from steroid therapy.  -Patient diagnosed with Covid 19 on ED visit on 12/18/2020, dismissed home with pulse oximeter and prescriptions for albuterol and Decadron.  At home patient has been monitoring his oxygen saturation, this morning oxygen levels were upper 80s to low 90s with shortness of breath.  Had called his doctor and instructed to come into the ED.  -In ED blood pressure 163/97, oxygen saturation 85% on room air.  Bilateral decreased breath sounds.  No wheezing, no crackles.  -CX-ray showing increased peripheral groundglass infiltrates compared to previous although they remain mild to moderate extent.  -WBC 13.4, glucose 121, creatinine 0.91. Troponin undetectable x1.  ----> 102, --> 457  -procal 0.74  -today. Some nausea. On 3L oximizer, feeling better  HR 40s. Currently in 50-60s. Asymptomatic.   Other viral sxs other than nausea abated.     Plan;   -start proning qid for 30 min per session as able.   -IS  - Pulse oximetry monitoring.  - Continue on IV Decadron, hold PTA oral Decadron.  - Continue on IV remdesivir  FOR 5 doses  Subcutaneous Lovenox for pharmacological DVT prophylaxix  Supplemental nasal oxygen. Wean as able  Special precautions.  Covid labs.follow LDH, CRP    Hypokalemia likely from diuretic use, poor oral intake. And hctz  Potassium 3.3  CL 40 mEq x 1.  Resolved     Hyponatremia likely from diuretic use, Covid pneumonia, poor oral intake.  Plan:  Holding PTA hydrochlorothiazide.  Na 134 today     Bradycardia  Asymptomatic.  No bber or CCBer  HR 70s on admission  Down to 40s. ekg shows NSR. HR 50s. RBBB. No ischemic changes.   Plan:  - Ekg, start tele,  follow    Hypertension.  Hold PTA hydrochlorothiazide overnight, reassess a.m.  As needed IV hydralazine ordered..  Na is up. Hold hydrochlorothiazide for now     Physical  deconditioning in the setting of Covid infection.  Lives at home with his wife.  Encourage ambulation as able to.  Aggressive incentive spirometer.  We will consider rehab team evaluation if needed.     Hyperglycemia likely from steroid therapy  Mild hyperglycemia noted,  hemoglobin A1c 6.1%  - range  We will order Accu-Cheks if hemoglobin A1c elevated.  Follow for need of insulin     MUNIRA.  Not on CPAP.     BPH.  Continue PTA tamsulosin.     Gout.  Continue PTA allopurinol.     Arthritis.  Hold PTA indomethacin.     DVT Prophylaxis: Enoxaparin (Lovenox) SQ  Code Status: Full Code, discussed with patient.     Disposition: Expected discharge in 1-2 days pending clinical improvement.    Robert Cortez MD  Text Page  (7am to 6pm)  Interval History     Feels much improved today  No CP/SOB, no fevers or chills  Mild cough when using IS  No new complaints.     -Data reviewed today: I reviewed all new labs and imaging results over the last 24 hours. I personally reviewed labs and imaging today.     Physical Exam   Temp: 95.3  F (35.2  C) Temp src: Axillary BP: 133/71 Pulse: 68   Resp: 18 SpO2: 95 % O2 Device: Nasal cannula Oxygen Delivery: 2 LPM  Vitals:    12/22/20 1625 12/23/20 0613 12/25/20 0631   Weight: 83.9 kg (185 lb) 82.5 kg (181 lb 14.4 oz) 83 kg (182 lb 14.4 oz)     Vital Signs with Ranges  Temp:  [95.3  F (35.2  C)-95.4  F (35.2  C)] 95.3  F (35.2  C)  Pulse:  [53-68] 68  Resp:  [18] 18  BP: (133-157)/(69-75) 133/71  SpO2:  [93 %-97 %] 95 %  I/O last 3 completed shifts:  In: -   Out: 525 [Urine:525]    Constitutional: Nad, up in bed eating  Respiratory: Breathing easily, slight bibasilar crackles  Cardiovascular: RRR no r/g/m  GI: soft, nt, nd  Skin/Integumen: no rash or edema  Psych: nl affect  Neuro: nl speech and  mentation      Medications     - MEDICATION INSTRUCTIONS -         allopurinol  300 mg Oral Daily     atorvastatin  20 mg Oral QPM     dexamethasone  6 mg Intravenous Daily     enoxaparin ANTICOAGULANT  40 mg Subcutaneous Q24H     remdesivir  100 mg Intravenous Q24H     sodium chloride (PF)  3 mL Intracatheter Q8H     tamsulosin  0.4 mg Oral QPM       Data   Recent Labs   Lab 12/25/20  0742 12/24/20  0759 12/23/20  0714 12/22/20  1558 12/22/20  1310 12/22/20  1218   WBC 7.0 7.6 8.9  --   --  13.4*   HGB 16.1 15.2 15.4  --   --  16.4   MCV 84 85 86  --   --  85    232 207  --   --  207    135 134  --  129* Canceled, Test credited   POTASSIUM 3.9 4.0 4.2  --  3.3* Canceled, Test credited   CHLORIDE 100 102 100  --  95 Canceled, Test credited   CO2 28 28 29  --  28 Canceled, Test credited   BUN 32* 33* 34*  --  30 Canceled, Test credited   CR 0.78 0.88 0.91 0.94 0.91 Canceled, Test credited   ANIONGAP 6 5 5  --  6 Canceled, Test credited   MADIHA 8.8 8.5 8.9  --  8.7 Canceled, Test credited   * 121* 117*  --  121* Canceled, Test credited   ALBUMIN 2.6*  --   --   --   --   --    PROTTOTAL 6.2*  --   --   --   --   --    BILITOTAL 0.9  --   --   --   --   --    ALKPHOS 73  --   --   --   --   --    * 193*  --   --   --   --    AST 76* 79*  --   --   --   --    TROPI  --   --   --  <0.015 <0.015 Canceled, Test credited       Imaging:   No results found for this or any previous visit (from the past 24 hour(s)).

## 2020-12-26 NOTE — PLAN OF CARE
Patient has been assessed for Home Oxygen needs. Oxygen readings:    *Pulse oximetry (SpO2) = 93% on room air at rest while awake.    *SpO2 improved to 96% on 2 liters/minute at rest.    *SpO2 = 91% on room air during activity/with exercise.    *SpO2 improved to 94% on 2 liters/minute during activity/with exercise.

## 2020-12-26 NOTE — PLAN OF CARE
PT: Orders received. Chart reviewed and discussed with care team. Per treating OT, pt with good balance ambulating to/from bathroom, SBA provided for management of O2/lines. OT provided education on energy conservation. PT not indicated as OT already addressed pt's energy conservation/ edu needs, no balance concerns noted.  Defer discharge recommendations to OT.  Will complete orders.

## 2020-12-26 NOTE — PLAN OF CARE
Discharge instructions discussed and questions answered. Home O2 instructions discussed and questions answered. Pt discharging home and spouse is picking up patient at door 3.

## 2020-12-26 NOTE — DISCHARGE SUMMARY
[facility]  Hospitalist Discharge Summary      Date of Admission:  12/22/2020  Date of Discharge:  12/26/2020  Discharging Provider: Robert Cortez MD      Discharge Diagnoses     Acute Hypoxic Resp Failure  COVID-19    Follow-ups Needed After Discharge   Follow-up Appointments     Follow-up and recommended labs and tests       Follow up with primary care provider, Waldo Matson MD, within   7 days for hospital follow- up.  The following labs/tests are recommended:   none.   Please call to schedule this appointment    Waldo Matson  [phone]   [address] [place], [place] [id number]           Unresulted Labs Ordered in the Past 30 Days of this Admission     No orders found from 11/22/2020 to 12/23/2020.        Discharge Disposition     Discharged to home  Condition at discharge: Stable    Hospital Course   Saul Hairston is a 75 year old  male with medical history of retention, BPH, sleep apnea presented to the ED for shortness of breath.     Acute hypoxia from Covid infection.  Covid pneumonia.  -Leukocytosis likely from steroid therapy.  -Patient diagnosed with Covid 19 on ED visit on 12/18/2020, dismissed home with pulse oximeter and prescriptions for albuterol and Decadron.  At home patient has been monitoring his oxygen saturation, this morning oxygen levels were upper 80s to low 90s with shortness of breath.  Had called his doctor and instructed to come into the ED.  -In ED blood pressure 163/97, oxygen saturation 85% on room air.  Bilateral decreased breath sounds.  No wheezing, no crackles.  -CX-ray showing increased peripheral groundglass infiltrates compared to previous although they remain mild to moderate extent.  -WBC 13.4, glucose 121, creatinine 0.91. Troponin undetectable x1.  ----> 102, --> 457  -procal 0.74  -today. Some nausea. On 3L oximizer, feeling better  HR 40s. Currently in 50-60s. Asymptomatic.   Other viral sxs other than nausea  abated.   Plan;   -Supplemental O2 at 2L at discharge  - Continue on PO Decadron    Hypokalemia likely from diuretic use, poor oral intake. And hctz  Potassium 3.3  CL 40 mEq x 1.  Resolved     Hyponatremia likely from diuretic use, Covid pneumonia, poor oral intake.  Plan:  Na normalized      Bradycardia  Asymptomatic.  No bber or CCBer  HR 70s on admission  Down to 40s. ekg shows NSR. HR 50s. RBBB. No ischemic changes.     Hypertension.  Stopped  PTA hydrochlorothiazide in setting or recent hyponatremia     Physical  deconditioning in the setting of Covid infection.  Lives at home with his wife.  Encourage ambulation as able to.  PT/OT eval -> home okay    Hyperglycemia likely from steroid therapy  Mild hyperglycemia noted,  hemoglobin A1c 6.1%  - range     MUNIRA.  Not on CPAP.     BPH.  Continue PTA tamsulosin.     Gout.  Continue PTA allopurinol.     Arthritis.  Hold PTA indomethacin.     Consultations This Hospital Stay   CARE MANAGEMENT / SOCIAL WORK IP CONSULT  PHYSICAL THERAPY ADULT IP CONSULT  OCCUPATIONAL THERAPY ADULT IP CONSULT  CARE MANAGEMENT / SOCIAL WORK IP CONSULT    Code Status   Full Code    Time Spent on this Encounter   I, Robert Cortez MD, personally saw the patient today and spent greater than 30 minutes discharging this patient.       Robert Cortez MD  [facility]  [address] [place] [id number]  Phone: [phone]  ______________________________________________________________________    Physical Exam   Vital Signs: Temp: 95.9  F (35.5  C) Temp src: Oral BP: 119/65 Pulse: 54   Resp: 16 SpO2: 96 % O2 Device: Nasal cannula Oxygen Delivery: 2 LPM  Weight: 185 lbs 8 oz      Primary Care Physician   Waldo Matson MD    Discharge Orders      COVID-19 [facility] Referral      Reason for your hospital stay    You had shortness of breath from COVID-19     Follow-up and recommended labs and tests     Follow up with primary care provider, Waldo  Brennan Matson MD, within 7 days for hospital follow- up.  The following labs/tests are recommended: none.   Please call to schedule this appointment    Waldo Matson  [phone] [address] [place] [id number]     Activity    Your activity upon discharge: activity as tolerated     Contact provider    Contact your primary care provider if If increased trouble breathing, new arm/leg swelling, dizziness/passing out, falls, bleeding that doesn't stop, or uncontrolled pain.     Oxygen Adult/Peds    Oxygen Documentation:   I certify that this patient, Saul Hairston has been under my care (or a nurse practitioner or physican's assistant working with me). This is the face-to-face encounter for oxygen medical necessity.      Saul Hairston is now in a chronic stable state and continues to require supplemental oxygen. Patient has continued oxygen desaturation due to Chronic Respiratory Failure with Hypoxia J93.11.    Alternative treatment(s) tried or considered and deemed clinically infective for treatment of Chronic Respiratory Failure with Hypoxia J93.11 include nebulizers, inhalers and steroids.  If portability is ordered, is the patient mobile within the home? yes    **Patients who qualify for home O2 coverage under the CMS guidelines require ABG tests or O2 sat readings obtained closest to, but no earlier than 2 days prior to the discharge, as evidence of the need for home oxygen therapy. Testing must be performed while patient is in the chronic stable state. See notes for O2 sats.**     Diet    Follow this diet upon discharge: Orders Placed This Encounter      Combination Diet Low Saturated Fat Na <2400mg Diet       Significant Results and Procedures   Most Recent 3 CBC's:  Recent Labs   Lab Test 12/26/20  0741 12/25/20  0742 12/24/20  0759   WBC 7.0 7.0 7.6   HGB 14.8 16.1 15.2   MCV 85 84 85    256 232     Most Recent 3 BMP's:  Recent Labs   Lab Test 12/26/20  0741 12/25/20  0742  12/24/20  0759    134 135   POTASSIUM 4.5 3.9 4.0   CHLORIDE 102 100 102   CO2 30 28 28   BUN 27 32* 33*   CR 0.79 0.78 0.88   ANIONGAP 2* 6 5   MADIHA 8.4* 8.8 8.5   * 107* 121*     Most Recent 2 LFT's:  Recent Labs   Lab Test 12/26/20  0741 12/25/20  0742   AST 68* 76*   * 199*   ALKPHOS 67 73   BILITOTAL 0.8 0.9     Most Recent 3 INR's:No lab results found.  Most Recent ESR & CRP:  Recent Labs   Lab Test 12/25/20  0742 03/30/17  1601 03/30/17  1601   SED  --   --  8   CRP 29.2*   < > 8.2*    < > = values in this interval not displayed.   ,   Results for orders placed or performed during the hospital encounter of 12/22/20   XR Chest Port 1 View    Narrative    CHEST ONE VIEW December 22, 2020 12:45 PM     HISTORY: COVID. Hypoxia.    COMPARISON: December 18, 2020.      Impression    IMPRESSION: Increased peripheral ground-glass infiltrates compared to  previous although they remain mild to moderate extent.    DIANA NAILS MD   XR Chest Port 1 View    Narrative    XR CHEST PORT 1 VW   12/24/2020 10:12 AM     HISTORY: covid pneumonia, increasing oxygen needs. follow up  infultrates    COMPARISON: 12/22/2020      Impression    IMPRESSION: Bilateral peripheral infiltrates compatible with  pneumonitis, slightly increased in the mid lungs from prior. No  pleural effusion. Normal heart size and pulmonary vascularity. Aortic  calcifications.    REGGIE SAL MD       Discharge Medications   Current Discharge Medication List      START taking these medications    Details   apixaban ANTICOAGULANT (ELIQUIS ANTICOAGULANT) 2.5 MG tablet Take 1 tablet (2.5 mg) by mouth 2 times daily for 21 days  Qty: 42 tablet, Refills: 0    Associated Diagnoses: Pneumonia due to 2019 novel coronavirus         CONTINUE these medications which have NOT CHANGED    Details   albuterol (PROAIR HFA) 108 (90 Base) MCG/ACT inhaler Inhale 2 puffs into the lungs every 2 hours as needed for shortness of breath / dyspnea or  wheezing  Qty: 1 Inhaler, Refills: 2      allopurinol (ZYLOPRIM) 100 MG tablet TAKE 3 TABLETS (300 MG) BY MOUTH DAILY  Qty: 270 tablet, Refills: 0    Associated Diagnoses: Idiopathic chronic gout 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      atorvastatin (LIPITOR) 20 MG tablet Take 1 tablet (20 mg) by mouth daily  Qty: 90 tablet, Refills: 3    Associated Diagnoses: Hyperlipidemia LDL goal <130      indomethacin (INDOCIN) 50 MG capsule Take 1 capsule (50 mg) by mouth 3 times daily as needed for moderate pain  Qty: 40 capsule, Refills: 2    Associated Diagnoses: Idiopathic chronic gout 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      Omega-3 Fatty Acids (OMEGA-3 FISH OIL PO) Take by mouth daily       tamsulosin (FLOMAX) 0.4 MG capsule Take 0.4 mg by mouth every evening         STOP taking these medications       dexamethasone (DECADRON) 6 MG tablet Comments:   Reason for Stopping:         hydrochlorothiazide (HYDRODIURIL) 25 MG tablet Comments:   Reason for Stopping:             Allergies   Allergies   Allergen Reactions     No Known Drug Allergies      Norco [Hydrocodone-Acetaminophen] Nausea and Vomiting

## 2020-12-28 ENCOUNTER — TELEPHONE (OUTPATIENT)
Dept: FAMILY MEDICINE | Facility: CLINIC | Age: 75
End: 2020-12-28

## 2020-12-28 NOTE — TELEPHONE ENCOUNTER
" Provider/MTM-  1. Patient discharged with 21 day course of Eliquis but unable to afford cost of this medication ($400)   A. Is there an alternative patient could try?  Patient has virtual visit with provider on 12/29/20 and was discharged on 12/26/20      Hospital/TCU/ED for chronic condition Discharge Protocol    \"Hi, my name is Ayana Pisano RN, a registered nurse, and I am calling from [facility].  I am calling to follow up and see how things are going for you after your recent emergency visit/hospital/TCU stay.\"    Tell me how you are doing now that you are home?\" Feeling much stronger than 2 days ago.  Able to get up and walk around house.  Using supplemental oxygen and current rate is 2 LPM.  Oxygen saturation per pulse oximeter is 90% without oxygen and with activity, otherwise is above 90%.    Drinking plenty of water and resting.  Finishing up Decadron.  Not able to afford Eliquis-$400.  Not needing to use Albuterol inhaler.    Was unaware he was supposed to stop taking hydrochlorothiazide and does not take Indomethacin frequently-only as needed.      Discharge Instructions    \"Let's review your discharge instructions.  What is/are the follow-up recommendations?  Pt. Response: Follow up with PCP within 7 days    \"Has an appointment with your primary care provider been scheduled?\"   No (schedule appointment)    \"When you see the provider, I would recommend that you bring your medications with you.\"    Medications    \"Tell me what changed about your medicines when you discharged?\"    Changes to chronic meds?    0-1    \"What questions do you have about your medications?\"    None     New diagnoses of heart failure, COPD, diabetes, or MI?    No              Post Discharge Medication Reconciliation Status: discharge medications reconciled, continue medications without change.    Was MTM referral placed (*Make sure to put transitions as reason for referral)?   No    Call Summary    \"What questions or " "concerns do you have about your recent visit and your follow-up care?\"     none    \"If you have questions or things don't continue to improve, we encourage you contact us through the main clinic number (give number).  Even if the clinic is not open, triage nurses are available 24/7 to help you.     We would like you to know that our clinic has extended hours (provide information).  We also have urgent care (provide details on closest location and hours/contact info)\"      \"Thank you for your time and take care!\"             "

## 2020-12-28 NOTE — TELEPHONE ENCOUNTER
This is the most reliable and doesnt need monitoring of inr lesly was warfarin takestime to work to get to therapeutic inr and not ideal to have im coming in if needing to quarantine at home for a while. Can MTM or drug assistance help hi until can be seen as planned at upcoming apt ?  I think its only for one month

## 2020-12-29 ENCOUNTER — VIRTUAL VISIT (OUTPATIENT)
Dept: FAMILY MEDICINE | Facility: CLINIC | Age: 75
End: 2020-12-29
Payer: COMMERCIAL

## 2020-12-29 DIAGNOSIS — U07.1 PNEUMONIA DUE TO 2019 NOVEL CORONAVIRUS: Primary | ICD-10-CM

## 2020-12-29 DIAGNOSIS — J12.82 PNEUMONIA DUE TO 2019 NOVEL CORONAVIRUS: Primary | ICD-10-CM

## 2020-12-29 PROCEDURE — 99495 TRANSJ CARE MGMT MOD F2F 14D: CPT | Mod: 95 | Performed by: FAMILY MEDICINE

## 2020-12-29 NOTE — PROGRESS NOTES
"Saul Hairston is a 75 year old male who is being evaluated via a billable telephone visit.      The patient has been notified of following:     \"This telephone visit will be conducted via a call between you and your physician/provider. We have found that certain health care needs can be provided without the need for a physical exam.  This service lets us provide the care you need with a short phone conversation.  If a prescription is necessary we can send it directly to your pharmacy.  If lab work is needed we can place an order for that and you can then stop by our lab to have the test done at a later time.    Telephone visits are billed at different rates depending on your insurance coverage. During this emergency period, for some insurers they may be billed the same as an in-person visit.  Please reach out to your insurance provider with any questions.    If during the course of the call the physician/provider feels a telephone visit is not appropriate, you will not be charged for this service.\"    Patient has given verbal consent for Telephone visit?  Yes    What phone number would you like to be contacted at? [phone] (H)     How would you like to obtain your AVS? Keshav Badillo     Saul Hairston is a 75 year old male who presents via phone visit today for the following health issues:    HPI      Feet have been swelling since his discharge     Hospital Follow-up Visit:    Hospital/Nursing Home/IP Rehab Facility: [facility]  Date of Admission: 12/22/2020  Date of Discharge: 12/26/2020  Reason(s) for Admission:   Acute Hypoxic Resp Failure  COVID-19      Was your hospitalization related to COVID-19? YES   How are you feeling today? Better  In the past 24 hours have you had shortness of breath when speaking, walking, or climbing stairs? My breathing issues have stayed the same oxygen has been better around 92   Do you have a cough? I don't have a cough  When is the last time you had a fever " greater than 100? Not since discharge   Are you having any other symptoms? Fatigue   Do you have any other stressors you would like to discuss with your provider? No       Was the patient in the ICU or did the patient experience delirium during hospitalization?  No          Problems taking medications regularly:  None  Medication changes since discharge: None  Problems adhering to non-medication therapy:  None    Summary of hospitalization:  [facility] discharge summary reviewed  Diagnostic Tests/Treatments reviewed.  Follow up needed: none  Other Healthcare Providers Involved in Patient s Care:         None  Update since discharge: improved.       Post Discharge Medication Reconciliation: discharge medications reconciled, continue medications without change.  Plan of care communicated with patient              Feet swelling since hospital discharge, has not weighed himself. Drinking a lot of water. Did not restart hydrochlorothiazide. No calf pain. No increasing shortness of breath. Breathing seems to be getting better. Sleeps in a bed. Uses one pillow. Denies PND. Has to get up to use the bathroom during the night. No difficulty voiding. Feeling better and stronger every day.     Review of Systems   As above        Objective          Vitals:  No vitals were obtained today due to virtual visit.    healthy, alert and no distress  PSYCH: Alert and oriented times 3; coherent speech, normal   rate and volume, able to articulate logical thoughts, able   to abstract reason, no tangential thoughts, no hallucinations   or delusions  His affect is normal  RESP: No cough, no audible wheezing, able to talk in full sentences  Remainder of exam unable to be completed due to telephone visits    No results displayed because visit has over 200 results.              Assessment/Plan:    Assessment & Plan            Acute hypoxia from Covid infection.  Covid pneumonia.  Improving. He will let us know if any worsening symptoms.  Plan on follow up with PCP in one week virtually to discuss when to restart hydrochlorothiazide and check BP and labs. Did have mildly elevated LFTs during hospitalization, so would plan to recheck CMP in the next couple of weeks. I placed a post covid clinic referral to evaluate for possible rehab to help with fatigue and shortness of breath (both improving). Bilateral leg swelling present since hospitalization may be secondary to inactivity, stopping diuretic. Denies symptoms concerning for HF or DVT.     -Leukocytosis likely from steroid therapy.  -Patient diagnosed with Covid 19 on ED visit on 12/18/2020, dismissed home with pulse oximeter and prescriptions for albuterol and Decadron.  At home patient has been monitoring his oxygen saturation, this morning oxygen levels were upper 80s to low 90s with shortness of breath.  Had called his doctor and instructed to come into the ED.  -In ED blood pressure 163/97, oxygen saturation 85% on room air.  Bilateral decreased breath sounds.  No wheezing, no crackles.  -CX-ray showing increased peripheral groundglass infiltrates compared to previous although they remain mild to moderate extent.  -WBC 13.4, glucose 121, creatinine 0.91. Troponin undetectable x1.  ----> 102, --> 457  -procal 0.74  -today. Some nausea. On 3L oximizer, feeling better  HR 40s. Currently in 50-60s. Asymptomatic.   Other viral sxs other than nausea abated.   Plan;   -Supplemental O2 at 2L at discharge  - Continue on PO Decadron     Hypokalemia likely from diuretic use, poor oral intake. And hydrochlorothiazide  Continue to hold HCTZ  Potassium 3.3  CL 40 mEq x 1.  Resolved     Hyponatremia likely from diuretic use, Covid pneumonia, poor oral intake.  Plan:  Na normalized      Bradycardia  Asymptomatic.  No bber or CCBer  HR 70s on admission  Down to 40s. ekg shows NSR. HR 50s. RBBB. No ischemic changes.      Hypertension.  Will continue to hold the hydrochlorothiazide for now and  follow up with Dr. Matson in 1-2 weeks.     Stopped  PTA hydrochlorothiazide in setting or recent hyponatremia.      Physical  deconditioning in the setting of Covid infection.  Post COVID clinic referral placed today    Lives at home with his wife.  Encourage ambulation as able to.  PT/OT eval -> home okay     Hyperglycemia likely from steroid therapy  Mild hyperglycemia noted,  hemoglobin A1c 6.1%  - range     MUNIRA.  Not on CPAP.     BPH.  Continue PTA tamsulosin.     Gout.  Continue PTA allopurinol.     Arthritis.  Hold PTA indomethacin.            Return in about 1 week (around 1/5/2021) for Follow up with Dr Matson.    Leatha Sykes MD, MD  [facility]    Phone call duration:  11 minutes

## 2020-12-30 ENCOUNTER — PATIENT OUTREACH (OUTPATIENT)
Dept: NURSING | Facility: CLINIC | Age: 75
End: 2020-12-30
Payer: COMMERCIAL

## 2020-12-30 DIAGNOSIS — J12.82 PNEUMONIA DUE TO 2019 NOVEL CORONAVIRUS: Primary | ICD-10-CM

## 2020-12-30 DIAGNOSIS — U07.1 PNEUMONIA DUE TO 2019 NOVEL CORONAVIRUS: Primary | ICD-10-CM

## 2020-12-30 ASSESSMENT — ACTIVITIES OF DAILY LIVING (ADL): DEPENDENT_IADLS:: INDEPENDENT

## 2020-12-30 NOTE — TELEPHONE ENCOUNTER
Any news about eliquis or alternative that he can afford was recommended to be on it for 1 month post discharge but not started and one week now since discharge  MTM was going to look into it  Did visit with dr rivera yesterday   Will forward to her to address  Not  sure about aspirin if would be helpful or harmlful  It doesnt prevent thromboembolism that I know of

## 2020-12-30 NOTE — PROGRESS NOTES
Clinic Care Coordination Contact    Clinic Care Coordination Contact  OUTREACH    Referral Information:  Referral Source: ED Follow-Up    Primary Diagnosis: Other (include Comment box)(COVID positive)    Chief Complaint   Patient presents with     Clinic Care Coordination - Post Hospital     Clinic Care Coordination RN         Universal Utilization:   [facility]  Hospitalist Discharge Summary       Date of Admission:  12/22/2020  Date of Discharge:  12/26/2020  Discharging Provider: Robert Cortez MD      Discharge Diagnoses     Acute Hypoxic Resp Failure  COVID-19  Clinic Utilization  Difficulty keeping appointments:: No  Compliance Concerns: No  No-Show Concerns: No  No PCP office visit in Past Year: No  Utilization    Last refreshed: 12/29/2020  2:59 PM: Hospital Admissions 2           Last refreshed: 12/29/2020  2:59 PM: ED Visits 2           Last refreshed: 12/29/2020  2:59 PM: No Show Count (past year) 0              Current as of: 12/29/2020  2:59 PM              Clinical Concerns:  Current Medical Concerns: Patient reports his breathing is much better  Pulse Oximeter 95% today.  Uses oxygen at 2 liters while napping .  Patient is using his incentive spirometer 3-4 times a day.  Continues to be tired  Received a call from the post COVID clinic and he was napping and plans to call back and make an appointment  Patient will call the clinic and make a follow up appointment with PCP in 1 week.  hydrochlorothiazide on hold due to low potassium .  Will recheck labs in a week  Weight 183#  Patient didn't start his Eliquis due to it cost was $400.00  Wondering if he should just take ASA  CC will get PCP advise   Current Behavioral Concerns: Not discussed     Education Provided to patient: See above    Pain  Pain (GOAL):: No  Health Maintenance Reviewed: Not assessed  Clinical Pathway: Clinic Care Coordination Pneumonia Assessment  Discharge:    Day of hospital discharge: 12/22/20    What  recommendations were made for follow up after your recent  hospitalization? Finish the Decadron hold HZTC    Have the follow up appointments been scheduled? Yes    If not, can I help you set up these appointments? No         Is there a referral/need for Pulmonary Rehab? No    Is the patient enrolled in [facility] Tele-Assurance program? No    Symptom Review:    How have you been feeling now that you are home?  Tired but better     Are you having any increased shortness of breath? No    Are you having any fevers? No    When you cough, are you coughing up anything? No  Medications:     Were you started on any new medications?  Yes, Eliquis which he has not started     Were any of your previous medications changed? No    Do you have all of your medications? Yes,     Have you had trouble filling your prescriptions? No    Are your medications effective in controlling your symptoms? Yes,     Are you currently on Prednisone (* does pt understand the tapering instructions)?  Finishing Decadron course of treatment       Was MTM or Diabetic Education referral placed (*Make sure to put transitions as reason for referral)? No    Oxygen/DME    Are you currently on oxygen? Yes     Is this new for you? No     Is it set up at home? Yes     What liter flow were you instructed to use and how often do you use it? 2 liters as needed     Who is your supply company? [employer]     Review with patient how to use/maintain nebulizers and inhalers: Yes  Activity:    Have you had to reduce your activities because of dyspnea or other symptoms?  Pacing activity     Were you instructed on how to cough and deep breathe? Yes,   Diet:    Are there any current diet restrictions or changes per discharge instructions? No  Emotions/Lifestyle:      Do you smoke? reports that he has never smoked. He has never used smokeless tobacco.    Medication Management:  Medication reconciliation status: Medications reviewed and reconciled.  Continue  medications without change.     Functional Status:  Dependent ADLs:: Independent  Dependent IADLs:: Independent  Bed or wheelchair confined:: No  Mobility Status: Independent    Living Situation:  Current living arrangement:: I live in a private home with spouse    Lifestyle & Psychosocial Needs:  Lifestyle     Physical activity     Days per week: 0 days     Minutes per session: 0 min     Stress: Not on file     Social Needs     Financial resource strain: Not hard at all     Food insecurity     Worry: Never true     Inability: Never true     Transportation needs     Medical: No     Non-medical: No     Diet:: Regular  Inadequate nutrition (GOAL):: No  Tube Feeding: No  Inadequate activity/exercise (GOAL):: No  Significant changes in sleep pattern (GOAL): No  Transportation means:: Accessible car     Hinduism or spiritual beliefs that impact treatment:: No  Mental health DX:: No  Mental health management concern (GOAL):: No  Chemical Dependency Status: No Current Concerns  Informal Support system:: Spouse   Socioeconomic History     Marital status:      Spouse name: Trang     Number of children: 2     Years of education: Not on file     Highest education level: Not on file   Occupational History     Occupation: Human Resources     Employer: RETIRED   Relationships     Social connections     Talks on phone: Twice a week     Gets together: Twice a week     Attends [employer] service: More than 4 times per year     Active member of club or organization: No     Attends meetings of clubs or organizations: Never     Relationship status:      Intimate partner violence     Fear of current or ex partner: Not on file     Emotionally abused: Not on file     Physically abused: Not on file     Forced sexual activity: Not on file     Tobacco Use     Smoking status: Never Smoker     Smokeless tobacco: Never Used   Substance and Sexual Activity     Alcohol use: No     Drug use: No     Sexual activity: Not Currently      Partners: Female     Birth control/protection: Male Surgical         Resources and Interventions:  Current Resources:      Community Resources: None  Supplies used at home:: None  Equipment Currently Used at Home: none  Employment Status: retired)   )    Advance Care Plan/Directive  Advanced Care Plans/Directives on file:: No    Referrals Placed: None     Goals:   Goals        General    Monitoring (pt-stated)     Notes - Note edited  12/30/2020  2:50 PM by Mehreen Mallory RN    Goal Statement: I will seek medical help if experiencing increased shortness of breth episodes,fever or increased swelling in lower legs   Date Goal set: 12/30/2020  Barriers: Deconditioned   Strengths: agrees with the plan   Date to Achieve By: 1/30/2021  Patient expressed understanding of goal: Yes  Action steps to achieve this goal:  1. I will continue to use oxygen at 2 liters as needed   2. I will the incentive spirometer 3-4 times a day   3. I will check my weight mejia and call if weight gain is 2-3# in a day or 5# in a week    4. I will cough deep breath and drink plenty of water   5. I will call if puse oximeter is consistently below 90%            Patient/Caregiver understanding: Expresses good understanding OF Discharge instructions     Outreach Frequency: weekly      Plan:   CC will get PCP advice on starting aspirin   Patient will call clinic and make a follow up with PCP in 1 week   CC will follow up in 3-5 business days   [facility]   Mehreen Mallory RN, Care Coordinator   [facility] and [place]   E-mail [email or web address]   [phone]

## 2020-12-31 LAB — INTERPRETATION ECG - MUSE: NORMAL

## 2020-12-31 NOTE — TELEPHONE ENCOUNTER
Left message to call back and ask to speak with an available triage nurse.    ANAID GilesN, RN  [facility]

## 2020-12-31 NOTE — TELEPHONE ENCOUNTER
Patient diagnosed with COVID on . Tomorrow would be 14 days out.    Writer spoke with patient and discussed the followin. Recommendations to start Warfarin - Patient very hesitant. Questioned if it is really necessary at this point?  2. Feet are still swollen. Patient denies any other s/s of clotting. Still using oxygen prn. Discussed s/s of DVT and PE to monitoring closely for.  3. Patient is willing to come into clinic for INR monitoring if necessary.  4. Patient requested to take a daily ASA instead of Warfarin - Thoughts?     Linette Marley, ANAIDN, RN

## 2020-12-31 NOTE — TELEPHONE ENCOUNTER
I agree with Dr. Sarabia and would recommend warfarin for the remaining time until 1 month post discharge. He can follow up with me or Dr. Matson with questions as well. Leatha Sykes M.D.

## 2020-12-31 NOTE — TELEPHONE ENCOUNTER
covid increased thromboembolism is lesly if was svere enough where needed hospitalization and oxygen  can choose not to take  Already 1 week off anticoagulation since discharged and was told to take eliquis originally I think 1 month  Its a recommendation  May be bets he get in with post covid clinic follow up soon as recommended by dr rivera who he visited with 2 days ago  Monitor for worsening swelling pain  Without an u/s hard to rule out dvt or pe in chest without a ct so monitor for worsening shortness of breath  He can take asa but it doesnt prevent thromboembolism and no studies i know of show sit helps with hx of covid like anticoagulation does   Can visit more with dr rivera who saw him couple days ago  I am just addressing as covering inbasket

## 2020-12-31 NOTE — TELEPHONE ENCOUNTER
Should we try warfarin, even if for few weeks goal 2 to 3 as cheapest option  But would need inr monitoring & not sure when out of quarantine to do inr checks safely or if home health could do,  im not sure

## 2021-01-04 ENCOUNTER — TELEPHONE (OUTPATIENT)
Dept: FAMILY MEDICINE | Facility: CLINIC | Age: 76
End: 2021-01-04

## 2021-01-04 ENCOUNTER — PATIENT OUTREACH (OUTPATIENT)
Dept: NURSING | Facility: CLINIC | Age: 76
End: 2021-01-04
Payer: COMMERCIAL

## 2021-01-04 DIAGNOSIS — U07.1 LAB TEST POSITIVE FOR DETECTION OF COVID-19 VIRUS: Primary | ICD-10-CM

## 2021-01-04 NOTE — TELEPHONE ENCOUNTER
ACC was consulted last week and patient was hesitant to start warfarin for a month, see 12/30/20 TE for details.   Patient is scheduled for follow-up with PCP tomorrow 1/5/21 to discuss current COVID-19 sx, AC therapy, and recommendations.  Per chart review, it appears patient may have the opportunity to start Eliquis using some coupons per MTM.    If PCP and patient decide to still start warfarin: patient should have a BASELINE INR prior to starting therapy.   Given patient's weight, height, age, sex, and current medications (fish oil & allopurinol possible interactions).  Writer would recommend starting 5 mg daily with first INR check on Friday 1/8/21.    Thanks!  Linette Marley, ANAIDN, RN

## 2021-01-04 NOTE — PROGRESS NOTES
Clinic Care Coordination Contact    Follow Up Progress Note   Universal Utilization:   [facility]  Hospitalist Discharge Summary       Date of Admission:  12/22/2020  Date of Discharge:  12/26/2020  Discharging Provider: Robert Cortez MD      Discharge Diagnoses     Acute Hypoxic Resp Failure  COVID-19     Assessment:   Patient reports bilateral feet edema  Weight stable at 180#  Patient states he already has a call into the clinic in regards to these symptoms.   Oximeter reading 95-97% Denies any increased SOB episodes     Conversation previously between Dr Sykes and Dr Sarabia was the patient could start Warfarin if the Eliquis is too expensive.    CC informed patient of this plan.      Warfarin RX can be sent to [facility] pharmacy.   CC sent a message to Linette Marley INR RN to set up a future appointment to dose Warfarin.      Patient to be on Warfarin 1 month after discharge 12/26/2021    Goals addressed this encounter:     Goal:    Goal Statement: I will seek medical help if experiencing increased shortness of breth episodes,fever or increased swelling in lower legs   Date Goal set: 12/30/2020  Barriers: Deconditioned   Strengths: agrees with the plan   Date to Achieve By: 1/30/2021  Patient expressed understanding of goal: Yes  Action steps to achieve this goal:  1. I will continue to use oxygen at 2 liters as needed   2. I will the incentive spirometer 3-4 times a day   3. I will check my weight mejia and call if weight gain is 2-3# in a day or 5# in a week    4. I will cough deep breath and drink plenty of water   5. I will call if puse oximeter is consistently below 90%     Intervention/Education provided during outreach:   Continue to monitor Oximetry      Plan:   Care Coordinator will follow up in 3-5 days     [facility]   Mehreen Mallory RN, Care Coordinator   [facility] and [place]   E-mail [email or web address]   [phone]

## 2021-01-04 NOTE — TELEPHONE ENCOUNTER
If feet more swollen off anticoagulation may be needs doppler to rule out dvt  Defer to pcp now that dr rinaldi back to send in warfarin for post covid treatment for 1 month ( already been now 2 weeks off any anticoagulation as couldn't afford eliquis )

## 2021-01-04 NOTE — TELEPHONE ENCOUNTER
I think best if patient makes a virtual apt with PCP dr rinaldi and discuss   1. Leg swelling  ?need for imaging as not on anticoagulation  2. Last week noted not started eliquis as too expensive and hesitant  About warfarin in conversation with Linette  3. Now MTSERGO Sarabial has kindly sent a link where he could I ideally get eliquis 30 day free trial if uses rul link provided to print a coupon electronically  4. Too many people involved last week making care confusing so bets if makes apt with PCp soon to discuss all of this and can use fee 30 day trial of eliquis right away until then

## 2021-01-04 NOTE — TELEPHONE ENCOUNTER
I am unsure from that message what exactly is the question.  Please triage and re ruote if needed.

## 2021-01-04 NOTE — TELEPHONE ENCOUNTER
Writer spoke with patient earlier today and edema is stable/unchanged.  There is follow up plan in place and patient is aware to call clinic, any time of day or night, to speak with triage, should any new, worsening or changing symptoms occur.    ANAID GilesN, RN  [facility]

## 2021-01-04 NOTE — TELEPHONE ENCOUNTER
Printed and gave to Ayana SAEED.  See her response.  Due to his leg swelling and high risk of DVT, he should be seen urgently. If he has dvt, treatment duration will change.

## 2021-01-04 NOTE — TELEPHONE ENCOUNTER
Reason for Call:  Other call back    Detailed comments: Patient was asked to go on blood thinner, his feet are swollen     Phone Number Patient can be reached at: Home number on file [phone] (home)    Best Time: anytime    Can we leave a detailed message on this number? YES    Call taken on 1/4/2021 at 11:53 AM by Nayla Moyer

## 2021-01-04 NOTE — TELEPHONE ENCOUNTER
Writer called patient and reviewed message per Dr. Matson.    Patient verbalized understanding and in agreement with plan.    Appt scheduled on 1/5/21.  Appt date, time and location confirmed with patient.    Eliquis coupons placed at  for  and noted Trang Hairston will .    EVANGELISTA Giles, RN  [facility]

## 2021-01-05 ENCOUNTER — OFFICE VISIT (OUTPATIENT)
Dept: FAMILY MEDICINE | Facility: CLINIC | Age: 76
End: 2021-01-05
Payer: COMMERCIAL

## 2021-01-05 VITALS
BODY MASS INDEX: 27.84 KG/M2 | DIASTOLIC BLOOD PRESSURE: 79 MMHG | WEIGHT: 194 LBS | SYSTOLIC BLOOD PRESSURE: 149 MMHG | HEART RATE: 84 BPM | TEMPERATURE: 95.6 F | OXYGEN SATURATION: 97 %

## 2021-01-05 DIAGNOSIS — R60.0 BILATERAL LOWER EXTREMITY EDEMA: Primary | ICD-10-CM

## 2021-01-05 DIAGNOSIS — U07.1 2019 NOVEL CORONAVIRUS DISEASE (COVID-19): ICD-10-CM

## 2021-01-05 PROCEDURE — 99214 OFFICE O/P EST MOD 30 MIN: CPT | Performed by: FAMILY MEDICINE

## 2021-01-05 ASSESSMENT — PAIN SCALES - GENERAL: PAINLEVEL: MILD PAIN (2)

## 2021-01-05 NOTE — PROGRESS NOTES
Assessment & Plan     Bilateral lower extremity edema  High risk of DVT as he was unable to start anticoagulant after his discharge for almost 14 days.  Now he is on apixaban through coupon.   - if dvt, needs longer term anticoagulation and needs to rule out PE.   We discussed about compression socks up to knee-high.  Elevation of feet when he can.  - US Lower Extremity Venous Duplex Bilateral; Future    2019 novel coronavirus disease (COVID-19)  -Can complicate above.  High risk of DVT.  - US Lower Extremity Venous Duplex Bilateral; Future                          No follow-ups on file.    Waldo Matson MD, MD  [facility]    Subjective     Saul Hairston is a 75 year old who presents to clinic today for the following health issues     HPI     Musculoskeletal problem/pain  Onset/Duration: 3 weeks   Description  Location: leg - left and right  Joint Swelling: YES  Redness: YES  Pain: YES- uncomfortable   Warmth: YES  Intensity:  moderate  Progression of Symptoms:  improving  Accompanying signs and symptoms:   Fevers: no  Numbness/tingling/weakness: YES  History  Trauma to the area: no  Recent illness:  YES  Previous similar problem: no  Previous evaluation:  no  Precipitating or alleviating factors:  Aggravating factors include: standing and walking  Therapies tried and outcome: nothing    Both feet are swollen. No pain.   Started when hospital.   Now getting worse.   Itchy and irritated.   Was hospitalized with covid recently. Has home oxygen. Not needing to use it during day time. No short of breath.               Objective    BP (!) 149/79 (BP Location: Left arm, Patient Position: Chair, Cuff Size: Adult Regular)   Pulse 84   Temp 95.6  F (35.3  C) (Tympanic)   Wt 88 kg (194 lb)   SpO2 97%   BMI 27.84 kg/m    Body mass index is 27.84 kg/m .  Physical Exam   GENERAL: healthy, alert and no distress  Both lower extremity pitting edema up to knee.  Mostly around ankles and  feet.  PSYCH: mentation appears normal, affect normal/bright

## 2021-01-05 NOTE — PATIENT INSTRUCTIONS
Please call RADIOLOGY to schedule an appointment for ultrasound:  [facility]:  [phone]   [facility]: [phone]     COVID vaccine FAQ:  https://bit.ly/[email or web address]      Did you know?      You can schedule a video visit for follow-up appointments as well as future appointments for certain conditions.  Please see the below link.     [email or web address]    If you have not already done so,  I encourage you to sign up for [employer] ([email or web address]).  This will allow you to review your results, securely communicate with a provider, and schedule virtual visits as well.

## 2021-01-07 ENCOUNTER — HOSPITAL ENCOUNTER (OUTPATIENT)
Dept: ULTRASOUND IMAGING | Facility: CLINIC | Age: 76
Discharge: HOME | End: 2021-01-07
Attending: FAMILY MEDICINE | Admitting: FAMILY MEDICINE
Payer: COMMERCIAL

## 2021-01-07 DIAGNOSIS — U07.1 2019 NOVEL CORONAVIRUS DISEASE (COVID-19): ICD-10-CM

## 2021-01-07 DIAGNOSIS — R60.0 BILATERAL LOWER EXTREMITY EDEMA: ICD-10-CM

## 2021-01-07 PROCEDURE — 93970 EXTREMITY STUDY: CPT

## 2021-01-08 ENCOUNTER — PATIENT OUTREACH (OUTPATIENT)
Dept: NURSING | Facility: CLINIC | Age: 76
End: 2021-01-08
Payer: COMMERCIAL

## 2021-01-08 DIAGNOSIS — U07.1 LAB TEST POSITIVE FOR DETECTION OF COVID-19 VIRUS: Primary | ICD-10-CM

## 2021-01-08 ASSESSMENT — ACTIVITIES OF DAILY LIVING (ADL): DEPENDENT_IADLS:: INDEPENDENT

## 2021-01-08 NOTE — PROGRESS NOTES
Clinic Care Coordination Contact    Follow Up Progress Note   [facility]  Hospitalist Discharge Summary       Date of Admission:  12/22/2020  Date of Discharge:  12/26/2020  Discharging Provider: Robert Cortez MD           Discharge Diagnoses        Acute Hypoxic Resp Failure  COVID-19     Assessment: Patient reports he had a doppler of lower extremities yesterday and PCP will call him with the results right leg swelling is better and left leg is still a little puffy.  Patient continues the Eliquis as directed   Pulse oximeter reading 95-97% Using oxygen at 2 liters twice a day per PCP instruction     Goals addressed this encounter:   Goals Addressed                 This Visit's Progress      Monitoring (pt-stated)   70%     Goal Statement: I will seek medical help if experiencing increased shortness of breth episodes,fever or increased swelling in lower legs   Date Goal set: 12/30/2020  Barriers: Deconditioned   Strengths: agrees with the plan   Date to Achieve By: 2/30/2021  Patient expressed understanding of goal: Yes  Action steps to achieve this goal:  1. I will continue to use oxygen at 2 liters as needed   2. I will the incentive spirometer 3-4 times a day   3. I will check my weight mejia and call if weight gain is 2-3# in a day or 5# in a week    4. I will cough deep breath and drink plenty of water   5. I will call if pulse oximeter is consistently below 90%             Intervention/Education provided during outreach: call with increased SOB or low Oximetry readings below 905     Outreach Frequency: weekly    Plan:     Care Coordinator will follow up in 3-5 business days     [facility]   Mehreen Mallory RN, Care Coordinator   [facility] and [place]   E-mail [email or web address]   [phone]

## 2021-01-11 ENCOUNTER — TRANSFERRED RECORDS (OUTPATIENT)
Dept: HEALTH INFORMATION MANAGEMENT | Facility: CLINIC | Age: 76
End: 2021-01-11

## 2021-01-11 ENCOUNTER — TELEPHONE (OUTPATIENT)
Dept: FAMILY MEDICINE | Facility: CLINIC | Age: 76
End: 2021-01-11

## 2021-01-11 DIAGNOSIS — R93.6 ABNORMAL ULTRASOUND OF LOWER EXTREMITY: Primary | ICD-10-CM

## 2021-01-11 NOTE — TELEPHONE ENCOUNTER
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   I have ordered that and he should try to get it in next 1-2 days.   Please notify him and help to schedule if needed.

## 2021-01-12 ENCOUNTER — HOSPITAL ENCOUNTER (OUTPATIENT)
Dept: CT IMAGING | Facility: CLINIC | Age: 76
Discharge: HOME | End: 2021-01-12
Attending: FAMILY MEDICINE | Admitting: FAMILY MEDICINE
Payer: COMMERCIAL

## 2021-01-12 DIAGNOSIS — R93.6 ABNORMAL ULTRASOUND OF LOWER EXTREMITY: ICD-10-CM

## 2021-01-12 PROCEDURE — 250N000011 HC RX IP 250 OP 636: Performed by: FAMILY MEDICINE

## 2021-01-12 PROCEDURE — 250N000009 HC RX 250: Performed by: FAMILY MEDICINE

## 2021-01-12 PROCEDURE — 74177 CT ABD & PELVIS W/CONTRAST: CPT

## 2021-01-12 RX ORDER — IOPAMIDOL 755 MG/ML
95 INJECTION, SOLUTION INTRAVASCULAR ONCE
Status: COMPLETED | OUTPATIENT
Start: 2021-01-12 | End: 2021-01-12

## 2021-01-12 RX ADMIN — IOPAMIDOL 95 ML: 755 INJECTION, SOLUTION INTRAVENOUS at 17:32

## 2021-01-12 RX ADMIN — SODIUM CHLORIDE 67 ML: 9 INJECTION, SOLUTION INTRAVENOUS at 17:32

## 2021-01-14 ENCOUNTER — HEALTH MAINTENANCE LETTER (OUTPATIENT)
Age: 76
End: 2021-01-14

## 2021-01-20 DIAGNOSIS — U07.1 PNEUMONIA DUE TO 2019 NOVEL CORONAVIRUS: ICD-10-CM

## 2021-01-20 DIAGNOSIS — J12.82 PNEUMONIA DUE TO 2019 NOVEL CORONAVIRUS: ICD-10-CM

## 2021-01-20 NOTE — TELEPHONE ENCOUNTER
Routing refill request to provider for review/approval because:  Drug not active on patient's medication list   do yo uwant him to continue this medication? Please sign off on med if ok.    Thank you

## 2021-01-20 NOTE — TELEPHONE ENCOUNTER
apixaban ANTICOAGULANT (ELIQUIS ANTICOAGULANT) 2.5 MG tablet      Last Written Prescription Date:  12-26-20  Last Fill Quantity: 42 tab,   # refills: 0  Last Office Visit: 1-15-21  Future Office visit:       Routing refill request to provider for review/approval because:  Drug not active on patient's medication list

## 2021-01-21 DIAGNOSIS — M1A.0720 IDIOPATHIC CHRONIC GOUT OF LEFT FOOT WITHOUT TOPHUS: ICD-10-CM

## 2021-01-23 RX ORDER — ALLOPURINOL 100 MG/1
TABLET ORAL
Qty: 90 TABLET | Refills: 0 | Status: SHIPPED | OUTPATIENT
Start: 2021-01-23 | End: 2021-02-22

## 2021-01-27 ENCOUNTER — PATIENT OUTREACH (OUTPATIENT)
Dept: NURSING | Facility: CLINIC | Age: 76
End: 2021-01-27
Payer: COMMERCIAL

## 2021-01-27 DIAGNOSIS — U07.1 PNEUMONIA DUE TO 2019 NOVEL CORONAVIRUS: Primary | ICD-10-CM

## 2021-01-27 DIAGNOSIS — J12.82 PNEUMONIA DUE TO 2019 NOVEL CORONAVIRUS: Primary | ICD-10-CM

## 2021-01-27 ASSESSMENT — ACTIVITIES OF DAILY LIVING (ADL): DEPENDENT_IADLS:: INDEPENDENT

## 2021-01-27 NOTE — PROGRESS NOTES
Clinic Care Coordination Contact    Follow Up Progress Note   Universal Utilization:   [facility]  Hospitalist Discharge Summary       Date of Admission:  12/22/2020  Date of Discharge:  12/26/2020  Discharging Provider: Robert Cortez MD      Discharge Diagnoses     Acute Hypoxic Resp Failure  COVID-19     Assessment:Patient reports he has not used the oxygen for a few days and Oximeter reading is 97-98%  Patient finished course of Eliquis.  Lower extremity edema has improved.  Just a slight swelling in left ankle   Weight stable at 187#  Goals addressed this encounter:   Goals Addressed    None          Intervention/Education provided during outreach: continue daily weights and check Oximeter readings.  Agrees to seek medical help if reoccurrence of SOB ,fever or Oximeter reading  below 90 %          Plan:   No unmet needs, no further care coordination needed at this time   Graduation letter sent via My Chart     [facility]   Mehreen Mallory RN, Care Coordinator   [facility] and [place]   E-mail [email or web address]   [phone]

## 2021-01-27 NOTE — LETTER
[place] CARE COORDINATION  [address] [place] [address]    January 27, 2021    Saul Hairston  [address]  [facility] [address]    Dear Saul,  Your Care Team congratulates you on your journey to maintain wellness. This document will help guide you on your journey to maintain a healthy lifestyle.  You can use this to help you overcome any barriers you may encounter.  If you should have any questions or concerns, you can contact the members of your Care Team or contact your Primary Care Clinic for assistance.     Health Maintenance  Health Maintenance Reviewed: Not assessed    My Access Plan  Medical Emergency 911   Primary Clinic Line [facility] - [phone]   24 Hour Appointment Line [phone] or  [phone] ([phone]) (toll-free)   24 Hour Nurse Line [phone] (toll-free)   Preferred Urgent Care Other   Preferred Hospital [facility]  [phone]   Preferred Pharmacy [email or web address]     Behavioral Health Crisis Line The National Suicide Prevention Lifeline at 1-432.678.2980 or 911     My Care Team Members  Patient Care Team       Relationship Specialty Notifications Start End    Waldo Matson MD PCP - General Family Practice  6/7/16     Phone: [phone] Fax: [phone] [address] [place] [address]    Waldo Matson MD Assigned PCP   11/22/20     Phone: [phone] Fax: [phone]         [facility] [address] [address]              Goals    None          Advance Care Plans/Directives Type:      We notice that you do not have an Advance Directive on file. Upon completion of your Health Care Directive, please bring a copy with you to your next office visit.    It has been your Clinic Care Team's pleasure to work with you on your goals.    [place],  SERGO [facility]   Mehreen Mallory RN, Care Coordinator   [facility]  [facility] and [place]   E-mail [email or web address]   [phone]   Your Clinic Care Team

## 2021-02-16 DIAGNOSIS — M1A.0720 IDIOPATHIC CHRONIC GOUT OF LEFT FOOT WITHOUT TOPHUS: ICD-10-CM

## 2021-02-22 RX ORDER — ALLOPURINOL 100 MG/1
TABLET ORAL
Qty: 90 TABLET | Refills: 0 | Status: SHIPPED | OUTPATIENT
Start: 2021-02-22 | End: 2021-03-22

## 2021-03-14 ENCOUNTER — TRANSFERRED RECORDS (OUTPATIENT)
Dept: HEALTH INFORMATION MANAGEMENT | Facility: CLINIC | Age: 76
End: 2021-03-14

## 2021-03-22 ENCOUNTER — TRANSFERRED RECORDS (OUTPATIENT)
Dept: HEALTH INFORMATION MANAGEMENT | Facility: CLINIC | Age: 76
End: 2021-03-22

## 2021-04-14 ENCOUNTER — TRANSFERRED RECORDS (OUTPATIENT)
Dept: HEALTH INFORMATION MANAGEMENT | Facility: CLINIC | Age: 76
End: 2021-04-14

## 2021-05-08 DIAGNOSIS — M1A.0720 IDIOPATHIC CHRONIC GOUT OF LEFT FOOT WITHOUT TOPHUS: ICD-10-CM

## 2021-05-11 RX ORDER — ALLOPURINOL 100 MG/1
TABLET ORAL
Qty: 90 TABLET | Refills: 0 | Status: SHIPPED | OUTPATIENT
Start: 2021-05-11 | End: 2021-06-08

## 2021-05-11 NOTE — TELEPHONE ENCOUNTER
Team coordinators-Please contact patient to schedule non-fasting lab appt.  Overdue for uric acid level check.    Lab ordered.    Prescription approved per [facility] Refill Protocol.    Thank you!  ANAID GilesN, RN  [facility]

## 2021-06-05 DIAGNOSIS — M1A.0720 IDIOPATHIC CHRONIC GOUT OF LEFT FOOT WITHOUT TOPHUS: ICD-10-CM

## 2021-06-08 RX ORDER — ALLOPURINOL 100 MG/1
TABLET ORAL
Qty: 30 TABLET | Refills: 0 | Status: SHIPPED | OUTPATIENT
Start: 2021-06-08 | End: 2022-05-17

## 2021-06-08 NOTE — TELEPHONE ENCOUNTER
Covering providers-Please review and sign if agree.  Mendy refill given and patient did not follow up.    Team Coordinators-Please contact patient to schedule non-fasting lab appointment for uric acid level check.    Thank you!  ANAID GilesN, RN  [facility]

## 2021-08-04 ENCOUNTER — TRANSFERRED RECORDS (OUTPATIENT)
Dept: HEALTH INFORMATION MANAGEMENT | Facility: CLINIC | Age: 76
End: 2021-08-04

## 2021-10-21 ENCOUNTER — OFFICE VISIT (OUTPATIENT)
Dept: URGENT CARE | Facility: URGENT CARE | Age: 76
End: 2021-10-21
Payer: COMMERCIAL

## 2021-10-21 VITALS
OXYGEN SATURATION: 98 % | WEIGHT: 212 LBS | SYSTOLIC BLOOD PRESSURE: 162 MMHG | DIASTOLIC BLOOD PRESSURE: 77 MMHG | RESPIRATION RATE: 16 BRPM | BODY MASS INDEX: 30.42 KG/M2 | HEART RATE: 61 BPM

## 2021-10-21 DIAGNOSIS — S91.311A FOOT LACERATION, RIGHT, INITIAL ENCOUNTER: Primary | ICD-10-CM

## 2021-10-21 DIAGNOSIS — R60.0 BILATERAL LEG EDEMA: ICD-10-CM

## 2021-10-21 PROCEDURE — 99213 OFFICE O/P EST LOW 20 MIN: CPT | Performed by: PHYSICIAN ASSISTANT

## 2021-10-21 NOTE — PROGRESS NOTES
"  Assessment & Plan     Foot laceration, right, initial encounter  1.5 cm healing open wound on dorsal foot  Area is oozing serous fluid due to leg and foot edema  Wound is slow healing, no signs of infection  Keep covered and seal with bacitracin  Advised to work on leg elevation    Bilateral leg edema  Advised to wear compressions stocking as soon as waking up in the morning  Leg elevation  Wound is going to heal slower due to edeme      Review of external notes as documented elsewhere in note         BMI:   Estimated body mass index is 30.42 kg/m  as calculated from the following:    Height as of 12/22/20: 1.778 m (5' 10\").    Weight as of this encounter: 96.2 kg (212 lb).       No follow-ups on file.    Eduar Fletcher PA-C  [facility] is a 76 year old who presents for the following health issues     HPI     Right foot wound  Laceration 1 week ago  Oozing fluid    Review of Systems   Constitutional, HEENT, cardiovascular, pulmonary, gi and gu systems are negative, except as otherwise noted.      Objective    BP (!) 162/77   Pulse 61   Resp 16   Wt 96.2 kg (212 lb)   SpO2 98%   BMI 30.42 kg/m    Body mass index is 30.42 kg/m .  Physical Exam   GENERAL: healthy, alert and no distress  MS: Positive for mild < 1+ pitting edema to mid leg  SKIN: Positive for slow healing 1.5 cm laceration, still open  NEURO: Normal strength and tone, mentation intact and speech normal  PSYCH: mentation appears normal, affect normal/bright                "

## 2021-10-24 ENCOUNTER — HEALTH MAINTENANCE LETTER (OUTPATIENT)
Age: 76
End: 2021-10-24

## 2021-12-17 DIAGNOSIS — E78.5 HYPERLIPIDEMIA LDL GOAL <130: ICD-10-CM

## 2021-12-18 RX ORDER — ATORVASTATIN CALCIUM 20 MG/1
TABLET, FILM COATED ORAL
Qty: 90 TABLET | Refills: 0 | Status: SHIPPED | OUTPATIENT
Start: 2021-12-18 | End: 2022-03-17

## 2021-12-18 NOTE — TELEPHONE ENCOUNTER
Team Coordinators-Please contact patient to schedule fasting lab appt to check cholesterol levels.  Patient to please fast 8-10 hours.  Sips of water and taking usual morning medications is fine.    Prescription approved per [facility] Refill Protocol.    Lipid panel ordered.    Thank you!  ANAID GilesN, RN  [facility]

## 2022-02-01 ENCOUNTER — TRANSFERRED RECORDS (OUTPATIENT)
Dept: HEALTH INFORMATION MANAGEMENT | Facility: CLINIC | Age: 77
End: 2022-02-01
Payer: COMMERCIAL

## 2022-02-13 ENCOUNTER — HEALTH MAINTENANCE LETTER (OUTPATIENT)
Age: 77
End: 2022-02-13

## 2022-03-16 DIAGNOSIS — E78.5 HYPERLIPIDEMIA LDL GOAL <130: ICD-10-CM

## 2022-03-17 NOTE — TELEPHONE ENCOUNTER
Routing refill request to provider for review/approval because:  Mendy given x1 and patient did not follow up, please advise  Patient needs to be seen because it has been more than 1 year since last office visit.    Team Coordinators: Please contact patient to set up annual physical for any further refills.     ANAID PlasenciaN RN  [facility]

## 2022-03-18 RX ORDER — ATORVASTATIN CALCIUM 20 MG/1
20 TABLET, FILM COATED ORAL DAILY
Qty: 90 TABLET | Refills: 0 | Status: SHIPPED | OUTPATIENT
Start: 2022-03-18 | End: 2022-05-17

## 2022-05-17 ENCOUNTER — OFFICE VISIT (OUTPATIENT)
Dept: FAMILY MEDICINE | Facility: CLINIC | Age: 77
End: 2022-05-17
Payer: COMMERCIAL

## 2022-05-17 VITALS
RESPIRATION RATE: 16 BRPM | HEIGHT: 68 IN | HEART RATE: 65 BPM | OXYGEN SATURATION: 97 % | DIASTOLIC BLOOD PRESSURE: 78 MMHG | BODY MASS INDEX: 33.34 KG/M2 | WEIGHT: 220 LBS | SYSTOLIC BLOOD PRESSURE: 130 MMHG | TEMPERATURE: 98.4 F

## 2022-05-17 DIAGNOSIS — E78.5 HYPERLIPIDEMIA LDL GOAL <130: ICD-10-CM

## 2022-05-17 DIAGNOSIS — R06.2 WHEEZING: ICD-10-CM

## 2022-05-17 DIAGNOSIS — E66.9 NON MORBID OBESITY, UNSPECIFIED OBESITY TYPE: ICD-10-CM

## 2022-05-17 DIAGNOSIS — M1A.0720 IDIOPATHIC CHRONIC GOUT OF LEFT FOOT WITHOUT TOPHUS: ICD-10-CM

## 2022-05-17 DIAGNOSIS — Z00.00 ENCOUNTER FOR MEDICARE ANNUAL WELLNESS EXAM: Primary | ICD-10-CM

## 2022-05-17 DIAGNOSIS — M25.649 STIFFNESS OF FINGER JOINT, UNSPECIFIED LATERALITY: ICD-10-CM

## 2022-05-17 DIAGNOSIS — R73.09 ELEVATED GLUCOSE: ICD-10-CM

## 2022-05-17 DIAGNOSIS — G47.33 OSA (OBSTRUCTIVE SLEEP APNEA): ICD-10-CM

## 2022-05-17 LAB
ERYTHROCYTE [DISTWIDTH] IN BLOOD BY AUTOMATED COUNT: 13 % (ref 10–15)
HCT VFR BLD AUTO: 51.2 % (ref 40–53)
HGB BLD-MCNC: 17.1 G/DL (ref 13.3–17.7)
MCH RBC QN AUTO: 29.4 PG (ref 26.5–33)
MCHC RBC AUTO-ENTMCNC: 33.4 G/DL (ref 31.5–36.5)
MCV RBC AUTO: 88 FL (ref 78–100)
PLATELET # BLD AUTO: 219 10E3/UL (ref 150–450)
RBC # BLD AUTO: 5.81 10E6/UL (ref 4.4–5.9)
WBC # BLD AUTO: 7.4 10E3/UL (ref 4–11)

## 2022-05-17 PROCEDURE — 36415 COLL VENOUS BLD VENIPUNCTURE: CPT | Performed by: FAMILY MEDICINE

## 2022-05-17 PROCEDURE — 85027 COMPLETE CBC AUTOMATED: CPT | Performed by: FAMILY MEDICINE

## 2022-05-17 PROCEDURE — 99397 PER PM REEVAL EST PAT 65+ YR: CPT | Performed by: FAMILY MEDICINE

## 2022-05-17 PROCEDURE — 80053 COMPREHEN METABOLIC PANEL: CPT | Performed by: FAMILY MEDICINE

## 2022-05-17 PROCEDURE — 80061 LIPID PANEL: CPT | Performed by: FAMILY MEDICINE

## 2022-05-17 PROCEDURE — 99214 OFFICE O/P EST MOD 30 MIN: CPT | Mod: 25 | Performed by: FAMILY MEDICINE

## 2022-05-17 PROCEDURE — 83036 HEMOGLOBIN GLYCOSYLATED A1C: CPT | Performed by: FAMILY MEDICINE

## 2022-05-17 RX ORDER — INDOMETHACIN 50 MG/1
50 CAPSULE ORAL 3 TIMES DAILY PRN
Qty: 30 CAPSULE | Refills: 0 | Status: SHIPPED | OUTPATIENT
Start: 2022-05-17 | End: 2023-09-28

## 2022-05-17 RX ORDER — ALBUTEROL SULFATE 90 UG/1
2 AEROSOL, METERED RESPIRATORY (INHALATION)
Qty: 18 G | Refills: 0 | Status: SHIPPED | OUTPATIENT
Start: 2022-05-17 | End: 2023-02-10

## 2022-05-17 RX ORDER — ATORVASTATIN CALCIUM 20 MG/1
20 TABLET, FILM COATED ORAL DAILY
Qty: 90 TABLET | Refills: 3 | Status: SHIPPED | OUTPATIENT
Start: 2022-05-17 | End: 2023-08-10

## 2022-05-17 ASSESSMENT — ENCOUNTER SYMPTOMS
COUGH: 0
SORE THROAT: 0
HEARTBURN: 0
PALPITATIONS: 0
DYSURIA: 0
HEADACHES: 0
SHORTNESS OF BREATH: 1
HEMATURIA: 0
FEVER: 0
HEMATOCHEZIA: 0
DIZZINESS: 0
FREQUENCY: 1
CONSTIPATION: 0
NAUSEA: 0
EYE PAIN: 0
ABDOMINAL PAIN: 0
WEAKNESS: 1
CHILLS: 0
ARTHRALGIAS: 1
DIARRHEA: 0
JOINT SWELLING: 1
MYALGIAS: 0
PARESTHESIAS: 0
NERVOUS/ANXIOUS: 0

## 2022-05-17 ASSESSMENT — ACTIVITIES OF DAILY LIVING (ADL): CURRENT_FUNCTION: NO ASSISTANCE NEEDED

## 2022-05-17 NOTE — PATIENT INSTRUCTIONS
Patient Education   Personalized Prevention Plan  You are due for the preventive services outlined below.  Your care team is available to assist you in scheduling these services.  If you have already completed any of these items, please share that information with your care team to update in your medical record.  Health Maintenance Due   Topic Date Due     ANNUAL REVIEW OF HM ORDERS  Never done     COVID-19 Vaccine (1) Never done     Zoster (Shingles) Vaccine (1 of 2) Never done     Cholesterol Lab  11/17/2021     Annual Wellness Visit  12/21/2021     FALL RISK ASSESSMENT  12/22/2021     Comprehensive Metabolic Panel  12/26/2021     Complete Blood Count  12/26/2021

## 2022-05-17 NOTE — PROGRESS NOTES
"SUBJECTIVE:   Saul Hairston is a 76 year old male who presents for Preventive Visit.  Patient has been advised of split billing requirements and indicates understanding: Yes  Are you in the first 12 months of your Medicare coverage?  No    Healthy Habits:     In general, how would you rate your overall health?  Good    Frequency of exercise:  2-3 days/week    Duration of exercise:  15-30 minutes    Do you usually eat at least 4 servings of fruit and vegetables a day, include whole grains    & fiber and avoid regularly eating high fat or \"junk\" foods?  No    Taking medications regularly:  Yes    Medication side effects:  Not applicable    Ability to successfully perform activities of daily living:  No assistance needed    Home Safety:  No safety concerns identified    Hearing Impairment:  Difficulty following a conversation in a noisy restaurant or crowded room, feel that people are mumbling or not speaking clearly and need to ask people to speak up or repeat themselves    In the past 6 months, have you been bothered by leaking of urine? Yes    In general, how would you rate your overall mental or emotional health?  Excellent      PHQ-2 Total Score: 1    Additional concerns today:  Yes    Do you feel safe in your environment? Yes    Have you ever done Advance Care Planning? (For example, a Health Directive, POLST, or a discussion with a medical provider or your loved ones about your wishes): No, advance care planning information given to patient to review.  Patient plans to discuss their wishes with loved ones or provider.       Fall risk  Fallen 2 or more times in the past year?: Yes  Any fall with injury in the past year?: Yes  Timed Up and Go Test (>13.5 is fall risk; contact physician) : 9    Cognitive Screening   1) Repeat 3 items (Leader, Season, Table)    2) Clock draw: NORMAL  3) 3 item recall: Recalls 3 objects  Results: 3 items recalled: COGNITIVE IMPAIRMENT LESS LIKELY    Mini-CogTM Copyright [employer]. " Licensed by the author for use in [facility]; reprinted with permission (imnna@[facility]). All rights reserved.      Do you have sleep apnea, excessive snoring or daytime drowsiness?: no    Reviewed and updated as needed this visit by clinical staff   Tobacco  Allergies  Meds   Med Hx  Surg Hx  Fam Hx  Soc Hx        Reviewed and updated as needed this visit by Provider                   Social History     Tobacco Use     Smoking status: Never Smoker     Smokeless tobacco: Never Used   Substance Use Topics     Alcohol use: No     If you drink alcohol do you typically have >3 drinks per day or >7 drinks per week? No    Alcohol Use 5/17/2022   Prescreen: >3 drinks/day or >7 drinks/week? Not Applicable   Prescreen: >3 drinks/day or >7 drinks/week? -     Current providers sharing in care for this patient include:   Patient Care Team:  Waldo Matson MD as PCP - General (Family Practice)  Waldo Matson MD as Assigned PCP    The following health maintenance items are reviewed in Epic and correct as of today:  Health Maintenance Due   Topic Date Due     COVID-19 Vaccine (1) Never done     ZOSTER IMMUNIZATION (1 of 2) Never done     LIPID  11/17/2021     FALL RISK ASSESSMENT  12/22/2021     CMP  12/26/2021     CBC  12/26/2021     Leg swelling both knee.     Seeing chiropractor and nutritionist. Was advised some homepoatic meds.     Feeling tired. Sleeping well. Not snoring or stopped breathing in sleep as per wife.   Almost taking a nap every day. Gets up around every 2-6 hours at night time. Can go back to sleep just fine.   Been to sleep specialist. Was told to have severe sleep apnea.   Goes to sleep from 12:30 to 2 am. Hoping to go to sleep early. No issue with driving.      Both fingers are getting stiff. Feels holding fluid inside. Mother and grandmother rheumatoid arhtritis. Gout - doing well.     Weight - went to 180 and started eating junk. Now eating better again.     Since  "prostate surgery Dec 2020, pull of testicle. Feels soreness with walking.     Seeing dermatologist.     Allopurinol - a year ago last time. Doing ok from gout. Would like to keep indomethacin handy.     Review of Systems   Constitutional: Negative for chills and fever.   HENT: Positive for hearing loss. Negative for congestion, ear pain and sore throat.    Eyes: Negative for pain and visual disturbance.   Respiratory: Positive for shortness of breath. Negative for cough.    Cardiovascular: Positive for peripheral edema. Negative for chest pain and palpitations.   Gastrointestinal: Negative for abdominal pain, constipation, diarrhea, heartburn, hematochezia and nausea.   Genitourinary: Positive for frequency and impotence. Negative for dysuria, genital sores, hematuria, penile discharge and urgency.   Musculoskeletal: Positive for arthralgias and joint swelling. Negative for myalgias.   Skin: Positive for rash.   Neurological: Positive for weakness. Negative for dizziness, headaches and paresthesias.   Psychiatric/Behavioral: Negative for mood changes. The patient is not nervous/anxious.      OBJECTIVE:   /78   Pulse 65   Temp 98.4  F (36.9  C) (Temporal)   Resp 16   Ht 1.727 m (5' 8\")   Wt 99.8 kg (220 lb)   SpO2 97%   BMI 33.45 kg/m   Estimated body mass index is 33.45 kg/m  as calculated from the following:    Height as of this encounter: 1.727 m (5' 8\").    Weight as of this encounter: 99.8 kg (220 lb).  Physical Exam  GENERAL: healthy, alert and no distress  EYES: Eyes grossly normal to inspection, PERRL and conjunctivae and sclerae normal  HENT: ear canals and TM's normal, nose and mouth without ulcers or lesions  NECK: no adenopathy, no asymmetry, masses, or scars and thyroid normal to palpation  RESP: lungs clear to auscultation - no rales, rhonchi or wheezes  CV: regular rate and rhythm, normal S1 S2, no S3 or S4, no murmur, click or rub, no peripheral edema and peripheral pulses " strong  ABDOMEN: soft, nontender, no hepatosplenomegaly, no masses and bowel sounds normal   (male): normal male genitalia without lesions or urethral discharge, no hernia  MS: no gross musculoskeletal defects noted, bilateral pedal pitting edema  SKIN: no suspicious lesions or rashes  NEURO: Normal strength and tone, mentation intact and speech normal  PSYCH: mentation appears normal, affect normal/bright    ASSESSMENT / PLAN:   (Z00.00) Encounter for Medicare annual wellness exam  (primary encounter diagnosis)  Comment:    Plan:      (G47.33) MUNIRA (obstructive sleep apnea)  Comment:    Plan:  Severe as per report. Does not want cpap. Daytime drowsiness. Offered follow up with sleep doctor and he wishes to think about it.     (M25.649) Stiffness of finger joint, unspecified laterality  Comment:    Plan:  Offered labs for RA due to family history. Uncommon for his age but cant rule out. He would like to hold off on intervention as symptoms are mild.     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 Not using allopurinol. Would like to keep indomethacin handy.   Plan: CBC with Platelets, indomethacin (INDOCIN) 50         MG capsule             (E78.5) Hyperlipidemia LDL goal <130  Comment:  Patient is tolerating current medication without any major side effects of concerns and current dose seems reasonable too.  Current medication regime is effective. Continue current treatment without any changes.   Plan: COMPREHENSIVE METABOLIC PANEL, Lipid panel         reflex to direct LDL Fasting, atorvastatin         (LIPITOR) 20 MG tablet             (E66.9) Non morbid obesity, unspecified obesity type  Comment:  Body mass index is 33.45 kg/m .  Plan:  Continue to work on lifestyle and weight loss.    (R06.2) Wheezing  Comment: no formal asthma or copd diagnosis. No wheezing currently. Would like to keep albuterol handy. If needing regularly, needs further evaluation.   Plan: albuterol  "(PROAIR HFA) 108 (90 Base) MCG/ACT         inhaler             Wishes to hold off on immunization today - shingrix and covid.         COUNSELING:  Reviewed preventive health counseling, as reflected in patient instructions  Special attention given to:       Regular exercise       Healthy diet/nutrition       Vision screening       Hearing screening       Dental care       Bladder control       Fall risk prevention    Estimated body mass index is 33.45 kg/m  as calculated from the following:    Height as of this encounter: 1.727 m (5' 8\").    Weight as of this encounter: 99.8 kg (220 lb).    Weight management plan: Discussed healthy diet and exercise guidelines    He reports that he has never smoked. He has never used smokeless tobacco.      Appropriate preventive services were discussed with this patient, including applicable screening as appropriate for cardiovascular disease, diabetes, osteopenia/osteoporosis, and glaucoma.  As appropriate for age/gender, discussed screening for colorectal cancer, prostate cancer, breast cancer, and cervical cancer. Checklist reviewing preventive services available has been given to the patient.    Reviewed patients plan of care and provided an AVS. The Basic Care Plan (routine screening as documented in Health Maintenance) for Saul meets the Care Plan requirement. This Care Plan has been established and reviewed with the Patient.    Counseling Resources:  ATP IV Guidelines  Pooled Cohorts Equation Calculator  Breast Cancer Risk Calculator  Breast Cancer: Medication to Reduce Risk  FRAX Risk Assessment  ICSI Preventive Guidelines  Dietary Guidelines for Americans, 2010  USDA's MyPlate  ASA Prophylaxis  Lung CA Screening    Waldo Matson MD  [facility]    Identified Health Risks:  "

## 2022-05-18 LAB
ALBUMIN SERPL-MCNC: 3.9 G/DL (ref 3.4–5)
ALP SERPL-CCNC: 121 U/L (ref 40–150)
ALT SERPL W P-5'-P-CCNC: 49 U/L (ref 0–70)
ANION GAP SERPL CALCULATED.3IONS-SCNC: 5 MMOL/L (ref 3–14)
AST SERPL W P-5'-P-CCNC: 26 U/L (ref 0–45)
BILIRUB SERPL-MCNC: 0.6 MG/DL (ref 0.2–1.3)
BUN SERPL-MCNC: 19 MG/DL (ref 7–30)
CALCIUM SERPL-MCNC: 9.2 MG/DL (ref 8.5–10.1)
CHLORIDE BLD-SCNC: 107 MMOL/L (ref 94–109)
CHOLEST SERPL-MCNC: 159 MG/DL
CO2 SERPL-SCNC: 28 MMOL/L (ref 20–32)
CREAT SERPL-MCNC: 1.02 MG/DL (ref 0.66–1.25)
FASTING STATUS PATIENT QL REPORTED: ABNORMAL
GFR SERPL CREATININE-BSD FRML MDRD: 77 ML/MIN/1.73M2
GLUCOSE BLD-MCNC: 110 MG/DL (ref 70–99)
HDLC SERPL-MCNC: 38 MG/DL
LDLC SERPL CALC-MCNC: 83 MG/DL
NONHDLC SERPL-MCNC: 121 MG/DL
POTASSIUM BLD-SCNC: 4.3 MMOL/L (ref 3.4–5.3)
PROT SERPL-MCNC: 7 G/DL (ref 6.8–8.8)
SODIUM SERPL-SCNC: 140 MMOL/L (ref 133–144)
TRIGL SERPL-MCNC: 192 MG/DL

## 2022-05-19 PROBLEM — R73.03 PREDIABETES: Status: ACTIVE | Noted: 2022-05-19

## 2022-05-19 LAB — HBA1C MFR BLD: 6.2 % (ref 0–5.6)

## 2022-07-24 ENCOUNTER — OFFICE VISIT (OUTPATIENT)
Dept: URGENT CARE | Facility: URGENT CARE | Age: 77
End: 2022-07-24
Payer: COMMERCIAL

## 2022-07-24 VITALS
RESPIRATION RATE: 16 BRPM | BODY MASS INDEX: 33.45 KG/M2 | DIASTOLIC BLOOD PRESSURE: 88 MMHG | SYSTOLIC BLOOD PRESSURE: 156 MMHG | OXYGEN SATURATION: 97 % | TEMPERATURE: 97.6 F | HEART RATE: 60 BPM | WEIGHT: 220 LBS

## 2022-07-24 DIAGNOSIS — M10.032 ACUTE IDIOPATHIC GOUT OF LEFT WRIST: ICD-10-CM

## 2022-07-24 DIAGNOSIS — M25.532 LEFT WRIST PAIN: Primary | ICD-10-CM

## 2022-07-24 PROCEDURE — 99213 OFFICE O/P EST LOW 20 MIN: CPT | Performed by: FAMILY MEDICINE

## 2022-07-24 RX ORDER — METHYLPREDNISOLONE 4 MG
4 TABLET, DOSE PACK ORAL SEE ADMIN INSTRUCTIONS
Qty: 21 TABLET | Refills: 0 | Status: SHIPPED | OUTPATIENT
Start: 2022-07-24 | End: 2022-11-02

## 2022-07-26 NOTE — PROGRESS NOTES
ASSESSMENT/ PLAN:  Left wrist pain     - XR Wrist Left G/E 3 Views; Future  Has a history of gout, but has never had it in the wrist.  No trauma.  No fracture noted,  Has chronic degenerative changes of the left wrist bones.      Acute idiopathic gout of left wrist     - methylPREDNISolone (MEDROL) 4 MG tablet therapy pack; Take 1 tablet (4 mg) by mouth See Admin Instructions follow package directions     Indomethacin for inflammation-  He has RX at home-  May use sparingly-  Due to his history of HTN he is advised to use little NSAIDs    Medrol dose pack  for inflammation     Acetaminophen  as an alternative for pain  Given patient education materials about gout  Drink large amounts of water to try to reduce uric acid levels  Follow-up with primary care physician concerning ongoing care of gout including possible testing of uric acid levels and possible treatment with allopurinol         ------------------------------------------------------------------------------------------------------------------------------      SUBJECTIVE:  Chief Complaint   Patient presents with     Wrist Pain     PT woke up this morning with a sore left wrist. Pt states the last 3 weeks he has had stiffness in fingers      Saul Hairston is a 76 year old male who presents with a chief complaint of left wrist pain, swelling, tenderness and decreased range of motion.  Also had finger stiffness left hand in the past 3 weeks  Symptoms began 1 day(s) ago, are moderate and severe and sudden onset, still present and constant  has a history of gout for 50 years - but never had a gout flare in the hand/ wrist   There was no injury noted to the painful area     Pain exacerbated by flexion/extension Relieved by nothing.  He treated it initially with no therapy.      He has indomethacin at home, but has not taken it.        Past Medical History:   Diagnosis Date     Arthritis     Gout     Cancer (H) 2018    Prostrate     CARDIOVASCULAR SCREENING; LDL  GOAL LESS THAN 160 8/14/2006     Elevated PSA      Gout, unspecified      Hypertension      Kidney stone 2009    Doing ok now     Obese      Seasonal allergic rhinitis     Spring and Fall     Sleep apnea     DOES NOT USE CPAP     Umbilical hernia without mention of obstruction or gangrene 6/2013     Patient Active Problem List   Diagnosis     Idiopathic chronic gout 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     Rotator cuff tear     Kidney stone     Elevated PSA     Seasonal allergic rhinitis     Hyperlipidemia LDL goal <130     Umbilical hernia     Hypertension goal BP (blood pressure) < 150/90     Non morbid obesity, unspecified obesity type     MUNIRA (obstructive sleep apnea)     Pneumonia due to 2019 novel coronavirus     Prediabetes       ALLERGIES:  Ciprofloxacin, No known drug allergies, and Norco [hydrocodone-acetaminophen]    MEDs  albuterol (PROAIR HFA) 108 (90 Base) MCG/ACT inhaler, Inhale 2 puffs into the lungs every 2 hours as needed for shortness of breath / dyspnea or wheezing  atorvastatin (LIPITOR) 20 MG tablet, Take 1 tablet (20 mg) by mouth daily  Omega-3 Fatty Acids (OMEGA-3 FISH OIL PO), Take by mouth daily   tamsulosin (FLOMAX) 0.4 MG capsule, Take 0.4 mg by mouth every evening  indomethacin (INDOCIN) 50 MG capsule, Take 1 capsule (50 mg) by mouth 3 times daily as needed for moderate pain (Patient not taking: Reported on 7/24/2022)    No current facility-administered medications on file prior to visit.      Social History     Tobacco Use     Smoking status: Never Smoker     Smokeless tobacco: Never Used   Substance Use Topics     Alcohol use: No       Family History   Problem Relation Age of Onset     Hypertension Father      Alcohol/Drug Father      Allergies Father      Respiratory Father      Asthma Father      Cerebrovascular Disease Maternal Grandmother      Arthritis Maternal Grandmother      Diabetes Maternal Grandmother      Alzheimer Disease Mother      Arthritis Mother      Eye Disorder Mother       Cerebrovascular Disease Paternal Grandfather      C.A.D. Maternal Uncle      C.A.D. Paternal Uncle      Prostate Cancer Maternal Uncle      Lipids Sister      Lipids Brother      Obesity Brother      C.A.D. Son      Hyperlipidemia Sister      Obesity Brother          ROS:  CONSTITUTIONAL:NEGATIVE for fever, chills,    INTEGUMENTARY/SKIN: NEGATIVE for worrisome rashes,  or lesions  EYES: NEGATIVE for vision changes or irritation  ENT/MOUTH: NEGATIVE for ear, mouth and throat problems  RESP:NEGATIVE for significant cough or SOB  GI: NEGATIVE for nausea, abdominal pain,   or change in bowel habits    EXAM:   BP (!) 156/88   Pulse 60   Temp 97.6  F (36.4  C) (Tympanic)   Resp 16   Wt 99.8 kg (220 lb)   SpO2 97%   BMI 33.45 kg/m      left Hand  No injury to the skin noted  No contusions noted    tenderness, warmth and swelling noted in the region of the  Left wrist joint  No pain or tenderness with palpation and range of motion of the left fingers,  Sensation intact.     No pain or tenderness with palpation of the contralateral elbow, shoulder     EXTREMITIES: peripheral pulses normal  SKIN: no suspicious lesions or rashes  NEURO: Normal strength and tone, sensory exam grossly normal, mentation intact and speech normal    X-RAY was done.-  No fracture,  Has chronic degenerative changes to the left wrist/ hand   x-ray read by me Odalys Ruiz MD    Results for orders placed or performed in visit on 07/24/22   XR Wrist Left G/E 3 Views     Status: None    Narrative    EXAM: XR WRIST LEFT G/E 3 VIEWS  LOCATION: [facility]  DATE/TIME: 7/24/2022 9:58 AM    INDICATION:  Left wrist pain  COMPARISON: None.      Impression    IMPRESSION: Normal joint spaces and alignment. No fracture.

## 2022-08-02 ENCOUNTER — TRANSFERRED RECORDS (OUTPATIENT)
Dept: HEALTH INFORMATION MANAGEMENT | Facility: CLINIC | Age: 77
End: 2022-08-02

## 2022-10-15 ENCOUNTER — HEALTH MAINTENANCE LETTER (OUTPATIENT)
Age: 77
End: 2022-10-15

## 2022-11-02 ENCOUNTER — OFFICE VISIT (OUTPATIENT)
Dept: FAMILY MEDICINE | Facility: CLINIC | Age: 77
End: 2022-11-02
Payer: COMMERCIAL

## 2022-11-02 VITALS
HEART RATE: 72 BPM | SYSTOLIC BLOOD PRESSURE: 168 MMHG | RESPIRATION RATE: 16 BRPM | HEIGHT: 68 IN | BODY MASS INDEX: 32.04 KG/M2 | WEIGHT: 211.4 LBS | DIASTOLIC BLOOD PRESSURE: 84 MMHG | OXYGEN SATURATION: 98 %

## 2022-11-02 DIAGNOSIS — R03.0 ELEVATED BLOOD PRESSURE READING WITHOUT DIAGNOSIS OF HYPERTENSION: ICD-10-CM

## 2022-11-02 DIAGNOSIS — Z01.818 PREOP GENERAL PHYSICAL EXAM: Primary | ICD-10-CM

## 2022-11-02 DIAGNOSIS — H26.9 CATARACT OF BOTH EYES, UNSPECIFIED CATARACT TYPE: ICD-10-CM

## 2022-11-02 PROCEDURE — 99213 OFFICE O/P EST LOW 20 MIN: CPT | Performed by: NURSE PRACTITIONER

## 2022-11-02 ASSESSMENT — PAIN SCALES - GENERAL: PAINLEVEL: MILD PAIN (2)

## 2022-11-02 NOTE — PATIENT INSTRUCTIONS
Preparing for Your Surgery  Getting started  A nurse will call you to review your health history and instructions. They will give you an arrival time based on your scheduled surgery time. Please be ready to share:    Your doctor s clinic name and phone number    Your medical, surgical, and anesthesia history    A list of allergies and sensitivities    A list of medicines, including herbal treatments and over-the-counter drugs    Whether the patient has a legal guardian (ask how to send us the papers in advance)  Please tell us if you re pregnant--or if there s any chance you might be pregnant. Some surgeries may injure a fetus (unborn baby), so they require a pregnancy test. Surgeries that are safe for a fetus don t always need a test, and you can choose whether to have one.   If you have a child who s having surgery, please ask for a copy of Preparing for Your Child s Surgery.    Preparing for surgery    Within 10 to 30 days of surgery: Have a pre-op exam (sometimes called an H&P, or History and Physical). This can be done at a clinic or pre-operative center.  ? If you re having a , you may not need this exam. Talk to your care team.    At your pre-op exam, talk to your care team about all medicines you take. If you need to stop any medicines before surgery, ask when to start taking them again.  ? We do this for your safety. Many medicines can make you bleed too much during surgery. Some change how well surgery (anesthesia) drugs work.    Call your insurance company to let them know you re having surgery. (If you don t have insurance, call [phone].)    Call your clinic if there s any change in your health. This includes signs of a cold or flu (sore throat, runny nose, cough, rash, fever). It also includes a scrape or scratch near the surgery site.    If you have questions on the day of surgery, call your hospital or surgery center.  COVID testing  You may need to be tested for COVID-19 before having  surgery. If so, we will give you instructions (or click here).  Eating and drinking guidelines  For your safety: Unless your surgeon tells you otherwise, follow the guidelines below.    Eat and drink as usual until 8 hours before you arrive for surgery. After that, no food or milk.    Drink clear liquids until 2 hours before you arrive. These are liquids you can see through, like water, Gatorade, and Propel Water. They also include plain black coffee and tea (no cream or milk), candy, and breath mints. You can spit out gum when you arrive.    If you drink alcohol: Stop drinking it the night before surgery.    If your care team tells you to take medicine on the morning of surgery, it s okay to take it with a sip of water.  Preventing infection    Shower or bathe the night before and morning of your surgery. Follow the instructions your clinic gave you. (If no instructions, use regular soap.)    Don t shave or clip hair near your surgery site. We ll remove the hair if needed.    Don t smoke or vape the morning of surgery. You may chew nicotine gum up to 2 hours before surgery. A nicotine patch is okay.  ? Note: Some surgeries require you to completely quit smoking and nicotine. Check with your surgeon.    Your care team will make every effort to keep you safe from infection. We will:  ? Clean our hands often with soap and water (or an alcohol-based hand rub).  ? Clean the skin at your surgery site with a special soap that kills germs.  ? Give you a special gown to keep you warm. (Cold raises the risk of infection.)  ? Wear special hair covers, masks, gowns and gloves during surgery.  ? Give antibiotic medicine, if prescribed. Not all surgeries need antibiotics.  What to bring on the day of surgery    Photo ID and insurance card    Copy of your health care directive, if you have one    Glasses and hearing aids (bring cases)  ? You can t wear contacts during surgery    Inhaler and eye drops, if you use them (tell us  about these when you arrive)    CPAP machine or breathing device, if you use them    A few personal items, if spending the night    If you have . . .  ? A pacemaker, ICD (cardiac defibrillator) or other implant: Bring the ID card.  ? An implanted stimulator: Bring the remote control.  ? A legal guardian: Bring a copy of the certified (court-stamped) guardianship papers.  Please remove any jewelry, including body piercings. Leave jewelry and other valuables at home.  If you re going home the day of surgery    You must have a responsible adult drive you home. They should stay with you overnight as well.    If you don t have someone to stay with you, and you aren t safe to go home alone, we may keep you overnight. Insurance often won t pay for this.  After surgery  If it s hard to control your pain or you need more pain medicine, please call your surgeon s office.  Questions?   If you have any questions for your care team, list them here:   ____________________________________________________________________________________________________________________________________________________________________________________________________________________________________________________________________  For informational purposes only. Not to replace the advice of your health care provider. Copyright   2003, 2019 [employer]. All rights reserved. Clinically reviewed by Adelina Taylor MD. [employer] [id number] - REV 10/22.

## 2022-11-02 NOTE — PROGRESS NOTES
[address], [address]  [facility] [address]  Phone: [phone]  Primary Provider: Waldo Matson  Pre-op Performing Provider: MARQUEZ LOPEZ      PREOPERATIVE EVALUATION:  Today's date: 11/2/2022    Saul Hairston is a 77 year old male who presents for a preoperative evaluation.    Surgical Information:  Surgery/Procedure: Cataract emulsification  Surgery Location: [facility]  Surgeon: Dr. Marin  Surgery Date: 11/15/22  Time of Surgery: TBD  Where patient plans to recover: At home with family  Fax number for surgical facility: [phone]    Type of Anesthesia Anticipated: Monitor Anesthesia Care    Assessment & Plan     The proposed surgical procedure is considered LOW risk.    (Z01.818) Preop general physical exam  (primary encounter diagnosis)  Comment: OK for surgery   Plan:     (H26.9) Cataract of both eyes, unspecified cataract type  Comment:   Plan:     (R03.0) Elevated blood pressure reading without diagnosis of hypertension  Comment: BP elevated today. He was elevated at his last visit as well but previously had been well-controlled. He will return in a few days for a BP recheck   Plan:            Risks and Recommendations:  The patient has the following additional risks and recommendations for perioperative complications:   - No identified additional risk factors other than previously addressed    Medication Instructions:  Hold supplements morning of surgery       RECOMMENDATION:  APPROVAL GIVEN to proceed with proposed procedure, without further diagnostic evaluation.        Subjective     HPI related to upcoming procedure: going for cataracts  Feeling well lately. No updates of PMH. No CP, SOB       Preop Questions 10/27/2022   1. Have you ever had a heart attack or stroke? No   2. Have you ever had surgery on your heart or blood vessels, such as a stent placement, a coronary artery bypass, or surgery on an artery in your head, neck,  heart, or legs? No   3. Do you have chest pain with activity? No   4. Do you have a history of  heart failure? No   5. Do you currently have a cold, bronchitis or symptoms of other infection? No   6. Do you have a cough, shortness of breath, or wheezing? No   7. Do you or anyone in your family have previous history of blood clots? No   8. Do you or does anyone in your family have a serious bleeding problem such as prolonged bleeding following surgeries or cuts? No   9. Have you ever had problems with anemia or been told to take iron pills? No   10. Have you had any abnormal blood loss such as black, tarry or bloody stools? No   11. Have you ever had a blood transfusion? No   12. Are you willing to have a blood transfusion if it is medically needed before, during, or after your surgery? Yes   13. Have you or any of your relatives ever had problems with anesthesia? No   14. Do you have sleep apnea, excessive snoring or daytime drowsiness? YES - rare snoring. Occasional nap   14a. Do you have a CPAP machine? No   15. Do you have any artifical heart valves or other implanted medical devices like a pacemaker, defibrillator, or continuous glucose monitor? No   16. Do you have artificial joints? No   17. Are you allergic to latex? No       Health Care Directive:  Patient does not have a Health Care Directive or Living Will: Discussed advance care planning with patient; however, patient declined at this time.    Preoperative Review of :   reviewed - no record of controlled substances prescribed.      Status of Chronic Conditions:  See problem list for active medical problems.  Problems all longstanding and stable, except as noted/documented.  See ROS for pertinent symptoms related to these conditions.      Review of Systems  Constitutional, neuro, ENT, endocrine, pulmonary, cardiac, gastrointestinal, genitourinary, musculoskeletal, integument and psychiatric systems are negative, except as otherwise noted.    Patient  Active Problem List    Diagnosis Date Noted     Prediabetes 05/19/2022     Priority: Medium     Pneumonia due to 2019 novel coronavirus 12/22/2020     Priority: Medium     MUNIRA (obstructive sleep apnea) 01/15/2018     Priority: Medium     Non morbid obesity, unspecified obesity type 09/26/2017     Priority: Medium     Hypertension goal BP (blood pressure) < 150/90 04/03/2017     Priority: Medium     Umbilical hernia 06/01/2013     Priority: Medium     Problem list name updated by automated process. Provider to review       Elevated PSA      Priority: Medium     Seasonal allergic rhinitis      Priority: Medium     Spring and Fall       Kidney stone      Priority: Medium     Doing ok now       Rotator cuff tear 12/16/2010     Priority: Medium     Hyperlipidemia LDL goal <130 08/14/2006     Priority: Medium     Idiopathic chronic gout 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      Priority: Medium     Problem list name updated by automated process. Provider to review        Past Medical History:   Diagnosis Date     Arthritis     Gout     Cancer (H) 2018    Prostrate     CARDIOVASCULAR SCREENING; LDL GOAL LESS THAN 160 8/14/2006     Elevated PSA      Gout, unspecified      Hypertension      Kidney stone 2009    Doing ok now     Obese      Seasonal allergic rhinitis     Spring and Fall     Sleep apnea     DOES NOT USE CPAP     Umbilical hernia without mention of obstruction or gangrene 6/2013     Past Surgical History:   Procedure Laterality Date     BIOPSY  2020    prostrate     COLONOSCOPY  6/16/2006     COLONOSCOPY N/A 6/15/2016    Procedure: COLONOSCOPY;  Surgeon: Poly Sanchez MD;  Location:  GI     CRYOABLATION PROSTATE N/A 12/1/2020    Procedure: CRYOTHERAPY OF PROSTATE;  Surgeon: Aj Caal MD;  Location:  OR     CYSTOSCOPY FLEXIBLE, CYOABLATION PROSTATE N/A 2/13/2018    Procedure: CYSTOSCOPY FLEXIBLE, CRYOABLATION PROSTATE;  FLEXIBLE CYSTOSCOPY, CYROABLATION OF PROSTATE ;  Surgeon: Aj Caal MD;   Location: [facility] OR     GENITOURINARY SURGERY       HERNIORRHAPHY UMBILICAL  7/11/2013    Procedure: HERNIORRHAPHY UMBILICAL;  Open Umbilical Hernia Repair With Mesh ;  Surgeon: Sergio Tomas MD;  Location: [facility] OR     ROTATOR CUFF REPAIR RT/LT  5/19/2011    Left Shoulder     SURGICAL HISTORY OF -       ear operation as child     SURGICAL HISTORY OF -       removal of pseudogout deposits - Right knee     TONSILLECTOMY      Child     Current Outpatient Medications   Medication Sig Dispense Refill     albuterol (PROAIR HFA) 108 (90 Base) MCG/ACT inhaler Inhale 2 puffs into the lungs every 2 hours as needed for shortness of breath / dyspnea or wheezing 18 g 0     atorvastatin (LIPITOR) 20 MG tablet Take 1 tablet (20 mg) by mouth daily 90 tablet 3     indomethacin (INDOCIN) 50 MG capsule Take 1 capsule (50 mg) by mouth 3 times daily as needed for moderate pain (Patient not taking: Reported on 7/24/2022) 30 capsule 0     methylPREDNISolone (MEDROL) 4 MG tablet therapy pack Take 1 tablet (4 mg) by mouth See Admin Instructions follow package directions 21 tablet 0     Omega-3 Fatty Acids (OMEGA-3 FISH OIL PO) Take by mouth daily        tamsulosin (FLOMAX) 0.4 MG capsule Take 0.4 mg by mouth every evening         Allergies   Allergen Reactions     Ciprofloxacin      No Known Drug Allergies      Norco [Hydrocodone-Acetaminophen] Nausea and Vomiting        Social History     Tobacco Use     Smoking status: Never     Smokeless tobacco: Never   Substance Use Topics     Alcohol use: No     Family History   Problem Relation Age of Onset     Hypertension Father      Alcohol/Drug Father      Allergies Father      Respiratory Father      Asthma Father      Cerebrovascular Disease Maternal Grandmother      Arthritis Maternal Grandmother      Diabetes Maternal Grandmother      Alzheimer Disease Mother      Arthritis Mother      Eye Disorder Mother      Cerebrovascular Disease Paternal Grandfather      C.A.D. Maternal Uncle       "C.A.THOMAS. Paternal Uncle      Prostate Cancer Maternal Uncle      Lipids Sister      Lipids Brother      Obesity Brother         Has lost weight     C.A.D. Son      Hyperlipidemia Sister      Obesity Brother      History   Drug Use No         Objective     BP (!) 168/84 (BP Location: Right arm, Patient Position: Sitting, Cuff Size: Adult Regular)   Pulse 72   Resp 16   Ht 1.727 m (5' 8\")   Wt 95.9 kg (211 lb 6.4 oz)   SpO2 98%   BMI 32.14 kg/m      Physical Exam    GENERAL APPEARANCE: healthy, alert and no distress     EYES: EOMI,      RESP: lungs clear to auscultation - no rales, rhonchi or wheezes     CV: regular rates and rhythm, normal S1 S2, no S3 or S4 and no murmur, click or rub     MS: extremities normal- no gross deformities noted, no evidence of inflammation in joints, FROM in all extremities.     SKIN: raised small pea sized lesion of left shin      NEURO: Normal strength and tone, sensory exam grossly normal, mentation intact and speech normal     PSYCH: mentation appears normal. and affect normal/bright    Recent Labs   Lab Test 05/17/22  1426 12/26/20  0741 12/23/20  0714 12/22/20  1558   HGB 17.1 14.8   < >  --     226   < >  --     134   < >  --    POTASSIUM 4.3 4.5   < >  --    CR 1.02 0.79   < > 0.94   A1C 6.2*  --   --  6.1*    < > = values in this interval not displayed.        Diagnostics:  No labs were ordered during this visit.   No EKG required for low risk surgery (cataract, skin procedure, breast biopsy, etc).    Revised Cardiac Risk Index (RCRI):  The patient has the following serious cardiovascular risks for perioperative complications:   - No serious cardiac risks = 0 points     RCRI Interpretation: 0 points: Class I (very low risk - 0.4% complication rate)           Signed Electronically by: LASHA Redd CNP  Copy of this evaluation report is provided to requesting physician.      "

## 2023-02-07 ENCOUNTER — TRANSFERRED RECORDS (OUTPATIENT)
Dept: HEALTH INFORMATION MANAGEMENT | Facility: CLINIC | Age: 78
End: 2023-02-07

## 2023-02-07 DIAGNOSIS — R06.2 WHEEZING: ICD-10-CM

## 2023-02-10 RX ORDER — ALBUTEROL SULFATE 90 UG/1
AEROSOL, METERED RESPIRATORY (INHALATION)
Qty: 18 G | Refills: 0 | Status: SHIPPED | OUTPATIENT
Start: 2023-02-10 | End: 2023-08-17

## 2023-02-10 NOTE — TELEPHONE ENCOUNTER
5/17/22 office visit note reviewed.    Prescription approved per [facility] Refill Protocol.  ANAID GilesN, RN-[facility]

## 2023-03-11 NOTE — TELEPHONE ENCOUNTER
Reason for Call:  Other call back    Detailed comments: patient has COVID Ad reporting Low Oxygen Levels today at 84    Phone Number Patient can be reached at: Home number on file [phone] (home)    Best Time: TODAY    Can we leave a detailed message on this number? Not Applicable    Call taken on 12/22/2020 at 10:34 AM by Kita Joyner       [Communication and Social Development] : communication and social development [Sleep Routines and Issues] : sleep routines and issues [Temper Tantrums and Discipline] : temper tantrums and discipline [Healthy Teeth] : healthy teeth [Safety] : safety [Mother] : mother [] : The components of the vaccine(s) to be administered today are listed in the plan of care. The disease(s) for which the vaccine(s) are intended to prevent and the risks have been discussed with the caretaker.  The risks are also included in the appropriate vaccination information statements which have been provided to the patient's caregiver.  The caregiver has given consent to vaccinate. [FreeTextEntry1] : \par 16 month old F presenting for 15 month [facility]. Appropriate growth and development for age. \par \par Plan: \par - Continue whole cow's milk. Continue table foods, 3 meals with 2-3 snacks per day\par - Incorporate fluorinated water daily in a sippy cup. Brush teeth twice a day with soft toothbrush\par - When in car, keep child in rear-facing car seats until age 2, or until the maximum height and weight for seat is reached\par - Infant should sleep in own crib, strive to maintain consistent daily routines and sleep schedule to encourage good sleep hygiene \par - Ensure home is safe since infant is increasingly mobile\par - Read aloud to infant\par - Prevnar, Hib today\par - Return in 3 months for 18 month old [facility]\par

## 2023-04-20 ENCOUNTER — PATIENT OUTREACH (OUTPATIENT)
Dept: CARE COORDINATION | Facility: CLINIC | Age: 78
End: 2023-04-20
Payer: COMMERCIAL

## 2023-05-04 ENCOUNTER — PATIENT OUTREACH (OUTPATIENT)
Dept: CARE COORDINATION | Facility: CLINIC | Age: 78
End: 2023-05-04
Payer: COMMERCIAL

## 2023-08-10 ENCOUNTER — APPOINTMENT (OUTPATIENT)
Dept: CT IMAGING | Facility: CLINIC | Age: 78
DRG: 310 | End: 2023-08-10
Attending: EMERGENCY MEDICINE
Payer: COMMERCIAL

## 2023-08-10 ENCOUNTER — OFFICE VISIT (OUTPATIENT)
Dept: URGENT CARE | Facility: URGENT CARE | Age: 78
End: 2023-08-10
Payer: COMMERCIAL

## 2023-08-10 ENCOUNTER — HOSPITAL ENCOUNTER (INPATIENT)
Facility: CLINIC | Age: 78
LOS: 1 days | Discharge: HOME | DRG: 310 | End: 2023-08-11
Attending: EMERGENCY MEDICINE | Admitting: STUDENT IN AN ORGANIZED HEALTH CARE EDUCATION/TRAINING PROGRAM
Payer: COMMERCIAL

## 2023-08-10 VITALS
RESPIRATION RATE: 20 BRPM | DIASTOLIC BLOOD PRESSURE: 84 MMHG | TEMPERATURE: 98 F | HEART RATE: 74 BPM | OXYGEN SATURATION: 98 % | SYSTOLIC BLOOD PRESSURE: 148 MMHG

## 2023-08-10 DIAGNOSIS — I48.91 ATRIAL FIBRILLATION WITH RAPID VENTRICULAR RESPONSE (H): Primary | ICD-10-CM

## 2023-08-10 DIAGNOSIS — I48.91 ATRIAL FIBRILLATION WITH RVR (H): ICD-10-CM

## 2023-08-10 DIAGNOSIS — R42 DIZZINESS: ICD-10-CM

## 2023-08-10 DIAGNOSIS — R91.1 PULMONARY NODULE: ICD-10-CM

## 2023-08-10 DIAGNOSIS — M25.532 LEFT WRIST PAIN: Primary | ICD-10-CM

## 2023-08-10 PROBLEM — R33.9 RETENTION OF URINE: Status: ACTIVE | Noted: 2023-08-10

## 2023-08-10 PROBLEM — C61 MALIGNANT TUMOR OF PROSTATE (H): Status: ACTIVE | Noted: 2023-08-08

## 2023-08-10 LAB
ANION GAP SERPL CALCULATED.3IONS-SCNC: 15 MMOL/L (ref 7–15)
ATRIAL RATE - MUSE: NORMAL BPM
BASOPHILS # BLD AUTO: 0.1 10E3/UL (ref 0–0.2)
BASOPHILS NFR BLD AUTO: 1 %
BUN SERPL-MCNC: 13.2 MG/DL (ref 8–23)
CALCIUM SERPL-MCNC: 9.3 MG/DL (ref 8.8–10.2)
CHLORIDE SERPL-SCNC: 103 MMOL/L (ref 98–107)
CREAT SERPL-MCNC: 0.99 MG/DL (ref 0.67–1.17)
D DIMER PPP FEU-MCNC: 1.52 UG/ML FEU (ref 0–0.5)
DEPRECATED HCO3 PLAS-SCNC: 22 MMOL/L (ref 22–29)
DIASTOLIC BLOOD PRESSURE - MUSE: NORMAL MMHG
EOSINOPHIL # BLD AUTO: 0.4 10E3/UL (ref 0–0.7)
EOSINOPHIL NFR BLD AUTO: 5 %
ERYTHROCYTE [DISTWIDTH] IN BLOOD BY AUTOMATED COUNT: 12.8 % (ref 10–15)
GFR SERPL CREATININE-BSD FRML MDRD: 79 ML/MIN/1.73M2
GLUCOSE SERPL-MCNC: 107 MG/DL (ref 70–99)
HCT VFR BLD AUTO: 50.1 % (ref 40–53)
HGB BLD-MCNC: 16.5 G/DL (ref 13.3–17.7)
HOLD SPECIMEN: NORMAL
IMM GRANULOCYTES # BLD: 0 10E3/UL
IMM GRANULOCYTES NFR BLD: 0 %
INTERPRETATION ECG - MUSE: NORMAL
LYMPHOCYTES # BLD AUTO: 1.6 10E3/UL (ref 0.8–5.3)
LYMPHOCYTES NFR BLD AUTO: 21 %
MCH RBC QN AUTO: 28.7 PG (ref 26.5–33)
MCHC RBC AUTO-ENTMCNC: 32.9 G/DL (ref 31.5–36.5)
MCV RBC AUTO: 87 FL (ref 78–100)
MONOCYTES # BLD AUTO: 0.7 10E3/UL (ref 0–1.3)
MONOCYTES NFR BLD AUTO: 9 %
NEUTROPHILS # BLD AUTO: 4.9 10E3/UL (ref 1.6–8.3)
NEUTROPHILS NFR BLD AUTO: 64 %
NRBC # BLD AUTO: 0 10E3/UL
NRBC BLD AUTO-RTO: 0 /100
NT-PROBNP SERPL-MCNC: 588 PG/ML (ref 0–900)
P AXIS - MUSE: NORMAL DEGREES
PLATELET # BLD AUTO: 203 10E3/UL (ref 150–450)
POTASSIUM SERPL-SCNC: 4.1 MMOL/L (ref 3.4–5.3)
PR INTERVAL - MUSE: NORMAL MS
QRS DURATION - MUSE: 114 MS
QT - MUSE: 350 MS
QTC - MUSE: 505 MS
R AXIS - MUSE: 57 DEGREES
RBC # BLD AUTO: 5.74 10E6/UL (ref 4.4–5.9)
SODIUM SERPL-SCNC: 140 MMOL/L (ref 136–145)
SYSTOLIC BLOOD PRESSURE - MUSE: NORMAL MMHG
T AXIS - MUSE: -16 DEGREES
TROPONIN T SERPL HS-MCNC: 24 NG/L
TSH SERPL DL<=0.005 MIU/L-ACNC: 1.39 UIU/ML (ref 0.3–4.2)
URATE SERPL-MCNC: 10.7 MG/DL (ref 3.4–7)
VENTRICULAR RATE- MUSE: 125 BPM
WBC # BLD AUTO: 7.6 10E3/UL (ref 4–11)

## 2023-08-10 PROCEDURE — 96361 HYDRATE IV INFUSION ADD-ON: CPT

## 2023-08-10 PROCEDURE — 99214 OFFICE O/P EST MOD 30 MIN: CPT | Performed by: PHYSICIAN ASSISTANT

## 2023-08-10 PROCEDURE — 84443 ASSAY THYROID STIM HORMONE: CPT | Performed by: EMERGENCY MEDICINE

## 2023-08-10 PROCEDURE — 250N000011 HC RX IP 250 OP 636: Performed by: EMERGENCY MEDICINE

## 2023-08-10 PROCEDURE — 84550 ASSAY OF BLOOD/URIC ACID: CPT | Performed by: STUDENT IN AN ORGANIZED HEALTH CARE EDUCATION/TRAINING PROGRAM

## 2023-08-10 PROCEDURE — 258N000003 HC RX IP 258 OP 636: Performed by: EMERGENCY MEDICINE

## 2023-08-10 PROCEDURE — 85379 FIBRIN DEGRADATION QUANT: CPT | Performed by: EMERGENCY MEDICINE

## 2023-08-10 PROCEDURE — 71275 CT ANGIOGRAPHY CHEST: CPT

## 2023-08-10 PROCEDURE — 93000 ELECTROCARDIOGRAM COMPLETE: CPT | Mod: 76 | Performed by: PHYSICIAN ASSISTANT

## 2023-08-10 PROCEDURE — 85025 COMPLETE CBC W/AUTO DIFF WBC: CPT | Performed by: EMERGENCY MEDICINE

## 2023-08-10 PROCEDURE — 83880 ASSAY OF NATRIURETIC PEPTIDE: CPT | Performed by: EMERGENCY MEDICINE

## 2023-08-10 PROCEDURE — 96365 THER/PROPH/DIAG IV INF INIT: CPT | Mod: 59

## 2023-08-10 PROCEDURE — 99223 1ST HOSP IP/OBS HIGH 75: CPT | Mod: AI | Performed by: STUDENT IN AN ORGANIZED HEALTH CARE EDUCATION/TRAINING PROGRAM

## 2023-08-10 PROCEDURE — 84484 ASSAY OF TROPONIN QUANT: CPT | Performed by: EMERGENCY MEDICINE

## 2023-08-10 PROCEDURE — 99285 EMERGENCY DEPT VISIT HI MDM: CPT | Mod: 25

## 2023-08-10 PROCEDURE — 82310 ASSAY OF CALCIUM: CPT | Performed by: EMERGENCY MEDICINE

## 2023-08-10 PROCEDURE — 250N000009 HC RX 250: Performed by: EMERGENCY MEDICINE

## 2023-08-10 PROCEDURE — 36415 COLL VENOUS BLD VENIPUNCTURE: CPT | Performed by: EMERGENCY MEDICINE

## 2023-08-10 PROCEDURE — 250N000013 HC RX MED GY IP 250 OP 250 PS 637: Performed by: STUDENT IN AN ORGANIZED HEALTH CARE EDUCATION/TRAINING PROGRAM

## 2023-08-10 PROCEDURE — 210N000002 HC R&B HEART CARE

## 2023-08-10 PROCEDURE — 93005 ELECTROCARDIOGRAM TRACING: CPT

## 2023-08-10 RX ORDER — PREDNISONE 20 MG/1
TABLET ORAL
Qty: 7 TABLET | Refills: 0 | Status: SHIPPED | OUTPATIENT
Start: 2023-08-10 | End: 2023-08-16

## 2023-08-10 RX ORDER — DILTIAZEM HYDROCHLORIDE 5 MG/ML
20 INJECTION INTRAVENOUS ONCE
Status: DISCONTINUED | OUTPATIENT
Start: 2023-08-10 | End: 2023-08-10

## 2023-08-10 RX ORDER — ONDANSETRON 2 MG/ML
4 INJECTION INTRAMUSCULAR; INTRAVENOUS EVERY 6 HOURS PRN
Status: DISCONTINUED | OUTPATIENT
Start: 2023-08-10 | End: 2023-08-11 | Stop reason: HOSPADM

## 2023-08-10 RX ORDER — TAMSULOSIN HYDROCHLORIDE 0.4 MG/1
0.4 CAPSULE ORAL EVERY EVENING
Status: DISCONTINUED | OUTPATIENT
Start: 2023-08-10 | End: 2023-08-11 | Stop reason: HOSPADM

## 2023-08-10 RX ORDER — IOPAMIDOL 755 MG/ML
75 INJECTION, SOLUTION INTRAVASCULAR ONCE
Status: COMPLETED | OUTPATIENT
Start: 2023-08-10 | End: 2023-08-10

## 2023-08-10 RX ORDER — ACETAMINOPHEN 500 MG
1000 TABLET ORAL EVERY 6 HOURS PRN
Status: DISCONTINUED | OUTPATIENT
Start: 2023-08-10 | End: 2023-08-11 | Stop reason: HOSPADM

## 2023-08-10 RX ORDER — METOPROLOL TARTRATE 25 MG/1
25 TABLET, FILM COATED ORAL 2 TIMES DAILY
Status: DISCONTINUED | OUTPATIENT
Start: 2023-08-10 | End: 2023-08-11

## 2023-08-10 RX ORDER — LIDOCAINE 40 MG/G
CREAM TOPICAL
Status: DISCONTINUED | OUTPATIENT
Start: 2023-08-10 | End: 2023-08-11 | Stop reason: HOSPADM

## 2023-08-10 RX ORDER — ONDANSETRON 4 MG/1
4 TABLET, ORALLY DISINTEGRATING ORAL EVERY 6 HOURS PRN
Status: DISCONTINUED | OUTPATIENT
Start: 2023-08-10 | End: 2023-08-11 | Stop reason: HOSPADM

## 2023-08-10 RX ORDER — PREDNISONE 20 MG/1
40 TABLET ORAL DAILY
Status: DISCONTINUED | OUTPATIENT
Start: 2023-08-11 | End: 2023-08-11 | Stop reason: HOSPADM

## 2023-08-10 RX ADMIN — APIXABAN 5 MG: 5 TABLET, FILM COATED ORAL at 20:35

## 2023-08-10 RX ADMIN — TAMSULOSIN HYDROCHLORIDE 0.4 MG: 0.4 CAPSULE ORAL at 20:36

## 2023-08-10 RX ADMIN — SODIUM CHLORIDE 1000 ML: 9 INJECTION, SOLUTION INTRAVENOUS at 15:06

## 2023-08-10 RX ADMIN — SODIUM CHLORIDE 80 ML: 9 INJECTION, SOLUTION INTRAVENOUS at 16:56

## 2023-08-10 RX ADMIN — IOPAMIDOL 75 ML: 755 INJECTION, SOLUTION INTRAVENOUS at 16:56

## 2023-08-10 RX ADMIN — DILTIAZEM HYDROCHLORIDE 2.5 MG/HR: 5 INJECTION INTRAVENOUS at 16:26

## 2023-08-10 ASSESSMENT — ACTIVITIES OF DAILY LIVING (ADL)
ADLS_ACUITY_SCORE: 35
ADLS_ACUITY_SCORE: 22
ADLS_ACUITY_SCORE: 22
ADLS_ACUITY_SCORE: 35
ADLS_ACUITY_SCORE: 35

## 2023-08-10 NOTE — ED NOTES
[facility]  ED Nurse Handoff Report    ED Chief complaint: Palpitations and Dizziness      ED Diagnosis:   Final diagnoses:   Pulmonary nodule   Atrial fibrillation with RVR (H)       Code Status: MD to discuss    Allergies:   Allergies   Allergen Reactions    Ciprofloxacin     Norco [Hydrocodone-Acetaminophen] Nausea and Vomiting       Patient Story: Pt comes in from  after feeling dizzy the last few days, found to be in Afib w/ RVR. He has no known hx of Afib.   Focused Assessment:  Slightly dizzy with standing, HR currently Afib with a controlled rate in the 80's. On a diltizem drip at 5 mg an hour.     Treatments and/or interventions provided: Diltiazem drip, IV fluids  Patient's response to treatments and/or interventions: Afib with a controlled rate in the 80's    To be done/followed up on inpatient unit:  Inpatient orders    Does this patient have any cognitive concerns?:  No    Activity level - Baseline/Home:  Independent  Activity Level - Current:   Independent    Patient's Preferred language: English   Needed?: No    Isolation: None  Infection: Not Applicable  Patient tested for COVID 19 prior to admission: NO  Bariatric?: No    Vital Signs:   Vitals:    08/10/23 1640 08/10/23 1647 08/10/23 1709 08/10/23 1724   BP: (!) 151/99  (!) 157/104 (!) 140/80   Pulse: 79 94 94 82   Resp: 23 12 11 (!) 9   Temp:       TempSrc:       SpO2: 96% 98% 97% 97%       Cardiac Rhythm:Cardiac Rhythm: Atrial fibrillation    Was the PSS-3 completed:   Yes  What interventions are required if any?               Family Comments: Wife went home for the night  OBS brochure/video discussed/provided to patient/family: N/A              Name of person given brochure if not patient:               Relationship to patient:     For the majority of the shift this patient's behavior was Green.   Behavioral interventions performed were  none needed.    ED NURSE PHONE NUMBER: [phone]

## 2023-08-10 NOTE — PATIENT INSTRUCTIONS
Please go to the ER for further evaluation    Start taking prednisone taper  Rest and use ice on the area  See ortho if symptoms are not improving as expected.  If you have fever, chills, worsening pain, development of numbness / tingling or worsening symptoms please follow-up in ER

## 2023-08-10 NOTE — ED TRIAGE NOTES
Pt presents from  for eval of dizziness. Pt noted he has felt dizzy since Tuesday after blood draw at urology office. Of note, pt went to  for eval of left wrist pain.      Triage Assessment       Row Name 08/10/23 9886       Triage Assessment (Adult)    Airway WDL WDL       Cardiac WDL    Cardiac WDL rhythm    Pulse Rate & Regularity radial pulse irregular       Peripheral/Neurovascular WDL    Peripheral Neurovascular WDL WDL       Cognitive/Neuro/Behavioral WDL    Cognitive/Neuro/Behavioral WDL WDL

## 2023-08-10 NOTE — PHARMACY-ADMISSION MEDICATION HISTORY
Pharmacist Admission Medication History    Admission medication history is complete. The information provided in this note is only as accurate as the sources available at the time of the update.    Medication reconciliation/reorder completed by provider prior to medication history? No    Information Source(s): Patient and CareEverywhere/SureScripts via in-person    Pertinent Information:   Has not started prednisone prescription    Changes made to PTA medication list:  Added: None  Deleted: atorvastatin,   Changed: None    Medication Affordability:  Not including over the counter (OTC) medications, was there a time in the past 3 months when you did not take your medications as prescribed because of cost?: No    Allergies reviewed with patient and updates made in EHR: yes    Medication History Completed By: Britt Calderon RPH 8/10/2023 4:18 PM    Prior to Admission medications    Medication Sig Last Dose Taking? Auth Provider Long Term End Date   indomethacin (INDOCIN) 50 MG capsule Take 1 capsule (50 mg) by mouth 3 times daily as needed for moderate pain PRN Yes Waldo Matson MD Yes    Omega-3 Fatty Acids (OMEGA-3 FISH OIL PO) Take 2 g by mouth daily 8/9/2023 Yes Reported, Patient     tamsulosin (FLOMAX) 0.4 MG capsule Take 0.4 mg by mouth every evening 8/9/2023 Yes Unknown, Entered By History     VENTOLIN  (90 Base) MCG/ACT inhaler INHALE 2 PUFFS INTO THE LUNGS EVERY 2 HOURS AS NEEDED FOR SHORTNESS OF BREATH / DYSPNEA OR WHEEZING PRN Yes Waldo Matson MD Yes    predniSONE (DELTASONE) 20 MG tablet Take 2 tablets (40 mg) by mouth daily for 2 days, THEN 1 tablet (20 mg) daily for 2 days, THEN 0.5 tablets (10 mg) daily for 2 days.  Patient not taking: Reported on 8/10/2023 Not Taking  Britt Macdonald PA-C No 8/16/23

## 2023-08-10 NOTE — PROGRESS NOTES
Assessment & Plan     1. Left wrist pain  Patient with left wrist pain for the past week or so.  Worsening in the past 2 days, given that he has CKD and hypertension we will do a short taper of prednisone to decrease inflammation and swelling. Patient declines XR and lab work.  He has not had fever, chills, the area is not tense.  No aspiration needed.  Low suspicion for infection/cellulitis, and I do not think any antibiotics are needed.  We discussed if symptoms were to worsen he would need to follow-up with Ortho, may need to have ultrasound/drainage at that time.  This usually gets better on its own.  Perhaps related to gout as he has had this in the past, which prednisone would improve his symptoms as well.  - predniSONE (DELTASONE) 20 MG tablet; Take 2 tablets (40 mg) by mouth daily for 2 days, THEN 1 tablet (20 mg) daily for 2 days, THEN 0.5 tablets (10 mg) daily for 2 days.  Dispense: 7 tablet; Refill: 0    2. Dizziness  Atrial fibrillation noted on EKG   To ER, patient will transport himself. Yoli notified   - EKG 12-lead complete w/read - Clinics          [facility]    CHIEF COMPLAINT:   Chief Complaint   Patient presents with    Musculoskeletal Problem     L wrist pain pt thinks is gout      Subjective     Saul is a 77 year old male who presents to clinic today for evaluation of left wrist pain.  Symptoms have been present for the past week, worsening in the past 2 days.  Similar to when he had gout in the past.  No injury or exacerbating activity noted.  Patient denies having fever, chills, numbness, tingling, pale or cold extremity.       Past Medical History:   Diagnosis Date    Arthritis     Gout    Cancer (H) 2018    Prostrate    CARDIOVASCULAR SCREENING; LDL GOAL LESS THAN 160 8/14/2006    Elevated PSA     Gout, unspecified     Hypertension     Kidney stone 2009    Doing ok now    Obese     Seasonal allergic rhinitis     Spring and Fall     Sleep apnea     DOES NOT USE CPAP    Umbilical hernia without mention of obstruction or gangrene 6/2013     Past Surgical History:   Procedure Laterality Date    BIOPSY 2020    prostrate    COLONOSCOPY  6/16/2006    COLONOSCOPY N/A 6/15/2016    Procedure: COLONOSCOPY;  Surgeon: Poly Sanchez MD;  Location:  GI    CRYOABLATION PROSTATE N/A 12/1/2020    Procedure: CRYOTHERAPY OF PROSTATE;  Surgeon: Aj Caal MD;  Location:  OR    CYSTOSCOPY FLEXIBLE, CYOABLATION PROSTATE N/A 2/13/2018    Procedure: CYSTOSCOPY FLEXIBLE, CRYOABLATION PROSTATE;  FLEXIBLE CYSTOSCOPY, CYROABLATION OF PROSTATE ;  Surgeon: Aj Caal MD;  Location:  OR    GENITOURINARY SURGERY      HERNIORRHAPHY UMBILICAL  7/11/2013    Procedure: HERNIORRHAPHY UMBILICAL;  Open Umbilical Hernia Repair With Mesh ;  Surgeon: Sergio Tomas MD;  Location: [facility]    ROTATOR CUFF REPAIR RT/LT  5/19/2011    Left Shoulder    SURGICAL HISTORY OF -       ear operation as child    SURGICAL HISTORY OF -       removal of pseudogout deposits - Right knee    TONSILLECTOMY      Child     Social History     Tobacco Use    Smoking status: Never    Smokeless tobacco: Never   Substance Use Topics    Alcohol use: No     Current Outpatient Medications   Medication    atorvastatin (LIPITOR) 20 MG tablet    Omega-3 Fatty Acids (OMEGA-3 FISH OIL PO)    predniSONE (DELTASONE) 20 MG tablet    tamsulosin (FLOMAX) 0.4 MG capsule    VENTOLIN  (90 Base) MCG/ACT inhaler    indomethacin (INDOCIN) 50 MG capsule     No current facility-administered medications for this visit.     Allergies   Allergen Reactions    Ciprofloxacin     Norco [Hydrocodone-Acetaminophen] Nausea and Vomiting       10 point ROS of systems were all negative except for pertinent positives noted in my HPI.      Exam:   BP (!) 166/85   Pulse 74   Temp 98  F (36.7  C) (Tympanic)   Resp 20   SpO2 98%   Gen: healthy,alert,no distress  Extremity: Left wrist has TTP at distal ulna.  Pain with supination. He has FROM. No erythema noted.   There is not compromise to the distal circulation.  Pulses are +2 and CRT is brisk  EXTREMITIES: peripheral pulses normal  SKIN: no suspicious lesions or rashes  NEURO: Normal strength and tone, sensory exam grossly normal, mentation intact and speech normal    No results found for any visits on 08/10/23.

## 2023-08-10 NOTE — ED PROVIDER NOTES
History     Chief Complaint:  Palpitations and Dizziness       The history is provided by the patient.      Saul Hairston is a 77 year old male who presents with palpitations and dizziness. The patient reports that he has experienced lightheaded-type dizziness since his blood was drawn 2 days ago. He states that the lightheadedness is intermittent and notes that it persists for a few hours during each episode. He denies any known triggers. He states that today he presented to urgent care for wrist pain and mentioned this lightheadedness. They order an ECG which showed atrial fibrillation and he was then told to present to the ED. His wrist pain was thought to be due to gout flare and he was given a prescription for prednisone. He notes that he has been camping in a camper at [facility] in [place]. He has been eating and drinking well during this time. He states that he has not had anything to eat today. He denies experiencing fever, cough, chest pain, shortness of breath, abdominal pain, abnormal bowel movements, or any urinary problems. He also denies any recent long travel, smoking, alcohol use, marijuana use, recent medication changes, or any history of heart disease or thyroid disease. He states that during the past few years he has experienced occasional episodes of a fluttering sensation in his chest that persists for only a few seconds. He states that 6-7 years ago he presented to the ED with shortness of breath and a workup revealed no worrisome findings. He presents to the ED with his wife.    Independent Historian:   None - Patient Only    Review of External Notes:   I reviewed the patient's urgent care note from today. They order an ECG which showed atrial fibrillation and he was then told to present to the ED. His wrist pain was thought to be due to gout and he was given a prescription for prednisone. They were not concerned for cellulitis. He declined obtaining X-ray imaging or any blood  work.    Medications:    Indomethacin  Tamsulosin  Albuterol inhaler  Prednisone    Past Medical History:    Arthritis  Elevated BMI  Elevated PSA  Elevated PSA  Hyperlipidemia  Hypertension  Idiopathic chronic gout 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  Increased frequency of urination  Kidney stone  Kidney stone  Malignant tumor of prostate  Obstructive sleep apnea  Pneumonia due to 2019 novel coronavirus  Prediabetes  Retention of urine  Rotator cuff tear  Seasonal allergic rhinitis  Umbilical hernia    Past Surgical History:    Biopsy prostate  Colonoscopy x2  Cryoablation prostate  Cystoscopy, flexible, cryoablation, prostate  Ear operation as a child  Removal of pseudogout deposits, right knee  Rotator cuff repair, left  Tonsillectomy     Physical Exam   Patient Vitals for the past 24 hrs:   BP Temp Temp src Pulse Resp SpO2   08/10/23 1724 (!) 140/80 -- -- 82 (!) 9 97 %   08/10/23 1709 (!) 157/104 -- -- 94 11 97 %   08/10/23 1647 -- -- -- 94 12 98 %   08/10/23 1640 (!) 151/99 -- -- 79 23 96 %   08/10/23 1628 (!) 136/93 -- -- 77 11 97 %   08/10/23 1533 -- -- -- 89 13 95 %   08/10/23 1530 -- -- -- 86 13 95 %   08/10/23 1450 -- -- -- (!) 134 14 97 %   08/10/23 1431 (!) 148/93 98.2  F (36.8  C) Oral 62 16 97 %        Physical Exam    Physical Exam   Constitutional:  Patient is oriented to person, place, and time. They appear well-developed and well-nourished.  HENT:   Mouth/Throat:   Oropharynx is clear and moist.   Eyes:    Conjunctivae normal and EOM are normal. Pupils are equal, round, and reactive to light.   Neck:    Normal range of motion.   Cardiovascular: Tachycardia with irregular rhythm. Exam reveals no gallop and no friction rub.  No murmur heard.  Pulmonary/Chest:  Effort normal and breath sounds normal. Patient has no wheezes. Patient has no rales.   Abdominal:   Soft. Bowel sounds are normal. Patient exhibits no mass. There is no tenderness. There is no rebound and no guarding.   Musculoskeletal:  Normal range  of motion. Patient exhibits no edema.   Neurological:   Patient is alert and oriented to person, place, and time. Patient has normal strength. No cranial nerve deficit or sensory deficit. GCS 15  Skin:   Skin is warm and dry. No rash noted. No erythema.   Psychiatric:   Patient has a normal mood and affect. Patient's behavior is normal. Judgment and thought content normal.     Emergency Department Course   ECG  ECG taken at 1424, ECG read at 1425  Atrial fibrillation with rapid ventricular response.  Right bundle branch block.  T wave abnormality, consider inferior ischemia.   Rate 125 bpm. SD interval * ms. QRS duration 114 ms. QT/QTc 350/505 ms. P-R-T axes * 57 -16.     Imaging:  CT Chest Pulmonary Embolism w Contrast   Final Result   IMPRESSION:   1.  No evidence for pulmonary emboli.   2.  No evidence for acute pulmonary disease.   3.  3 mm pulmonary nodule along the left major fissure, likely incidental. Please see guidelines below.      REFERENCE:   Guidelines for Management of Incidental Pulmonary Nodules Detected on CT Images: From the Fleischner Society 2017.    Guidelines apply to incidental nodules in patients who are 35 years or older.   Guidelines do not apply to lung cancer screening, patients with immunosuppression, or patients with known primary cancer.      SINGLE NODULE   Nodule size <6 mm   Low-risk patients: No follow-up needed.   High-risk patients: Optional follow-up at 12 months.      Nodule size 6-8 mm   Low-risk patients: Follow-up CT at 6-12 months, then consider CT at 18-24 months.   High-risk patients: Follow-up CT at 6-12 months, then at 18-24 months if no change.      Nodule size >8 mm   Either low or high-risk patients:   Consider CT, PET/CT, or tissue sampling at 3 months.            Report per radiology    Laboratory:  Labs Ordered and Resulted from Time of ED Arrival to Time of ED Departure   BASIC METABOLIC PANEL - Abnormal       Result Value    Sodium 140      Potassium 4.1       Chloride 103      Carbon Dioxide (CO2) 22      Anion Gap 15      Urea Nitrogen 13.2      Creatinine 0.99      Calcium 9.3      Glucose 107 (*)     GFR Estimate 79     TROPONIN T, HIGH SENSITIVITY - Abnormal    Troponin T, High Sensitivity 24 (*)    D DIMER QUANTITATIVE - Abnormal    D-Dimer Quantitative 1.52 (*)    TSH WITH FREE T4 REFLEX - Normal    TSH 1.39     NT PROBNP INPATIENT - Normal    N terminal Pro BNP Inpatient 588     CBC WITH PLATELETS AND DIFFERENTIAL    WBC Count 7.6      RBC Count 5.74      Hemoglobin 16.5      Hematocrit 50.1      MCV 87      MCH 28.7      MCHC 32.9      RDW 12.8      Platelet Count 203      % Neutrophils 64      % Lymphocytes 21      % Monocytes 9      % Eosinophils 5      % Basophils 1      % Immature Granulocytes 0      NRBCs per 100 WBC 0      Absolute Neutrophils 4.9      Absolute Lymphocytes 1.6      Absolute Monocytes 0.7      Absolute Eosinophils 0.4      Absolute Basophils 0.1      Absolute Immature Granulocytes 0.0      Absolute NRBCs 0.0        Emergency Department Course & Assessments:    Interventions:  Medications   diltiazem (CARDIZEM) injection 20 mg (0 mg Intravenous Hold 8/10/23 1557)   diltiazem (CARDIZEM) 125 mg in sodium chloride 0.9 % 125 mL infusion (5 mg/hr Intravenous Rate/Dose Change 8/10/23 1645)   0.9% sodium chloride BOLUS (0 mLs Intravenous Stopped 8/10/23 1645)   iopamidol (ISOVUE-370) solution 75 mL (75 mLs Intravenous $Given 8/10/23 1656)   Saline Flush (80 mLs Intravenous $Given 8/10/23 1656)        Assessments:  1449 I obtained history and examined the patient.  1642 I reassessed the patient.  1735 I reassessed the patient.     Independent Interpretation (X-rays, CTs, rhythm strip):  None    Consultations/Discussion of Management or Tests:  1747 I spoke on the phone with Robert Cortez of the hospitalist service regarding the patient's presentation, findings, and plan of care.    Social Determinants of Health affecting care:    None    Disposition:  The patient was admitted to the hospital under the care of Dr. Robert Cortez.     Impression & Plan      Medical Decision Making:  Yovanny is a 77-year-old gentleman presenting from urgent care with new onset A-fib with RVR.  While he had very brief palpitations in the past he has never been diagnosed with an arrhythmia.  I did confirm his atrial fibrillation with RVR on EKG here.  Blood work was performed as well as imaging.  His troponin is slightly elevated at 24 most likely due to demand ischemia as he does not actually have any chest pain and he has been having tachycardia.  He has no acute metabolic derangement.  His CBC is unremarkable.  His D-dimer was elevated even with age adjustment so a CTA of his chest was performed.  This did show an incidental pulmonary nodule but no PE infiltrate effusion or other acute abnormalities.    He was started on a dill drip here at a very low rate of 5 mg an hour.  This has rate controlled him but has not converted him.  At this time I will admit him for his new onset A-fib with RVR.    Diagnosis:    ICD-10-CM    1. Pulmonary nodule  R91.1       2. Atrial fibrillation with RVR (H)  I48.91            Scribe Disclosure:  I, Álvaro Jolley, am serving as a scribe at 2:55 PM on 8/10/2023 to document services personally performed by Jewell Rosenberg MD based on my observations and the provider's statements to me.        Jewell Rosenberg MD  08/10/23 1107

## 2023-08-11 ENCOUNTER — APPOINTMENT (OUTPATIENT)
Dept: CARDIOLOGY | Facility: CLINIC | Age: 78
DRG: 310 | End: 2023-08-11
Attending: INTERNAL MEDICINE
Payer: COMMERCIAL

## 2023-08-11 ENCOUNTER — APPOINTMENT (OUTPATIENT)
Dept: CARDIOLOGY | Facility: CLINIC | Age: 78
DRG: 310 | End: 2023-08-11
Attending: STUDENT IN AN ORGANIZED HEALTH CARE EDUCATION/TRAINING PROGRAM
Payer: COMMERCIAL

## 2023-08-11 ENCOUNTER — APPOINTMENT (OUTPATIENT)
Dept: GENERAL RADIOLOGY | Facility: CLINIC | Age: 78
DRG: 310 | End: 2023-08-11
Attending: STUDENT IN AN ORGANIZED HEALTH CARE EDUCATION/TRAINING PROGRAM
Payer: COMMERCIAL

## 2023-08-11 VITALS
DIASTOLIC BLOOD PRESSURE: 70 MMHG | HEIGHT: 69 IN | HEART RATE: 58 BPM | SYSTOLIC BLOOD PRESSURE: 154 MMHG | TEMPERATURE: 98.3 F | BODY MASS INDEX: 28.96 KG/M2 | OXYGEN SATURATION: 97 % | WEIGHT: 195.5 LBS | RESPIRATION RATE: 18 BRPM

## 2023-08-11 LAB
ANION GAP SERPL CALCULATED.3IONS-SCNC: 13 MMOL/L (ref 7–15)
BUN SERPL-MCNC: 12.3 MG/DL (ref 8–23)
CALCIUM SERPL-MCNC: 8.8 MG/DL (ref 8.8–10.2)
CHLORIDE SERPL-SCNC: 103 MMOL/L (ref 98–107)
CREAT SERPL-MCNC: 1.01 MG/DL (ref 0.67–1.17)
DEPRECATED HCO3 PLAS-SCNC: 22 MMOL/L (ref 22–29)
ERYTHROCYTE [DISTWIDTH] IN BLOOD BY AUTOMATED COUNT: 13 % (ref 10–15)
GFR SERPL CREATININE-BSD FRML MDRD: 78 ML/MIN/1.73M2
GLUCOSE SERPL-MCNC: 93 MG/DL (ref 70–99)
HCT VFR BLD AUTO: 44.1 % (ref 40–53)
HGB BLD-MCNC: 14.3 G/DL (ref 13.3–17.7)
MCH RBC QN AUTO: 28.7 PG (ref 26.5–33)
MCHC RBC AUTO-ENTMCNC: 32.4 G/DL (ref 31.5–36.5)
MCV RBC AUTO: 88 FL (ref 78–100)
PLATELET # BLD AUTO: 196 10E3/UL (ref 150–450)
POTASSIUM SERPL-SCNC: 3.9 MMOL/L (ref 3.4–5.3)
RBC # BLD AUTO: 4.99 10E6/UL (ref 4.4–5.9)
SODIUM SERPL-SCNC: 138 MMOL/L (ref 136–145)
WBC # BLD AUTO: 8.3 10E3/UL (ref 4–11)

## 2023-08-11 PROCEDURE — 250N000012 HC RX MED GY IP 250 OP 636 PS 637: Performed by: STUDENT IN AN ORGANIZED HEALTH CARE EDUCATION/TRAINING PROGRAM

## 2023-08-11 PROCEDURE — 82310 ASSAY OF CALCIUM: CPT | Performed by: STUDENT IN AN ORGANIZED HEALTH CARE EDUCATION/TRAINING PROGRAM

## 2023-08-11 PROCEDURE — G0378 HOSPITAL OBSERVATION PER HR: HCPCS

## 2023-08-11 PROCEDURE — 250N000009 HC RX 250: Performed by: HOSPITALIST

## 2023-08-11 PROCEDURE — 93016 CV STRESS TEST SUPVJ ONLY: CPT | Performed by: INTERNAL MEDICINE

## 2023-08-11 PROCEDURE — 93325 DOPPLER ECHO COLOR FLOW MAPG: CPT | Mod: 26 | Performed by: INTERNAL MEDICINE

## 2023-08-11 PROCEDURE — 250N000013 HC RX MED GY IP 250 OP 250 PS 637: Performed by: INTERNAL MEDICINE

## 2023-08-11 PROCEDURE — 999N000208 ECHOCARDIOGRAM COMPLETE

## 2023-08-11 PROCEDURE — 99222 1ST HOSP IP/OBS MODERATE 55: CPT | Mod: 25 | Performed by: INTERNAL MEDICINE

## 2023-08-11 PROCEDURE — 250N000013 HC RX MED GY IP 250 OP 250 PS 637: Performed by: STUDENT IN AN ORGANIZED HEALTH CARE EDUCATION/TRAINING PROGRAM

## 2023-08-11 PROCEDURE — 255N000002 HC RX 255 OP 636: Performed by: STUDENT IN AN ORGANIZED HEALTH CARE EDUCATION/TRAINING PROGRAM

## 2023-08-11 PROCEDURE — 85027 COMPLETE CBC AUTOMATED: CPT | Performed by: STUDENT IN AN ORGANIZED HEALTH CARE EDUCATION/TRAINING PROGRAM

## 2023-08-11 PROCEDURE — 82374 ASSAY BLOOD CARBON DIOXIDE: CPT | Performed by: STUDENT IN AN ORGANIZED HEALTH CARE EDUCATION/TRAINING PROGRAM

## 2023-08-11 PROCEDURE — 999N000208 ECHO STRESS ECHOCARDIOGRAM

## 2023-08-11 PROCEDURE — 93350 STRESS TTE ONLY: CPT | Mod: 26 | Performed by: INTERNAL MEDICINE

## 2023-08-11 PROCEDURE — 99239 HOSP IP/OBS DSCHRG MGMT >30: CPT | Performed by: STUDENT IN AN ORGANIZED HEALTH CARE EDUCATION/TRAINING PROGRAM

## 2023-08-11 PROCEDURE — 73110 X-RAY EXAM OF WRIST: CPT | Mod: LT

## 2023-08-11 PROCEDURE — 93321 DOPPLER ECHO F-UP/LMTD STD: CPT | Mod: 26 | Performed by: INTERNAL MEDICINE

## 2023-08-11 PROCEDURE — 36415 COLL VENOUS BLD VENIPUNCTURE: CPT | Performed by: STUDENT IN AN ORGANIZED HEALTH CARE EDUCATION/TRAINING PROGRAM

## 2023-08-11 PROCEDURE — 93321 DOPPLER ECHO F-UP/LMTD STD: CPT | Mod: TC

## 2023-08-11 PROCEDURE — 93018 CV STRESS TEST I&R ONLY: CPT | Performed by: INTERNAL MEDICINE

## 2023-08-11 RX ORDER — METOPROLOL TARTRATE 25 MG/1
25 TABLET, FILM COATED ORAL 2 TIMES DAILY
Qty: 30 TABLET | Refills: 0 | Status: SHIPPED | OUTPATIENT
Start: 2023-08-11 | End: 2023-08-11

## 2023-08-11 RX ORDER — OXYMETAZOLINE HYDROCHLORIDE 0.05 G/100ML
2 SPRAY NASAL 2 TIMES DAILY
Status: DISCONTINUED | OUTPATIENT
Start: 2023-08-11 | End: 2023-08-11 | Stop reason: HOSPADM

## 2023-08-11 RX ORDER — LOSARTAN POTASSIUM 25 MG/1
25 TABLET ORAL DAILY
Status: DISCONTINUED | OUTPATIENT
Start: 2023-08-11 | End: 2023-08-11 | Stop reason: HOSPADM

## 2023-08-11 RX ORDER — METOPROLOL SUCCINATE 25 MG/1
25 TABLET, EXTENDED RELEASE ORAL DAILY
Qty: 90 TABLET | Refills: 3 | Status: SHIPPED | OUTPATIENT
Start: 2023-08-11 | End: 2023-08-11

## 2023-08-11 RX ORDER — ACYCLOVIR 200 MG/1
10 CAPSULE ORAL ONCE
Status: DISCONTINUED | OUTPATIENT
Start: 2023-08-11 | End: 2023-08-11 | Stop reason: HOSPADM

## 2023-08-11 RX ORDER — METOPROLOL SUCCINATE 25 MG/1
25 TABLET, EXTENDED RELEASE ORAL DAILY
Status: DISCONTINUED | OUTPATIENT
Start: 2023-08-11 | End: 2023-08-11 | Stop reason: HOSPADM

## 2023-08-11 RX ORDER — LOSARTAN POTASSIUM 25 MG/1
25 TABLET ORAL DAILY
Qty: 30 TABLET | Refills: 0 | Status: SHIPPED | OUTPATIENT
Start: 2023-08-11 | End: 2023-08-17

## 2023-08-11 RX ORDER — METOPROLOL SUCCINATE 25 MG/1
25 TABLET, EXTENDED RELEASE ORAL DAILY
Qty: 90 TABLET | Refills: 3 | Status: SHIPPED | OUTPATIENT
Start: 2023-08-11 | End: 2023-08-17

## 2023-08-11 RX ADMIN — HUMAN ALBUMIN MICROSPHERES AND PERFLUTREN 6 ML: 10; .22 INJECTION, SOLUTION INTRAVENOUS at 12:29

## 2023-08-11 RX ADMIN — LOSARTAN POTASSIUM 25 MG: 25 TABLET, FILM COATED ORAL at 13:03

## 2023-08-11 RX ADMIN — OXYMETAZOLINE HYDROCHLORIDE 2 SPRAY: 0.05 SPRAY NASAL at 08:22

## 2023-08-11 RX ADMIN — HUMAN ALBUMIN MICROSPHERES AND PERFLUTREN 6 ML: 10; .22 INJECTION, SOLUTION INTRAVENOUS at 11:31

## 2023-08-11 RX ADMIN — PREDNISONE 40 MG: 20 TABLET ORAL at 08:17

## 2023-08-11 RX ADMIN — APIXABAN 5 MG: 5 TABLET, FILM COATED ORAL at 08:18

## 2023-08-11 ASSESSMENT — ACTIVITIES OF DAILY LIVING (ADL)
ADLS_ACUITY_SCORE: 22

## 2023-08-11 NOTE — UTILIZATION REVIEW
"  Admission Status; Secondary Review Determination         Under the authority of the Utilization Management Committee, the utilization review process indicated a secondary review on the above patient.  The review outcome is based on review of the medical records, discussions with staff, and applying clinical experience noted on the date of the review.       (x) Observation Status Appropriate - This patient does not meet hospital inpatient criteria and is placed in observation status. If this patient's primary payer is Medicare and was admitted as an inpatient, Condition Code 44 should be used and patient status changed to \"observation\".     RATIONALE FOR DETERMINATION   The patient is a 77-year-old male admitted on 8/10/2023.  He came in because of rapid heart rate and was found in urgent care to be in A-fib with rapid ventricular response.  Care was initiated in the ED with a diltiazem infusion and the patient spontaneously converted to a normal sinus rhythm.  He was seen by cardiology and initiated on metoprolol 25 mg twice daily.  Eliquis also started.  Echocardiogram is pending and a stress echocardiogram to evaluate for ischemic heart disease has been ordered.  Patient also has wrist discomfort and has been started on some oral steroids with taper and, he does have chronic kidney disease and hypertension in addition.  Based on current diagnosis and current plan, recommend changing the patient from inpatient to observation status.  If the stress echocardiogram leads to an angiogram and intervention, reconsideration for going back to inpatient status can be considered at that time.  Dr. Cortez will be sent in [employer] paging text with this recommendation.      The severity of illness, intensity of service provided, expected LOS and risk for adverse outcome make the care complex, high risk and appropriate for hospital admission.        The information on this document is developed by the utilization review team in " order for the business office to ensure compliance.  This only denotes the appropriateness of proper admission status and does not reflect the quality of care rendered.         The definitions of Inpatient Status and Observation Status used in making the determination above are those provided in the CMS Coverage Manual, Chapter 1 and Chapter 6, section 70.4.      Sincerely,     Tay Villa MD  Physician Advisor  Utilization Review/ Case Management  [facility].

## 2023-08-11 NOTE — PLAN OF CARE
Discharge instructions reviewed with pt, new meds metoprolol, losartan and eliquis filled at discharge pharmacy.  Pt already has prednisone at home.  Pt will schedule follow up in 1 week with primary and orders placed for cardiology clinic to call him to schedule that follow up.  LDA removed.  All questions answered, all belongings returned.

## 2023-08-11 NOTE — PLAN OF CARE
Alert and oriented x4. VSS. Denies pain. Up independently. Voiding well. Echo and stress echo done today. Tele Sinus adan. Plan to continue to monitor today. Likely discharge if stress echo good.

## 2023-08-11 NOTE — ED NOTES
"Anesthesia Transfer of Care Note    Patient: Dariel Urbina    Procedure(s) Performed: Procedure(s) (LRB):  COLONOSCOPY (N/A)    Patient location: [facility]    Anesthesia Type: general    Transport from OR: Transported from OR on 2-3 L/min O2 by NC with adequate spontaneous ventilation    Post pain: adequate analgesia    Post assessment: no apparent anesthetic complications and tolerated procedure well    Post vital signs: stable    Level of consciousness: awake, alert and oriented    Nausea/Vomiting: no nausea/vomiting    Complications: none    Transfer of care protocol was followed      Last vitals:   Visit Vitals  BP (!) 159/85 (BP Location: Left arm, Patient Position: Sitting)   Pulse (!) 59   Temp 36.5 °C (97.7 °F) (Temporal)   Resp 17   Ht 5' 5" (1.651 m)   Wt 86.6 kg (191 lb)   SpO2 97%   BMI 31.78 kg/m²     " Patient converted to NSR with a heart rate in the 50's. He is currently asymptomatic and the diltiazem was stopped.

## 2023-08-11 NOTE — H&P
[facility]    History and Physical - Hospitalist Service       Date of Admission:  8/10/2023    Assessment & Plan      Saul Hairston is a 77 year old male admitted on 8/10/2023. He presents for evaluation of left wrist pain and tachycardia.      Atrial fibrillation with rapid ventricular response (H)    Assessment: Presents with dizziness and was seen at urgent care where he was found to be in A-fib with RVR.  He is chest pain-free and without any dyspnea or PND/orthopnea symptoms.  Was initiated on a diltiazem infusion and his heart rates have normalized.  Currently hemodynamically stable at this time and overall nontoxic-appearing.  [id number] 2 score of 3 for hypertension, age greater than 75    Plan:   -Admit to inpatient  -Continue diltiazem infusion, wean as able  -Initiate metoprolol 25 mg twice daily  -Start Eliquis 5 mg twice daily for stroke prevention  -Monitor on telemetry  -Obtain echocardiogram  -Cardiology consulted  -Follow vitals/temp     Left wrist pain   Assessment: Patient with left wrist pain for the past week or so.  Worsening in the past 2 days, given that he has CKD and hypertension we will do a short taper of prednisone to decrease inflammation and swelling. He has not had fever, chills, the area is not tense.  No aspiration needed. Low suspicion for infection/cellulitis.  Possible gout flare.   Plan:  -Obtain wrist x-ray in a.m.  -Check uric acid levels  -Trial of prednisone 40 mg daily with taper      Hyperlipidemia LDL goal <130    Hypertension goal BP (blood pressure) < 150/90    Assessment/Plan: Not currently on statin or antihypertensives.  Will start on low-dose Norvasc for hypertension at this time.      MUNIRA (obstructive sleep apnea)    Assessment/Plan: Stable, continue to follow as an outpatient      Malignant tumor of prostate (H)    Assessment/Plan: Continue follow-up with outpatient urologist      Pulmonary nodule    Assessment/Plan: Continue to follow  with primary care team       Diet:  Regular Diet  DVT Prophylaxis: DOAC  Brown Catheter: PRESENT, indication:    Lines: None     Cardiac Monitoring: None  Code Status:  FULL CODE    Clinically Significant Risk Factors Present on Admission                  # Hypertension: Noted on problem list               Disposition Plan      Expected Discharge Date: 08/12/2023                  Robert Cortez MD  Hospitalist Service  [facility]  Securely message with [employer] (more info)  Text page via [employer] Paging/Directory     ______________________________________________________________________    Chief Complaint     Tachycardia  Left wrist pain    History is obtained from the patient, electronic health record, and emergency department physician    History of Present Illness   Saul Hairston is a 77 year old male who with past medical history of prostate cancer, arthritis who presents for evaluation of tachycardia and left wrist pain.    Patient was seen at urgent care today for worsening left wrist pain over the past several days.  Ultimately was found to have atrial fibrillation with rapid ventricular response and was recommended to seek further evaluation in the ED.  He denies any chest pain or shortness of breath.  He does endorse some dizziness over the past several days but otherwise no syncopal symptoms or head trauma.  He has no prior history of atrial fibrillation.  He has no prior history of strokes or coronary disease.  Denies any significant alcohol use.  Denies any significant illicit drug use.  He denies any recent bloody stools, weight loss or night sweats.  Denies any current nausea/vomiting or abdominal pain.  Denies any PND/orthopnea.  He has no worsening lower extremity edema or calf pain.  He otherwise has no other complaints this time.  Denies any fevers or chills or redness around his wrist.  Denies any recent trauma to his wrist.  Does note that his wrist pain feels similar to his  previous gout flares.      Past Medical History    Past Medical History:   Diagnosis Date    Arthritis     Gout    Atrial fibrillation with rapid ventricular response (H) 8/10/2023    Cancer (H) 2018    Prostrate    CARDIOVASCULAR SCREENING; LDL GOAL LESS THAN 160 8/14/2006    Elevated PSA     Gout, unspecified     Hypertension     Kidney stone 2009    Doing ok now    Obese     Seasonal allergic rhinitis     Spring and Fall    Sleep apnea     DOES NOT USE CPAP    Umbilical hernia without mention of obstruction or gangrene 6/2013       Past Surgical History   Past Surgical History:   Procedure Laterality Date    BIOPSY  2020    prostrate    COLONOSCOPY  6/16/2006    COLONOSCOPY N/A 6/15/2016    Procedure: COLONOSCOPY;  Surgeon: Poly Sanchez MD;  Location:  GI    CRYOABLATION PROSTATE N/A 12/1/2020    Procedure: CRYOTHERAPY OF PROSTATE;  Surgeon: Aj Caal MD;  Location:  OR    CYSTOSCOPY FLEXIBLE, CYOABLATION PROSTATE N/A 2/13/2018    Procedure: CYSTOSCOPY FLEXIBLE, CRYOABLATION PROSTATE;  FLEXIBLE CYSTOSCOPY, CYROABLATION OF PROSTATE ;  Surgeon: Aj Caal MD;  Location:  OR    GENITOURINARY SURGERY      HERNIORRHAPHY UMBILICAL  7/11/2013    Procedure: HERNIORRHAPHY UMBILICAL;  Open Umbilical Hernia Repair With Mesh ;  Surgeon: Sergio Tomas MD;  Location: [facility] OR    ROTATOR CUFF REPAIR RT/LT  5/19/2011    Left Shoulder    SURGICAL HISTORY OF -       ear operation as child    SURGICAL HISTORY OF -       removal of pseudogout deposits - Right knee    TONSILLECTOMY      Child       Prior to Admission Medications   Prior to Admission Medications   Prescriptions Last Dose Informant Patient Reported? Taking?   Omega-3 Fatty Acids (OMEGA-3 FISH OIL PO) 8/9/2023 Self Yes Yes   Sig: Take 2 g by mouth daily   VENTOLIN  (90 Base) MCG/ACT inhaler PRN  No Yes   Sig: INHALE 2 PUFFS INTO THE LUNGS EVERY 2 HOURS AS NEEDED FOR SHORTNESS OF BREATH / DYSPNEA OR WHEEZING   indomethacin (INDOCIN)  50 MG capsule PRN  No Yes   Sig: Take 1 capsule (50 mg) by mouth 3 times daily as needed for moderate pain   predniSONE (DELTASONE) 20 MG tablet Not Taking  No No   Sig: Take 2 tablets (40 mg) by mouth daily for 2 days, THEN 1 tablet (20 mg) daily for 2 days, THEN 0.5 tablets (10 mg) daily for 2 days.   Patient not taking: Reported on 8/10/2023   tamsulosin (FLOMAX) 0.4 MG capsule 8/9/2023  Yes Yes   Sig: Take 0.4 mg by mouth every evening      Facility-Administered Medications: None        Review of Systems    The 10 point Review of Systems is negative other than noted in the HPI or here.     Social History   I have reviewed this patient's social history and updated it with pertinent information if needed.  Social History     Tobacco Use    Smoking status: Never    Smokeless tobacco: Never   Vaping Use    Vaping Use: Never used   Substance Use Topics    Alcohol use: No    Drug use: No         Family History   I have reviewed this patient's family history and updated it with pertinent information if needed.  Family History   Problem Relation Age of Onset    Hypertension Father     Alcohol/Drug Father     Allergies Father     Respiratory Father     Asthma Father     Cerebrovascular Disease Maternal Grandmother     Arthritis Maternal Grandmother     Diabetes Maternal Grandmother     Alzheimer Disease Mother     Arthritis Mother     Eye Disorder Mother     Cerebrovascular Disease Paternal Grandfather     C.A.D. Maternal Uncle     C.A.D. Paternal Uncle     Prostate Cancer Maternal Uncle     Lipids Sister     Lipids Brother     Obesity Brother         Has lost weight    C.A.D. Son     Hyperlipidemia Sister     Obesity Brother          Allergies   Allergies   Allergen Reactions    Ciprofloxacin     Norco [Hydrocodone-Acetaminophen] Nausea and Vomiting     ------------------------------------------------------------------------     Physical Exam   Vital Signs: Temp: 98.2  F (36.8  C) Temp src: Oral BP: (!) 152/85 Pulse: 58    Resp: 22 SpO2: 96 %      Weight: 0 lbs 0 oz    Constitutional: awake, alert, cooperative, no apparent distress.   Eyes: Lids and lashes normal, pupils equal, round and reactive to light   ENT: Normocephalic, without obvious abnormality, atraumatic, sinuses nontender on palpation   Hematologic / Lymphatic: no cervical lymphadenopathy   Respiratory: CTABL   Cardiovascular: RRR with no m/r/g   GI: Normal bowel sounds, soft, non-distended, non-tender.   Skin: normal skin color, texture, turgor   Musculoskeletal: Left wrist has TTP at distal ulna.   Neurologic: Awake, alert, oriented to name, place and time. Cranial nerves II-XII are grossly intact. Motor is 5 out of 5 bilaterally. Sensory is intact.   Neuropsychiatric: normal mood and affect    ----------------------------------------------------------------------------------------------------------------------------------    Medical Decision Making       75 MINUTES SPENT BY ME on the date of service doing chart review, history, exam, documentation & further activities per the note.      Data   ------------------------- PAST 24 HR DATA REVIEWED -----------------------------------------------    I have personally reviewed the following data over the past 24 hrs:    7.6  \   16.5   / 203     140 103 13.2 /  107 (H)   4.1 22 0.99 \     Trop: 24 (H) BNP: 588     TSH: 1.39 T4: N/A A1C: N/A     INR:  N/A PTT:  N/A   D-dimer:  1.52 (H) Fibrinogen:  N/A       Imaging results reviewed over the past 24 hrs:   Recent Results (from the past 24 hour(s))   CT Chest Pulmonary Embolism w Contrast    Narrative    EXAM: CT CHEST PULMONARY EMBOLISM W CONTRAST  LOCATION: [facility]  DATE: 8/10/2023    INDICATION: afib with elevated d dimer  COMPARISON: None.  TECHNIQUE: CT chest pulmonary angiogram during arterial phase injection of IV contrast. Multiplanar reformats and MIP reconstructions were performed. Dose reduction techniques were used.   CONTRAST: 75 mL  isovue 370    FINDINGS:  ANGIOGRAM CHEST: Pulmonary arteries are normal caliber and negative for pulmonary emboli. Thoracic aorta is negative for dissection. No CT evidence of right heart strain.    LUNGS AND PLEURA: 3 mm pulmonary nodule along the left major fissure on image 148 of series 7. Minimal dependent atelectasis, lungs otherwise are clear. No infiltrates or effusions.    MEDIASTINUM/AXILLAE: No adenopathy. Atherosclerotic disease thoracic aorta.    CORONARY ARTERY CALCIFICATION: Severe.    UPPER ABDOMEN: A few diverticula visualized left colon.    MUSCULOSKELETAL: Hypertrophic changes and degenerative disc disease thoracic spine.      Impression    IMPRESSION:  1.  No evidence for pulmonary emboli.  2.  No evidence for acute pulmonary disease.  3.  3 mm pulmonary nodule along the left major fissure, likely incidental. Please see guidelines below.    REFERENCE:  Guidelines for Management of Incidental Pulmonary Nodules Detected on CT Images: From the Fleischner Society 2017.   Guidelines apply to incidental nodules in patients who are 35 years or older.  Guidelines do not apply to lung cancer screening, patients with immunosuppression, or patients with known primary cancer.    SINGLE NODULE  Nodule size <6 mm  Low-risk patients: No follow-up needed.  High-risk patients: Optional follow-up at 12 months.    Nodule size 6-8 mm  Low-risk patients: Follow-up CT at 6-12 months, then consider CT at 18-24 months.  High-risk patients: Follow-up CT at 6-12 months, then at 18-24 months if no change.    Nodule size >8 mm  Either low or high-risk patients:  Consider CT, PET/CT, or tissue sampling at 3 months.       ------------------------- ENCOUNTER LABS ----------------------------------------------------------------  Recent Labs   Lab 08/10/23  1506   WBC 7.6   HGB 16.5   MCV 87         POTASSIUM 4.1   CHLORIDE 103   CO2 22   BUN 13.2   CR 0.99   ANIONGAP 15   MADIHA 9.3   *       Most Recent 3  CBC's:  Recent Labs   Lab Test 08/10/23  1506 05/17/22  1426 12/26/20  0741   WBC 7.6 7.4 7.0   HGB 16.5 17.1 14.8   MCV 87 88 85    219 226     Most Recent 3 BMP's:  Recent Labs   Lab Test 08/10/23  1506 05/17/22  1426 12/26/20  0741    140 134   POTASSIUM 4.1 4.3 4.5   CHLORIDE 103 107 102   CO2 22 28 30   BUN 13.2 19 27   CR 0.99 1.02 0.79   ANIONGAP 15 5 2*   MADIHA 9.3 9.2 8.4*   * 110* 111*     Most Recent 2 LFT's:  Recent Labs   Lab Test 05/17/22  1426 12/26/20  0741   AST 26 68*   ALT 49 188*   ALKPHOS 121 67   BILITOTAL 0.6 0.8     Most Recent 3 INR's:No lab results found.  Most Recent 3 Troponin's:  Recent Labs   Lab Test 12/22/20  1558 12/22/20  1310 12/22/20  1218   TROPI <0.015 <0.015 Canceled, Test credited     Most Recent 3 BNP's:  Recent Labs   Lab Test 08/10/23  1506 11/27/17  0613   NTBNPI 588 73     Most Recent 6 Bacteria Isolates From Any Culture (See EPIC Reports for Culture Details):  Recent Labs   Lab Test 12/18/20  1200 08/19/19  1153   CULT No growth No beta hemolytic Streptococcus Group A isolated     Most Recent TSH and T4:  Recent Labs   Lab Test 08/10/23  1506   TSH 1.39     Most Recent ESR & CRP:  Recent Labs   Lab Test 12/25/20  0742 12/18/20  1200 03/30/17  1601   SED  --   --  8   CRP 29.2*   < > 8.2*    < > = values in this interval not displayed.

## 2023-08-11 NOTE — DISCHARGE SUMMARY
"[facility]  Hospitalist Discharge Summary      Date of Admission:  8/10/2023  Date of Discharge:  8/11/2023  Discharging Provider: Robert Cortez MD  Discharge Service: Hospitalist Service    Discharge Diagnoses     Atrial fibrillation with rapid ventricular response (H)     Clinically Significant Risk Factors     # Overweight: Estimated body mass index is 28.87 kg/m  as calculated from the following:    Height as of this encounter: 1.753 m (5' 9\").    Weight as of this encounter: 88.7 kg (195 lb 8 oz).       Follow-ups Needed After Discharge   Follow-up Appointments     Follow-up and recommended labs and tests       Follow up with primary care provider, Waldo Matson MD, within   7 days for hospital follow- up.  No follow up labs or test are needed.            Unresulted Labs Ordered in the Past 30 Days of this Admission       No orders found for last 31 day(s).          Discharge Disposition   Discharged to home  Condition at discharge: Stable    Hospital Course      Saul Hairston is a 77 year old male admitted on 8/10/2023. He presents for evaluation of left wrist pain and tachycardia.      Atrial fibrillation with rapid ventricular response (H)    Assessment: Presents with dizziness and was seen at urgent care where he was found to be in A-fib with RVR.  He is chest pain-free and without any dyspnea or PND/orthopnea symptoms.  Was initiated on a diltiazem infusion and his heart rates have normalized.  Currently hemodynamically stable at this time and overall nontoxic-appearing.  [id number] 2 score of 3 for hypertension, age greater than 75    Plan:   -Start Eliquis 5 mg twice daily for stroke prevention  -Obtain stress echocardiogram -> This was a normal stress echocardiogram with no evidence of stress-induced ischemia.  -Cardiology consulted -> add a low-dose of metoprolol succinate 25 mg daily and losartan 25 mg daily.      Left wrist pain   Assessment: Patient with left wrist " pain for the past week or so.  Worsening in the past 2 days, given that he has CKD and hypertension we will do a short taper of prednisone to decrease inflammation and swelling. He has not had fever, chills, the area is not tense.  No aspiration needed. Low suspicion for infection/cellulitis.  Possible gout flare.   Plan:  -Obtain wrist x-ray in a.m -> Osteopenia. Normal joint spaces and alignment. No acute  fracture.  -Check uric acid levels -> elevated at > 10 consistent with gout flare   -Continue PTA Prednisone burst      Hyperlipidemia LDL goal <130    Hypertension goal BP (blood pressure) < 150/90    Assessment/Plan: Not currently on statin or antihypertensives. Cardiology added a low-dose of metoprolol succinate 25 mg daily and losartan 25 mg daily.       MUNIRA (obstructive sleep apnea)    Assessment/Plan: Stable, continue to follow as an outpatient      Malignant tumor of prostate (H)    Assessment/Plan: Continue follow-up with outpatient urologist      Pulmonary nodule    Assessment/Plan: Continue to follow with primary care team    Consultations This Hospital Stay   CARDIOLOGY IP CONSULT  PHARMACY LIAISON FOR MEDICATION COVERAGE CONSULT    Code Status   Full Code    Time Spent on this Encounter   I, Robert Cortez MD, personally saw the patient today and spent greater than 30 minutes discharging this patient.       Robert Cortez MD  [facility]  [address]  [facility] [address]  Phone: [phone]  ______________________________________________________________________    Physical Exam   Vital Signs: Temp: 98.3  F (36.8  C) Temp src: Oral BP: (!) 154/70 Pulse: 58   Resp: 18 SpO2: 97 % O2 Device: None (Room air)    Weight: 195 lbs 8 oz  ----------------------------------------------------------------------------------------       Primary Care Physician   Waldo Matson MD    Discharge Orders      Follow-Up with Cardiology RYNE      Follow-Up with  Cardiology      Reason for your hospital stay    You had abnormal heart rate and rhythm and was evaluated by our Cardiology team.     Follow-up and recommended labs and tests     Follow up with primary care provider, Waldo Matson MD, within 7 days for hospital follow- up.  No follow up labs or test are needed.     Activity    Your activity upon discharge: activity as tolerated and activity as tolerated and no driving for today     Diet    Follow this diet upon discharge: Orders Placed This Encounter      Combination Diet Low Saturated Fat Na <2400mg Diet, No Caffeine Diet       Significant Results and Procedures   Most Recent 3 CBC's:  Recent Labs   Lab Test 08/11/23  0549 08/10/23  1506 05/17/22  1426   WBC 8.3 7.6 7.4   HGB 14.3 16.5 17.1   MCV 88 87 88    203 219     Most Recent 3 BMP's:  Recent Labs   Lab Test 08/11/23  0549 08/10/23  1506 05/17/22  1426    140 140   POTASSIUM 3.9 4.1 4.3   CHLORIDE 103 103 107   CO2 22 22 28   BUN 12.3 13.2 19   CR 1.01 0.99 1.02   ANIONGAP 13 15 5   MADIHA 8.8 9.3 9.2   GLC 93 107* 110*     Most Recent 2 LFT's:  Recent Labs   Lab Test 05/17/22  1426 12/26/20  0741   AST 26 68*   ALT 49 188*   ALKPHOS 121 67   BILITOTAL 0.6 0.8     Most Recent 3 INR's:No lab results found.  Most Recent 3 Troponin's:  Recent Labs   Lab Test 12/22/20  1558 12/22/20  1310 12/22/20  1218   TROPI <0.015 <0.015 Canceled, Test credited     Most Recent 3 BNP's:  Recent Labs   Lab Test 08/10/23  1506 11/27/17  0613   NTBNPI 588 73     Most Recent Cholesterol Panel:  Recent Labs   Lab Test 05/17/22  1426   CHOL 159   LDL 83   HDL 38*   TRIG 192*     Most Recent TSH and T4:  Recent Labs   Lab Test 08/10/23  1506   TSH 1.39     Most Recent Urinalysis:  Recent Labs   Lab Test 12/18/20  1344   COLOR Yellow   APPEARANCE Clear   URINEGLC Negative   URINEBILI Negative   URINEKETONE 40*   SG 1.022   UBLD Trace*   URINEPH 5.5   PROTEIN 30*   NITRITE Negative   LEUKEST Negative   RBCU 1    WBCU 1   ,   Results for orders placed or performed during the hospital encounter of 08/10/23   CT Chest Pulmonary Embolism w Contrast    Narrative    EXAM: CT CHEST PULMONARY EMBOLISM W CONTRAST  LOCATION: [facility]  DATE: 8/10/2023    INDICATION: afib with elevated d dimer  COMPARISON: None.  TECHNIQUE: CT chest pulmonary angiogram during arterial phase injection of IV contrast. Multiplanar reformats and MIP reconstructions were performed. Dose reduction techniques were used.   CONTRAST: 75 mL isovue 370    FINDINGS:  ANGIOGRAM CHEST: Pulmonary arteries are normal caliber and negative for pulmonary emboli. Thoracic aorta is negative for dissection. No CT evidence of right heart strain.    LUNGS AND PLEURA: 3 mm pulmonary nodule along the left major fissure on image 148 of series 7. Minimal dependent atelectasis, lungs otherwise are clear. No infiltrates or effusions.    MEDIASTINUM/AXILLAE: No adenopathy. Atherosclerotic disease thoracic aorta.    CORONARY ARTERY CALCIFICATION: Severe.    UPPER ABDOMEN: A few diverticula visualized left colon.    MUSCULOSKELETAL: Hypertrophic changes and degenerative disc disease thoracic spine.      Impression    IMPRESSION:  1.  No evidence for pulmonary emboli.  2.  No evidence for acute pulmonary disease.  3.  3 mm pulmonary nodule along the left major fissure, likely incidental. Please see guidelines below.    REFERENCE:  Guidelines for Management of Incidental Pulmonary Nodules Detected on CT Images: From the Fleischner Society 2017.   Guidelines apply to incidental nodules in patients who are 35 years or older.  Guidelines do not apply to lung cancer screening, patients with immunosuppression, or patients with known primary cancer.    SINGLE NODULE  Nodule size <6 mm  Low-risk patients: No follow-up needed.  High-risk patients: Optional follow-up at 12 months.    Nodule size 6-8 mm  Low-risk patients: Follow-up CT at 6-12 months, then consider  CT at 18-24 months.  High-risk patients: Follow-up CT at 6-12 months, then at 18-24 months if no change.    Nodule size >8 mm  Either low or high-risk patients:  Consider CT, PET/CT, or tissue sampling at 3 months.     XR Wrist Left G/E 3 Views    Narrative    EXAM: XR WRIST LEFT G/E 3 VIEWS  DATE/TIME: 2023 9:26 AM    INDICATION: left wrist pain  COMPARISON: Left wrist radiographs 2022.      Impression    IMPRESSION: Osteopenia. Normal joint spaces and alignment. No acute  fracture.    ANA BURK DO         SYSTEM ID:  [id number]   Echocardiogram Complete    Narrative    [id number]  [id number]  [id number]  [id number]^LACHO^CHUCK     [facility]  Echocardiography Laboratory  [address] [place] [address]     Name: LUIS ALBERTO SHEA  MRN: [id number]  : 1945  Study Date: 2023 11:11 AM  Age: 77 yrs  Gender: Male  Patient Location: [facility]  Reason For Study: Atrial Fibrillation  Ordering Physician: CHUCK MONK  Referring Physician: CHUCK MONK  Performed By: Andi Celestin     BSA: 2.1 m2  Height: 69 in  Weight: 199 lb  HR: 64  ______________________________________________________________________________  Procedure  Complete Echo Adult. Optison (NDC #[id number]) given intravenously.  ______________________________________________________________________________  Interpretation Summary     The ascending aorta is Mildly dilated.  The left atrium is mildly dilated.  Left ventricular systolic function is normal.  Sinus rhythm was noted.  ______________________________________________________________________________  Left Ventricle  The left ventricle is normal in size. There is normal left ventricular wall  thickness. Left ventricular systolic function is normal. Left ventricular  diastolic function is indeterminate. Normal left ventricular wall motion. No  evidence of left ventricular mass or tumors.     Right Ventricle  The right ventricle is normal in structure, function and size.      Atria  The left atrium is mildly dilated. The right atrium is borderline dilated.     Mitral Valve  The mitral valve leaflets appear normal. There is no evidence of stenosis,  fluttering, or prolapse.     Tricuspid Valve  Normal tricuspid valve.     Aortic Valve  There is moderate trileaflet aortic sclerosis.     Pulmonic Valve  Normal pulmonic valve. There is trace pulmonic valvular regurgitation.     Vessels  The aortic root is normal size. The ascending aorta is Mildly dilated. The  inferior vena cava is normal.     Pericardium  The pericardium appears normal.     Rhythm  Sinus rhythm was noted.  ______________________________________________________________________________  MMode/2D Measurements & Calculations     IVSd: 0.90 cm  LVIDd: 5.0 cm  LVIDs: 2.6 cm  LVPWd: 0.98 cm  FS: 48.3 %  LV mass(C)d: 168.4 grams  LV mass(C)dI: 81.7 grams/m2  Ao root diam: 3.3 cm  asc Aorta Diam: 4.3 cm  LVOT diam: 2.0 cm  LVOT area: 3.1 cm2  LA Volume (BP): 71.8 ml  LA Volume Index (BP): 34.9 ml/m2  RWT: 0.39     TAPSE: 3.1 cm     Doppler Measurements & Calculations  MV E max zohaib: 78.8 cm/sec  MV A max zohaib: 93.4 cm/sec  MV E/A: 0.84  MV dec slope: 427.0 cm/sec2  MV dec time: 0.18 sec  Ao V2 max: 210.0 cm/sec  Ao max P.0 mmHg  Ao V2 mean: 139.0 cm/sec  Ao mean P.0 mmHg  Ao V2 VTI: 43.5 cm  RODRIGUE(I,D): 1.9 cm2  RODRIGUE(V,D): 1.7 cm2  LV V1 max P.4 mmHg  LV V1 max: 116.0 cm/sec  LV V1 VTI: 26.4 cm  SV(LVOT): 82.9 ml  SI(LVOT): 40.2 ml/m2  PA V2 max: 143.0 cm/sec  PA max P.2 mmHg  PA acc time: 0.11 sec  TR max zohaib: 267.0 cm/sec  TR max P.5 mmHg  AV Zohaib Ratio (DI): 0.55  RODRIGUE Index (cm2/m2): 0.92  E/E' av.1  Lateral E/e': 6.6  Medial E/e': 9.7     ______________________________________________________________________________  Report approved by: Cande Melchor 2023 01:08 PM         Echo Stress Echocardiogram    Narrative    [id number]  [id number]  [id number]  [id number]^SOY^IDALMIS^DOMINIQUE     [facility]  [facility]  Echocardiography Laboratory  [address] [place] [address]     Name: LUIS ALBERTO SHEA  MRN: [id number]  : 1945  Study Date: 2023 12:31 PM  Age: 77 yrs  Gender: Male  Patient Location: [facility]  Reason For Study: Chest Pain  Ordering Physician: IDALMIS OLIVIER  Referring Physician: TOBI GOODE  Performed By: Andi Celestin     BSA: 2.0 m2  Height: 69 in  Weight: 195 lb  HR: 65  BP: 146/78 mmHg  ______________________________________________________________________________  Procedure  Stress Echo Complete. Contrast Optison.  ______________________________________________________________________________  Interpretation Summary  Contrast was used without apparent complications. This was a normal stress  echocardiogram with no evidence of stress-induced ischemia.  ______________________________________________________________________________  Stress  The patient exercised 5:30.  RPP 23,318.  There was a hypertensive BP response to exercise.  The patient exhibited no chest pain during exercise.  Exercise was stopped due to fatigue.  Target Heart Rate was achieved.  A treadmill exercise test according to the Kyler protocol was performed.  The EKG portion of this stress test was negative for inducible ischemia (see  echo results below).  Normal resting wall motion and no stress-induced wall motion abnormality.  Left ventricular cavity size decreases with exercise.  Global LV systolic function augments with exercise.     Baseline  The patient is in normal sinus rhythm.  Non specific ST segment changes.  Mild hypertension at rest.  No regional wall motion abnormalities noted.  The visual ejection fraction is estimated at 55-60%.  No hemodynamically significant valvular abnormalities on 2D or color flow  imaging.     Stress Results             Protocol:  Kyler          Maximum Predicted HR:   145 bpm             Target HR: 123 bpm        % Maximum Predicted HR: 90 %             Stage   DurationHeart Rate  BP               Comment                 (mm:ss)   (bpm)         Stage 1   3:00     117    154/82         Stage 2   2:30     131    178/78        RecoveryR  7:00      81    138/74RPP=23,318;[place]=5; FAC= Below Avg             Stress Duration:   5:30 mm:ss        Recovery Time: 7:00 mm:ss          Maximum Stress HR: 131 bpm           METS:          7     ______________________________________________________________________________  Report approved by: Cande Melchor 08/11/2023 02:42 PM     ______________________________________________________________________________          Discharge Medications   Current Discharge Medication List        START taking these medications    Details   apixaban ANTICOAGULANT (ELIQUIS) 5 MG tablet Take 1 tablet (5 mg) by mouth 2 times daily for 30 days  Qty: 60 tablet, Refills: 0    Associated Diagnoses: Atrial fibrillation with RVR (H)      losartan (COZAAR) 25 MG tablet Take 1 tablet (25 mg) by mouth daily for 30 days  Qty: 30 tablet, Refills: 0    Associated Diagnoses: Atrial fibrillation with RVR (H)      metoprolol succinate ER (TOPROL XL) 25 MG 24 hr tablet Take 1 tablet (25 mg) by mouth daily  Qty: 90 tablet, Refills: 3    Associated Diagnoses: Atrial fibrillation with RVR (H)           CONTINUE these medications which have NOT CHANGED    Details   indomethacin (INDOCIN) 50 MG capsule Take 1 capsule (50 mg) by mouth 3 times daily as needed for moderate pain  Qty: 30 capsule, Refills: 0    Associated Diagnoses: Idiopathic chronic gout 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      Omega-3 Fatty Acids (OMEGA-3 FISH OIL PO) Take 2 g by mouth daily      tamsulosin (FLOMAX) 0.4 MG capsule Take 0.4 mg by mouth every evening      VENTOLIN  (90 Base) MCG/ACT inhaler INHALE 2 PUFFS INTO THE LUNGS EVERY 2 HOURS AS NEEDED FOR SHORTNESS OF BREATH / DYSPNEA OR WHEEZING  Qty: 18 g, Refills: 0    Associated Diagnoses: Wheezing      predniSONE (DELTASONE) 20 MG tablet Take 2 tablets  (40 mg) by mouth daily for 2 days, THEN 1 tablet (20 mg) daily for 2 days, THEN 0.5 tablets (10 mg) daily for 2 days.  Qty: 7 tablet, Refills: 0    Associated Diagnoses: Left wrist pain           Allergies   Allergies   Allergen Reactions    Ciprofloxacin     Norco [Hydrocodone-Acetaminophen] Nausea and Vomiting

## 2023-08-11 NOTE — CONSULTS
Patient has Medicare Advantage through [facility].    Xarelto/Eliquis  --$47/mo ($94 for 3 months)  --lf/when total drug costs exceed $4,660, price will increase to a 25% coinsurance, equivalent to $151/mo.    Keyla Rosario  Pharmacy Technician/Liaison, Discharge Pharmacy   [phone] (voice or text)  [email or web address]

## 2023-08-11 NOTE — CONSULTS
[facility]    Cardiology Consultation     Date of Admission:  8/10/2023    Assessment & Plan   Saul Hairston is a 77 year old male who was admitted on 8/10/2023.    1.  New onset atrial fibrillation, now spontaneously converted to sinus rhythm.    2.  Hypertension    3.  Dyslipidemia    4.  Obstructive sleep apnea    5.  Minimal troponin elevation without chest pain.  He did have a brief episode of mild indigestion type chest discomfort a few days ago.  ECG is abnormal showing nonspecific ST and T wave abnormalities in the anterolateral precordial leads in the setting of incomplete right bundle branch block.    Recommendations:    I will set up a stress echocardiogram today to evaluate for ischemic heart disease in the setting of chest discomfort, minor troponin elevation, and abnormal ECG findings prior to discharge.    Minimal symptoms with atrial fibrillation with relatively good heart rate control prior to admission. Now back in SR.   Blood pressure is elevated and requires additional treatment.  I will add a low-dose of metoprolol succinate 25 mg daily and losartan 25 mg daily.  [id number] score is 3 (age over 75, hypertension) therefore I will recommend anticoagulation for stroke prevention with Eliquis or Xarelto.  Echocardiogram is pending.  He has a soft systolic murmur not likely to be significant valvular disease.  Okay for discharge home after echocardiogram completed.  Will have him follow-up in cardiology clinic to reevaluate for recurrent episodes of atrial fibrillation and to discuss medical therapy.    Moderate complexity     IDALMIS OLIVIER MD, MD    Addendum:  Echo and stress echo are both essentially normal.   No additional cardiac testing needed.  OK for discharge home with medication recommendations as above.    Cost assessment for DOAC:    Patient has Medicare Advantage through [employer].     Xarelto/Eliquis  --$47/mo ($94 for 3 months)  --lf/when total drug costs exceed  $4,660, price will increase to a 25% coinsurance, equivalent to $151/mo.    Robert Martinez MD    Primary Care Physician   Waldo Matson MD    Reason for Consult   Reason for consult: I was asked by the hospitalist service to evaluate this patient for new onset atrial fibrillation.    History of Present Illness   Saul Hairston is a 77 year old male who presents with left wrist pain to urgent care.  He was noted incidentally to have an irregular heart rhythm and found to have atrial fibrillation on cardiac monitor.  He was referred to the emergency room.  He had 1 heart rate of 134 in the emergency room but otherwise his heart rates seem to be in the 70s and 80s most the time with elevated blood pressures.  Nevertheless, he was diagnosed with A-fib with RVR and admitted to the CCU on IV diltiazem infusion.  His rhythm converted spontaneously back to sinus and IV diltiazem was discontinued.    He was essentially asymptomatic with this although did have a little bit of lightheadedness when he was walking into the urgent care.  He had 1 episode of brief heartburn type discomfort in his chest a couple of days ago while he was at rest while sitting around a campfire.  He has had no discomfort in his chest with exertion since then and no symptoms of shortness of breath palpitations or syncope.    His blood pressures have been elevated in the 150s to 160s systolic here in the hospital.    Past Medical History   Past Medical History:   Diagnosis Date    Arthritis     Gout    Atrial fibrillation with rapid ventricular response (H) 8/10/2023    Cancer (H) 2018    Prostrate    CARDIOVASCULAR SCREENING; LDL GOAL LESS THAN 160 8/14/2006    Elevated PSA     Gout, unspecified     Hypertension     Kidney stone 2009    Doing ok now    Obese     Seasonal allergic rhinitis     Spring and Fall    Sleep apnea     DOES NOT USE CPAP    Umbilical hernia without mention of obstruction or gangrene 6/2013         Past Surgical History    Past Surgical History:   Procedure Laterality Date    BIOPSY 2020    prostrate    COLONOSCOPY  6/16/2006    COLONOSCOPY N/A 6/15/2016    Procedure: COLONOSCOPY;  Surgeon: Poly Sanchez MD;  Location:  GI    CRYOABLATION PROSTATE N/A 12/1/2020    Procedure: CRYOTHERAPY OF PROSTATE;  Surgeon: Aj Caal MD;  Location:  OR    CYSTOSCOPY FLEXIBLE, CYOABLATION PROSTATE N/A 2/13/2018    Procedure: CYSTOSCOPY FLEXIBLE, CRYOABLATION PROSTATE;  FLEXIBLE CYSTOSCOPY, CYROABLATION OF PROSTATE ;  Surgeon: Aj Caal MD;  Location:  OR    GENITOURINARY SURGERY      HERNIORRHAPHY UMBILICAL  7/11/2013    Procedure: HERNIORRHAPHY UMBILICAL;  Open Umbilical Hernia Repair With Mesh ;  Surgeon: Sergio Tomas MD;  Location: [facility] OR    ROTATOR CUFF REPAIR RT/LT  5/19/2011    Left Shoulder    SURGICAL HISTORY OF -       ear operation as child    SURGICAL HISTORY OF -       removal of pseudogout deposits - Right knee    TONSILLECTOMY      Child         Prior to Admission Medications   Prior to Admission Medications   Prescriptions Last Dose Informant Patient Reported? Taking?   Omega-3 Fatty Acids (OMEGA-3 FISH OIL PO) 8/9/2023 Self Yes Yes   Sig: Take 2 g by mouth daily   VENTOLIN  (90 Base) MCG/ACT inhaler PRN  No Yes   Sig: INHALE 2 PUFFS INTO THE LUNGS EVERY 2 HOURS AS NEEDED FOR SHORTNESS OF BREATH / DYSPNEA OR WHEEZING   indomethacin (INDOCIN) 50 MG capsule PRN  No Yes   Sig: Take 1 capsule (50 mg) by mouth 3 times daily as needed for moderate pain   predniSONE (DELTASONE) 20 MG tablet Not Taking  No No   Sig: Take 2 tablets (40 mg) by mouth daily for 2 days, THEN 1 tablet (20 mg) daily for 2 days, THEN 0.5 tablets (10 mg) daily for 2 days.   Patient not taking: Reported on 8/10/2023   tamsulosin (FLOMAX) 0.4 MG capsule 8/9/2023  Yes Yes   Sig: Take 0.4 mg by mouth every evening      Facility-Administered Medications: None     Current Facility-Administered Medications   Medication Dose Route  Frequency    apixaban ANTICOAGULANT  5 mg Oral BID    metoprolol tartrate  25 mg Oral BID    oxymetazoline  2 spray Both Nostrils BID    predniSONE  40 mg Oral Daily    sodium chloride (PF)  3 mL Intracatheter Q8H    tamsulosin  0.4 mg Oral QPM     Current Facility-Administered Medications   Medication Last Rate    dilTIAZem      - MEDICATION INSTRUCTIONS -       Allergies   Allergies   Allergen Reactions    Ciprofloxacin     Norco [Hydrocodone-Acetaminophen] Nausea and Vomiting       Social History    reports that he has never smoked. He has never used smokeless tobacco. He reports that he does not drink alcohol and does not use drugs.      Family History   I have reviewed this patient's family history and updated it with pertinent information if needed.  Family History   Problem Relation Age of Onset    Hypertension Father     Alcohol/Drug Father     Allergies Father     Respiratory Father     Asthma Father     Cerebrovascular Disease Maternal Grandmother     Arthritis Maternal Grandmother     Diabetes Maternal Grandmother     Alzheimer Disease Mother     Arthritis Mother     Eye Disorder Mother     Cerebrovascular Disease Paternal Grandfather     C.A.D. Maternal Uncle     C.A.D. Paternal Uncle     Prostate Cancer Maternal Uncle     Lipids Sister     Lipids Brother     Obesity Brother         Has lost weight    C.A.D. Son     Hyperlipidemia Sister     Obesity Brother           Review of Systems   A comprehensive review of system was performed and is negative other than that noted in the HPI or here.     Physical Exam   Vital Signs with Ranges  Temp:  [98  F (36.7  C)-98.3  F (36.8  C)] 98.3  F (36.8  C)  Pulse:  [] 58  Resp:  [9-23] 18  BP: (136-169)/() 154/70  SpO2:  [95 %-99 %] 97 %  Wt Readings from Last 4 Encounters:   08/11/23 88.7 kg (195 lb 8 oz)   11/02/22 95.9 kg (211 lb 6.4 oz)   07/24/22 99.8 kg (220 lb)   05/17/22 99.8 kg (220 lb)     No intake/output data recorded.      Vitals: BP (!)  "154/70 (BP Location: Left arm)   Pulse 58   Temp 98.3  F (36.8  C) (Oral)   Resp 18   Ht 1.753 m (5' 9\")   Wt 88.7 kg (195 lb 8 oz)   SpO2 97%   BMI 28.87 kg/m      Physical Exam:   General - Alert and oriented to time place and person in no acute distress  Eyes - No scleral icterus  HEENT - Neck supple, moist mucous membranes  Cardiovascular -regular rhythm with a 2/6 systolic ejection murmur at the right upper sternal border  Extremities - There is no lower extremity edema  Respiratory -clear to auscultation  Skin - No pallor or cyanosis  Gastrointestinal - Non tender and non distended without rebound or guarding  Psych - Appropriate affect   Neurological - No gross motor neurological focal deficits    No lab results found in last 7 days.    Invalid input(s): TROPONINIES    Recent Labs   Lab 08/11/23  0549 08/10/23  1506   WBC 8.3 7.6   HGB 14.3 16.5   MCV 88 87    203    140   POTASSIUM 3.9 4.1   CHLORIDE 103 103   CO2 22 22   BUN 12.3 13.2   CR 1.01 0.99   GFRESTIMATED 78 79   ANIONGAP 13 15   MADIHA 8.8 9.3   GLC 93 107*     Recent Labs   Lab Test 05/17/22  1426 11/17/20  1107 09/19/16  0926 10/26/15  1052   CHOL 159 139   < > 242*   HDL 38* 33*   < > 31*   LDL 83 81   < > 167*   TRIG 192* 125   < > 218*   CHOLHDLRATIO  --   --   --  7.8*    < > = values in this interval not displayed.     Recent Labs   Lab 08/11/23  0549 08/10/23  1506   WBC 8.3 7.6   HGB 14.3 16.5   HCT 44.1 50.1   MCV 88 87    203     No results for input(s): PH, PHV, PO2, PO2V, SAT, PCO2, PCO2V, HCO3, HCO3V in the last 168 hours.  Recent Labs   Lab 08/10/23  1506   NTBNPI 588     Recent Labs   Lab 08/10/23  1506   DD 1.52*     No results for input(s): SED, CRP in the last 168 hours.  Recent Labs   Lab 08/11/23  0549 08/10/23  1506    203     Recent Labs   Lab 08/10/23  1506   TSH 1.39     No results for input(s): COLOR, APPEARANCE, URINEGLC, URINEBILI, URINEKETONE, SG, UBLD, URINEPH, PROTEIN, UROBILINOGEN, " NITRITE, LEUKEST, RBCU, WBCU in the last 168 hours.    Imaging:  Recent Results (from the past 48 hour(s))   CT Chest Pulmonary Embolism w Contrast    Narrative    EXAM: CT CHEST PULMONARY EMBOLISM W CONTRAST  LOCATION: [facility]  DATE: 8/10/2023    INDICATION: afib with elevated d dimer  COMPARISON: None.  TECHNIQUE: CT chest pulmonary angiogram during arterial phase injection of IV contrast. Multiplanar reformats and MIP reconstructions were performed. Dose reduction techniques were used.   CONTRAST: 75 mL isovue 370    FINDINGS:  ANGIOGRAM CHEST: Pulmonary arteries are normal caliber and negative for pulmonary emboli. Thoracic aorta is negative for dissection. No CT evidence of right heart strain.    LUNGS AND PLEURA: 3 mm pulmonary nodule along the left major fissure on image 148 of series 7. Minimal dependent atelectasis, lungs otherwise are clear. No infiltrates or effusions.    MEDIASTINUM/AXILLAE: No adenopathy. Atherosclerotic disease thoracic aorta.    CORONARY ARTERY CALCIFICATION: Severe.    UPPER ABDOMEN: A few diverticula visualized left colon.    MUSCULOSKELETAL: Hypertrophic changes and degenerative disc disease thoracic spine.      Impression    IMPRESSION:  1.  No evidence for pulmonary emboli.  2.  No evidence for acute pulmonary disease.  3.  3 mm pulmonary nodule along the left major fissure, likely incidental. Please see guidelines below.    REFERENCE:  Guidelines for Management of Incidental Pulmonary Nodules Detected on CT Images: From the Fleischner Society 2017.   Guidelines apply to incidental nodules in patients who are 35 years or older.  Guidelines do not apply to lung cancer screening, patients with immunosuppression, or patients with known primary cancer.    SINGLE NODULE  Nodule size <6 mm  Low-risk patients: No follow-up needed.  High-risk patients: Optional follow-up at 12 months.    Nodule size 6-8 mm  Low-risk patients: Follow-up CT at 6-12 months, then  "consider CT at 18-24 months.  High-risk patients: Follow-up CT at 6-12 months, then at 18-24 months if no change.    Nodule size >8 mm  Either low or high-risk patients:  Consider CT, PET/CT, or tissue sampling at 3 months.         Echo:  No results found for this or any previous visit (from the past 4320 hour(s)).    Clinically Significant Risk Factors Present on Admission                  # Hypertension: Noted on problem list      # Overweight: Estimated body mass index is 28.87 kg/m  as calculated from the following:    Height as of this encounter: 1.753 m (5' 9\").    Weight as of this encounter: 88.7 kg (195 lb 8 oz).           Cardiac Arrhythmia: Atrial fibrillation: Paroxysmal                                                    "

## 2023-08-11 NOTE — PLAN OF CARE
Neuro: A&Ox4  Tele/Cardiac: SB  Resp: WDL  Activity: SBA  Pain: denies  Drips/IV: SL  GI/: WDL  Skin: WDL  Diet: Diet: Combination Diet Low Saturated Fat Na <2400mg Diet, No Caffeine Diet     Test/Procedures: x-ray wrist, echo   Plan: x-ray wrist, echo, possible discharge today.

## 2023-08-13 ENCOUNTER — PATIENT OUTREACH (OUTPATIENT)
Dept: CARE COORDINATION | Facility: CLINIC | Age: 78
End: 2023-08-13
Payer: COMMERCIAL

## 2023-08-13 NOTE — PROGRESS NOTES
Clinic Care Coordination Contact  [facility]: Post-Discharge Note  SITUATION                                                      Admission:    Admission Date: 08/10/23   Reason for Admission: 1. Pulmonary nodule  R91.1       2. Atrial fibrillation with RVR (H)  I48.91  Discharge:   Discharge Date: 08/11/23  Discharge Diagnosis: Atrial fibrillation with rapid ventricular response (H)    BACKGROUND                                                      Per hospital discharge summary and inpatient provider notes:    Saul Hairston is a 77 year old male who presents with palpitations and dizziness. The patient reports that he has experienced lightheaded-type dizziness since his blood was drawn 2 days ago. He states that the lightheadedness is intermittent and notes that it persists for a few hours during each episode. He denies any known triggers. He states that today he presented to urgent care for wrist pain and mentioned this lightheadedness. They order an ECG which showed atrial fibrillation and he was then told to present to the ED. His wrist pain was thought to be due to gout flare and he was given a prescription for prednisone. He notes that he has been camping in a camper at [facility] in [place]. He has been eating and drinking well during this time. He states that he has not had anything to eat today. He denies experiencing fever, cough, chest pain, shortness of breath, abdominal pain, abnormal bowel movements, or any urinary problems. He also denies any recent long travel, smoking, alcohol use, marijuana use, recent medication changes, or any history of heart disease or thyroid disease. He states that during the past few years he has experienced occasional episodes of a fluttering sensation in his chest that persists for only a few seconds. He states that 6-7 years ago he presented to the ED with shortness of breath and a workup revealed no worrisome findings. He presents to the ED with his  wife.      ASSESSMENT           Discharge Assessment  How are you doing now that you are home?: Patient states he is feeling fine.  How are your symptoms? (Red Flag symptoms escalate to triage hotline per guidelines): Improved  Do you feel your condition is stable enough to be safe at home until your provider visit?: Yes  Does the patient have their discharge instructions? : Yes  Does the patient have questions regarding their discharge instructions? : No  Were you started on any new medications or were there changes to any of your previous medications? : Yes  Does the patient have all of their medications?: Yes  Do you have questions regarding any of your medications? : No  Do you have all of your needed medical supplies or equipment (DME)?  (i.e. oxygen tank, CPAP, cane, etc.):  (n/a)  Discharge follow-up appointment scheduled within 14 calendar days? : No  Is patient agreeable to assistance with scheduling? : Yes         Post-op (Clinicians Only)  Did the patient have surgery or a procedure: No        PLAN                                                      Outpatient Plan:  Follow up with primary care provider, Waldo Matson MD, within   7 days for hospital follow- up.  No follow up labs or test are needed.        No future appointments.      For any urgent concerns, please contact our 24 hour nurse triage line: [phone] ([phone])         Elana Meneses RN

## 2023-08-14 ENCOUNTER — TELEPHONE (OUTPATIENT)
Dept: CARDIOLOGY | Facility: CLINIC | Age: 78
End: 2023-08-14
Payer: COMMERCIAL

## 2023-08-14 NOTE — TELEPHONE ENCOUNTER
Patient was admitted to [facility] on 8/10/23 with new onset A. Fib which spontaneously converted to NSR. Minimal troponin elevation without chest pain.     PMH: HLD, HTN, MUNIRA, malignant tumor of prostate.    8/11/23: Echo showed the ascending aorta is mildly dilated. The left atrium is mildly dilated. Left ventricular systolic function is normal. Sinus rhythm was noted.    8/11/23: Stress echo showed normal stress echocardiogram with no evidence of stress-induced ischemia.    Pt was started on Eliquis, Cozaar and Metoprolol at time of discharge.    Called patient to discuss any post hospital d/c questions he may have, review medication changes, and confirm f/u appts, but phone answered by his wife and pt is currently not home. She denied any questions regarding pt's new medications.    Patient has reportedly had no complaints of palpitations, chest pain or lightheadedness. She states he did have a short bout of SOB while doing some heavy lifting early today.    RN confirmed with wife that pt needs to be scheduled for a cardiology ELLA and cardiologist follow up appts as ordered. Scheduling and Dr. Martinez's Team RN phone numbers provided.    Advised to call clinic with any cardiac related questions or concerns prior to this ella't, and wife verbalized understanding and agreed with plan. VAN Bourgeois RN.

## 2023-08-15 ENCOUNTER — CARE COORDINATION (OUTPATIENT)
Dept: CARDIOLOGY | Facility: CLINIC | Age: 78
End: 2023-08-15

## 2023-08-15 NOTE — PROGRESS NOTES
Pt called left  on Dr. Martinez's nurse line stating he has had some shortness of breath and wonders if this is to be expected. Returned call to pt. Pt reports he feels a little lightheaded, tired and a little short of breath. Pt states he initially felt better prior to hospital discharge and now a little worse since returning home. He doesn't check his BP and HR at home and doesn't have a smart watch. He felt his pulse and said it feels regular, strong and not too fast or too slow. He denies chest pain. Discussed he was started on metoprolol, losartan and Eliquis and is possible his BP and HR may be lower than he's used to vs other causes could be contributing to his symptoms. Pt has an appt w/his PCP on 8/17 which he was instructed to keep for further follow up of these symptoms. Advised he return to the ED if any significant changes to current symptoms or new symptoms or chest pain. Otherwise he should also call scheduling and arrange cardiology follow up. Advise we are booking out several weeks for both APPs or MDs.  Pt verbalized understanding. Alyssa Ko RN on 8/15/2023 at 4:31 PM

## 2023-08-16 NOTE — PROGRESS NOTES
Assessment & Plan     Hospital discharge follow up  Atrial fibrillation with RVR (H)  Diagnosed with Afib with RVR. Reviewed ED notes. Stress echo normal. Today feels well, no dizziness, CP, SOB. Tolerating new medications well. Heart rate well controlled, regular rhythm. CHADSVASC score 3.    Plan:  -continue losartan 25mg daily, metoprolol ER 25mg daily (for rate control)  -continue eliquis 5mg BID   -BMP today    - Basic metabolic panel  - losartan (COZAAR) 25 MG tablet  Dispense: 90 tablet; Refill: 3  - apixaban ANTICOAGULANT (ELIQUIS) 5 MG tablet  Dispense: 180 tablet; Refill: 0  - metoprolol succinate ER (TOPROL XL) 25 MG 24 hr tablet  Dispense: 90 tablet; Refill: 3  - Basic metabolic panel    Gout  L wrist pain during hospital stay, uric acid elevated. Felt to be gout flare. Resolved with prednisone. Offered to restart allopurionl. Pt declines and will reach out if he changes his mind.    Wheezing  Some wheezing, SOB, today. Could be related to new medications. Advised to monitor symptoms-if no improvement in 1 week should come back to clinic or urgent care for evaluation. Counseled on warning signs. Heart and lungs clear today, appears well, vitals normal.   - albuterol (VENTOLIN HFA) 108 (90 Base) MCG/ACT inhaler  Dispense: 18 g; Refill: 0    Malignant tumor of prostate (H)  DIAGNOSED (12-21-17), with A PSA= 13.9, GLENN SCORE= 6 (3/3) and 7 (3/4). HE HAD A NERVE SPARING CRYOBLATION OF THE PROSTATE (2-13-18). FOLLOWUP BX NEG. HE WAS LAST SEEN 8/2/22 AND HIS PSA WAS <0.04. MRI SCAN SHOWED PIRADS 4, AND PIRADS 3 LESIONS. PREV LESION BEFORE CRYO LOOKED GOOD WITH NO ACTIVITY. bx positive for g7 (4,3). C.T SCAN NEG, BONE SCAN NEG. SIZE 26 G. s/p cryotherapy , 12/21. good flow, mild freq. on flomax. recent covid and now recovered. LAST PSA 0.13 (2/7/23) PSA 0.43       MED REC REQUIRED  Post Medication Reconciliation Status:  Discharge medications reconciled, continue medications without change      Valdo Culver  DO [place]  [facility] JANNET Hughes is a 77 year old, presenting for the following health issues:  Hospital F/U    HPI         8/13/2023     8:51 AM   Post Discharge Outreach   Admission Date 8/10/2023   Reason for Admission 1. Pulmonary nodule  R91.1       2. Atrial fibrillation with RVR (H)  I48.91   Discharge Date 8/11/2023   Discharge Diagnosis Atrial fibrillation with rapid ventricular response (H)   How are you doing now that you are home? Patient states he is feeling fine.   How are your symptoms? (Red Flag symptoms escalate to triage hotline per guidelines) Improved   Do you feel your condition is stable enough to be safe at home until your provider visit? Yes   Does the patient have their discharge instructions?  Yes   Does the patient have questions regarding their discharge instructions?  No   Were you started on any new medications or were there changes to any of your previous medications?  Yes   Does the patient have all of their medications? Yes   Do you have questions regarding any of your medications?  No   Discharge follow-up appointment scheduled within 14 calendar days?  No     Hospital Follow-up Visit:    Hospital/Nursing Home/IP Rehab Facility: [facility]  Date of Admission: 8/10/23  Date of Discharge: 8/11/23  Reason(s) for Admission: atrial fibrillation w/RVR  Was your hospitalization related to COVID-19? No   Problems taking medications regularly:  No  Medication changes since discharge: Eliquis 5mg, Losartan 25mg, Metoprolol 25mg   Problems adhering to non-medication therapy:  No     Summary of hospitalization:  [facility] discharge summary reviewed  Diagnostic Tests/Treatments reviewed.  Follow up needed: none  Other Healthcare Providers Involved in Patient s Care: None  Update since discharge: improved.   Plan of care communicated with patient        Discharge summary:  77 yr presented with dizziness, found to  "have Afib with RVR. Started on diltiazem infusion, HR normalized.   -started eliquis 5mg BID, metoprolol 25mg daily, losartan 25mg daily  -stress echo was normal    L wrist pain  -XR showed osteopenia  -uric acid elevated, likely gout flare  -prednisone burst given  -gone away   -since age 27    Refill meds  BMP due to losartan      Review of Systems   Constitutional, HEENT, cardiovascular, pulmonary, gi and gu systems are negative, except as otherwise noted.      Objective    /60 (BP Location: Right arm, Patient Position: Sitting, Cuff Size: Adult Large)   Pulse 56   Temp 98.5  F (36.9  C) (Temporal)   Resp 18   Ht 1.715 m (5' 7.52\")   Wt 90.1 kg (198 lb 11.2 oz)   SpO2 98%   BMI 30.64 kg/m    Body mass index is 30.64 kg/m .    Physical Exam  Constitutional:       General: He is not in acute distress.     Appearance: He is not ill-appearing or diaphoretic.   Eyes:      Extraocular Movements: Extraocular movements intact.      Conjunctiva/sclera: Conjunctivae normal.      Pupils: Pupils are equal, round, and reactive to light.   Cardiovascular:      Rate and Rhythm: Normal rate and regular rhythm.      Pulses: Normal pulses.      Heart sounds: No murmur heard.  Pulmonary:      Effort: Pulmonary effort is normal.      Breath sounds: Normal breath sounds.   Abdominal:      General: Abdomen is flat.   Skin:     Findings: No rash.   Neurological:      General: No focal deficit present.      Mental Status: He is alert and oriented to person, place, and time.   Psychiatric:         Mood and Affect: Mood normal.         Behavior: Behavior normal.                "

## 2023-08-17 ENCOUNTER — OFFICE VISIT (OUTPATIENT)
Dept: FAMILY MEDICINE | Facility: CLINIC | Age: 78
End: 2023-08-17
Payer: COMMERCIAL

## 2023-08-17 VITALS
HEIGHT: 68 IN | TEMPERATURE: 98.5 F | DIASTOLIC BLOOD PRESSURE: 60 MMHG | WEIGHT: 198.7 LBS | RESPIRATION RATE: 18 BRPM | HEART RATE: 56 BPM | OXYGEN SATURATION: 98 % | BODY MASS INDEX: 30.11 KG/M2 | SYSTOLIC BLOOD PRESSURE: 138 MMHG

## 2023-08-17 DIAGNOSIS — I48.91 ATRIAL FIBRILLATION WITH RVR (H): ICD-10-CM

## 2023-08-17 DIAGNOSIS — M10.00 IDIOPATHIC GOUT, UNSPECIFIED CHRONICITY, UNSPECIFIED SITE: ICD-10-CM

## 2023-08-17 DIAGNOSIS — R06.2 WHEEZING: ICD-10-CM

## 2023-08-17 DIAGNOSIS — Z09 HOSPITAL DISCHARGE FOLLOW-UP: Primary | ICD-10-CM

## 2023-08-17 DIAGNOSIS — C61 MALIGNANT TUMOR OF PROSTATE (H): ICD-10-CM

## 2023-08-17 LAB
ANION GAP SERPL CALCULATED.3IONS-SCNC: 13 MMOL/L (ref 7–15)
BUN SERPL-MCNC: 24.4 MG/DL (ref 8–23)
CALCIUM SERPL-MCNC: 9.8 MG/DL (ref 8.8–10.2)
CHLORIDE SERPL-SCNC: 105 MMOL/L (ref 98–107)
CREAT SERPL-MCNC: 1.08 MG/DL (ref 0.67–1.17)
DEPRECATED HCO3 PLAS-SCNC: 22 MMOL/L (ref 22–29)
GFR SERPL CREATININE-BSD FRML MDRD: 72 ML/MIN/1.73M2
GLUCOSE SERPL-MCNC: 115 MG/DL (ref 70–99)
POTASSIUM SERPL-SCNC: 4.7 MMOL/L (ref 3.4–5.3)
SODIUM SERPL-SCNC: 140 MMOL/L (ref 136–145)

## 2023-08-17 PROCEDURE — 80048 BASIC METABOLIC PNL TOTAL CA: CPT | Performed by: STUDENT IN AN ORGANIZED HEALTH CARE EDUCATION/TRAINING PROGRAM

## 2023-08-17 PROCEDURE — 36415 COLL VENOUS BLD VENIPUNCTURE: CPT | Performed by: STUDENT IN AN ORGANIZED HEALTH CARE EDUCATION/TRAINING PROGRAM

## 2023-08-17 PROCEDURE — 99495 TRANSJ CARE MGMT MOD F2F 14D: CPT | Performed by: STUDENT IN AN ORGANIZED HEALTH CARE EDUCATION/TRAINING PROGRAM

## 2023-08-17 RX ORDER — METOPROLOL SUCCINATE 25 MG/1
25 TABLET, EXTENDED RELEASE ORAL DAILY
Qty: 90 TABLET | Refills: 3 | Status: SHIPPED | OUTPATIENT
Start: 2023-08-17 | End: 2023-10-06

## 2023-08-17 RX ORDER — ALBUTEROL SULFATE 90 UG/1
1-2 AEROSOL, METERED RESPIRATORY (INHALATION) EVERY 6 HOURS PRN
Qty: 18 G | Refills: 0 | Status: SHIPPED | OUTPATIENT
Start: 2023-08-17 | End: 2024-06-04

## 2023-08-17 RX ORDER — LOSARTAN POTASSIUM 25 MG/1
25 TABLET ORAL DAILY
Qty: 90 TABLET | Refills: 3 | Status: SHIPPED | OUTPATIENT
Start: 2023-08-17 | End: 2023-10-06

## 2023-08-17 ASSESSMENT — PAIN SCALES - GENERAL: PAINLEVEL: MILD PAIN (2)

## 2023-08-17 NOTE — PATIENT INSTRUCTIONS
Continue losartan, metoprolol, and eliquis.  If your breathing is not getting better, go to urgent care for evaluation.  Labs today-check kidney function.    Let us know if you want to restart the allopurinol for gout.      Dr. Mayfield

## 2023-08-20 ENCOUNTER — HEALTH MAINTENANCE LETTER (OUTPATIENT)
Age: 78
End: 2023-08-20

## 2023-09-24 ENCOUNTER — HOSPITAL ENCOUNTER (EMERGENCY)
Facility: CLINIC | Age: 78
Discharge: HOME | End: 2023-09-24
Attending: EMERGENCY MEDICINE | Admitting: EMERGENCY MEDICINE
Payer: COMMERCIAL

## 2023-09-24 ENCOUNTER — NURSE TRIAGE (OUTPATIENT)
Dept: NURSING | Facility: CLINIC | Age: 78
End: 2023-09-24
Payer: COMMERCIAL

## 2023-09-24 VITALS
DIASTOLIC BLOOD PRESSURE: 91 MMHG | HEART RATE: 47 BPM | WEIGHT: 195 LBS | SYSTOLIC BLOOD PRESSURE: 159 MMHG | RESPIRATION RATE: 10 BRPM | OXYGEN SATURATION: 98 % | BODY MASS INDEX: 30.07 KG/M2 | TEMPERATURE: 97.1 F

## 2023-09-24 DIAGNOSIS — I48.0 PAROXYSMAL ATRIAL FIBRILLATION (H): ICD-10-CM

## 2023-09-24 LAB
ANION GAP SERPL CALCULATED.3IONS-SCNC: 12 MMOL/L (ref 7–15)
ATRIAL RATE - MUSE: 59 BPM
BASOPHILS # BLD AUTO: 0 10E3/UL (ref 0–0.2)
BASOPHILS NFR BLD AUTO: 0 %
BUN SERPL-MCNC: 18.6 MG/DL (ref 8–23)
CALCIUM SERPL-MCNC: 9.2 MG/DL (ref 8.8–10.2)
CHLORIDE SERPL-SCNC: 106 MMOL/L (ref 98–107)
CREAT SERPL-MCNC: 1.05 MG/DL (ref 0.67–1.17)
DEPRECATED HCO3 PLAS-SCNC: 20 MMOL/L (ref 22–29)
DIASTOLIC BLOOD PRESSURE - MUSE: NORMAL MMHG
EGFRCR SERPLBLD CKD-EPI 2021: 74 ML/MIN/1.73M2
EOSINOPHIL # BLD AUTO: 0.1 10E3/UL (ref 0–0.7)
EOSINOPHIL NFR BLD AUTO: 2 %
ERYTHROCYTE [DISTWIDTH] IN BLOOD BY AUTOMATED COUNT: 12.7 % (ref 10–15)
GLUCOSE SERPL-MCNC: 134 MG/DL (ref 70–99)
HCT VFR BLD AUTO: 46.7 % (ref 40–53)
HGB BLD-MCNC: 15.2 G/DL (ref 13.3–17.7)
HOLD SPECIMEN: NORMAL
IMM GRANULOCYTES # BLD: 0 10E3/UL
IMM GRANULOCYTES NFR BLD: 0 %
INTERPRETATION ECG - MUSE: NORMAL
LYMPHOCYTES # BLD AUTO: 1.4 10E3/UL (ref 0.8–5.3)
LYMPHOCYTES NFR BLD AUTO: 21 %
MCH RBC QN AUTO: 28.6 PG (ref 26.5–33)
MCHC RBC AUTO-ENTMCNC: 32.5 G/DL (ref 31.5–36.5)
MCV RBC AUTO: 88 FL (ref 78–100)
MONOCYTES # BLD AUTO: 0.7 10E3/UL (ref 0–1.3)
MONOCYTES NFR BLD AUTO: 10 %
NEUTROPHILS # BLD AUTO: 4.7 10E3/UL (ref 1.6–8.3)
NEUTROPHILS NFR BLD AUTO: 67 %
NRBC # BLD AUTO: 0 10E3/UL
NRBC BLD AUTO-RTO: 0 /100
P AXIS - MUSE: 63 DEGREES
PLATELET # BLD AUTO: 186 10E3/UL (ref 150–450)
POTASSIUM SERPL-SCNC: 4.5 MMOL/L (ref 3.4–5.3)
PR INTERVAL - MUSE: 160 MS
QRS DURATION - MUSE: 120 MS
QT - MUSE: 444 MS
QTC - MUSE: 439 MS
R AXIS - MUSE: 39 DEGREES
RBC # BLD AUTO: 5.32 10E6/UL (ref 4.4–5.9)
SODIUM SERPL-SCNC: 138 MMOL/L (ref 136–145)
SYSTOLIC BLOOD PRESSURE - MUSE: NORMAL MMHG
T AXIS - MUSE: 39 DEGREES
TROPONIN T SERPL HS-MCNC: 24 NG/L
VENTRICULAR RATE- MUSE: 59 BPM
WBC # BLD AUTO: 7 10E3/UL (ref 4–11)

## 2023-09-24 PROCEDURE — 99284 EMERGENCY DEPT VISIT MOD MDM: CPT

## 2023-09-24 PROCEDURE — 84484 ASSAY OF TROPONIN QUANT: CPT | Performed by: EMERGENCY MEDICINE

## 2023-09-24 PROCEDURE — 36415 COLL VENOUS BLD VENIPUNCTURE: CPT | Performed by: EMERGENCY MEDICINE

## 2023-09-24 PROCEDURE — 85025 COMPLETE CBC W/AUTO DIFF WBC: CPT | Performed by: EMERGENCY MEDICINE

## 2023-09-24 PROCEDURE — 80048 BASIC METABOLIC PNL TOTAL CA: CPT | Performed by: EMERGENCY MEDICINE

## 2023-09-24 PROCEDURE — 93005 ELECTROCARDIOGRAM TRACING: CPT

## 2023-09-24 NOTE — TELEPHONE ENCOUNTER
Patient states he is not feeling well today.  He states his heart rate is very irradic.  Patient states he was diagnosed with a fib recently.    Patients blood pressures have been: 148/90 and 122/74  Pulse: 77, 88 currently    Patient states he feels dizzy and lightheaded.    Patient has not passed out, no severe weakness, not confused, no sweating, heart is not beating fast or slow, no breathing difficulty.    Care advise: go to ED.  Patient states he will go to [facility].    Mavis Beauchamp RN   09/24/23 11:57 AM  [facility] Nurse Advisor    Reason for Disposition   Dizziness, lightheadedness, or weakness    Additional Information   Negative: Passed out (i.e., lost consciousness, collapsed and was not responding)   Negative: Shock suspected (e.g., cold/pale/clammy skin, too weak to stand, low BP, rapid pulse)   Negative: Difficult to awaken or acting confused (e.g., disoriented, slurred speech)   Negative: Visible sweat on face or sweat dripping down face   Negative: Unable to walk, or can only walk with assistance (e.g., requires support)   Negative: [1] Received SHOCK from implantable cardiac defibrillator AND [2] persisting symptoms (i.e., palpitations, lightheadedness)   Negative: [1] Dizziness, lightheadedness, or weakness AND [2] heart beating very rapidly (e.g., > 140 / minute)   Negative: [1] Dizziness, lightheadedness, or weakness AND [2] heart beating very slowly (e.g., < 50 / minute)   Negative: Sounds like a life-threatening emergency to the triager   Negative: Chest pain   Negative: Implantable Cardiac Defibrillator (ICD) or a pacemaker symptoms or questions   Negative: Difficulty breathing    Protocols used: Heart Rate and Heartbeat [id number]

## 2023-09-24 NOTE — DISCHARGE INSTRUCTIONS
As we have discussed atrial fibrillation can come and go.  We have seen a slightly slow heart rate but this is likely related to Toprol.  Continue your medications if your symptoms are at all constant or unrelenting the emergency room is here to reassess you but if it continues to come and go please follow-up with cardiology for further advice on how to manage her paroxysmal A-fib.

## 2023-09-24 NOTE — ED PROVIDER NOTES
History     Chief Complaint:  Palpitations       HPI   Saul Hairston is a 77 year old male on Eliquis with history of hypertension and AFIB who presents with palpitations. The patient reports that his palpitations started at 0930 this morning when he woke up. He also felt lightheaded and short of breath. He affirms that he went to sleep last night asymptomatic. The patient denies chest pain. He explains that he was diagnosed with AFIB after a full workup including an echocardiogram.      Independent Historian:   None - Patient Only    Review of External Notes:   Recent echo and stress test      Medications:    Ventolin  Eliquis  Cozaar  Toprol  Indocin  Flomax    Past Medical History:    Arthritis  AFIB with RVR  Gout  HTN  Kidney stone  Obesity  Allergic rhinitis  Sleep apnea  Umbilical hernia  Obesity  Pneumonia  COVID infection  Prediabetes  Pulmonary nodule    Past Surgical History:    Prostate biopsy  Colonoscopy x2  Cryoablation prostate  Herniorrhaphy umbilical   surgery  Rotator cuff repair left shoulder  Pseudogout deposit removal  Tonsillectomy    Physical Exam   Patient Vitals for the past 24 hrs:   BP Temp Temp src Pulse Resp SpO2 Weight   09/24/23 1333 (!) 159/91 -- -- 54 18 98 % --   09/24/23 1331 -- -- -- 52 18 98 % --   09/24/23 1300 (!) 160/71 97.1  F (36.2  C) Temporal 60 20 97 % 88.5 kg (195 lb)        Physical Exam  Vitals reviewed.   HENT:      Head: Normocephalic.      Nose: Nose normal.      Mouth/Throat:      Mouth: Mucous membranes are moist.   Cardiovascular:      Rate and Rhythm: Regular rhythm. Bradycardia present.   Pulmonary:      Effort: Pulmonary effort is normal.   Musculoskeletal:         General: Normal range of motion.   Skin:     General: Skin is warm.      Capillary Refill: Capillary refill takes less than 2 seconds.   Neurological:      General: No focal deficit present.      Mental Status: He is alert.         ECG  ECG taken at 1303, ECG read at 1401  Sinus bradycardia  Right  bundle branch block  Abnormal ECG   Rate 58 bpm. SC interval 160 ms. QRS duration 120 ms. QT/QTc 444/439 ms. P-R-T axes 63 39 39.       Laboratory:  Labs Ordered and Resulted from Time of ED Arrival to Time of ED Departure   CBC WITH PLATELETS AND DIFFERENTIAL       Result Value    WBC Count 7.0      RBC Count 5.32      Hemoglobin 15.2      Hematocrit 46.7      MCV 88      MCH 28.6      MCHC 32.5      RDW 12.7      Platelet Count 186      % Neutrophils 67      % Lymphocytes 21      % Monocytes 10      % Eosinophils 2      % Basophils 0      % Immature Granulocytes 0      NRBCs per 100 WBC 0      Absolute Neutrophils 4.7      Absolute Lymphocytes 1.4      Absolute Monocytes 0.7      Absolute Eosinophils 0.1      Absolute Basophils 0.0      Absolute Immature Granulocytes 0.0      Absolute NRBCs 0.0     BASIC METABOLIC PANEL   TROPONIN T, HIGH SENSITIVITY          Emergency Department Course & Assessments:      Interventions:  Medications - No data to display     Assessments:  1356 I obtained history and examined the patient as noted above    Independent Interpretation (X-rays, CTs, rhythm strip):  None    Consultations/Discussion of Management or Tests:  None        Social Determinants of Health affecting care:   None    Disposition:  The patient was discharged to home.     Impression & Plan      Medical Decision Making:  Patient presents with palpitations at home.  Since is resolved.  Suspect paroxysmal A-fib.  EKG shows sinus bradycardia rates are somewhat slow in the upper 40s low 50s but is on metoprolol.  Chest x-ray not indicated lab work unremarkable observed in the ER without recurrence of A-fib.  Patient offered reassurance and encouraged to follow-up with cardiology in outpatient was discharged home in stable condition.      Diagnosis:    ICD-10-CM    1. Paroxysmal atrial fibrillation (H)  I48.0                Scribe Disclosure:  Sam PEGUERO, am serving as a scribe at 2:00 PM on 9/24/2023 to document  services personally performed by Chase Lundberg MD based on my observations and the provider's statements to me.   9/24/2023   Chase Lundberg MD Goodman, Brian Samuel, MD  09/24/23 [id number]

## 2023-09-24 NOTE — ED TRIAGE NOTES
Patient presents with palpitations since 0930. Takes eliquis for Afib. Also feeling dizzy/lightheadness & SOB. Denies CP.

## 2023-09-28 DIAGNOSIS — M1A.0720 IDIOPATHIC CHRONIC GOUT OF LEFT FOOT WITHOUT TOPHUS: ICD-10-CM

## 2023-09-28 RX ORDER — INDOMETHACIN 50 MG/1
50 CAPSULE ORAL 3 TIMES DAILY PRN
Qty: 30 CAPSULE | Refills: 0 | Status: SHIPPED | OUTPATIENT
Start: 2023-09-28 | End: 2024-01-15

## 2023-09-28 NOTE — TELEPHONE ENCOUNTER
Refilled 1 time.  Please notify and help patient to schedule a follow up appointment before further refills.

## 2023-09-29 RX ORDER — INDOMETHACIN 50 MG/1
50 CAPSULE ORAL 3 TIMES DAILY PRN
Qty: 30 CAPSULE | Refills: 0 | OUTPATIENT
Start: 2023-09-29

## 2023-10-06 ENCOUNTER — OFFICE VISIT (OUTPATIENT)
Dept: CARDIOLOGY | Facility: CLINIC | Age: 78
End: 2023-10-06
Attending: INTERNAL MEDICINE
Payer: COMMERCIAL

## 2023-10-06 VITALS
BODY MASS INDEX: 30.65 KG/M2 | DIASTOLIC BLOOD PRESSURE: 73 MMHG | HEART RATE: 50 BPM | HEIGHT: 68 IN | WEIGHT: 202.2 LBS | OXYGEN SATURATION: 98 % | SYSTOLIC BLOOD PRESSURE: 144 MMHG

## 2023-10-06 DIAGNOSIS — G47.33 OSA (OBSTRUCTIVE SLEEP APNEA): ICD-10-CM

## 2023-10-06 DIAGNOSIS — I10 HYPERTENSION GOAL BP (BLOOD PRESSURE) < 150/90: ICD-10-CM

## 2023-10-06 DIAGNOSIS — I48.91 ATRIAL FIBRILLATION WITH RAPID VENTRICULAR RESPONSE (H): ICD-10-CM

## 2023-10-06 DIAGNOSIS — E78.5 HYPERLIPIDEMIA LDL GOAL <130: ICD-10-CM

## 2023-10-06 DIAGNOSIS — I48.0 PAROXYSMAL ATRIAL FIBRILLATION (H): Primary | ICD-10-CM

## 2023-10-06 PROCEDURE — 99214 OFFICE O/P EST MOD 30 MIN: CPT | Performed by: NURSE PRACTITIONER

## 2023-10-06 RX ORDER — LOSARTAN POTASSIUM 25 MG/1
50 TABLET ORAL DAILY
Qty: 90 TABLET | Refills: 3 | Status: SHIPPED | OUTPATIENT
Start: 2023-10-06 | End: 2023-11-15

## 2023-10-06 RX ORDER — METOPROLOL SUCCINATE 25 MG/1
12.5 TABLET, EXTENDED RELEASE ORAL DAILY
Qty: 90 TABLET | Refills: 3 | Status: SHIPPED | OUTPATIENT
Start: 2023-10-06 | End: 2023-12-13

## 2023-10-06 NOTE — LETTER
10/6/2023    Waldo Matson MD, MD  [address]  [place] [address]    RE: Saul Hairston       Dear Colleague,     I had the pleasure of seeing Saul Hairston in the [facility].              ~Cardiology Clinic Visit~    Primary Cardiologist: Dr. Martinez  Reason for visit: Hospital follow up    History of Present Illness  Saul Hairston is a very pleasant 78 year old male with a past medical history notable for new afib/rvr, gout, malignant tumor of prostate,     In summary, he originally presented to urgent care on 8/10/23 with concerns for left wrist pain.  He was incidentally found to have irregular heart rhythm and was in AF at that encounter.  He was referred to ER and had a HR of 134 on initial presentation, which slowed to a more controlled rate of 70-80.  BP stable.  Nevertheless, he was diagnosed with new AF with RVR.  He did spontaneously convert to SR.    For this new arrhythmia, he was recommended resting and stress echocardiogram.    8/11/23: Echo showed the ascending aorta is mildly dilated. The left atrium is mildly dilated. Left ventricular systolic function is normal. Sinus rhythm was noted.  8/11/23: Stress echo showed normal stress echocardiogram with no evidence of stress-induced ischemia.    In addition, his BP was elevated during his ER admission and he was started on Toprolol XL 25 mg daily and losartan 25 mg daily.  For a [id number]-CFRb score of 3, he was started on anticoagulation.    He was seen again in the ER on 9/24/23 for palpitations.  On evaluation, he was found to be in regular rhythm.  At that time, suspect possible paroxysmal atrial fibrillation that resolved at the time of ER presentation.  He was discharged to home without further intervention.    Interval 10/6/23  At this point, he feels like he might be having recurrent AF episodes about 1x per week.  These episodes typically last a few seconds, but have lasted as long as 90 minutes or so.  He does  feel mild LH on occasion, as well as mild fatigue.   BP elevated today 144/73, resting HR 50.  __________________________________________________________________         Assessment and Impression:     1.  Paroxysmal atrial fibrillation, now in sinus rhythm, on A/C.  2.  Hypertension  3.  Dyslipidemia  4.  Obstructive sleep apnea         Recommendations and Plan:     Given his low resting HR and elevated BP, will reduce Toprol XL to 12.5 mg daily (take at bedtime) and increase Losartan to 50 mg daily.  Counseled to continue BP checks at home and hold losartan for SBP <100.  For increased symptoms of palpitations or AF, we have discussed increasing metoprolol back up to the dose of 25 mg daily.  We will then need to continue to monitor resting pulse rate for symptomatic bradycardia.  Recheck renal function at upcoming PCP visit.  Follow up with Dr. Martinez in December and review symptoms and BP control.  __________________________________________________________________    Thank you for the opportunity to participate in this pleasant patient's care.    We would be happy to see this patient sooner for any concerns in the meantime.    LASHA Perez, CNP   Nurse Practitioner  [facility]    Today's clinic visit entailed:  Review of the result(s) of each unique test - hospital records, EKG, stress echo, echo, labs  Prescription drug management    The level of medical decision making during this visit was of moderate complexity.    Orders this Visit:  Orders Placed This Encounter   Procedures    Basic metabolic panel     Orders Placed This Encounter   Medications    losartan (COZAAR) 25 MG tablet     Sig: Take 2 tablets (50 mg) by mouth daily     Dispense:  90 tablet     Refill:  3    metoprolol succinate ER (TOPROL XL) 25 MG 24 hr tablet     Sig: Take 0.5 tablets (12.5 mg) by mouth daily     Dispense:  90 tablet     Refill:  3     Medications Discontinued During This Encounter   Medication  "Reason    losartan (COZAAR) 25 MG tablet Reorder (No AVS)    metoprolol succinate ER (TOPROL XL) 25 MG 24 hr tablet Reorder (No AVS)     Encounter Diagnoses   Name Primary?    Atrial fibrillation with rapid ventricular response (H)     Paroxysmal atrial fibrillation (H) Yes    MUNIRA (obstructive sleep apnea)     Hyperlipidemia LDL goal <130     Hypertension goal BP (blood pressure) < 150/90      CURRENT MEDICATIONS:  Current Outpatient Medications   Medication Sig Dispense Refill    albuterol (VENTOLIN HFA) 108 (90 Base) MCG/ACT inhaler Inhale 1-2 puffs into the lungs every 6 hours as needed for shortness of breath, wheezing or cough 18 g 0    apixaban ANTICOAGULANT (ELIQUIS) 5 MG tablet Take 1 tablet (5 mg) by mouth 2 times daily 180 tablet 0    indomethacin (INDOCIN) 50 MG capsule TAKE 1 CAPSULE (50 MG) BY MOUTH 3 TIMES DAILY AS NEEDED FOR MODERATE PAIN 30 capsule 0    losartan (COZAAR) 25 MG tablet Take 2 tablets (50 mg) by mouth daily 90 tablet 3    metoprolol succinate ER (TOPROL XL) 25 MG 24 hr tablet Take 0.5 tablets (12.5 mg) by mouth daily 90 tablet 3    Omega-3 Fatty Acids (OMEGA-3 FISH OIL PO) Take 2 g by mouth daily      tamsulosin (FLOMAX) 0.4 MG capsule Take 0.4 mg by mouth every evening       ALLERGIES     Allergies   Allergen Reactions    Ciprofloxacin     Norco [Hydrocodone-Acetaminophen] Nausea and Vomiting     PAST MEDICAL, SURGICAL, FAMILY, SOCIAL HISTORY:  History was reviewed and updated as needed, see medical record.    Review of Systems:  A 10-point Review Of Systems is otherwise normal except for that which is noted in the HPI and interval summary.    Physical Exam:    Vitals: BP (!) 144/73 (BP Location: Left arm, Patient Position: Sitting)   Pulse 50   Ht 1.727 m (5' 8\")   Wt 91.7 kg (202 lb 3.2 oz)   SpO2 98%   BMI 30.74 kg/m    Constitutional: Appears stated age, well nourished, NAD.  Neck: Supple. Carotid pulses full and equal.   Respiratory: Non-labored. Lungs CTAB.  Cardiovascular: " RRR, normal S1 and S2. No M/G/R.  No edema.  Musculoskeletal/Extremities: Symmetrical movement to all extremities.  Neurologic: No gross focal deficits. Alert, awake, and oriented to all spheres.  Psychiatric: Affect appropriate. Mentation normal.    Recent Lab Results:  LIPID RESULTS:  Lab Results   Component Value Date    CHOL 159 05/17/2022    CHOL 139 11/17/2020    HDL 38 (L) 05/17/2022    HDL 33 (L) 11/17/2020    LDL 83 05/17/2022    LDL 81 11/17/2020    TRIG 192 (H) 05/17/2022    TRIG 125 11/17/2020    CHOLHDLRATIO 7.8 (H) 10/26/2015     LIVER ENZYME RESULTS:  Lab Results   Component Value Date    AST 26 05/17/2022    AST 68 (H) 12/26/2020    ALT 49 05/17/2022     (H) 12/26/2020     CBC RESULTS:  Lab Results   Component Value Date    WBC 7.0 09/24/2023    WBC 7.0 12/26/2020    RBC 5.32 09/24/2023    RBC 5.21 12/26/2020    HGB 15.2 09/24/2023    HGB 14.8 12/26/2020    HCT 46.7 09/24/2023    HCT 44.0 12/26/2020    MCV 88 09/24/2023    MCV 85 12/26/2020    MCH 28.6 09/24/2023    MCH 28.4 12/26/2020    MCHC 32.5 09/24/2023    MCHC 33.6 12/26/2020    RDW 12.7 09/24/2023    RDW 12.4 12/26/2020     09/24/2023     12/26/2020     BMP RESULTS:  Lab Results   Component Value Date     09/24/2023     12/26/2020    POTASSIUM 4.5 09/24/2023    POTASSIUM 4.3 05/17/2022    POTASSIUM 4.5 12/26/2020    CHLORIDE 106 09/24/2023    CHLORIDE 107 05/17/2022    CHLORIDE 102 12/26/2020    CO2 20 (L) 09/24/2023    CO2 28 05/17/2022    CO2 30 12/26/2020    ANIONGAP 12 09/24/2023    ANIONGAP 5 05/17/2022    ANIONGAP 2 (L) 12/26/2020     (H) 09/24/2023     (H) 05/17/2022     (H) 12/26/2020    BUN 18.6 09/24/2023    BUN 19 05/17/2022    BUN 27 12/26/2020    CR 1.05 09/24/2023    CR 0.79 12/26/2020    GFRESTIMATED 74 09/24/2023    GFRESTIMATED 89 12/26/2020    GFRESTBLACK >90 12/26/2020    MADIHA 9.2 09/24/2023    MADIHA 8.4 (L) 12/26/2020      A1C RESULTS:  Lab Results   Component Value Date     A1C 6.2 (H) 05/17/2022    A1C 6.1 (H) 12/22/2020     INR RESULTS:  No results found for: INR        Thank you for allowing me to participate in the care of your patient.      Sincerely,     LASHA Perez [facility]  cc:   Robert Martinez MD  [address]  [place] [id number]

## 2023-10-06 NOTE — PATIENT INSTRUCTIONS
Thank you for your visit with the [facility] today.    Today's Summary     Increase losartan to 50 mg (2 tablets) daily.  Continue to check blood pressure at home.  If the top number is less than 100, then hold the dose Losartan until the top number is higher (in the 120s or higher).  Because you are having some lightheadedness and fatigue, and also have a low pulse rate today, lets decrease the Metoprolol to 1/2 tablet (12.5 mg) and take it at bedtime to reduce symptoms.  If you end up having more palpitations or feel like you are going into atrial fibrillation more often, then it ok to go back up to the full dose of Metoprolol 25 mg daily.  Have your kidney function rechecked at your PCP visit.  Follow up in December with Dr. Martinez.    If you have questions or concerns, please do not hesitate to call my nursing support team at [phone].    Scheduling phone number: [phone]  [facility] Number: [phone]    It was a pleasure seeing you today.     LASHA Perez, CNP  Nurse Practitioner  [facility]  October 6, 2023  ________________________________________________________

## 2023-10-06 NOTE — PROGRESS NOTES
~Cardiology Clinic Visit~    Primary Cardiologist: Dr. Martinez  Reason for visit: Hospital follow up    History of Present Illness  Saul Hairston is a very pleasant 78 year old male with a past medical history notable for new afib/rvr, gout, malignant tumor of prostate,     In summary, he originally presented to urgent care on 8/10/23 with concerns for left wrist pain.  He was incidentally found to have irregular heart rhythm and was in AF at that encounter.  He was referred to ER and had a HR of 134 on initial presentation, which slowed to a more controlled rate of 70-80.  BP stable.  Nevertheless, he was diagnosed with new AF with RVR.  He did spontaneously convert to SR.    For this new arrhythmia, he was recommended resting and stress echocardiogram.    8/11/23: Echo showed the ascending aorta is mildly dilated. The left atrium is mildly dilated. Left ventricular systolic function is normal. Sinus rhythm was noted.  8/11/23: Stress echo showed normal stress echocardiogram with no evidence of stress-induced ischemia.    In addition, his BP was elevated during his ER admission and he was started on Toprolol XL 25 mg daily and losartan 25 mg daily.  For a [id number] score of 3, he was started on anticoagulation.    He was seen again in the ER on 9/24/23 for palpitations.  On evaluation, he was found to be in regular rhythm.  At that time, suspect possible paroxysmal atrial fibrillation that resolved at the time of ER presentation.  He was discharged to home without further intervention.    Interval 10/6/23  At this point, he feels like he might be having recurrent AF episodes about 1x per week.  These episodes typically last a few seconds, but have lasted as long as 90 minutes or so.  He does feel mild LH on occasion, as well as mild fatigue.   BP elevated today 144/73, resting HR 50.  __________________________________________________________________         Assessment and Impression:     1.  Paroxysmal  atrial fibrillation, now in sinus rhythm, on A/C.  2.  Hypertension  3.  Dyslipidemia  4.  Obstructive sleep apnea         Recommendations and Plan:     Given his low resting HR and elevated BP, will reduce Toprol XL to 12.5 mg daily (take at bedtime) and increase Losartan to 50 mg daily.  Counseled to continue BP checks at home and hold losartan for SBP <100.  For increased symptoms of palpitations or AF, we have discussed increasing metoprolol back up to the dose of 25 mg daily.  We will then need to continue to monitor resting pulse rate for symptomatic bradycardia.  Recheck renal function at upcoming PCP visit.  Follow up with Dr. Martinez in December and review symptoms and BP control.  __________________________________________________________________    Thank you for the opportunity to participate in this pleasant patient's care.    We would be happy to see this patient sooner for any concerns in the meantime.    LASHA Perez, CNP   Nurse Practitioner  [facility]    Today's clinic visit entailed:  Review of the result(s) of each unique test - hospital records, EKG, stress echo, echo, labs  Prescription drug management    The level of medical decision making during this visit was of moderate complexity.    Orders this Visit:  Orders Placed This Encounter   Procedures    Basic metabolic panel     Orders Placed This Encounter   Medications    losartan (COZAAR) 25 MG tablet     Sig: Take 2 tablets (50 mg) by mouth daily     Dispense:  90 tablet     Refill:  3    metoprolol succinate ER (TOPROL XL) 25 MG 24 hr tablet     Sig: Take 0.5 tablets (12.5 mg) by mouth daily     Dispense:  90 tablet     Refill:  3     Medications Discontinued During This Encounter   Medication Reason    losartan (COZAAR) 25 MG tablet Reorder (No AVS)    metoprolol succinate ER (TOPROL XL) 25 MG 24 hr tablet Reorder (No AVS)     Encounter Diagnoses   Name Primary?    Atrial fibrillation with rapid ventricular  "response (H)     Paroxysmal atrial fibrillation (H) Yes    MUNIRA (obstructive sleep apnea)     Hyperlipidemia LDL goal <130     Hypertension goal BP (blood pressure) < 150/90      CURRENT MEDICATIONS:  Current Outpatient Medications   Medication Sig Dispense Refill    albuterol (VENTOLIN HFA) 108 (90 Base) MCG/ACT inhaler Inhale 1-2 puffs into the lungs every 6 hours as needed for shortness of breath, wheezing or cough 18 g 0    apixaban ANTICOAGULANT (ELIQUIS) 5 MG tablet Take 1 tablet (5 mg) by mouth 2 times daily 180 tablet 0    indomethacin (INDOCIN) 50 MG capsule TAKE 1 CAPSULE (50 MG) BY MOUTH 3 TIMES DAILY AS NEEDED FOR MODERATE PAIN 30 capsule 0    losartan (COZAAR) 25 MG tablet Take 2 tablets (50 mg) by mouth daily 90 tablet 3    metoprolol succinate ER (TOPROL XL) 25 MG 24 hr tablet Take 0.5 tablets (12.5 mg) by mouth daily 90 tablet 3    Omega-3 Fatty Acids (OMEGA-3 FISH OIL PO) Take 2 g by mouth daily      tamsulosin (FLOMAX) 0.4 MG capsule Take 0.4 mg by mouth every evening       ALLERGIES     Allergies   Allergen Reactions    Ciprofloxacin     Norco [Hydrocodone-Acetaminophen] Nausea and Vomiting     PAST MEDICAL, SURGICAL, FAMILY, SOCIAL HISTORY:  History was reviewed and updated as needed, see medical record.    Review of Systems:  A 10-point Review Of Systems is otherwise normal except for that which is noted in the HPI and interval summary.    Physical Exam:    Vitals: BP (!) 144/73 (BP Location: Left arm, Patient Position: Sitting)   Pulse 50   Ht 1.727 m (5' 8\")   Wt 91.7 kg (202 lb 3.2 oz)   SpO2 98%   BMI 30.74 kg/m    Constitutional: Appears stated age, well nourished, NAD.  Neck: Supple. Carotid pulses full and equal.   Respiratory: Non-labored. Lungs CTAB.  Cardiovascular: RRR, normal S1 and S2. No M/G/R.  No edema.  Musculoskeletal/Extremities: Symmetrical movement to all extremities.  Neurologic: No gross focal deficits. Alert, awake, and oriented to all spheres.  Psychiatric: Affect " appropriate. Mentation normal.    Recent Lab Results:  LIPID RESULTS:  Lab Results   Component Value Date    CHOL 159 05/17/2022    CHOL 139 11/17/2020    HDL 38 (L) 05/17/2022    HDL 33 (L) 11/17/2020    LDL 83 05/17/2022    LDL 81 11/17/2020    TRIG 192 (H) 05/17/2022    TRIG 125 11/17/2020    CHOLHDLRATIO 7.8 (H) 10/26/2015     LIVER ENZYME RESULTS:  Lab Results   Component Value Date    AST 26 05/17/2022    AST 68 (H) 12/26/2020    ALT 49 05/17/2022     (H) 12/26/2020     CBC RESULTS:  Lab Results   Component Value Date    WBC 7.0 09/24/2023    WBC 7.0 12/26/2020    RBC 5.32 09/24/2023    RBC 5.21 12/26/2020    HGB 15.2 09/24/2023    HGB 14.8 12/26/2020    HCT 46.7 09/24/2023    HCT 44.0 12/26/2020    MCV 88 09/24/2023    MCV 85 12/26/2020    MCH 28.6 09/24/2023    MCH 28.4 12/26/2020    MCHC 32.5 09/24/2023    MCHC 33.6 12/26/2020    RDW 12.7 09/24/2023    RDW 12.4 12/26/2020     09/24/2023     12/26/2020     BMP RESULTS:  Lab Results   Component Value Date     09/24/2023     12/26/2020    POTASSIUM 4.5 09/24/2023    POTASSIUM 4.3 05/17/2022    POTASSIUM 4.5 12/26/2020    CHLORIDE 106 09/24/2023    CHLORIDE 107 05/17/2022    CHLORIDE 102 12/26/2020    CO2 20 (L) 09/24/2023    CO2 28 05/17/2022    CO2 30 12/26/2020    ANIONGAP 12 09/24/2023    ANIONGAP 5 05/17/2022    ANIONGAP 2 (L) 12/26/2020     (H) 09/24/2023     (H) 05/17/2022     (H) 12/26/2020    BUN 18.6 09/24/2023    BUN 19 05/17/2022    BUN 27 12/26/2020    CR 1.05 09/24/2023    CR 0.79 12/26/2020    GFRESTIMATED 74 09/24/2023    GFRESTIMATED 89 12/26/2020    GFRESTBLACK >90 12/26/2020    MADIHA 9.2 09/24/2023    MADIHA 8.4 (L) 12/26/2020      A1C RESULTS:  Lab Results   Component Value Date    A1C 6.2 (H) 05/17/2022    A1C 6.1 (H) 12/22/2020     INR RESULTS:  No results found for: INR

## 2023-10-25 ENCOUNTER — ANCILLARY PROCEDURE (OUTPATIENT)
Dept: GENERAL RADIOLOGY | Facility: CLINIC | Age: 78
End: 2023-10-25
Attending: FAMILY MEDICINE
Payer: COMMERCIAL

## 2023-10-25 ENCOUNTER — OFFICE VISIT (OUTPATIENT)
Dept: FAMILY MEDICINE | Facility: CLINIC | Age: 78
End: 2023-10-25
Payer: COMMERCIAL

## 2023-10-25 VITALS
HEIGHT: 69 IN | DIASTOLIC BLOOD PRESSURE: 75 MMHG | OXYGEN SATURATION: 98 % | SYSTOLIC BLOOD PRESSURE: 136 MMHG | HEART RATE: 55 BPM | BODY MASS INDEX: 30.21 KG/M2 | TEMPERATURE: 97.9 F | WEIGHT: 204 LBS | RESPIRATION RATE: 17 BRPM

## 2023-10-25 DIAGNOSIS — H91.93 BILATERAL HEARING LOSS, UNSPECIFIED HEARING LOSS TYPE: ICD-10-CM

## 2023-10-25 DIAGNOSIS — L98.9 HAND LESION: ICD-10-CM

## 2023-10-25 DIAGNOSIS — I10 HYPERTENSION GOAL BP (BLOOD PRESSURE) < 150/90: Primary | ICD-10-CM

## 2023-10-25 DIAGNOSIS — M1A.0720 IDIOPATHIC CHRONIC GOUT OF LEFT FOOT WITHOUT TOPHUS: ICD-10-CM

## 2023-10-25 DIAGNOSIS — Z12.83 SKIN CANCER SCREENING: ICD-10-CM

## 2023-10-25 PROBLEM — U07.1 PNEUMONIA DUE TO 2019 NOVEL CORONAVIRUS: Status: RESOLVED | Noted: 2020-12-22 | Resolved: 2023-10-25

## 2023-10-25 PROBLEM — J12.82 PNEUMONIA DUE TO 2019 NOVEL CORONAVIRUS: Status: RESOLVED | Noted: 2020-12-22 | Resolved: 2023-10-25

## 2023-10-25 PROCEDURE — 99214 OFFICE O/P EST MOD 30 MIN: CPT | Performed by: FAMILY MEDICINE

## 2023-10-25 PROCEDURE — 73090 X-RAY EXAM OF FOREARM: CPT | Mod: TC | Performed by: RADIOLOGY

## 2023-10-25 RX ORDER — RESPIRATORY SYNCYTIAL VIRUS VACCINE 120MCG/0.5
0.5 KIT INTRAMUSCULAR ONCE
Qty: 1 EACH | Refills: 0 | Status: CANCELLED | OUTPATIENT
Start: 2023-10-25 | End: 2023-10-25

## 2023-10-25 RX ORDER — ALLOPURINOL 300 MG/1
300 TABLET ORAL DAILY
Qty: 90 TABLET | Refills: 1 | Status: SHIPPED | OUTPATIENT
Start: 2023-10-25 | End: 2024-04-24

## 2023-10-25 ASSESSMENT — PAIN SCALES - GENERAL: PAINLEVEL: NO PAIN (0)

## 2023-10-25 NOTE — PROGRESS NOTES
"  Assessment & Plan     Hypertension goal BP (blood pressure) < 150/90  Bp stable. Stick to same rx. Has enough of rx at home.        Hand lesion  I suspect most likely lipoma. It is not really freely mobile and I will obtain xray just to make sure it is not a bony lesion. He is asymptomatic. If xray is normal - no further intervention needed.   - XR Forearm Left 2 Views; Future    Idiopathic chronic gout 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  More frequent episodes. Also uric acid level is high. Consider resuming allopurinol. He agreed. Start home dose again.   - allopurinol (ZYLOPRIM) 300 MG tablet; Take 1 tablet (300 mg) by mouth daily    Bilateral hearing loss, unspecified hearing loss type  Noticing worsening of hearing bilaterally. Both ears are clear.   - Adult Audiology  Referral; Future    Skin cancer screening     - Adult Dermatology  Referral; Future             BMI:   Estimated body mass index is 30.57 kg/m  as calculated from the following:    Height as of this encounter: 1.74 m (5' 8.5\").    Weight as of this encounter: 92.5 kg (204 lb).           Wadlo Matson MD, MD  [facility]    Aby Hughes is a 78 year old, presenting for the following health issues:  Recheck Medication (BP/Hearing concerns/Left elbow concerns/Mole/Tingling in toes that comes in goes for a few years now/Gout)      10/25/2023     1:52 PM   Additional Questions   Roomed by Lauren MISHRA       History of Present Illness       Reason for visit:  Check hearing    He eats 2-3 servings of fruits and vegetables daily.He consumes 1 sweetened beverage(s) daily.He exercises with enough effort to increase his heart rate 9 or less minutes per day.  He exercises with enough effort to increase his heart rate 3 or less days per week.   He is taking medications regularly.    Checking if having higher heart rate.     Bp meds - goes up and down. Monitors bp with wrist monitor. 117/60 to 160/80 range. " "    Eliquis 5mg twice per day. Metoprolol 12.5mg per day and losartan 25mg bid.     Hearing is not great. Feels stuffed in both ears.     Tamsulosin through urology.     More issues with gout. Using indomethacin more.   Used to be on allopurinol. Stopped during covid.     Left arm - bump. Feels it is on bone. Does not bother him. Been there for 6 months or so.     Needs to see dermatologist for eczema.     Does not want any shots.     Working on diet. Does not drink alcohol.     Review of Systems         Objective    /75 (BP Location: Right arm, Patient Position: Sitting, Cuff Size: Adult Large)   Pulse 55   Temp 97.9  F (36.6  C) (Temporal)   Resp 17   Ht 1.74 m (5' 8.5\")   Wt 92.5 kg (204 lb)   SpO2 98%   BMI 30.57 kg/m    Body mass index is 30.57 kg/m .  Physical Exam   Both TM clear.  Left forearm - diffuse fatty collection around elbow. Not freely mobile. Around 5 cm x 3 cm in size.                       "

## 2023-11-15 DIAGNOSIS — I48.0 PAROXYSMAL ATRIAL FIBRILLATION (H): ICD-10-CM

## 2023-11-15 RX ORDER — LOSARTAN POTASSIUM 25 MG/1
50 TABLET ORAL DAILY
Qty: 180 TABLET | Refills: 3 | Status: SHIPPED | OUTPATIENT
Start: 2023-11-15 | End: 2023-12-13

## 2023-12-13 ENCOUNTER — LAB (OUTPATIENT)
Dept: LAB | Facility: CLINIC | Age: 78
End: 2023-12-13
Payer: COMMERCIAL

## 2023-12-13 ENCOUNTER — OFFICE VISIT (OUTPATIENT)
Dept: CARDIOLOGY | Facility: CLINIC | Age: 78
End: 2023-12-13
Attending: INTERNAL MEDICINE
Payer: COMMERCIAL

## 2023-12-13 VITALS
DIASTOLIC BLOOD PRESSURE: 70 MMHG | BODY MASS INDEX: 29.49 KG/M2 | HEIGHT: 69 IN | OXYGEN SATURATION: 96 % | SYSTOLIC BLOOD PRESSURE: 157 MMHG | HEART RATE: 63 BPM | WEIGHT: 199.1 LBS

## 2023-12-13 DIAGNOSIS — I48.91 ATRIAL FIBRILLATION WITH RAPID VENTRICULAR RESPONSE (H): ICD-10-CM

## 2023-12-13 DIAGNOSIS — I10 HYPERTENSION GOAL BP (BLOOD PRESSURE) < 150/90: Primary | ICD-10-CM

## 2023-12-13 DIAGNOSIS — G47.33 OSA (OBSTRUCTIVE SLEEP APNEA): ICD-10-CM

## 2023-12-13 DIAGNOSIS — I48.0 PAROXYSMAL ATRIAL FIBRILLATION (H): ICD-10-CM

## 2023-12-13 LAB
ANION GAP SERPL CALCULATED.3IONS-SCNC: 10 MMOL/L (ref 7–15)
BUN SERPL-MCNC: 19.6 MG/DL (ref 8–23)
CALCIUM SERPL-MCNC: 9.5 MG/DL (ref 8.8–10.2)
CHLORIDE SERPL-SCNC: 103 MMOL/L (ref 98–107)
CREAT SERPL-MCNC: 1.11 MG/DL (ref 0.67–1.17)
DEPRECATED HCO3 PLAS-SCNC: 26 MMOL/L (ref 22–29)
EGFRCR SERPLBLD CKD-EPI 2021: 69 ML/MIN/1.73M2
GLUCOSE SERPL-MCNC: 119 MG/DL (ref 70–99)
POTASSIUM SERPL-SCNC: 4 MMOL/L (ref 3.4–5.3)
SODIUM SERPL-SCNC: 139 MMOL/L (ref 135–145)

## 2023-12-13 PROCEDURE — 99214 OFFICE O/P EST MOD 30 MIN: CPT | Performed by: INTERNAL MEDICINE

## 2023-12-13 PROCEDURE — 80048 BASIC METABOLIC PNL TOTAL CA: CPT | Performed by: NURSE PRACTITIONER

## 2023-12-13 PROCEDURE — 36415 COLL VENOUS BLD VENIPUNCTURE: CPT | Performed by: NURSE PRACTITIONER

## 2023-12-13 RX ORDER — LOSARTAN POTASSIUM 25 MG/1
25 TABLET ORAL DAILY
Qty: 180 TABLET | Refills: 3 | Status: SHIPPED | OUTPATIENT
Start: 2023-12-13 | End: 2024-01-04

## 2023-12-13 RX ORDER — FLECAINIDE ACETATE 50 MG/1
50 TABLET ORAL 2 TIMES DAILY
Qty: 180 TABLET | Refills: 3 | Status: SHIPPED | OUTPATIENT
Start: 2023-12-13

## 2023-12-13 RX ORDER — METOPROLOL SUCCINATE 25 MG/1
25 TABLET, EXTENDED RELEASE ORAL DAILY
Qty: 90 TABLET | Refills: 3 | Status: SHIPPED | OUTPATIENT
Start: 2023-12-13 | End: 2024-01-04
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
## 2023-12-13 NOTE — PATIENT INSTRUCTIONS
It was a pleasure seeing you today and thank you for allowing me to be a part of your health care team.  Should you have any questions regarding your visit or future needs please feel free to reach out to my care team for assistance.      Thank you, Dr. Robert Martinez        **Nursing: [phone]       **Scheduling: [phone]
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
## 2023-12-19 ENCOUNTER — TRANSFERRED RECORDS (OUTPATIENT)
Dept: HEALTH INFORMATION MANAGEMENT | Facility: CLINIC | Age: 78
End: 2023-12-19
Payer: COMMERCIAL
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
## 2024-01-03 ENCOUNTER — CARE COORDINATION (OUTPATIENT)
Dept: CARDIOLOGY | Facility: CLINIC | Age: 79
End: 2024-01-03
Payer: COMMERCIAL

## 2024-01-03 DIAGNOSIS — I48.0 PAROXYSMAL ATRIAL FIBRILLATION (H): ICD-10-CM

## 2024-01-03 NOTE — TELEPHONE ENCOUNTER
I would recommend that he cut the metoprolol succinate in half to 12.5 mg a day and add an additional 25 mg of losartan in the morning to make a total of 25 mg of losartan twice daily.  Continue the low-dose flecainide for now.  Robert Martinez MD

## 2024-01-03 NOTE — PROGRESS NOTES
maricel Nassar [facility] Heart Team 6 (supporting ALSHA Perez CNP)38 minutes ago (2:33 PM)       In recent weeks my pulse is mostly in the 40's.     My blood pressure during the morning is around 125/54 and in the evening around 165/70.     I'm more tired than before and wondering about reactions from the medications affecting me.     Thanks     Saul        I called pt in response to his my chart message. Pt said he has noticed some increased shortness of breath with activity, small amount of LE edema, increased fatigue,and low HR since the medication changes made at his 12/13/23 visit with . Pt had visit for HTN and paroxysmal afib w/elevated HRs. Pt was started on flecainide 50 mg BID, metoprolol succinate 25 mg daily, and losartan was decreased from 50 mg to 25 mg daily. Pt doesn't think he has had any episodes of afib since starting the flecainide. His HR has consistently been in the mid 40s- low 50s for the past two weeks.  BP has been elevated in the evenings. He takes his metoprolol in the AM and losartan in the PM. I will send an update to  and call pt back with recommendations. Sowmya SAEED January 3, 2024, 3:21 PM

## 2024-01-04 RX ORDER — LOSARTAN POTASSIUM 25 MG/1
25 TABLET ORAL 2 TIMES DAILY
Qty: 180 TABLET | Refills: 3 | Status: SHIPPED | OUTPATIENT
Start: 2024-01-04

## 2024-01-04 RX ORDER — METOPROLOL SUCCINATE 25 MG/1
12.5 TABLET, EXTENDED RELEASE ORAL DAILY
Qty: 45 TABLET | Refills: 3 | Status: SHIPPED | OUTPATIENT
Start: 2024-01-04

## 2024-01-04 NOTE — PROGRESS NOTES
I called pt and reviewed 's recommendations.  Decrease metoprolol succinate to 12.5 mg daily  Increase losartan to 25 mg twice daily  Continue flecainide    Pt will continue to monitor BP and HR and send an update on how he is doing in 1-2 weeks. Pt to call sooner if symptoms worsen. Update in my chart also sent per pt's request Sowmya SAEED January 4, 2024, 9:11 AM

## 2024-01-05 ENCOUNTER — OFFICE VISIT (OUTPATIENT)
Dept: AUDIOLOGY | Facility: CLINIC | Age: 79
End: 2024-01-05
Attending: FAMILY MEDICINE
Payer: COMMERCIAL

## 2024-01-05 DIAGNOSIS — H91.93 BILATERAL HEARING LOSS, UNSPECIFIED HEARING LOSS TYPE: ICD-10-CM

## 2024-01-05 DIAGNOSIS — H90.3 SENSORINEURAL HEARING LOSS (SNHL), BILATERAL: Primary | ICD-10-CM

## 2024-01-05 PROCEDURE — 92557 COMPREHENSIVE HEARING TEST: CPT | Performed by: AUDIOLOGIST

## 2024-01-05 PROCEDURE — 92565 STENGER TEST PURE TONE: CPT | Performed by: AUDIOLOGIST

## 2024-01-05 PROCEDURE — 92550 TYMPANOMETRY & REFLEX THRESH: CPT | Performed by: AUDIOLOGIST
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
## 2024-01-13 DIAGNOSIS — M1A.0720 IDIOPATHIC CHRONIC GOUT OF LEFT FOOT WITHOUT TOPHUS: ICD-10-CM

## 2024-01-15 RX ORDER — INDOMETHACIN 50 MG/1
50 CAPSULE ORAL 3 TIMES DAILY PRN
Qty: 30 CAPSULE | Refills: 0 | Status: SHIPPED | OUTPATIENT
Start: 2024-01-15 | End: 2024-09-17

## 2024-02-07 ENCOUNTER — TRANSFERRED RECORDS (OUTPATIENT)
Dept: HEALTH INFORMATION MANAGEMENT | Facility: CLINIC | Age: 79
End: 2024-02-07
Payer: COMMERCIAL

## 2024-03-17 NOTE — PROGRESS NOTES
~Cardiology Clinic Visit~    Primary Cardiologist: Dr. Martinez  Reason for visit: AF follow up     History of Present Illness  Saul Hairston is a very pleasant 78 year old male with a past medical history notable for new afib/rvr, gout, malignant tumor of prostate,      In summary, he originally presented to urgent care on 8/10/23 with concerns for left wrist pain.  He was incidentally found to have irregular heart rhythm and was in AF at that encounter.  He was referred to ER and had a HR of 134 on initial presentation, which slowed to a more controlled rate of 70-80.  BP stable.  Nevertheless, he was diagnosed with new AF with RVR.  He did spontaneously convert to SR.     For this new arrhythmia, he was recommended resting and stress echocardiogram.     8/11/23: Echo showed the ascending aorta is mildly dilated. The left atrium is mildly dilated. Left ventricular systolic function is normal. Sinus rhythm was noted.  8/11/23: Stress echo showed normal stress echocardiogram with no evidence of stress-induced ischemia.     In addition, his BP was elevated during his ER admission and he was started on Toprolol XL and losartan.  For a [id number] score of 3, he was started on anticoagulation.     He was seen again in the ER on 9/24/23 for palpitations.  On evaluation, he was found to be in regular rhythm.  At that time, suspect possible paroxysmal atrial fibrillation that resolved at the time of ER presentation.  He was discharged to home without further intervention.     In follow up, patient has unfortunately continued to struggle with recurrent bursts of atrial fibrillation.  When he last saw us in December 2023, he felt as though the episodes were lasting anywhere from 3 to 12 hours and causes symptoms of severe fatigue and palpitations.  For this, his metoprolol succinate was increased to 25 mg daily, losartan decreased to 25 mg daily, and he was also started on flecainide 50 mg twice daily.  He is still  on Eliquis 5 mg twice daily.    As of today, he has no recurrent AF since December.  He is tolerating his medications without side effects and feeling quite well.  __________________________________________________________________          Assessment and Impression:      1.  Paroxysmal atrial fibrillation, now in sinus rhythm, on A/C.  2.  Hypertension  3.  Dyslipidemia  4.  Obstructive sleep apnea         Recommendations and Plan:     Continue current regimen without changes.  Ongoing BP and heart rhythm monitoring at home.  Pt counseled when to call clinic or seek urgent care.  Follow up with Dr. Martinez in 1-year.  __________________________________________________________________    Thank you for the opportunity to participate in this pleasant patient's care.    We would be happy to see this patient sooner for any concerns in the meantime.    LASHA Perez, CNP   Nurse Practitioner  [facility]    Today's clinic visit entailed:  Review of the result(s) of each unique test - cardiac testing and cardiac imaging, labs  Prescription drug management    The level of medical decision making during this visit was of moderate complexity.    Orders this Visit:  Orders Placed This Encounter   Procedures    Follow-Up with Cardiology     No orders of the defined types were placed in this encounter.    There are no discontinued medications.  Encounter Diagnoses   Name Primary?    Paroxysmal atrial fibrillation (H) Yes    Hypertension goal BP (blood pressure) < 150/90     MUNIRA (obstructive sleep apnea)     Hyperlipidemia LDL goal <130      CURRENT MEDICATIONS:  Current Outpatient Medications   Medication Sig Dispense Refill    albuterol (VENTOLIN HFA) 108 (90 Base) MCG/ACT inhaler Inhale 1-2 puffs into the lungs every 6 hours as needed for shortness of breath, wheezing or cough 18 g 0    allopurinol (ZYLOPRIM) 300 MG tablet Take 1 tablet (300 mg) by mouth daily 90 tablet 1    apixaban ANTICOAGULANT  "(ELIQUIS) 5 MG tablet Take 1 tablet (5 mg) by mouth 2 times daily 180 tablet 3    flecainide (TAMBOCOR) 50 MG tablet Take 1 tablet (50 mg) by mouth 2 times daily 180 tablet 3    indomethacin (INDOCIN) 50 MG capsule TAKE 1 CAPSULE (50 MG) BY MOUTH 3 TIMES DAILY AS NEEDED FOR MODERATE PAIN 30 capsule 0    losartan (COZAAR) 25 MG tablet Take 1 tablet (25 mg) by mouth 2 times daily 180 tablet 3    metoprolol succinate ER (TOPROL XL) 25 MG 24 hr tablet Take 0.5 tablets (12.5 mg) by mouth daily 45 tablet 3    Omega-3 Fatty Acids (OMEGA-3 FISH OIL PO) Take 2 g by mouth daily      tamsulosin (FLOMAX) 0.4 MG capsule Take 0.4 mg by mouth every evening       ALLERGIES     Allergies   Allergen Reactions    Ciprofloxacin     Norco [Hydrocodone-Acetaminophen] Nausea and Vomiting     PAST MEDICAL, SURGICAL, FAMILY, SOCIAL HISTORY:  History was reviewed and updated as needed, see medical record.    Review of Systems:  A 10-point Review Of Systems is otherwise normal except for that which is noted in the HPI and interval summary.    Physical Exam:    Vitals: /66 (BP Location: Left arm, Patient Position: Sitting)   Pulse 51   Ht 1.74 m (5' 8.5\")   Wt 91 kg (200 lb 9.6 oz)   SpO2 98%   BMI 30.06 kg/m    Constitutional: Appears stated age, well nourished, NAD.  Neck: Supple. Carotid pulses full and equal.   Respiratory: Non-labored. Lungs CTAB.  Cardiovascular: RRR, normal S1 and S2. No M/G/R.  No edema.  GI: Soft, non-distended, non-tender.  Skin: Warm and dry.   Musculoskeletal/Extremities: Symmetrical movement.  Neurologic: No gross focal deficits. Alert, awake.  Psychiatric: Affect appropriate. Mentation normal.    Recent Lab Results:  LIPID RESULTS:  Lab Results   Component Value Date    CHOL 159 05/17/2022    CHOL 139 11/17/2020    HDL 38 (L) 05/17/2022    HDL 33 (L) 11/17/2020    LDL 83 05/17/2022    LDL 81 11/17/2020    TRIG 192 (H) 05/17/2022    TRIG 125 11/17/2020    CHOLHDLRATIO 7.8 (H) 10/26/2015     LIVER ENZYME " "RESULTS:  Lab Results   Component Value Date    AST 26 05/17/2022    AST 68 (H) 12/26/2020    ALT 49 05/17/2022     (H) 12/26/2020     CBC RESULTS:  Lab Results   Component Value Date    WBC 7.0 09/24/2023    WBC 7.0 12/26/2020    RBC 5.32 09/24/2023    RBC 5.21 12/26/2020    HGB 15.2 09/24/2023    HGB 14.8 12/26/2020    HCT 46.7 09/24/2023    HCT 44.0 12/26/2020    MCV 88 09/24/2023    MCV 85 12/26/2020    MCH 28.6 09/24/2023    MCH 28.4 12/26/2020    MCHC 32.5 09/24/2023    MCHC 33.6 12/26/2020    RDW 12.7 09/24/2023    RDW 12.4 12/26/2020     09/24/2023     12/26/2020     BMP RESULTS:  Lab Results   Component Value Date     12/13/2023     12/26/2020    POTASSIUM 4.0 12/13/2023    POTASSIUM 4.3 05/17/2022    POTASSIUM 4.5 12/26/2020    CHLORIDE 103 12/13/2023    CHLORIDE 107 05/17/2022    CHLORIDE 102 12/26/2020    CO2 26 12/13/2023    CO2 28 05/17/2022    CO2 30 12/26/2020    ANIONGAP 10 12/13/2023    ANIONGAP 5 05/17/2022    ANIONGAP 2 (L) 12/26/2020     (H) 12/13/2023     (H) 05/17/2022     (H) 12/26/2020    BUN 19.6 12/13/2023    BUN 19 05/17/2022    BUN 27 12/26/2020    CR 1.11 12/13/2023    CR 0.79 12/26/2020    GFRESTIMATED 69 12/13/2023    GFRESTIMATED 89 12/26/2020    GFRESTBLACK >90 12/26/2020    MADIHA 9.5 12/13/2023    MADIHA 8.4 (L) 12/26/2020      A1C RESULTS:  Lab Results   Component Value Date    A1C 6.2 (H) 05/17/2022    A1C 6.1 (H) 12/22/2020     INR RESULTS:  No results found for: \"INR\"                  "

## 2024-03-18 ENCOUNTER — OFFICE VISIT (OUTPATIENT)
Dept: CARDIOLOGY | Facility: CLINIC | Age: 79
End: 2024-03-18
Attending: INTERNAL MEDICINE
Payer: COMMERCIAL

## 2024-03-18 VITALS
BODY MASS INDEX: 29.71 KG/M2 | HEIGHT: 69 IN | SYSTOLIC BLOOD PRESSURE: 139 MMHG | HEART RATE: 51 BPM | OXYGEN SATURATION: 98 % | WEIGHT: 200.6 LBS | DIASTOLIC BLOOD PRESSURE: 66 MMHG

## 2024-03-18 DIAGNOSIS — G47.33 OSA (OBSTRUCTIVE SLEEP APNEA): ICD-10-CM

## 2024-03-18 DIAGNOSIS — E78.5 HYPERLIPIDEMIA LDL GOAL <130: ICD-10-CM

## 2024-03-18 DIAGNOSIS — I48.0 PAROXYSMAL ATRIAL FIBRILLATION (H): Primary | ICD-10-CM

## 2024-03-18 DIAGNOSIS — I10 HYPERTENSION GOAL BP (BLOOD PRESSURE) < 150/90: ICD-10-CM

## 2024-03-18 PROCEDURE — 99214 OFFICE O/P EST MOD 30 MIN: CPT | Performed by: NURSE PRACTITIONER

## 2024-03-18 NOTE — LETTER
3/18/2024    Waldo Matson MD, MD  [address]  [place] [address]    RE: Saul Hairston       Dear Colleague,     I had the pleasure of seeing Saul Hairston in the [facility].              ~Cardiology Clinic Visit~    Primary Cardiologist: Dr. Martinez  Reason for visit: AF follow up     History of Present Illness  Saul Hairston is a very pleasant 78 year old male with a past medical history notable for new afib/rvr, gout, malignant tumor of prostate,      In summary, he originally presented to urgent care on 8/10/23 with concerns for left wrist pain.  He was incidentally found to have irregular heart rhythm and was in AF at that encounter.  He was referred to ER and had a HR of 134 on initial presentation, which slowed to a more controlled rate of 70-80.  BP stable.  Nevertheless, he was diagnosed with new AF with RVR.  He did spontaneously convert to SR.     For this new arrhythmia, he was recommended resting and stress echocardiogram.     8/11/23: Echo showed the ascending aorta is mildly dilated. The left atrium is mildly dilated. Left ventricular systolic function is normal. Sinus rhythm was noted.  8/11/23: Stress echo showed normal stress echocardiogram with no evidence of stress-induced ischemia.     In addition, his BP was elevated during his ER admission and he was started on Toprolol XL and losartan.  For a [id number] score of 3, he was started on anticoagulation.     He was seen again in the ER on 9/24/23 for palpitations.  On evaluation, he was found to be in regular rhythm.  At that time, suspect possible paroxysmal atrial fibrillation that resolved at the time of ER presentation.  He was discharged to home without further intervention.     In follow up, patient has unfortunately continued to struggle with recurrent bursts of atrial fibrillation.  When he last saw us in December 2023, he felt as though the episodes were lasting anywhere from 3 to 12 hours and causes  symptoms of severe fatigue and palpitations.  For this, his metoprolol succinate was increased to 25 mg daily, losartan decreased to 25 mg daily, and he was also started on flecainide 50 mg twice daily.  He is still on Eliquis 5 mg twice daily.    As of today, he has no recurrent AF since December.  He is tolerating his medications without side effects and feeling quite well.  __________________________________________________________________          Assessment and Impression:      1.  Paroxysmal atrial fibrillation, now in sinus rhythm, on A/C.  2.  Hypertension  3.  Dyslipidemia  4.  Obstructive sleep apnea         Recommendations and Plan:     Continue current regimen without changes.  Ongoing BP and heart rhythm monitoring at home.  Pt counseled when to call clinic or seek urgent care.  Follow up with Dr. Martinez in 1-year.  __________________________________________________________________    Thank you for the opportunity to participate in this pleasant patient's care.    We would be happy to see this patient sooner for any concerns in the meantime.    LASHA Perez, CNP   Nurse Practitioner  [facility]    Today's clinic visit entailed:  Review of the result(s) of each unique test - cardiac testing and cardiac imaging, labs  Prescription drug management    The level of medical decision making during this visit was of moderate complexity.    Orders this Visit:  Orders Placed This Encounter   Procedures    Follow-Up with Cardiology     No orders of the defined types were placed in this encounter.    There are no discontinued medications.  Encounter Diagnoses   Name Primary?    Paroxysmal atrial fibrillation (H) Yes    Hypertension goal BP (blood pressure) < 150/90     MUNIRA (obstructive sleep apnea)     Hyperlipidemia LDL goal <130      CURRENT MEDICATIONS:  Current Outpatient Medications   Medication Sig Dispense Refill    albuterol (VENTOLIN HFA) 108 (90 Base) MCG/ACT inhaler Inhale  "1-2 puffs into the lungs every 6 hours as needed for shortness of breath, wheezing or cough 18 g 0    allopurinol (ZYLOPRIM) 300 MG tablet Take 1 tablet (300 mg) by mouth daily 90 tablet 1    apixaban ANTICOAGULANT (ELIQUIS) 5 MG tablet Take 1 tablet (5 mg) by mouth 2 times daily 180 tablet 3    flecainide (TAMBOCOR) 50 MG tablet Take 1 tablet (50 mg) by mouth 2 times daily 180 tablet 3    indomethacin (INDOCIN) 50 MG capsule TAKE 1 CAPSULE (50 MG) BY MOUTH 3 TIMES DAILY AS NEEDED FOR MODERATE PAIN 30 capsule 0    losartan (COZAAR) 25 MG tablet Take 1 tablet (25 mg) by mouth 2 times daily 180 tablet 3    metoprolol succinate ER (TOPROL XL) 25 MG 24 hr tablet Take 0.5 tablets (12.5 mg) by mouth daily 45 tablet 3    Omega-3 Fatty Acids (OMEGA-3 FISH OIL PO) Take 2 g by mouth daily      tamsulosin (FLOMAX) 0.4 MG capsule Take 0.4 mg by mouth every evening       ALLERGIES     Allergies   Allergen Reactions    Ciprofloxacin     Norco [Hydrocodone-Acetaminophen] Nausea and Vomiting     PAST MEDICAL, SURGICAL, FAMILY, SOCIAL HISTORY:  History was reviewed and updated as needed, see medical record.    Review of Systems:  A 10-point Review Of Systems is otherwise normal except for that which is noted in the HPI and interval summary.    Physical Exam:    Vitals: /66 (BP Location: Left arm, Patient Position: Sitting)   Pulse 51   Ht 1.74 m (5' 8.5\")   Wt 91 kg (200 lb 9.6 oz)   SpO2 98%   BMI 30.06 kg/m    Constitutional: Appears stated age, well nourished, NAD.  Neck: Supple. Carotid pulses full and equal.   Respiratory: Non-labored. Lungs CTAB.  Cardiovascular: RRR, normal S1 and S2. No M/G/R.  No edema.  GI: Soft, non-distended, non-tender.  Skin: Warm and dry.   Musculoskeletal/Extremities: Symmetrical movement.  Neurologic: No gross focal deficits. Alert, awake.  Psychiatric: Affect appropriate. Mentation normal.    Recent Lab Results:  LIPID RESULTS:  Lab Results   Component Value Date    CHOL 159 05/17/2022 " "   CHOL 139 11/17/2020    HDL 38 (L) 05/17/2022    HDL 33 (L) 11/17/2020    LDL 83 05/17/2022    LDL 81 11/17/2020    TRIG 192 (H) 05/17/2022    TRIG 125 11/17/2020    CHOLHDLRATIO 7.8 (H) 10/26/2015     LIVER ENZYME RESULTS:  Lab Results   Component Value Date    AST 26 05/17/2022    AST 68 (H) 12/26/2020    ALT 49 05/17/2022     (H) 12/26/2020     CBC RESULTS:  Lab Results   Component Value Date    WBC 7.0 09/24/2023    WBC 7.0 12/26/2020    RBC 5.32 09/24/2023    RBC 5.21 12/26/2020    HGB 15.2 09/24/2023    HGB 14.8 12/26/2020    HCT 46.7 09/24/2023    HCT 44.0 12/26/2020    MCV 88 09/24/2023    MCV 85 12/26/2020    MCH 28.6 09/24/2023    MCH 28.4 12/26/2020    MCHC 32.5 09/24/2023    MCHC 33.6 12/26/2020    RDW 12.7 09/24/2023    RDW 12.4 12/26/2020     09/24/2023     12/26/2020     BMP RESULTS:  Lab Results   Component Value Date     12/13/2023     12/26/2020    POTASSIUM 4.0 12/13/2023    POTASSIUM 4.3 05/17/2022    POTASSIUM 4.5 12/26/2020    CHLORIDE 103 12/13/2023    CHLORIDE 107 05/17/2022    CHLORIDE 102 12/26/2020    CO2 26 12/13/2023    CO2 28 05/17/2022    CO2 30 12/26/2020    ANIONGAP 10 12/13/2023    ANIONGAP 5 05/17/2022    ANIONGAP 2 (L) 12/26/2020     (H) 12/13/2023     (H) 05/17/2022     (H) 12/26/2020    BUN 19.6 12/13/2023    BUN 19 05/17/2022    BUN 27 12/26/2020    CR 1.11 12/13/2023    CR 0.79 12/26/2020    GFRESTIMATED 69 12/13/2023    GFRESTIMATED 89 12/26/2020    GFRESTBLACK >90 12/26/2020    MADIHA 9.5 12/13/2023    MADIHA 8.4 (L) 12/26/2020      A1C RESULTS:  Lab Results   Component Value Date    A1C 6.2 (H) 05/17/2022    A1C 6.1 (H) 12/22/2020     INR RESULTS:  No results found for: \"INR\"        Thank you for allowing me to participate in the care of your patient.      Sincerely,     LASHA Perez [facility]  cc:   Robert Martinez MD  [id number]  [place] [id number]

## 2024-03-18 NOTE — PATIENT INSTRUCTIONS
Thank you for your visit with the [facility] today.    Today's Summary     Continue current medications as is.  Continue tracking BP at home.  Continue monitoring for recurrent AF.  Follow up in 1-year for routine visit.  Please call 6-months in advance to schedule your appointment.    If you have questions or concerns, please do not hesitate to call my nursing support team at [phone].    Scheduling phone number: [phone]    It was a pleasure seeing you today.     LASHA Perez, CNP  Nurse Practitioner  [facility]  March 18, 2024  ________________________________________________________

## 2024-04-24 DIAGNOSIS — M1A.0720 IDIOPATHIC CHRONIC GOUT OF LEFT FOOT WITHOUT TOPHUS: ICD-10-CM

## 2024-04-24 RX ORDER — ALLOPURINOL 300 MG/1
1 TABLET ORAL DAILY
Qty: 90 TABLET | Refills: 1 | Status: SHIPPED | OUTPATIENT
Start: 2024-04-24 | End: 2024-09-17

## 2024-06-01 DIAGNOSIS — R06.2 WHEEZING: ICD-10-CM

## 2024-06-04 RX ORDER — ALBUTEROL SULFATE 90 UG/1
1-2 AEROSOL, METERED RESPIRATORY (INHALATION) EVERY 6 HOURS PRN
Qty: 8.5 G | Refills: 0 | Status: SHIPPED | OUTPATIENT
Start: 2024-06-04 | End: 2024-06-26

## 2024-06-26 DIAGNOSIS — R06.2 WHEEZING: ICD-10-CM

## 2024-06-26 RX ORDER — ALBUTEROL SULFATE 90 UG/1
1-2 AEROSOL, METERED RESPIRATORY (INHALATION) EVERY 6 HOURS PRN
Qty: 8.5 G | Refills: 0 | Status: SHIPPED | OUTPATIENT
Start: 2024-06-26 | End: 2024-07-25

## 2024-07-24 DIAGNOSIS — R06.2 WHEEZING: ICD-10-CM

## 2024-07-25 RX ORDER — ALBUTEROL SULFATE 90 UG/1
1-2 AEROSOL, METERED RESPIRATORY (INHALATION) EVERY 6 HOURS PRN
Qty: 8.5 G | Refills: 0 | Status: SHIPPED | OUTPATIENT
Start: 2024-07-25

## 2024-08-09 ENCOUNTER — OFFICE VISIT (OUTPATIENT)
Dept: URGENT CARE | Facility: URGENT CARE | Age: 79
End: 2024-08-09
Payer: COMMERCIAL

## 2024-08-09 VITALS
WEIGHT: 190 LBS | DIASTOLIC BLOOD PRESSURE: 62 MMHG | BODY MASS INDEX: 28.47 KG/M2 | SYSTOLIC BLOOD PRESSURE: 146 MMHG | HEART RATE: 69 BPM | RESPIRATION RATE: 16 BRPM | OXYGEN SATURATION: 99 % | TEMPERATURE: 98 F

## 2024-08-09 DIAGNOSIS — R50.9 FEVER, UNSPECIFIED: Primary | ICD-10-CM

## 2024-08-09 DIAGNOSIS — J32.9 BACTERIAL SINUSITIS: ICD-10-CM

## 2024-08-09 DIAGNOSIS — B96.89 BACTERIAL SINUSITIS: ICD-10-CM

## 2024-08-09 DIAGNOSIS — R09.81 NASAL CONGESTION: ICD-10-CM

## 2024-08-09 DIAGNOSIS — Z79.01 LONG TERM CURRENT USE OF ANTICOAGULANT THERAPY: ICD-10-CM

## 2024-08-09 LAB
FLUAV AG SPEC QL IA: NEGATIVE
FLUBV AG SPEC QL IA: NEGATIVE

## 2024-08-09 PROCEDURE — 99214 OFFICE O/P EST MOD 30 MIN: CPT | Performed by: PHYSICIAN ASSISTANT

## 2024-08-09 PROCEDURE — 87635 SARS-COV-2 COVID-19 AMP PRB: CPT | Performed by: PHYSICIAN ASSISTANT

## 2024-08-09 PROCEDURE — 87804 INFLUENZA ASSAY W/OPTIC: CPT | Performed by: PHYSICIAN ASSISTANT

## 2024-08-09 RX ORDER — FLUTICASONE PROPIONATE 50 MCG
1 SPRAY, SUSPENSION (ML) NASAL DAILY
Qty: 16 G | Refills: 0 | Status: SHIPPED | OUTPATIENT
Start: 2024-08-09

## 2024-08-09 RX ORDER — OXYMETAZOLINE HYDROCHLORIDE 0.05 G/100ML
2 SPRAY NASAL 2 TIMES DAILY
Qty: 30 ML | Refills: 0 | Status: SHIPPED | OUTPATIENT
Start: 2024-08-09 | End: 2024-08-12

## 2024-08-09 RX ORDER — CEFDINIR 300 MG/1
300 CAPSULE ORAL 2 TIMES DAILY
Qty: 20 CAPSULE | Refills: 0 | Status: SHIPPED | OUTPATIENT
Start: 2024-08-09 | End: 2024-08-19

## 2024-08-09 RX ORDER — CETIRIZINE HYDROCHLORIDE 10 MG/1
10 TABLET ORAL DAILY
Qty: 30 TABLET | Refills: 0 | Status: SHIPPED | OUTPATIENT
Start: 2024-08-09 | End: 2024-08-31

## 2024-08-09 NOTE — PROGRESS NOTES
Assessment & Plan     Cough    OTC cough medications  Covid pending  Influenza pending  - Symptomatic COVID-19 Virus (Coronavirus) by PCR Nose  - Influenza A & B Antigen - Clinic Collect    Fever, unspecified    A fever is a high body temperature and is the body's reaction to an illness. It's one way your body fights illness. A temperature of up to 102 F can be helpful, because it helps the body respond to infection. Most healthy people can have a fever as high as 103 F to 104 F for short periods of time without problems.  Its important to stay well hydrated with a fever and avoid being in the heat.  A fever can be treated with medications provided, but If symptoms worsen then be seen by your PCP or go to the .     - Symptomatic COVID-19 Virus (Coronavirus) by PCR Nose  - Influenza A & B Antigen - Clinic Collect    Bacterial sinusitis    You have been diagnosed with a bacterial sinus infection. Sinusitis can occur during a cold. It can also happen due to allergies to pollens and other particles in the air.  A sinus infection can sometimes cause fever, headache, and facial pain. There is often green or yellow fluid draining from the nose or into the back of the throat (post-nasal drip). This infection is treated with antibiotics.  Home care  Take the full course of antibiotics as instructed. Don't stop taking them, even when you feel better.  Drink plenty of water, hot tea, and other liquids as directed by the healthcare provider. This may help thin nasal mucus. It also may help your sinuses drain fluids.  Heat may help soothe painful areas of your face. Use a towel soaked in hot water. Or,  the shower and direct the warm spray onto your face. Using a vaporizer along with a menthol rub at night may also help soothe symptoms.     - cefdinir (OMNICEF) 300 MG capsule  Dispense: 20 capsule; Refill: 0    Nasal congestion    Start with using afrin for 3 days while using flonse, then continue flonase daily as  needed  Zyrtec for runny nose    - fluticasone (FLONASE) 50 MCG/ACT nasal spray  Dispense: 16 g; Refill: 0  - cetirizine (ZYRTEC) 10 MG tablet  Dispense: 30 tablet; Refill: 0  - oxymetazoline (AFRIN) 0.05 % nasal spray  Dispense: 30 mL; Refill: 0    Long term current use of anticoagulant therapy    Patient to avoid NSAIDS  No need to recheck blood work as he is on eliquis and not warfarin         At today's visit with Saul Hairston , we discussed results, diagnosis, medications and formulated a plan.  We also discussed red flags for immediate return to clinic/ER, as well as indications for follow up with PCP if not improved in 3 days. Patient understood and agreed to plan. Saul Hairston was discharged with stable vitals and has no further questions.       No follow-ups on file.    Eduar Fletcher, [facility], PA-DONELL  [facility] URGENT [facility]    Aby Hughes is a 78 year old male who presents to clinic today for the following health issues:  Chief Complaint   Patient presents with    URI     8/5/24: Pt has coughing, chest congestion, rhinorrhea, hot or flushed, headaches: Pt denies SOB, difficulty,          8/9/2024     2:03 PM   Additional Questions   Roomed by Arley Hugo RN     HPI  Review of Systems  Constitutional, HEENT, cardiovascular, pulmonary, GI, , musculoskeletal, neuro, skin, endocrine and psych systems are negative, except as otherwise noted.      Objective    BP (!) 146/62 (BP Location: Left arm, Patient Position: Sitting, Cuff Size: Adult Regular)   Pulse 69   Temp 98  F (36.7  C) (Tympanic)   Resp 16   Wt 86.2 kg (190 lb)   SpO2 99%   BMI 28.47 kg/m    Physical Exam   GENERAL: alert and no distress  EYES: Eyes grossly normal to inspection, PERRL and conjunctivae and sclerae normal  HENT: normal cephalic/atraumatic, ear canals and TM's normal, nasal mucosa edematous , rhinorrhea thick, and sinuses: maxillary, frontal tenderness on both sides, maxillary, frontal swelling on both  sides  NECK: no adenopathy, no asymmetry, masses, or scars  RESP: lungs clear to auscultation - no rales, rhonchi or wheezes  CV: regular rate and rhythm, normal S1 S2, no S3 or S4, no murmur, click or rub, no peripheral edema  MS: no gross musculoskeletal defects noted, no edema  SKIN: no suspicious lesions or rashes  NEURO: Normal strength and tone, mentation intact and speech normal  PSYCH: mentation appears normal, affect normal/bright      No results found for any visits on 08/09/24.

## 2024-08-10 LAB — SARS-COV-2 RNA RESP QL NAA+PROBE: NEGATIVE

## 2024-08-31 DIAGNOSIS — R09.81 NASAL CONGESTION: ICD-10-CM

## 2024-08-31 RX ORDER — CETIRIZINE HYDROCHLORIDE 10 MG/1
10 TABLET ORAL DAILY
Qty: 90 TABLET | Refills: 1 | Status: SHIPPED | OUTPATIENT
Start: 2024-08-31

## 2024-09-16 ENCOUNTER — NURSE TRIAGE (OUTPATIENT)
Dept: FAMILY MEDICINE | Facility: CLINIC | Age: 79
End: 2024-09-16
Payer: COMMERCIAL

## 2024-09-16 NOTE — TELEPHONE ENCOUNTER
S-(situation): PT has had numbness in his left hand since last year. Usually, this happens daily and goes away.  On 9/14/24, at 0300, pt started getting this numbness and pins and needles in the left hand and wrist. No higher on arm. PT did have pain in this hand. This is more sustained than normally. PT is concerned this is a pinched nerve.      B-(background): see above.    A-(assessment): Feels like hand feel asleep. Weak feeling, Nausea, light headed.    R-(recommendations): If sx worsen with increased pain or weakness, go right to ER.  PT's wife changed her schedule for 9/17/24 , so pt could get into see PCP in the morning.  Dr. Matson- is this OK. Protocol directed pt to be seen today.  DARLIN Dick              Reason for Disposition   Tingling (e.g., pins and needles) of the face, arm or leg on one side of the body, that is present now (Exceptions: Chronic or recurrent symptom lasting > 4 weeks; or tingling from known cause, such as: bumped elbow, carpal tunnel syndrome, pinched nerve, frostbite.)    Additional Information   Negative: Difficult to awaken or acting confused (e.g., disoriented, slurred speech)   Negative: New neurologic deficit that is present NOW, sudden onset of ANY of the following: * Weakness of the face, arm, or leg on one side of the body* Numbness of the face, arm, or leg on one side of the body* Loss of speech or garbled speech   Negative: Sounds like a life-threatening emergency to the triager   Negative: SEVERE weakness (i.e., unable to walk or barely able to walk, requires support) and new-onset or worsening   Negative: Confusion, disorientation, or hallucinations is main symptom   Negative: Dizziness is main symptom   Negative: Followed a head injury within last 3 days   Negative: Headache (with neurologic deficit)   Negative: Unable to urinate (or only a few drops) and bladder feels very full   Negative: Loss of bladder or bowel control (urine or bowel incontinence; wetting self,  "leaking stool) of new-onset   Negative: Back pain with numbness (loss of sensation) in groin or rectal area   Negative: Neurologic deficit that was brief (now gone), ANY of the following: * Weakness of the face, arm, or leg on one side of the body * Numbness of the face, arm, or leg on one side of the body * Loss of speech or garbled speech   Negative: Patient sounds very sick or weak to the triager    Answer Assessment - Initial Assessment Questions  1. SYMPTOM: \"What is the main symptom you are concerned about?\" (e.g., weakness, numbness)      Left hand numbness. Tingling sensation. Has this about once per day.  2. ONSET: \"When did this start?\" (minutes, hours, days; while sleeping)      Started on 9/14/24 at 0300am during the night.  3. LAST NORMAL: \"When was the last time you (the patient) were normal (no symptoms)?\"      Before bed on 9/14/24  4. PATTERN \"Does this come and go, or has it been constant since it started?\"  \"Is it present now?\"      Comes and goes. Now having sx.  5. CARDIAC SYMPTOMS: \"Have you had any of the following symptoms: chest pain, difficulty breathing, palpitations?\"      no  6. NEUROLOGIC SYMPTOMS: \"Have you had any of the following symptoms: headache, dizziness, vision loss, double vision, changes in speech, unsteady on your feet?\"      Weak from the pain. Nausea from the pain. Little light headed.  7. OTHER SYMPTOMS: \"Do you have any other symptoms?\"      no  8. PREGNANCY: \"Is there any chance you are pregnant?\" \"When was your last menstrual period?\"      N.a    Protocols used: Neurologic Deficit-A-[place]    "

## 2024-09-17 ENCOUNTER — OFFICE VISIT (OUTPATIENT)
Dept: FAMILY MEDICINE | Facility: CLINIC | Age: 79
End: 2024-09-17
Payer: COMMERCIAL

## 2024-09-17 VITALS
HEART RATE: 54 BPM | SYSTOLIC BLOOD PRESSURE: 118 MMHG | TEMPERATURE: 97.8 F | BODY MASS INDEX: 28.94 KG/M2 | DIASTOLIC BLOOD PRESSURE: 62 MMHG | HEIGHT: 69 IN | WEIGHT: 195.4 LBS | OXYGEN SATURATION: 97 % | RESPIRATION RATE: 16 BRPM

## 2024-09-17 DIAGNOSIS — R20.0 FINGER NUMBNESS: ICD-10-CM

## 2024-09-17 DIAGNOSIS — I10 HYPERTENSION GOAL BP (BLOOD PRESSURE) < 150/90: ICD-10-CM

## 2024-09-17 DIAGNOSIS — M25.532 LEFT WRIST PAIN: Primary | ICD-10-CM

## 2024-09-17 DIAGNOSIS — R73.09 ELEVATED GLUCOSE: ICD-10-CM

## 2024-09-17 DIAGNOSIS — M1A.0720 IDIOPATHIC CHRONIC GOUT OF LEFT FOOT WITHOUT TOPHUS: ICD-10-CM

## 2024-09-17 DIAGNOSIS — E78.5 HYPERLIPIDEMIA LDL GOAL <130: ICD-10-CM

## 2024-09-17 LAB
ALBUMIN SERPL BCG-MCNC: 4.2 G/DL (ref 3.5–5.2)
ALP SERPL-CCNC: 103 U/L (ref 40–150)
ALT SERPL W P-5'-P-CCNC: 29 U/L (ref 0–70)
ANION GAP SERPL CALCULATED.3IONS-SCNC: 10 MMOL/L (ref 7–15)
AST SERPL W P-5'-P-CCNC: 29 U/L (ref 0–45)
BILIRUB SERPL-MCNC: 0.4 MG/DL
BUN SERPL-MCNC: 21.3 MG/DL (ref 8–23)
CALCIUM SERPL-MCNC: 9.2 MG/DL (ref 8.8–10.4)
CHLORIDE SERPL-SCNC: 107 MMOL/L (ref 98–107)
CHOLEST SERPL-MCNC: 207 MG/DL
CREAT SERPL-MCNC: 1.32 MG/DL (ref 0.67–1.17)
EGFRCR SERPLBLD CKD-EPI 2021: 56 ML/MIN/1.73M2
ERYTHROCYTE [DISTWIDTH] IN BLOOD BY AUTOMATED COUNT: 12.8 % (ref 10–15)
FASTING STATUS PATIENT QL REPORTED: NO
FASTING STATUS PATIENT QL REPORTED: NO
GLUCOSE SERPL-MCNC: 116 MG/DL (ref 70–99)
HBA1C MFR BLD: 5.6 % (ref 0–5.6)
HCO3 SERPL-SCNC: 22 MMOL/L (ref 22–29)
HCT VFR BLD AUTO: 45.5 % (ref 40–53)
HDLC SERPL-MCNC: 33 MG/DL
HGB BLD-MCNC: 14.5 G/DL (ref 13.3–17.7)
LDLC SERPL CALC-MCNC: 142 MG/DL
MCH RBC QN AUTO: 28.5 PG (ref 26.5–33)
MCHC RBC AUTO-ENTMCNC: 31.9 G/DL (ref 31.5–36.5)
MCV RBC AUTO: 90 FL (ref 78–100)
NONHDLC SERPL-MCNC: 174 MG/DL
PLATELET # BLD AUTO: 179 10E3/UL (ref 150–450)
POTASSIUM SERPL-SCNC: 4.3 MMOL/L (ref 3.4–5.3)
PROT SERPL-MCNC: 6.8 G/DL (ref 6.4–8.3)
RBC # BLD AUTO: 5.08 10E6/UL (ref 4.4–5.9)
SODIUM SERPL-SCNC: 139 MMOL/L (ref 135–145)
TRIGL SERPL-MCNC: 160 MG/DL
URATE SERPL-MCNC: 6.2 MG/DL (ref 3.4–7)
WBC # BLD AUTO: 6.9 10E3/UL (ref 4–11)

## 2024-09-17 PROCEDURE — 80061 LIPID PANEL: CPT | Performed by: FAMILY MEDICINE

## 2024-09-17 PROCEDURE — 85027 COMPLETE CBC AUTOMATED: CPT | Performed by: FAMILY MEDICINE

## 2024-09-17 PROCEDURE — G2211 COMPLEX E/M VISIT ADD ON: HCPCS | Performed by: FAMILY MEDICINE

## 2024-09-17 PROCEDURE — 36415 COLL VENOUS BLD VENIPUNCTURE: CPT | Performed by: FAMILY MEDICINE

## 2024-09-17 PROCEDURE — 84550 ASSAY OF BLOOD/URIC ACID: CPT | Performed by: FAMILY MEDICINE

## 2024-09-17 PROCEDURE — 80053 COMPREHEN METABOLIC PANEL: CPT | Performed by: FAMILY MEDICINE

## 2024-09-17 PROCEDURE — 99214 OFFICE O/P EST MOD 30 MIN: CPT | Performed by: FAMILY MEDICINE

## 2024-09-17 PROCEDURE — 83036 HEMOGLOBIN GLYCOSYLATED A1C: CPT | Performed by: FAMILY MEDICINE

## 2024-09-17 RX ORDER — INDOMETHACIN 50 MG/1
50 CAPSULE ORAL 3 TIMES DAILY PRN
Qty: 30 CAPSULE | Refills: 0 | Status: SHIPPED | OUTPATIENT
Start: 2024-09-17

## 2024-09-17 RX ORDER — ALLOPURINOL 300 MG/1
1 TABLET ORAL DAILY
Qty: 90 TABLET | Refills: 1 | Status: SHIPPED | OUTPATIENT
Start: 2024-09-17

## 2024-09-17 ASSESSMENT — PAIN SCALES - GENERAL: PAINLEVEL: NO PAIN (1)

## 2024-09-17 ASSESSMENT — ENCOUNTER SYMPTOMS: NUMBNESS: 1

## 2024-09-17 NOTE — PROGRESS NOTES
"  Assessment & Plan     Left wrist pain  Left wrist pain and history of gout.  His previous x-ray showed cystic changes in his carpal bones which could be from gouty arthropathy.  Due to now persistent symptom of his wrist pain and also getting some finger numbness he could have either peripheral neuropathy versus radiculopathy versus carpal tunnel.  At this point we agreed to send specialist for further evaluation.  - Orthopedic  Referral; Future    Finger numbness  See above  - Orthopedic  Referral; Future    Idiopathic chronic gout 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  Continue allopurinol.  Indomethacin not most ideal but okay to use it with flare.  - CBC with Platelets; Future  - Uric acid; Future  - allopurinol (ZYLOPRIM) 300 MG tablet; Take 1 tablet (300 mg) by mouth daily.  - indomethacin (INDOCIN) 50 MG capsule; Take 1 capsule (50 mg) by mouth 3 times daily as needed for moderate pain.  - CBC with Platelets  - Uric acid    Hypertension goal BP (blood pressure) < 150/90  Patient is tolerating current medication without any major side effects of concerns and current dose seems reasonable too.  Current medication regime is effective. Continue current treatment without any changes.   - COMPREHENSIVE METABOLIC PANEL; Future  - COMPREHENSIVE METABOLIC PANEL    Hyperlipidemia LDL goal <130     - Lipid panel reflex to direct LDL Non-fasting; Future  - Lipid panel reflex to direct LDL Non-fasting    Elevated glucose     - Hemoglobin A1c; Future  - Hemoglobin A1c             BMI  Estimated body mass index is 29.28 kg/m  as calculated from the following:    Height as of this encounter: 1.74 m (5' 8.5\").    Weight as of this encounter: 88.6 kg (195 lb 6.4 oz).          The longitudinal plan of care for the diagnosis(es)/condition(s) as documented were addressed during this visit. Due to the added complexity in care, I will continue to support Saul in the subsequent management and with ongoing continuity of " "care.    Subjective   Saul is a 78 year old, presenting for the following health issues:  Numbness (Numbness on the left hand X Sunday morning (09/15/2024) Pt stated that his wrist and had was hurting /Pt also wants to discuss about her medication)        9/17/2024    10:16 AM   Additional Questions   Roomed by Maddie Mcclain   Accompanied by Self     History of Present Illness       Reason for visit:  Recurring numbness in thumb and forefinger and pain in left dantete  Symptom onset:  1-3 days ago  Symptoms include:  Numbness and some pain  Symptom intensity:  Mild  Symptom progression:  Staying the same  Had these symptoms before:  Yes  Has tried/received treatment for these symptoms:  No   He is taking medications regularly.     Left wrist soreness.   Left finger and thumb numbness. Shakes hands and it goes away.  Going on for a year. No night time numbness.   Right handed.   With gout - left wrist mostly. Sometime right index finger. Mostly gout in foot.  Sunday morning - lot of wrist pain. Numbness is not new.   Feels could be from gout.   Psa numbers are going up. Too old for surgery as per patient.     Indomethacin does not use it daily. Allopurinol daily. Would like to hold off on flu shot.         Objective    /62   Pulse 54   Temp 97.8  F (36.6  C) (Temporal)   Resp 16   Ht 1.74 m (5' 8.5\")   Wt 88.6 kg (195 lb 6.4 oz)   SpO2 97%   BMI 29.28 kg/m    Body mass index is 29.28 kg/m .  Physical Exam               Signed Electronically by: Waldo Matson MD, MD      Answers submitted by the patient for this visit:  General Questionnaire (Submitted on 9/17/2024)  Chief Complaint: Chronic problems general questions HPI Form  How many days per week do you miss taking your medication?: 0  General Concern (Submitted on 9/17/2024)  Chief Complaint: Chronic problems general questions HPI Form  What is the reason for your visit today?: recurring numbness in thumb and forefinger and pain in left danette  When " did your symptoms begin?: 1-3 days ago  What are your symptoms?: numbness and some pain  How would you describe these symptoms?: Mild  Are your symptoms:: Staying the same  Have you had these symptoms before?: Yes  Have you tried or received treatment for these symptoms before?: No

## 2024-09-18 ENCOUNTER — PATIENT OUTREACH (OUTPATIENT)
Dept: CARE COORDINATION | Facility: CLINIC | Age: 79
End: 2024-09-18
Payer: COMMERCIAL

## 2024-09-19 DIAGNOSIS — M25.532 WRIST PAIN, LEFT: Primary | ICD-10-CM

## 2024-09-20 NOTE — TELEPHONE ENCOUNTER
DIAGNOSIS: Waldo Matson MD  Left wrist pain   Finger numbness   xrays done   Surgical Sub-Specialist    APPOINTMENT DATE: 9/24/24   NOTES STATUS DETAILS   OFFICE NOTE from referring provider Internal 9/17/24 - Waldo Matson MD - [facility]    MEDICATION LIST Internal    XRAYS (IMAGES & REPORTS) Internal 10/25/23 - XR Forearm Left  8/11/23 - XR Wrist Left   7/24/22 - XR Wrist Left

## 2024-09-24 ENCOUNTER — ANCILLARY PROCEDURE (OUTPATIENT)
Dept: GENERAL RADIOLOGY | Facility: CLINIC | Age: 79
End: 2024-09-24
Attending: STUDENT IN AN ORGANIZED HEALTH CARE EDUCATION/TRAINING PROGRAM
Payer: COMMERCIAL

## 2024-09-24 ENCOUNTER — OFFICE VISIT (OUTPATIENT)
Dept: ORTHOPEDICS | Facility: CLINIC | Age: 79
End: 2024-09-24
Attending: FAMILY MEDICINE
Payer: COMMERCIAL

## 2024-09-24 ENCOUNTER — PRE VISIT (OUTPATIENT)
Dept: ORTHOPEDICS | Facility: CLINIC | Age: 79
End: 2024-09-24

## 2024-09-24 DIAGNOSIS — M25.532 WRIST PAIN, LEFT: ICD-10-CM

## 2024-09-24 DIAGNOSIS — R20.0 FINGER NUMBNESS: ICD-10-CM

## 2024-09-24 DIAGNOSIS — M25.532 LEFT WRIST PAIN: ICD-10-CM

## 2024-09-24 PROCEDURE — 99204 OFFICE O/P NEW MOD 45 MIN: CPT | Performed by: STUDENT IN AN ORGANIZED HEALTH CARE EDUCATION/TRAINING PROGRAM

## 2024-09-24 PROCEDURE — 73130 X-RAY EXAM OF HAND: CPT | Mod: LT | Performed by: RADIOLOGY

## 2024-09-24 NOTE — LETTER
"9/24/2024      Saul Hairston  [address]  [facility] [address]      Dear Colleague,    Thank you for referring your patient, Salu Hairston, to the [facility] [place]. Please see a copy of my visit note below.    DME FITTING    Relevant Diagnosis: Left wrist pain and finger numbness  Wrist,Quickfit, L <10\" brace was fit on patient's Left Wrist..     Person(s) involved in teaching:   Patient    Brace was applied in standard Manner:  Yes  Brace fit well:  Yes  Patient reports brace to fit comfortably:  Yes    Education:   Patient shown self application and removal of brace: Yes  Patient shown how to adjust brace fit, if necessary: Yes  Patient educated on billing and return policy: Yes  Patient confirmed understanding when and how to contact clinic with concerns: Yes      Ortho Hand    78-year-old right-hand-dominant non-smoker presenting with left thumb, index and middle finger paresthesias.  The symptoms have been present for approximately 1 year.  He has occasional symptoms while driving with the left wrist in an extended position.  He has nighttime awakening due to the symptoms, but infrequently and approximately once per month.  He has not tried splinting yet.  He is not dropping items yet.  The left small finger is unaffected.    ROS: Negative, see HPI  Past medical history: Nondiabetic, atrial fibrillation  Past surgical history: None to the hands or wrist  Medications: Eliquis  Allergies: Ciprofloxacin, Norco  Social history: Non-smoker, denies any tobacco or nicotine use  Family history: No bleeding or clotting problems, or issues with anesthesia    Examination:  Nonlabored breathing  Not distressed  Left median and ulnar distributed sensation is normal  Left thenar eminence is without atrophy and thumb opposition remains intact  Left hand intrinsic strength remains intact  Left palm with questionable Tinel's over the carpal tunnel and mildly positive Durkan's test  No Tinel's " over the left Guyon's canal and negative elbow flexion test    No nerve study    A/P: 79-year-old male presenting with left carpal tunnel syndrome    -Discussed the options for management of carpal tunnel syndrome, including observation, wrist cock-up splinting, lumbrical stretch exercises and surgery.  Since the patient has not tried conservative management and his symptoms are infrequent, it would not be unreasonable to trial lumbrical stretch exercises with wrist splinting.  Provided a wrist splint.  If the symptoms worsen or the splint does not help, patient should return to clinic after 6 weeks.  -A total of 45 minutes was devoted to review of chart, direct face-to-face patient counseling and documentation during this encounter, exclusive of any procedure performed.    Dennis Rankin MD, PhD       Again, thank you for allowing me to participate in the care of your patient.        Sincerely,        Dennis Rankin MD

## 2024-09-24 NOTE — PROGRESS NOTES
"DME FITTING    Relevant Diagnosis: Left wrist pain and finger numbness  Wrist,Quickfit, L <10\" brace was fit on patient's Left Wrist..     Person(s) involved in teaching:   Patient    Brace was applied in standard Manner:  Yes  Brace fit well:  Yes  Patient reports brace to fit comfortably:  Yes    Education:   Patient shown self application and removal of brace: Yes  Patient shown how to adjust brace fit, if necessary: Yes  Patient educated on billing and return policy: Yes  Patient confirmed understanding when and how to contact clinic with concerns: Yes    "

## 2024-09-24 NOTE — NURSING NOTE
Reason For Visit:   Chief Complaint   Patient presents with    Consult     Left wrist pain with finger numbness       Primary MD: Waldo Matson  Ref. MD: Waldo Matson    Age: 78 year old    ?  No      There were no vitals taken for this visit.      Pain Assessment  Patient Currently in Pain: Yes  0-10 Pain Scale: 1  Primary Pain Location: Wrist (left wrist and hand)  Pain Descriptors: Discomfort    Hand Dominance Evaluation  Hand Dominance: Right          QuickDASH Assessment      9/24/2024    11:00 AM   QuickDASH Main   1. Open a tight or new jar Mild difficulty   2. Do heavy household chores (e.g., wash walls, floors) No difficulty   3. Carry a shopping bag or briefcase No difficulty   4. Wash your back No difficulty   5. Use a knife to cut food No difficulty   6. Recreational activities in which you take some force or impact through your arm, shoulder or hand (e.g., golf, hammering, tennis, etc.) No difficulty   7. During the past week, to what extent has your arm, shoulder or hand problem interfered with your normal social activities with family, friends, neighbours or groups Slightly   8. During the past week, were you limited in your work or other regular daily activities as a result of your arm, shoulder or hand problem Slightly limited          Current Outpatient Medications   Medication Sig Dispense Refill    albuterol (PROAIR HFA/PROVENTIL HFA/VENTOLIN HFA) 108 (90 Base) MCG/ACT inhaler INHALE 1-2 PUFFS INTO THE LUNGS EVERY 6 HOURS AS NEEDED FOR SHORTNESS OF BREATH, WHEEZING OR COUGH 8.5 g 0    allopurinol (ZYLOPRIM) 300 MG tablet Take 1 tablet (300 mg) by mouth daily. 90 tablet 1    apixaban ANTICOAGULANT (ELIQUIS) 5 MG tablet Take 1 tablet (5 mg) by mouth 2 times daily 180 tablet 3    cetirizine (ZYRTEC) 10 MG tablet TAKE 1 TABLET (10 MG) BY MOUTH DAILY. 90 tablet 1    flecainide (TAMBOCOR) 50 MG tablet Take 1 tablet (50 mg) by mouth 2 times daily 180 tablet 3     fluticasone (FLONASE) 50 MCG/ACT nasal spray Spray 1 spray into both nostrils daily 16 g 0    indomethacin (INDOCIN) 50 MG capsule Take 1 capsule (50 mg) by mouth 3 times daily as needed for moderate pain. 30 capsule 0    losartan (COZAAR) 25 MG tablet Take 1 tablet (25 mg) by mouth 2 times daily 180 tablet 3    metoprolol succinate ER (TOPROL XL) 25 MG 24 hr tablet Take 0.5 tablets (12.5 mg) by mouth daily 45 tablet 3    Omega-3 Fatty Acids (OMEGA-3 FISH OIL PO) Take 2 g by mouth daily      tamsulosin (FLOMAX) 0.4 MG capsule Take 0.4 mg by mouth every evening         Allergies   Allergen Reactions    Ciprofloxacin     Norco [Hydrocodone-Acetaminophen] Nausea and Vomiting       CHRISTIE RO, ATC

## 2024-09-25 NOTE — PROGRESS NOTES
Ortho Hand    78-year-old right-hand-dominant non-smoker presenting with left thumb, index and middle finger paresthesias.  The symptoms have been present for approximately 1 year.  He has occasional symptoms while driving with the left wrist in an extended position.  He has nighttime awakening due to the symptoms, but infrequently and approximately once per month.  He has not tried splinting yet.  He is not dropping items yet.  The left small finger is unaffected.    ROS: Negative, see HPI  Past medical history: Nondiabetic, atrial fibrillation  Past surgical history: None to the hands or wrist  Medications: Eliquis  Allergies: Ciprofloxacin, Norco  Social history: Non-smoker, denies any tobacco or nicotine use  Family history: No bleeding or clotting problems, or issues with anesthesia    Examination:  Nonlabored breathing  Not distressed  Left median and ulnar distributed sensation is normal  Left thenar eminence is without atrophy and thumb opposition remains intact  Left hand intrinsic strength remains intact  Left palm with questionable Tinel's over the carpal tunnel and mildly positive Durkan's test  No Tinel's over the left Guyon's canal and negative elbow flexion test    No nerve study    A/P: 79-year-old male presenting with left carpal tunnel syndrome    -Discussed the options for management of carpal tunnel syndrome, including observation, wrist cock-up splinting, lumbrical stretch exercises and surgery.  Since the patient has not tried conservative management and his symptoms are infrequent, it would not be unreasonable to trial lumbrical stretch exercises with wrist splinting.  Provided a wrist splint.  If the symptoms worsen or the splint does not help, patient should return to clinic after 6 weeks.  -A total of 45 minutes was devoted to review of chart, direct face-to-face patient counseling and documentation during this encounter, exclusive of any procedure performed.    Dennis Rankin MD, PhD

## 2024-09-28 ENCOUNTER — OFFICE VISIT (OUTPATIENT)
Dept: URGENT CARE | Facility: URGENT CARE | Age: 79
End: 2024-09-28
Payer: COMMERCIAL

## 2024-09-28 VITALS
DIASTOLIC BLOOD PRESSURE: 68 MMHG | TEMPERATURE: 98.6 F | HEART RATE: 58 BPM | SYSTOLIC BLOOD PRESSURE: 144 MMHG | OXYGEN SATURATION: 97 % | WEIGHT: 195 LBS | BODY MASS INDEX: 29.22 KG/M2 | RESPIRATION RATE: 16 BRPM

## 2024-09-28 DIAGNOSIS — M25.532 LEFT WRIST PAIN: Primary | ICD-10-CM

## 2024-09-28 DIAGNOSIS — Z87.39 H/O: GOUT: ICD-10-CM

## 2024-09-28 LAB — URATE SERPL-MCNC: 6.3 MG/DL (ref 3.4–7)

## 2024-09-28 PROCEDURE — 84550 ASSAY OF BLOOD/URIC ACID: CPT | Performed by: PHYSICIAN ASSISTANT

## 2024-09-28 PROCEDURE — 36415 COLL VENOUS BLD VENIPUNCTURE: CPT | Performed by: PHYSICIAN ASSISTANT

## 2024-09-28 PROCEDURE — 99213 OFFICE O/P EST LOW 20 MIN: CPT | Performed by: PHYSICIAN ASSISTANT

## 2024-09-28 RX ORDER — METHYLPREDNISOLONE 4 MG
TABLET, DOSE PACK ORAL
Qty: 21 TABLET | Refills: 0 | Status: SHIPPED | OUTPATIENT
Start: 2024-09-28

## 2024-09-28 NOTE — PATIENT INSTRUCTIONS
(M25.532) Left wrist pain  (primary encounter diagnosis)  Comment: Will treat presumptively for gout given patient's past medical history.  Plan: Uric acid, methylPREDNISolone (MEDROL DOSEPAK)         4 MG tablet therapy pack            (Z87.39) H/O: gout  Comment:   Plan: Maintain good hydration.  Avoid foods high in purines.    Follow-up with primary doctor or orthopedics should wrist pain persist or worsen.

## 2024-09-28 NOTE — PROGRESS NOTES
Patient presents with:  Urgent Care: Pt saw a hand doctor recently and was told he had carpal tunnel and is now wearing a wrist brace. Pt thinks he has gout in L wrist     (M25.532) Left wrist pain  (primary encounter diagnosis)  Comment: Will treat presumptively for gout given patient's past medical history.  Plan: Uric acid, methylPREDNISolone (MEDROL DOSEPAK)         4 MG tablet therapy pack            (Z87.39) H/O: gout  Comment:   Plan: Maintain good hydration.  Avoid foods high in purines.    Follow-up with primary doctor or orthopedics should wrist pain persist or worsen.      At the end of the encounter, I discussed results, diagnosis, medications. Discussed red flags for immediate return to clinic/ER, as well as indications for follow up if no improvement. Patient understood and agreed to plan. Patient was stable for discharge       SUBJECTIVE:   Saul Hairston is a 79 year old male who presents today with persistent left wrist pain.  He was recently diagnosed with carpal tunnel in his left wrist and has been wearing a brace.  The swelling and redness persist.  Denies any trauma.  Last uric acid was 6.2 on 9/17/2024.  The wrist pain has worsened since that last uric acid test.     Patient Active Problem List   Diagnosis    Idiopathic chronic gout 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    Rotator cuff tear    Kidney stone    Elevated PSA    Seasonal allergic rhinitis    Hyperlipidemia LDL goal <130    Umbilical hernia    Hypertension goal BP (blood pressure) < 150/90    Non morbid obesity, unspecified obesity type    MUNIRA (obstructive sleep apnea)    Prediabetes    Malignant tumor of prostate (H)    Increased frequency of urination    Retention of urine    Pulmonary nodule    Atrial fibrillation with RVR (H)         Past Medical History:   Diagnosis Date    Arthritis     Gout    Atrial fibrillation with rapid ventricular response (H) 08/10/2023    Cancer (H) 2018    Prostrate    CARDIOVASCULAR SCREENING; LDL GOAL LESS THAN  160 08/14/2006    Elevated PSA     Gout, unspecified     Hypertension     Kidney stone 2009    Doing ok now    Obese     Pneumonia due to 2019 novel coronavirus 12/22/2020    Seasonal allergic rhinitis     Spring and Fall    Sleep apnea     DOES NOT USE CPAP    Umbilical hernia without mention of obstruction or gangrene 06/2013         Current Outpatient Medications   Medication Sig Dispense Refill    Multiple Vitamins-Iron (DAILY-SAM/IRON/BETA-CAROTENE) TABS TAKE 1 TABLET BY MOUTH DAILY. (Patient not taking: Reported on 10/19/2020) 30 tablet 7     Social History     Tobacco Use    Smoking status: Never Smoker    Smokeless tobacco: Never Used   Substance Use Topics    Alcohol use: Not on file     Family History   Problem Relation Age of Onset    Diabetes Mother     Diabetes Father          ROS:    10 point ROS of systems including Constitutional, Eyes, Respiratory, Cardiovascular, Gastroenterology, Genitourinary, Integumentary, Muscularskeletal, Psychiatric ,neurological were all negative except for pertinent positives noted in my HPI       OBJECTIVE:  BP (!) 144/68   Pulse 58   Temp 98.6  F (37  C) (Tympanic)   Resp 16   Wt 88.5 kg (195 lb)   SpO2 97%   BMI 29.22 kg/m    Physical Exam:  GENERAL APPEARANCE: healthy, alert and no distress  Extremities: Left wrist warmth and erythema with subtle swelling at distal aspect of wrist.  Range of motion is full albeit with some discomfort.  NEURO: Normal strength and tone, sensory exam grossly normal,  normal speech and mentation  SKIN: no suspicious lesions or rashes    Uric acid level is pending at discharge.

## 2024-10-13 ENCOUNTER — HEALTH MAINTENANCE LETTER (OUTPATIENT)
Age: 79
End: 2024-10-13

## 2024-10-15 ENCOUNTER — OFFICE VISIT (OUTPATIENT)
Dept: URGENT CARE | Facility: URGENT CARE | Age: 79
End: 2024-10-15
Payer: COMMERCIAL

## 2024-10-15 VITALS
TEMPERATURE: 97.4 F | HEART RATE: 60 BPM | SYSTOLIC BLOOD PRESSURE: 160 MMHG | OXYGEN SATURATION: 99 % | DIASTOLIC BLOOD PRESSURE: 78 MMHG | RESPIRATION RATE: 16 BRPM

## 2024-10-15 DIAGNOSIS — M10.021 ACUTE IDIOPATHIC GOUT OF RIGHT ELBOW: Primary | ICD-10-CM

## 2024-10-15 PROCEDURE — 99214 OFFICE O/P EST MOD 30 MIN: CPT | Performed by: PHYSICIAN ASSISTANT

## 2024-10-15 RX ORDER — METHYLPREDNISOLONE 4 MG/1
TABLET ORAL
Qty: 21 TABLET | Refills: 0 | Status: SHIPPED | OUTPATIENT
Start: 2024-10-15 | End: 2024-10-31

## 2024-10-15 ASSESSMENT — PAIN SCALES - GENERAL: PAINLEVEL: EXTREME PAIN (8)

## 2024-10-15 NOTE — PATIENT INSTRUCTIONS
(M10.021) Acute idiopathic gout of right elbow  (primary encounter diagnosis)  Comment:   Plan: methylPREDNISolone (MEDROL DOSEPAK) 4 MG tablet        therapy pack          Continue or initiate chronic therapies for gout -- For patients who are taking urate-lowering therapy (eg, allopurinol), continue these medications without interruption during g?ut flares. There is no benefit to temporary discontinuation of urate-lowering therapy.     Please continue your allopurinol.    Follow up with your primary doctor should symptoms persist, recur or worsen.

## 2024-10-15 NOTE — PROGRESS NOTES
"Patient presents with:  Urgent Care: Yesterday woke up \"hardly able to move my arm\"  Sore and stiff even this morning. Walking and Tylenol didn't seem to help.       (M10.021) Acute idiopathic gout of right elbow  (primary encounter diagnosis)  Comment:   Plan: methylPREDNISolone (MEDROL DOSEPAK) 4 MG tablet        therapy pack          Continue or initiate chronic therapies for gout -- For patients who are taking urate-lowering therapy (eg, allopurinol), continue these medications without interruption during g?ut flares. There is no benefit to temporary discontinuation of urate-lowering therapy.     Please continue your allopurinol.    Follow up with your primary doctor should symptoms persist, recur or worsen.    At the end of the encounter, I discussed results, diagnosis, medications. Discussed red flags for immediate return to clinic/ER, as well as indications for follow up if no improvement. Patient understood and agreed to plan. Patient was stable for discharge     If not improving or if condition worsens, follow up with your Primary Care Provider          SUBJECTIVE:   Saul Hairston is a 79 year old male who presents today with right elbow pain, redness, swelling and stiffness onset yesterday.  Denies any trauma.  Denies any fevers.  Past medical history is significant for gout, he just recently had a flare and was treated with a Medrol Dosepak.  He stopped his allopurinol while on the steroid.    He is here today with his significant other who helps with the HPI.    Patient Active Problem List   Diagnosis    Idiopathic chronic gout 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    Rotator cuff tear    Kidney stone    Elevated PSA    Seasonal allergic rhinitis    Hyperlipidemia LDL goal <130    Umbilical hernia    Hypertension goal BP (blood pressure) < 150/90    Non morbid obesity, unspecified obesity type    MUNIRA (obstructive sleep apnea)    Prediabetes    Malignant tumor of prostate (H)    Increased frequency of urination    " Retention of urine    Pulmonary nodule    Atrial fibrillation with RVR (H)         Past Medical History:   Diagnosis Date    Arthritis     Gout    Atrial fibrillation with rapid ventricular response (H) 08/10/2023    Cancer (H) 2018    Prostrate    CARDIOVASCULAR SCREENING; LDL GOAL LESS THAN 160 08/14/2006    Elevated PSA     Gout, unspecified     Hypertension     Kidney stone 2009    Doing ok now    Obese     Pneumonia due to 2019 novel coronavirus 12/22/2020    Seasonal allergic rhinitis     Spring and Fall    Sleep apnea     DOES NOT USE CPAP    Umbilical hernia without mention of obstruction or gangrene 06/2013         Current Outpatient Medications   Medication Sig Dispense Refill    Multiple Vitamins-Iron (DAILY-SAM/IRON/BETA-CAROTENE) TABS TAKE 1 TABLET BY MOUTH DAILY. (Patient not taking: Reported on 10/19/2020) 30 tablet 7     Social History     Tobacco Use    Smoking status: Never Smoker    Smokeless tobacco: Never Used   Substance Use Topics    Alcohol use: Not on file     Family History   Problem Relation Age of Onset    Diabetes Mother     Diabetes Father          ROS:    10 point ROS of systems including Constitutional, Eyes, Respiratory, Cardiovascular, Gastroenterology, Genitourinary, Integumentary, Muscularskeletal, Psychiatric ,neurological were all negative except for pertinent positives noted in my HPI       OBJECTIVE:  BP (!) 160/78   Pulse 60   Temp 97.4  F (36.3  C) (Tympanic)   Resp 16   SpO2 99%   Physical Exam:  GENERAL APPEARANCE: healthy, alert and no distress  Extremities: Right elbow: Generalized tenderness to palpation, warmth, redness and subtle swelling.  The olecranon bursa is spared.  NEURO: Normal strength and tone, sensory exam grossly normal,  normal speech and mentation  SKIN: no suspicious lesions or rashes

## 2024-10-31 ENCOUNTER — NURSE TRIAGE (OUTPATIENT)
Dept: FAMILY MEDICINE | Facility: CLINIC | Age: 79
End: 2024-10-31
Payer: COMMERCIAL

## 2024-10-31 ENCOUNTER — MYC MEDICAL ADVICE (OUTPATIENT)
Dept: FAMILY MEDICINE | Facility: CLINIC | Age: 79
End: 2024-10-31
Payer: COMMERCIAL

## 2024-10-31 ENCOUNTER — APPOINTMENT (OUTPATIENT)
Dept: GENERAL RADIOLOGY | Facility: CLINIC | Age: 79
End: 2024-10-31
Attending: EMERGENCY MEDICINE
Payer: COMMERCIAL

## 2024-10-31 ENCOUNTER — HOSPITAL ENCOUNTER (EMERGENCY)
Facility: CLINIC | Age: 79
Discharge: OTHER INSTITUTION | End: 2024-10-31
Attending: EMERGENCY MEDICINE | Admitting: EMERGENCY MEDICINE
Payer: COMMERCIAL

## 2024-10-31 ENCOUNTER — APPOINTMENT (OUTPATIENT)
Dept: CT IMAGING | Facility: CLINIC | Age: 79
End: 2024-10-31
Attending: EMERGENCY MEDICINE
Payer: COMMERCIAL

## 2024-10-31 ENCOUNTER — HOSPITAL ENCOUNTER (INPATIENT)
Facility: CLINIC | Age: 79
LOS: 5 days | Discharge: HOME | DRG: 549 | End: 2024-11-05
Attending: PEDIATRICS | Admitting: FAMILY MEDICINE
Payer: COMMERCIAL

## 2024-10-31 VITALS
SYSTOLIC BLOOD PRESSURE: 139 MMHG | WEIGHT: 185 LBS | DIASTOLIC BLOOD PRESSURE: 82 MMHG | HEIGHT: 69 IN | HEART RATE: 81 BPM | BODY MASS INDEX: 27.4 KG/M2 | RESPIRATION RATE: 18 BRPM | TEMPERATURE: 98.7 F | OXYGEN SATURATION: 98 %

## 2024-10-31 DIAGNOSIS — M1A.0720 IDIOPATHIC CHRONIC GOUT OF LEFT FOOT WITHOUT TOPHUS: ICD-10-CM

## 2024-10-31 DIAGNOSIS — M19.90 INFLAMMATORY ARTHRITIS: ICD-10-CM

## 2024-10-31 DIAGNOSIS — N17.9 ACUTE KIDNEY INJURY (H): ICD-10-CM

## 2024-10-31 DIAGNOSIS — M62.81 GENERALIZED MUSCLE WEAKNESS: ICD-10-CM

## 2024-10-31 DIAGNOSIS — M10.9 GOUT OF MULTIPLE SITES, UNSPECIFIED CAUSE, UNSPECIFIED CHRONICITY: Primary | ICD-10-CM

## 2024-10-31 DIAGNOSIS — M13.0 POLYARTHRITIS: ICD-10-CM

## 2024-10-31 DIAGNOSIS — J30.89 SEASONAL ALLERGIC RHINITIS DUE TO OTHER ALLERGIC TRIGGER: ICD-10-CM

## 2024-10-31 DIAGNOSIS — M19.90 INFLAMMATORY ARTHROPATHY: ICD-10-CM

## 2024-10-31 DIAGNOSIS — J63.4: ICD-10-CM

## 2024-10-31 DIAGNOSIS — K86.2 PANCREATIC CYST: ICD-10-CM

## 2024-10-31 LAB
ALBUMIN SERPL BCG-MCNC: 3.6 G/DL (ref 3.5–5.2)
ALBUMIN UR-MCNC: 50 MG/DL
ALP SERPL-CCNC: 91 U/L (ref 40–150)
ALT SERPL W P-5'-P-CCNC: 82 U/L (ref 0–70)
ANION GAP SERPL CALCULATED.3IONS-SCNC: 12 MMOL/L (ref 7–15)
APPEARANCE UR: ABNORMAL
AST SERPL W P-5'-P-CCNC: 66 U/L (ref 0–45)
ATRIAL RATE - MUSE: 80 BPM
B BURGDOR IGG+IGM SER QL: 0.04
BASOPHILS # BLD AUTO: 0 10E3/UL (ref 0–0.2)
BASOPHILS NFR BLD AUTO: 0 %
BILIRUB SERPL-MCNC: 0.7 MG/DL
BILIRUB UR QL STRIP: NEGATIVE
BUN SERPL-MCNC: 31.7 MG/DL (ref 8–23)
CALCIUM SERPL-MCNC: 8.9 MG/DL (ref 8.8–10.4)
CHLORIDE SERPL-SCNC: 95 MMOL/L (ref 98–107)
COLOR UR AUTO: ABNORMAL
CREAT SERPL-MCNC: 1.76 MG/DL (ref 0.67–1.17)
CRP SERPL-MCNC: 254.89 MG/L
DIASTOLIC BLOOD PRESSURE - MUSE: NORMAL MMHG
EGFRCR SERPLBLD CKD-EPI 2021: 39 ML/MIN/1.73M2
EOSINOPHIL # BLD AUTO: 0 10E3/UL (ref 0–0.7)
EOSINOPHIL NFR BLD AUTO: 0 %
ERYTHROCYTE [DISTWIDTH] IN BLOOD BY AUTOMATED COUNT: 13.6 % (ref 10–15)
ERYTHROCYTE [SEDIMENTATION RATE] IN BLOOD BY WESTERGREN METHOD: 23 MM/HR (ref 0–20)
FLUAV RNA SPEC QL NAA+PROBE: NEGATIVE
FLUBV RNA RESP QL NAA+PROBE: NEGATIVE
GLUCOSE SERPL-MCNC: 118 MG/DL (ref 70–99)
GLUCOSE UR STRIP-MCNC: NEGATIVE MG/DL
HCO3 SERPL-SCNC: 24 MMOL/L (ref 22–29)
HCT VFR BLD AUTO: 42.9 % (ref 40–53)
HGB BLD-MCNC: 14.4 G/DL (ref 13.3–17.7)
HGB UR QL STRIP: ABNORMAL
IMM GRANULOCYTES # BLD: 0.1 10E3/UL
IMM GRANULOCYTES NFR BLD: 0 %
INTERPRETATION ECG - MUSE: NORMAL
KETONES UR STRIP-MCNC: 10 MG/DL
LACTATE SERPL-SCNC: 1.6 MMOL/L (ref 0.7–2)
LEUKOCYTE ESTERASE UR QL STRIP: NEGATIVE
LYMPHOCYTES # BLD AUTO: 0.9 10E3/UL (ref 0.8–5.3)
LYMPHOCYTES NFR BLD AUTO: 8 %
MCH RBC QN AUTO: 29 PG (ref 26.5–33)
MCHC RBC AUTO-ENTMCNC: 33.6 G/DL (ref 31.5–36.5)
MCV RBC AUTO: 86 FL (ref 78–100)
MONOCYTES # BLD AUTO: 0.8 10E3/UL (ref 0–1.3)
MONOCYTES NFR BLD AUTO: 7 %
MUCOUS THREADS #/AREA URNS LPF: PRESENT /LPF
NEUTROPHILS # BLD AUTO: 9.6 10E3/UL (ref 1.6–8.3)
NEUTROPHILS NFR BLD AUTO: 84 %
NITRATE UR QL: NEGATIVE
NRBC # BLD AUTO: 0 10E3/UL
NRBC BLD AUTO-RTO: 0 /100
P AXIS - MUSE: 70 DEGREES
PH UR STRIP: 5.5 [PH] (ref 5–7)
PLATELET # BLD AUTO: 141 10E3/UL (ref 150–450)
POTASSIUM SERPL-SCNC: 4.4 MMOL/L (ref 3.4–5.3)
PR INTERVAL - MUSE: 156 MS
PROCALCITONIN SERPL IA-MCNC: 1.17 NG/ML
PROT SERPL-MCNC: 6.5 G/DL (ref 6.4–8.3)
QRS DURATION - MUSE: 116 MS
QT - MUSE: 386 MS
QTC - MUSE: 445 MS
R AXIS - MUSE: 60 DEGREES
RBC # BLD AUTO: 4.97 10E6/UL (ref 4.4–5.9)
RBC URINE: 2 /HPF
RSV RNA SPEC NAA+PROBE: NEGATIVE
SARS-COV-2 RNA RESP QL NAA+PROBE: NEGATIVE
SODIUM SERPL-SCNC: 131 MMOL/L (ref 135–145)
SP GR UR STRIP: 1.01 (ref 1–1.03)
SYSTOLIC BLOOD PRESSURE - MUSE: NORMAL MMHG
T AXIS - MUSE: 40 DEGREES
UROBILINOGEN UR STRIP-MCNC: NORMAL MG/DL
VENTRICULAR RATE- MUSE: 80 BPM
WBC # BLD AUTO: 11.4 10E3/UL (ref 4–11)
WBC URINE: 4 /HPF

## 2024-10-31 PROCEDURE — 86705 HEP B CORE ANTIBODY IGM: CPT

## 2024-10-31 PROCEDURE — 85004 AUTOMATED DIFF WBC COUNT: CPT | Performed by: EMERGENCY MEDICINE

## 2024-10-31 PROCEDURE — 85004 AUTOMATED DIFF WBC COUNT: CPT

## 2024-10-31 PROCEDURE — 84550 ASSAY OF BLOOD/URIC ACID: CPT

## 2024-10-31 PROCEDURE — 87476 LYME DIS DNA AMP PROBE: CPT | Performed by: EMERGENCY MEDICINE

## 2024-10-31 PROCEDURE — 86140 C-REACTIVE PROTEIN: CPT | Performed by: EMERGENCY MEDICINE

## 2024-10-31 PROCEDURE — 96361 HYDRATE IV INFUSION ADD-ON: CPT

## 2024-10-31 PROCEDURE — 85060 BLOOD SMEAR INTERPRETATION: CPT | Performed by: STUDENT IN AN ORGANIZED HEALTH CARE EDUCATION/TRAINING PROGRAM

## 2024-10-31 PROCEDURE — 70450 CT HEAD/BRAIN W/O DYE: CPT

## 2024-10-31 PROCEDURE — 85652 RBC SED RATE AUTOMATED: CPT | Performed by: EMERGENCY MEDICINE

## 2024-10-31 PROCEDURE — 93005 ELECTROCARDIOGRAM TRACING: CPT

## 2024-10-31 PROCEDURE — 86618 LYME DISEASE ANTIBODY: CPT | Performed by: EMERGENCY MEDICINE

## 2024-10-31 PROCEDURE — 81003 URINALYSIS AUTO W/O SCOPE: CPT | Performed by: EMERGENCY MEDICINE

## 2024-10-31 PROCEDURE — 120N000002 HC R&B MED SURG/OB UMMC

## 2024-10-31 PROCEDURE — 87468 ANAPLSMA PHGCYTOPHLM AMP PRB: CPT

## 2024-10-31 PROCEDURE — 86431 RHEUMATOID FACTOR QUANT: CPT

## 2024-10-31 PROCEDURE — 86780 TREPONEMA PALLIDUM: CPT

## 2024-10-31 PROCEDURE — 87389 HIV-1 AG W/HIV-1&-2 AB AG IA: CPT

## 2024-10-31 PROCEDURE — 83605 ASSAY OF LACTIC ACID: CPT | Performed by: EMERGENCY MEDICINE

## 2024-10-31 PROCEDURE — 99285 EMERGENCY DEPT VISIT HI MDM: CPT | Mod: 25

## 2024-10-31 PROCEDURE — 87040 BLOOD CULTURE FOR BACTERIA: CPT | Performed by: EMERGENCY MEDICINE

## 2024-10-31 PROCEDURE — 87798 DETECT AGENT NOS DNA AMP: CPT

## 2024-10-31 PROCEDURE — 86200 CCP ANTIBODY: CPT | Performed by: EMERGENCY MEDICINE

## 2024-10-31 PROCEDURE — 84132 ASSAY OF SERUM POTASSIUM: CPT | Performed by: EMERGENCY MEDICINE

## 2024-10-31 PROCEDURE — 71046 X-RAY EXAM CHEST 2 VIEWS: CPT

## 2024-10-31 PROCEDURE — 36415 COLL VENOUS BLD VENIPUNCTURE: CPT

## 2024-10-31 PROCEDURE — 86036 ANCA SCREEN EACH ANTIBODY: CPT | Performed by: INTERNAL MEDICINE

## 2024-10-31 PROCEDURE — 85045 AUTOMATED RETICULOCYTE COUNT: CPT

## 2024-10-31 PROCEDURE — 87637 SARSCOV2&INF A&B&RSV AMP PRB: CPT | Performed by: EMERGENCY MEDICINE

## 2024-10-31 PROCEDURE — 36415 COLL VENOUS BLD VENIPUNCTURE: CPT | Performed by: EMERGENCY MEDICINE

## 2024-10-31 PROCEDURE — 99222 1ST HOSP IP/OBS MODERATE 55: CPT | Performed by: INTERNAL MEDICINE

## 2024-10-31 PROCEDURE — 83930 ASSAY OF BLOOD OSMOLALITY: CPT

## 2024-10-31 PROCEDURE — 258N000003 HC RX IP 258 OP 636: Performed by: EMERGENCY MEDICINE

## 2024-10-31 PROCEDURE — 96360 HYDRATION IV INFUSION INIT: CPT

## 2024-10-31 PROCEDURE — 84145 PROCALCITONIN (PCT): CPT | Performed by: EMERGENCY MEDICINE

## 2024-10-31 PROCEDURE — 86038 ANTINUCLEAR ANTIBODIES: CPT

## 2024-10-31 RX ORDER — SODIUM CHLORIDE 9 MG/ML
INJECTION, SOLUTION INTRAVENOUS CONTINUOUS
Status: DISCONTINUED | OUTPATIENT
Start: 2024-10-31 | End: 2024-10-31 | Stop reason: HOSPADM

## 2024-10-31 RX ORDER — LOSARTAN POTASSIUM 25 MG/1
25 TABLET ORAL 2 TIMES DAILY
Status: DISCONTINUED | OUTPATIENT
Start: 2024-10-31 | End: 2024-11-05 | Stop reason: HOSPADM

## 2024-10-31 RX ORDER — NALOXONE HYDROCHLORIDE 0.4 MG/ML
0.4 INJECTION, SOLUTION INTRAMUSCULAR; INTRAVENOUS; SUBCUTANEOUS
Status: DISCONTINUED | OUTPATIENT
Start: 2024-10-31 | End: 2024-11-05 | Stop reason: HOSPADM

## 2024-10-31 RX ORDER — TAMSULOSIN HYDROCHLORIDE 0.4 MG/1
0.4 CAPSULE ORAL EVERY EVENING
Status: DISCONTINUED | OUTPATIENT
Start: 2024-10-31 | End: 2024-11-05 | Stop reason: HOSPADM

## 2024-10-31 RX ORDER — POLYETHYLENE GLYCOL 3350 17 G/17G
17 POWDER, FOR SOLUTION ORAL 2 TIMES DAILY PRN
Status: DISCONTINUED | OUTPATIENT
Start: 2024-10-31 | End: 2024-11-05 | Stop reason: HOSPADM

## 2024-10-31 RX ORDER — CETIRIZINE HYDROCHLORIDE 10 MG/1
10 TABLET ORAL DAILY
Status: DISCONTINUED | OUTPATIENT
Start: 2024-11-01 | End: 2024-11-05 | Stop reason: HOSPADM

## 2024-10-31 RX ORDER — CALCIUM CARBONATE 500 MG/1
1000 TABLET, CHEWABLE ORAL 4 TIMES DAILY PRN
Status: DISCONTINUED | OUTPATIENT
Start: 2024-10-31 | End: 2024-11-05 | Stop reason: HOSPADM

## 2024-10-31 RX ORDER — AMOXICILLIN 250 MG
2 CAPSULE ORAL 2 TIMES DAILY PRN
Status: DISCONTINUED | OUTPATIENT
Start: 2024-10-31 | End: 2024-11-05 | Stop reason: HOSPADM

## 2024-10-31 RX ORDER — INDOMETHACIN 50 MG/1
50 CAPSULE ORAL 3 TIMES DAILY PRN
Status: DISCONTINUED | OUTPATIENT
Start: 2024-10-31 | End: 2024-11-03

## 2024-10-31 RX ORDER — ONDANSETRON 2 MG/ML
4 INJECTION INTRAMUSCULAR; INTRAVENOUS EVERY 6 HOURS PRN
Status: DISCONTINUED | OUTPATIENT
Start: 2024-10-31 | End: 2024-11-05 | Stop reason: HOSPADM

## 2024-10-31 RX ORDER — LIDOCAINE 40 MG/G
CREAM TOPICAL
Status: DISCONTINUED | OUTPATIENT
Start: 2024-10-31 | End: 2024-11-05 | Stop reason: HOSPADM

## 2024-10-31 RX ORDER — NALOXONE HYDROCHLORIDE 0.4 MG/ML
0.2 INJECTION, SOLUTION INTRAMUSCULAR; INTRAVENOUS; SUBCUTANEOUS
Status: DISCONTINUED | OUTPATIENT
Start: 2024-10-31 | End: 2024-11-05 | Stop reason: HOSPADM

## 2024-10-31 RX ORDER — AMOXICILLIN 250 MG
1 CAPSULE ORAL 2 TIMES DAILY PRN
Status: DISCONTINUED | OUTPATIENT
Start: 2024-10-31 | End: 2024-11-05 | Stop reason: HOSPADM

## 2024-10-31 RX ORDER — ALLOPURINOL 300 MG/1
300 TABLET ORAL DAILY
Status: DISCONTINUED | OUTPATIENT
Start: 2024-11-01 | End: 2024-11-05 | Stop reason: HOSPADM

## 2024-10-31 RX ORDER — OXYCODONE HYDROCHLORIDE 5 MG/1
5 TABLET ORAL EVERY 4 HOURS PRN
Status: DISCONTINUED | OUTPATIENT
Start: 2024-10-31 | End: 2024-11-01

## 2024-10-31 RX ORDER — ONDANSETRON 4 MG/1
4 TABLET, ORALLY DISINTEGRATING ORAL EVERY 6 HOURS PRN
Status: DISCONTINUED | OUTPATIENT
Start: 2024-10-31 | End: 2024-11-05 | Stop reason: HOSPADM

## 2024-10-31 RX ORDER — OXYMETAZOLINE HYDROCHLORIDE 0.05 G/100ML
2 SPRAY NASAL DAILY
Status: DISCONTINUED | OUTPATIENT
Start: 2024-11-01 | End: 2024-11-01

## 2024-10-31 RX ORDER — OXYMETAZOLINE HYDROCHLORIDE 0.05 G/100ML
2 SPRAY NASAL DAILY
Status: ON HOLD | COMMUNITY
End: 2024-11-05

## 2024-10-31 RX ORDER — FLECAINIDE ACETATE 50 MG/1
50 TABLET ORAL 2 TIMES DAILY
Status: DISCONTINUED | OUTPATIENT
Start: 2024-10-31 | End: 2024-11-05 | Stop reason: HOSPADM

## 2024-10-31 RX ORDER — ACETAMINOPHEN 325 MG/1
975 TABLET ORAL 3 TIMES DAILY
Status: DISCONTINUED | OUTPATIENT
Start: 2024-11-01 | End: 2024-11-02

## 2024-10-31 RX ADMIN — SODIUM CHLORIDE: 9 INJECTION, SOLUTION INTRAVENOUS at 18:54

## 2024-10-31 RX ADMIN — SODIUM CHLORIDE 1000 ML: 900 INJECTION, SOLUTION INTRAVENOUS at 13:17

## 2024-10-31 ASSESSMENT — ACTIVITIES OF DAILY LIVING (ADL)
ADLS_ACUITY_SCORE: 0

## 2024-10-31 ASSESSMENT — COLUMBIA-SUICIDE SEVERITY RATING SCALE - C-SSRS
6. HAVE YOU EVER DONE ANYTHING, STARTED TO DO ANYTHING, OR PREPARED TO DO ANYTHING TO END YOUR LIFE?: NO
1. IN THE PAST MONTH, HAVE YOU WISHED YOU WERE DEAD OR WISHED YOU COULD GO TO SLEEP AND NOT WAKE UP?: NO
2. HAVE YOU ACTUALLY HAD ANY THOUGHTS OF KILLING YOURSELF IN THE PAST MONTH?: NO

## 2024-10-31 NOTE — PHARMACY-ADMISSION MEDICATION HISTORY
Pharmacist Admission Medication History    Admission medication history is complete. The information provided in this note is only as accurate as the sources available at the time of the update.    Information Source(s): Patient and CareEverywhere/SureScripts via in-person    Pertinent Information: None    Changes made to PTA medication list:  Added: Afrin  Deleted: Flonase, Medrol Dosepak - completed course  Changed:   Fish oil daily to BID  Cetirizine daily to PRN    Allergies reviewed with patient and updates made in EHR: yes    Medication History Completed By: Marisela Deras RPH 10/31/2024 2:28 PM    PTA Med List   Medication Sig Last Dose/Taking    albuterol (PROAIR HFA/PROVENTIL HFA/VENTOLIN HFA) 108 (90 Base) MCG/ACT inhaler INHALE 1-2 PUFFS INTO THE LUNGS EVERY 6 HOURS AS NEEDED FOR SHORTNESS OF BREATH, WHEEZING OR COUGH Taking As Needed    allopurinol (ZYLOPRIM) 300 MG tablet Take 1 tablet (300 mg) by mouth daily. 10/30/2024 Morning    apixaban ANTICOAGULANT (ELIQUIS) 5 MG tablet Take 1 tablet (5 mg) by mouth 2 times daily 10/30/2024 Evening    cetirizine (ZYRTEC) 10 MG tablet TAKE 1 TABLET (10 MG) BY MOUTH DAILY. (Patient taking differently: Take 10 mg by mouth daily as needed for allergies.) Taking Differently    flecainide (TAMBOCOR) 50 MG tablet Take 1 tablet (50 mg) by mouth 2 times daily 10/30/2024 Evening    indomethacin (INDOCIN) 50 MG capsule Take 1 capsule (50 mg) by mouth 3 times daily as needed for moderate pain. Taking As Needed    losartan (COZAAR) 25 MG tablet Take 1 tablet (25 mg) by mouth 2 times daily 10/30/2024 Evening    metoprolol succinate ER (TOPROL XL) 25 MG 24 hr tablet Take 0.5 tablets (12.5 mg) by mouth daily 10/30/2024 Morning    Omega-3 Fatty Acids (OMEGA-3 FISH OIL PO) Take 2 g by mouth 2 times daily (with meals). 10/30/2024    oxymetazoline (AFRIN) 0.05 % nasal spray Spray 2 sprays into both nostrils daily. 10/30/2024    tamsulosin (FLOMAX) 0.4 MG capsule Take 0.4 mg by mouth  every evening 10/30/2024 Evening

## 2024-10-31 NOTE — TELEPHONE ENCOUNTER
See triage TE. Advised ED.  BRIE Tian, ANAIDN, RN (she/her)  [facility] RN

## 2024-10-31 NOTE — PROGRESS NOTES
"Transfer Type: [facility]  Transfer Triage Note    Date of call: 10/31/24  Time of call: 3:16 PM    Current Patient Location:  [facility]  Current Level of Care: Med Surg  Ref: Dr. Edgar    Vitals: Temp: 98.7  F (37.1  C) Temp src: Temporal BP: 114/63 Pulse: 72   Resp: 18 SpO2: 97 % Height: 175.3 cm (5' 9\") Weight: 83.9 kg (185 lb)  O2 Device: None (Room air) at    Diagnosis: Inflammatory Polyarthritis and ASTRID  Reason for requested transfer: Further diagnostic work up, management, and consultation for specialized care   Isolation Needs: None    Care everywhere has been updated and reviewed: Yes  Necessary images have been sent through PACS: Yes    If patient is transferring for specialty care or specific procedure, the specialist required has participated in the transfer call and agreed with need for transfer and anticipated timeline: Yes, Provider name: Trisha specialty with: Rheumatology    Transfer accepted: Yes  Stability of Patient: Patient is vitally stable, with no critical labs, and will likely remain stable throughout the transfer process  Is the patient appropriate for [facility]? Yes  Level of Care Needed: Med Surg  Telemetry Needed:  None  Expected Time of Arrival for Transfer: 0-8 hours  Arrival Location:  [facility]     Recommendations for Management and Stabilization: Given    Additional Comments:   Saul Hairston is a 80 yo M with a history of Afib, HTN, HLD, MUNIRA, prostate cancer in 2018, and gout who presented to [facility] for polyarthralgia and generalized weakness.  He notes this started with L elbow pain which was evaluated and treated with a medrol dose pack 10/15.  However since that time, arthralgias have progressed to multiple other joints and he has developed associated generalized weakness.  Hands, wrists, fingers, and knees bilaterally are the most prominent in discomfort, previously had shoulder pain but this has " improved.      Evaluated at [facility] and found to have dramatic elevations of CRP and elevated procal with mild leukocytosis, thrombocytopenia, and no anemia.  Additionally noted to have mild transaminitis and normal lactic acid but a mild ASTRID.  He was recommended for admission to [facility] but declined given concern for need of rheumatologic evaluation.      Dr. Rico was on the call and agreed with transfer to [facility] with rheumatology consult and the following labs: ANCA, RF, Anti-CCP, sed rate but also recommended infectious workup concurrently.  Given clinical presentation and hemodynamic stability, hold off on steroids and antibiotics at present, repeat blood culture requested, lyme pending.    Accepted to [facility].    Jeannie Villalba MD

## 2024-10-31 NOTE — ED PROVIDER NOTES
Emergency Department Note      History of Present Illness     Chief Complaint   Generalized Weakness and Generalized Body Aches      HPI   Saul Hairston is a 79 year old male with history of atrial fibrillation with RVR on Eliquis, gout, arthritis, and prostate cancer s/p cryoablation who presents to the ED with his wife for evaluation of generalized weakness and generalized joint aches. Patient reports pain and stiffness to his joints, especially the bilateral knees, ankles, elbows, and wrists, for the last 7-8 days. He specifically mentions numbness and swelling in his left hand and fingers. The joint pain and stiffness has been debilitating, causing him unable to perform ADLs without assistance from his wife. He endorses lightheadedness, chills, night sweats, and headaches. Productive cough started this morning. Wife states patient has been more confused compared to baseline. He had a gout flare a few weeks ago to his right elbow. He presented to Urgent Care 10/15/24 and was prescribed methylprednisolone which he finished about 1 week ago. Patient states his current symptoms are different from his history with gout. Patient denies chest pain, shortness of breath, abdominal pain, rashes, congestion, or pharyngitis. No recent hiking or insect bites. He is allergic to ciprofloxacin and Norco.     Independent Historian   Wife as detailed above.    Review of External Notes   Urgent Care 10/15 right elbow pain diagnosed with gout and prescribed medrol dosepak    Past Medical History     Medical History and Problem List   Arthritis  Atrial fibrillation with RVR  Malignant tumor of prostate  Gout  Hypertension  Kidney stone  Pneumonia  MUNIRA  Hyperlipidemia  Prediabetes  Pulmonary nodule    Medications   Allopurinol  Apixaban  Flecainide  Losartan  Metoprolol succinate ER  Tamsulosin    Surgical History   Tonsillectomy  Removal of pseudogout deposits, right knee  ENT surgery  Left rotator cuff repair  Umbilical  "herniorrhaphy   surgery  Cryoablation of prostate    Physical Exam     Patient Vitals for the past 24 hrs:   BP Temp Temp src Pulse Resp SpO2 Height Weight   10/31/24 1300 114/63 -- -- 72 -- 97 % -- --   10/31/24 1132 125/67 98.7  F (37.1  C) Temporal 80 18 98 % 1.753 m (5' 9\") 83.9 kg (185 lb)     Physical Exam  Nursing note and vitals reviewed.  Constitutional:  Appears well-developed and well-nourished.   HENT:   Head:    Atraumatic.   Mouth/Throat:   Oropharynx is clear and moist. No oropharyngeal exudate.   Eyes:    Pupils are equal, round, and reactive to light.   Neck:    Normal range of motion. Neck supple.      No tracheal deviation present. No thyromegaly present.   Cardiovascular:  Normal rate, regular rhythm, no murmur   Pulmonary/Chest: Breath sounds are clear and equal without wheezes or crackles.  Abdominal:   Soft. Bowel sounds are normal. Exhibits no distension and      no mass. There is no tenderness.      There is no rebound and no guarding.   Musculoskeletal:  Bilateral knee swelling and warmth without redness or significant tenderness.  Swelling left 2nd and 3rd PIP joints.  Minimal swelling left olecranon without tenderness, warmth or redness.  Lymphadenopathy:  No cervical adenopathy.   Neurological:   Alert and oriented to person, place, and time. Unable to  due to pain in bilateral hands.  Bilateral lower extremity strength intact, but generally weak.  Skin:    Skin is warm and dry. No rash noted. No pallor.      Diagnostics     Lab Results   Labs Ordered and Resulted from Time of ED Arrival to Time of ED Departure   PROCALCITONIN - Abnormal       Result Value    Procalcitonin 1.17 (*)    COMPREHENSIVE METABOLIC PANEL - Abnormal    Sodium 131 (*)     Potassium 4.4      Carbon Dioxide (CO2) 24      Anion Gap 12      Urea Nitrogen 31.7 (*)     Creatinine 1.76 (*)     GFR Estimate 39 (*)     Calcium 8.9      Chloride 95 (*)     Glucose 118 (*)     Alkaline Phosphatase 91      AST 66 (*)  "    ALT 82 (*)     Protein Total 6.5      Albumin 3.6      Bilirubin Total 0.7     CRP INFLAMMATION - Abnormal    CRP Inflammation 254.89 (*)    ROUTINE UA WITH MICROSCOPIC REFLEX TO CULTURE - Abnormal    Color Urine Light Yellow      Appearance Urine Slightly Cloudy (*)     Glucose Urine Negative      Bilirubin Urine Negative      Ketones Urine 10 (*)     Specific Gravity Urine 1.013      Blood Urine Small (*)     pH Urine 5.5      Protein Albumin Urine 50 (*)     Urobilinogen Urine Normal      Nitrite Urine Negative      Leukocyte Esterase Urine Negative      Mucus Urine Present (*)     RBC Urine 2      WBC Urine 4     CBC WITH PLATELETS AND DIFFERENTIAL - Abnormal    WBC Count 11.4 (*)     RBC Count 4.97      Hemoglobin 14.4      Hematocrit 42.9      MCV 86      MCH 29.0      MCHC 33.6      RDW 13.6      Platelet Count 141 (*)     % Neutrophils 84      % Lymphocytes 8      % Monocytes 7      % Eosinophils 0      % Basophils 0      % Immature Granulocytes 0      NRBCs per 100 WBC 0      Absolute Neutrophils 9.6 (*)     Absolute Lymphocytes 0.9      Absolute Monocytes 0.8      Absolute Eosinophils 0.0      Absolute Basophils 0.0      Absolute Immature Granulocytes 0.1      Absolute NRBCs 0.0     INFLUENZA A/B, RSV, & SARS-COV2 PCR - Normal    Influenza A PCR Negative      Influenza B PCR Negative      RSV PCR Negative      SARS CoV2 PCR Negative     LACTIC ACID WHOLE BLOOD WITH 1X REPEAT IN 2 HR WHEN >2 - Normal    Lactic Acid, Initial 1.6     LYME DISEASE DNA DETECTION BY PCR   LYME DISEASE TOTAL ANTIBODIES WITH REFLEX TO CONFIRMATION   ERYTHROCYTE SEDIMENTATION RATE AUTO   RHEUMATOID FACTOR   CYCLIC CITRULLINATED PEPTIDE ANTIBODY IGG   ANCA IGG BY IFA WITH REFLEX TO TITER   BLOOD CULTURE   BLOOD CULTURE       Imaging   XR Chest 2 Views   Final Result   IMPRESSION: No focal consolidation, pleural effusion or pneumothorax.   Cardiomediastinal silhouette is unremarkable. Mild degenerative   changes of the spine.       HAWA WARREN MD            SYSTEM ID:  [id number]      CT Head w/o Contrast   Final Result   IMPRESSION: Negative for acute intracranial hemorrhage, hydrocephalus   or transcortical infarct. No skull fracture.      TRUPTI ROACH DO            SYSTEM ID:  [id number]          EKG   ECG results from 10/31/24   EKG 12 lead     Value    Systolic Blood Pressure     Diastolic Blood Pressure     Ventricular Rate 80    Atrial Rate 80    WA Interval 156    QRS Duration 116        QTc 445    P Axis 70    R AXIS 60    T Axis 40    Interpretation ECG      Sinus rhythm  Incomplete right bundle branch block  Borderline ECG  When compared with ECG of 24-Sep-2023 13:03,  Incomplete right bundle branch block has replaced Right bundle branch block  Read by Dr. Richey 1202          Independent Interpretation   CXR: No infiltrate, pleural effusion, pulmonary edema, or cardiomegaly.  CT Head: No intracranial hemorrhage or midline shift.    ED Course      Medications Administered   Medications   sodium chloride 0.9% BOLUS 1,000 mL (0 mLs Intravenous Stopped 10/31/24 1710)       Procedures   Procedures     Discussion of Management   See below    ED Course   ED Course as of 10/31/24 1629   Thu Oct 31, 2024   1217 I obtained history and examined the patient as noted above.    1436 I spoke with Dr. Roa of the hospitalist team regarding the patient. She declines admission as this is a rheumatological issue and would like patient to be transferred to [facility].   1500 I rechecked the patient and explained findings.   1521 I spoke with Dr. Villalba of the hospitalist team at [facility] regarding the patient, who accepted the patient for transfer.        Additional Documentation  None    Medical Decision Making / Diagnosis     CMS Diagnoses: None    MIPS       None    MDM   Saul Hairston is a 79 year old male who arrives to the emergency department due to worsening polyarthritis.  I found the patient to have signs of severe inflammatory  arthritis with a markedly elevated CRP.  And mildly elevated sed rate.  I also considered possibility of infectious arthritis since he has a mildly elevated procalcitonin, however he does not have any focal joint pain or swelling but rather generalized joint pain and swelling and his lactic acid is normal without any significant white blood cell count elevation and without any fevers, so I feel that infectious arthritis is unlikely.  Especially considering this has been a gradually progressive process which began almost 2 weeks ago.  I also found him to have acute renal injury so he was hydrated with normal saline IV fluid.  He does not have any heart murmur or risk factors for endocarditis, which I feel is unlikely.  Chest x-ray does not show any pneumonia.  He is not having any symptoms concerning for meningitis or encephalitis.  Head CT was negative also.  I considered possibility of Lyme's disease although he does not have any recent tick bites, however he does have a dog and he and his wife go for walks, so Lyme testing was sent.  I considered possibly rheumatoid arthritis or lupus also.  At the request of the hospitalist the patient was transferred to [facility] so he could have rheumatology consultation since we do not have access to rheumatology at this hospital.    Disposition   The patient was transferred to [facility]. Dr. Villalba accepted the patient for transfer.     Diagnosis     ICD-10-CM    1. Inflammatory arthritis  M19.90       2. Polyarthritis  M13.0       3. Generalized muscle weakness  M62.81       4. Acute kidney injury (H)  N17.9              Scribe Disclosure:  I, Tiffany Yadira, am serving as a scribe at 1:16 PM on 10/31/2024 to document services personally performed by Lauren Richey MD based on my observations and the provider's statements to me.        Lauren Richey MD  10/31/24 [id number]

## 2024-10-31 NOTE — ED TRIAGE NOTES
Pt reports 1 week of generalized weakness and all over joint pain. Denies fevers, cough or nasal congestion.      Triage Assessment (Adult)       Row Name 10/31/24 1134          Triage Assessment    Airway WDL WDL        Respiratory WDL    Respiratory WDL WDL        Skin Circulation/Temperature WDL    Skin Circulation/Temperature WDL WDL        Cardiac WDL    Cardiac WDL WDL        Peripheral/Neurovascular WDL    Peripheral Neurovascular WDL WDL        Cognitive/Neuro/Behavioral WDL    Cognitive/Neuro/Behavioral WDL WDL

## 2024-10-31 NOTE — ED NOTES
[facility]  ED Nurse Handoff Report    ED Chief complaint: Generalized Weakness and Generalized Body Aches      ED Diagnosis:   Final diagnoses:   Inflammatory arthritis   Polyarthritis   Generalized muscle weakness   Acute kidney injury (H)       Code Status: to be discussed    Allergies:   Allergies   Allergen Reactions    Ciprofloxacin     Norco [Hydrocodone-Acetaminophen] Nausea and Vomiting       Patient Story: Saul Hairston is a 79 year old male with history of  who presents to the ED with his wife for evaluation of generalized weakness and generalized joint aches. Patient reports pain and stiffness to his joints, especially the bilateral knees, ankles, elbows, and wrists, for the last 7-8 days. He specifically mentions numbness and swelling in his left hand and fingers. The joint pain and stiffness has been debilitating, causing him unable to perform ADLs without assistance from his wife. He endorses lightheadedness, chills, night sweats, and headaches. Productive cough started this morning. Wife states patient has been more confused compared to baseline. He had a gout flare a few weeks ago to his right elbow. He presented to Urgent Care and was prescribed methylprednisolone which he finished about 1 week ago. Patient states his current symptoms are different from his history with gout. Patient denies chest pain, shortness of breath, abdominal pain, rashes, congestion, or pharyngitis. No recent hiking or insect bites. He is allergic to ciprofloxacin and Norco.   Focused Assessment:  weakness    Treatments and/or interventions provided:   Medications   sodium chloride 0.9% BOLUS 1,000 mL (1,000 mLs Intravenous $New Bag 10/31/24 1317)      Abnormal Labs Resulted from Time of ED Arrival to Time of ED Departure   PROCALCITONIN - Abnormal       Result Value    Procalcitonin 1.17 (*)    COMPREHENSIVE METABOLIC PANEL - Abnormal    Sodium 131 (*)     Potassium 4.4      Carbon Dioxide (CO2) 24      Anion  Gap 12      Urea Nitrogen 31.7 (*)     Creatinine 1.76 (*)     GFR Estimate 39 (*)     Calcium 8.9      Chloride 95 (*)     Glucose 118 (*)     Alkaline Phosphatase 91      AST 66 (*)     ALT 82 (*)     Protein Total 6.5      Albumin 3.6      Bilirubin Total 0.7     CRP INFLAMMATION - Abnormal    CRP Inflammation 254.89 (*)    ROUTINE UA WITH MICROSCOPIC REFLEX TO CULTURE - Abnormal    Color Urine Light Yellow      Appearance Urine Slightly Cloudy (*)     Glucose Urine Negative      Bilirubin Urine Negative      Ketones Urine 10 (*)     Specific Gravity Urine 1.013      Blood Urine Small (*)     pH Urine 5.5      Protein Albumin Urine 50 (*)     Urobilinogen Urine Normal      Nitrite Urine Negative      Leukocyte Esterase Urine Negative      Mucus Urine Present (*)     RBC Urine 2      WBC Urine 4     CBC WITH PLATELETS AND DIFFERENTIAL - Abnormal    WBC Count 11.4 (*)     RBC Count 4.97      Hemoglobin 14.4      Hematocrit 42.9      MCV 86      MCH 29.0      MCHC 33.6      RDW 13.6      Platelet Count 141 (*)     % Neutrophils 84      % Lymphocytes 8      % Monocytes 7      % Eosinophils 0      % Basophils 0      % Immature Granulocytes 0      NRBCs per 100 WBC 0      Absolute Neutrophils 9.6 (*)     Absolute Lymphocytes 0.9      Absolute Monocytes 0.8      Absolute Eosinophils 0.0      Absolute Basophils 0.0      Absolute Immature Granulocytes 0.1      Absolute NRBCs 0.0        XR Chest 2 Views   Final Result   IMPRESSION: No focal consolidation, pleural effusion or pneumothorax.   Cardiomediastinal silhouette is unremarkable. Mild degenerative   changes of the spine.      HAWA WARREN MD            SYSTEM ID:  [id number]      CT Head w/o Contrast   Final Result   IMPRESSION: Negative for acute intracranial hemorrhage, hydrocephalus   or transcortical infarct. No skull fracture.      TRUPTI ROACH DO            SYSTEM ID:  [id number]         Patient's response to treatments and/or interventions: good    To  "be done/followed up on inpatient unit:  na    Does this patient have any cognitive concerns?:  na    Activity level - Baseline/Home:  Independent  Activity Level - Current:   Stand with Assist and Walker    Patient's Preferred language: English   Needed?: No    Isolation: None  Infection: Not Applicable  Patient tested for COVID 19 prior to admission: NO  Bariatric?: No    Vital Signs:   Vitals:    10/31/24 1132 10/31/24 1300   BP: 125/67 114/63   Pulse: 80 72   Resp: 18    Temp: 98.7  F (37.1  C)    TempSrc: Temporal    SpO2: 98% 97%   Weight: 83.9 kg (185 lb)    Height: 1.753 m (5' 9\")        Cardiac Rhythm:     Was the PSS-3 completed:   Yes  What interventions are required if any?               Family Comments: na  OBS brochure/video discussed/provided to patient/family: Yes              Name of person given brochure if not patient: na              Relationship to patient: na    For the majority of the shift this patient's behavior was Green.   Behavioral interventions performed were na.    ED NURSE PHONE NUMBER:         "

## 2024-11-01 LAB
A PHAGOCYTOPH DNA BLD QL NAA+PROBE: NOT DETECTED
ALBUMIN SERPL BCG-MCNC: 3.3 G/DL (ref 3.5–5.2)
ALP SERPL-CCNC: 93 U/L (ref 40–150)
ALT SERPL W P-5'-P-CCNC: 133 U/L (ref 0–70)
ANION GAP SERPL CALCULATED.3IONS-SCNC: 16 MMOL/L (ref 7–15)
AST SERPL W P-5'-P-CCNC: 120 U/L (ref 0–45)
BABESIA DNA BLD QL NAA+PROBE: NOT DETECTED
BASOPHILS # BLD AUTO: 0 10E3/UL (ref 0–0.2)
BASOPHILS # BLD AUTO: 0 10E3/UL (ref 0–0.2)
BASOPHILS NFR BLD AUTO: 0 %
BASOPHILS NFR BLD AUTO: 0 %
BILIRUB SERPL-MCNC: 0.7 MG/DL
BUN SERPL-MCNC: 33.3 MG/DL (ref 8–23)
C TRACH DNA SPEC QL PROBE+SIG AMP: NEGATIVE
CALCIUM SERPL-MCNC: 8.7 MG/DL (ref 8.8–10.4)
CCP AB SER IA-ACNC: 1.3 U/ML
CHLORIDE SERPL-SCNC: 100 MMOL/L (ref 98–107)
CK SERPL-CCNC: 78 U/L (ref 39–308)
CREAT SERPL-MCNC: 1.54 MG/DL (ref 0.67–1.17)
CREAT UR-MCNC: 64.6 MG/DL
EGFRCR SERPLBLD CKD-EPI 2021: 46 ML/MIN/1.73M2
EHRLICHIA DNA SPEC QL NAA+PROBE: NOT DETECTED
EOSINOPHIL # BLD AUTO: 0.2 10E3/UL (ref 0–0.7)
EOSINOPHIL # BLD AUTO: 0.2 10E3/UL (ref 0–0.7)
EOSINOPHIL NFR BLD AUTO: 2 %
EOSINOPHIL NFR BLD AUTO: 2 %
ERYTHROCYTE [DISTWIDTH] IN BLOOD BY AUTOMATED COUNT: 13.5 % (ref 10–15)
ERYTHROCYTE [DISTWIDTH] IN BLOOD BY AUTOMATED COUNT: 13.6 % (ref 10–15)
FRACT EXCRET NA UR+SERPL-RTO: 0.7 %
GLUCOSE SERPL-MCNC: 103 MG/DL (ref 70–99)
HAV IGM SERPL QL IA: NONREACTIVE
HBV CORE IGM SERPL QL IA: NONREACTIVE
HBV SURFACE AG SERPL QL IA: NONREACTIVE
HCO3 SERPL-SCNC: 18 MMOL/L (ref 22–29)
HCT VFR BLD AUTO: 41.5 % (ref 40–53)
HCT VFR BLD AUTO: 41.8 % (ref 40–53)
HCV AB SERPL QL IA: NONREACTIVE
HGB BLD-MCNC: 14 G/DL (ref 13.3–17.7)
HGB BLD-MCNC: 14.2 G/DL (ref 13.3–17.7)
HIV 1+2 AB+HIV1 P24 AG SERPL QL IA: NONREACTIVE
IMM GRANULOCYTES # BLD: 0 10E3/UL
IMM GRANULOCYTES # BLD: 0 10E3/UL
IMM GRANULOCYTES NFR BLD: 0 %
IMM GRANULOCYTES NFR BLD: 0 %
LYMPHOCYTES # BLD AUTO: 0.9 10E3/UL (ref 0.8–5.3)
LYMPHOCYTES # BLD AUTO: 0.9 10E3/UL (ref 0.8–5.3)
LYMPHOCYTES NFR BLD AUTO: 10 %
LYMPHOCYTES NFR BLD AUTO: 9 %
MCH RBC QN AUTO: 28.9 PG (ref 26.5–33)
MCH RBC QN AUTO: 30 PG (ref 26.5–33)
MCHC RBC AUTO-ENTMCNC: 33.5 G/DL (ref 31.5–36.5)
MCHC RBC AUTO-ENTMCNC: 34.2 G/DL (ref 31.5–36.5)
MCV RBC AUTO: 86 FL (ref 78–100)
MCV RBC AUTO: 88 FL (ref 78–100)
MONOCYTES # BLD AUTO: 0.6 10E3/UL (ref 0–1.3)
MONOCYTES # BLD AUTO: 0.7 10E3/UL (ref 0–1.3)
MONOCYTES NFR BLD AUTO: 7 %
MONOCYTES NFR BLD AUTO: 7 %
N GONORRHOEA DNA SPEC QL NAA+PROBE: NEGATIVE
NEUTROPHILS # BLD AUTO: 7.6 10E3/UL (ref 1.6–8.3)
NEUTROPHILS # BLD AUTO: 7.9 10E3/UL (ref 1.6–8.3)
NEUTROPHILS NFR BLD AUTO: 81 %
NEUTROPHILS NFR BLD AUTO: 82 %
NRBC # BLD AUTO: 0 10E3/UL
NRBC # BLD AUTO: 0 10E3/UL
NRBC BLD AUTO-RTO: 0 /100
NRBC BLD AUTO-RTO: 0 /100
OSMOLALITY SERPL: 291 MMOL/KG (ref 280–301)
OSMOLALITY UR: 315 MMOL/KG (ref 100–1200)
PATH REPORT.COMMENTS IMP SPEC: NORMAL
PATH REPORT.COMMENTS IMP SPEC: NORMAL
PATH REPORT.FINAL DX SPEC: NORMAL
PATH REPORT.MICROSCOPIC SPEC OTHER STN: NORMAL
PATH REPORT.MICROSCOPIC SPEC OTHER STN: NORMAL
PATH REPORT.RELEVANT HX SPEC: NORMAL
PLATELET # BLD AUTO: 149 10E3/UL (ref 150–450)
PLATELET # BLD AUTO: 167 10E3/UL (ref 150–450)
POTASSIUM SERPL-SCNC: 4.4 MMOL/L (ref 3.4–5.3)
PROT SERPL-MCNC: 6.1 G/DL (ref 6.4–8.3)
RBC # BLD AUTO: 4.73 10E6/UL (ref 4.4–5.9)
RBC # BLD AUTO: 4.84 10E6/UL (ref 4.4–5.9)
RETICS # AUTO: 0.03 10E6/UL (ref 0.03–0.1)
RETICS/RBC NFR AUTO: 0.7 % (ref 0.5–2)
RHEUMATOID FACT SERPL-ACNC: <10 IU/ML
SODIUM SERPL-SCNC: 134 MMOL/L (ref 135–145)
SODIUM UR-SCNC: 30 MMOL/L
SODIUM UR-SCNC: 39 MMOL/L
T PALLIDUM AB SER QL: NONREACTIVE
URATE SERPL-MCNC: 7.2 MG/DL (ref 3.4–7)
WBC # BLD AUTO: 9.3 10E3/UL (ref 4–11)
WBC # BLD AUTO: 9.7 10E3/UL (ref 4–11)

## 2024-11-01 PROCEDURE — 120N000002 HC R&B MED SURG/OB UMMC

## 2024-11-01 PROCEDURE — 84300 ASSAY OF URINE SODIUM: CPT

## 2024-11-01 PROCEDURE — 99222 1ST HOSP IP/OBS MODERATE 55: CPT | Mod: AI

## 2024-11-01 PROCEDURE — 99222 1ST HOSP IP/OBS MODERATE 55: CPT | Performed by: PHYSICIAN ASSISTANT

## 2024-11-01 PROCEDURE — 87491 CHLMYD TRACH DNA AMP PROBE: CPT

## 2024-11-01 PROCEDURE — 250N000009 HC RX 250: Performed by: STUDENT IN AN ORGANIZED HEALTH CARE EDUCATION/TRAINING PROGRAM

## 2024-11-01 PROCEDURE — 87798 DETECT AGENT NOS DNA AMP: CPT | Performed by: PHYSICIAN ASSISTANT

## 2024-11-01 PROCEDURE — 250N000009 HC RX 250

## 2024-11-01 PROCEDURE — 87449 NOS EACH ORGANISM AG IA: CPT | Performed by: PHYSICIAN ASSISTANT

## 2024-11-01 PROCEDURE — 258N000003 HC RX IP 258 OP 636

## 2024-11-01 PROCEDURE — 82550 ASSAY OF CK (CPK): CPT | Performed by: INTERNAL MEDICINE

## 2024-11-01 PROCEDURE — 83935 ASSAY OF URINE OSMOLALITY: CPT

## 2024-11-01 PROCEDURE — 87385 HISTOPLASMA CAPSUL AG IA: CPT | Performed by: PHYSICIAN ASSISTANT

## 2024-11-01 PROCEDURE — 80053 COMPREHEN METABOLIC PANEL: CPT

## 2024-11-01 PROCEDURE — 82570 ASSAY OF URINE CREATININE: CPT

## 2024-11-01 PROCEDURE — 85004 AUTOMATED DIFF WBC COUNT: CPT

## 2024-11-01 PROCEDURE — 250N000013 HC RX MED GY IP 250 OP 250 PS 637

## 2024-11-01 PROCEDURE — 36415 COLL VENOUS BLD VENIPUNCTURE: CPT

## 2024-11-01 RX ORDER — OXYMETAZOLINE HYDROCHLORIDE 0.05 G/100ML
2 SPRAY NASAL EVERY OTHER DAY
Status: DISCONTINUED | OUTPATIENT
Start: 2024-11-02 | End: 2024-11-04

## 2024-11-01 RX ADMIN — FLECAINIDE ACETATE 50 MG: 50 TABLET ORAL at 20:16

## 2024-11-01 RX ADMIN — TAMSULOSIN HYDROCHLORIDE 0.4 MG: 0.4 CAPSULE ORAL at 00:27

## 2024-11-01 RX ADMIN — FLECAINIDE ACETATE 50 MG: 50 TABLET ORAL at 08:28

## 2024-11-01 RX ADMIN — FLECAINIDE ACETATE 50 MG: 50 TABLET ORAL at 00:27

## 2024-11-01 RX ADMIN — APIXABAN 5 MG: 5 TABLET, FILM COATED ORAL at 08:29

## 2024-11-01 RX ADMIN — TAMSULOSIN HYDROCHLORIDE 0.4 MG: 0.4 CAPSULE ORAL at 20:16

## 2024-11-01 RX ADMIN — APIXABAN 5 MG: 5 TABLET, FILM COATED ORAL at 00:27

## 2024-11-01 RX ADMIN — ALLOPURINOL 300 MG: 300 TABLET ORAL at 08:28

## 2024-11-01 RX ADMIN — ANAKINRA 100 MG: 100 INJECTION, SOLUTION SUBCUTANEOUS at 18:38

## 2024-11-01 RX ADMIN — APIXABAN 5 MG: 5 TABLET, FILM COATED ORAL at 20:16

## 2024-11-01 RX ADMIN — ACETAMINOPHEN 975 MG: 325 TABLET, FILM COATED ORAL at 08:29

## 2024-11-01 RX ADMIN — ACETAMINOPHEN 975 MG: 325 TABLET, FILM COATED ORAL at 13:20

## 2024-11-01 RX ADMIN — SODIUM CHLORIDE 1000 ML: 9 INJECTION, SOLUTION INTRAVENOUS at 13:20

## 2024-11-01 RX ADMIN — Medication 12.5 MG: at 08:29

## 2024-11-01 RX ADMIN — ACETAMINOPHEN 975 MG: 325 TABLET, FILM COATED ORAL at 20:16

## 2024-11-01 RX ADMIN — OXYMETAZOLINE HYDROCHLORIDE 2 SPRAY: 0.05 SPRAY NASAL at 08:29

## 2024-11-01 NOTE — PHARMACY-ADMISSION MEDICATION HISTORY
Admission medication history completed at [facility]. Please see Pharmacist Admission Medication History note by Jewell Deras, PharmD, from 10/31/24.

## 2024-11-01 NOTE — H&P
[facility]    History and Physical - [facility] Service       Date of Admission:  10/31/2024    Assessment & Plan     Saul Hairston is a 79 year old male with a PMH of Afib, HTN, HLD, MUNIRA, prostate cancer diagnosed in 2018 now in remission, and gout admitted on 10/31/2024 for ASTRID & progressive, debilitating polyarthralgia of unclear etiology.    # Polyarthralgia concerning for inflammatory vs infectious arthritis  # Transaminitis  Initially presented to [facility] ED for ~1wk of progressive and disabling pain/stiffness in multiple joints and generalized weakness. Endorses lightheadedness, chills, night sweats, headaches, confusion but denies chest pain, shortness of breath, abdominal pain, mucosal lesions, diarrhea, rashes, congestion, pharyngitis, recent hiking or insect bites. On ED work-up noted to have markedly elevated CRP (254), mildly elevated ESR (23), procal elevation at 1.17, mild transaminitis, mild leukocytosis, & UA w/positive protein, small blood, small ketones; lyme panel negative, CXR & CT head reassuring, EKG w/o significant changes. Differential broad but most likely either rheumatic (RA, Lupus, Sjogrens, PMR, etc) vs infectious (lyme, borrelia, ehrlichiosis, anaplasma, gonorrhea, syphilis, etc.); though severe, polyarticular gout also considered.   - Rheumatology consulted, recs appreciated  - ID consulted, recs appreciated  - Plan on bedside U/S during rounds tomorrow, consider if a joint may be appropriate for tapping  - Pending Workup: ANCA, RF, Anti-CCP, Tick-borne disease panel, acute hepatitis panel, peripheral smear, gonorrhea & chlamydia, HIV, syphilis, uric acid, blood cultures  - Negative Workup: Lyme  - Antibiotics: None at present, pending ID recs  - Pain: Tylenol scheduled; Oxy 2.5-5 q4h PRN   - Nausea: Zofran PRN  - Constipation: Miralax Senna PRN  - Labs: CMP, CBC daily, CRP q48h  - Nursing: Vitals q8h    # ASTRID  In ED, Cr  "elevated to 1.76 (baseline 0.9-1.0), BUN up to 31.7; is s/p 1L NS in ED. Given fluid shortage, will encourage PO hydration and recheck with CMP in the AM.  - HOLD PTA Losartan, Indomethacin    # Hyponatremia  Na down to 131 on admission, eval as below.  - Urine Na, Serum Osm, Urine Osm, & FENa    Chronic/Stable:  # AFib: PTA Eliquis 5mg daily, Flecanide 50mg BID, Metoprolol 12.5mg daily  # HTN: PTA HOLD Losartan 25mg daily  # Gout: PTA allopurinol 300mg daily, HOLD indomethacin 50mg TID PRN  # BPH: PTA Tamsulosin 0.4mg daily  # Seasonal allergies: PTA Loratadine 10mg daily, Afrin PRN       Diet: Combination Diet Regular Diet Adult  DVT Prophylaxis: DOAC  Brown Catheter: Not present  Fluids: PO  Lines: None     Cardiac Monitoring: None  Code Status: Full Code    Clinically Significant Risk Factors Present on Admission         # Hyponatremia: Lowest Na = 131 mmol/L in last 2 days, will monitor as appropriate  # Hypochloremia: Lowest Cl = 95 mmol/L in last 2 days, will monitor as appropriate         # Drug Induced Coagulation Defect: home medication list includes an anticoagulant medication    # Hypertension: Noted on problem list         # Overweight: Estimated body mass index is 27.32 kg/m  as calculated from the following:    Height as of an earlier encounter on 10/31/24: 1.753 m (5' 9\").    Weight as of an earlier encounter on 10/31/24: 83.9 kg (185 lb).              Disposition Plan      Expected Discharge Date: 11/02/2024                The patient's care was discussed with the Attending Physician, Dr. Garnett.    [facility]  [facility]  Securely message with [employer] (more info)  Text page via [facility] Paging/Directory   See signed in provider for up to date coverage information  ______________________________________________________________________    Chief Complaint   Debilitating joint pain    History is obtained from " the patient and the medical record    History of Present Illness   Saul Hairston is a 79 year old male with a PMH of Afib, HTN, HLD, MUNIRA, prostate cancer diagnosed in 2018 now in remission, and gout admitted on 10/31/2024 for progressive, debilitating polyarthralgia.    Patient reports that diffuse joint pain and weakness started approximately a week ago, and the before then that he was largely in history of his normal health.  The only thing of note that was different than his usual, was that he did have x 2 episodes of gout attacks in the week or 2 leading up to this presentation, that was treated with Medrol Dosepak with improvement.  His gout is normally well-controlled.    Symptoms have predominantly included severe, disabling joint pain in multiple large and small joints including both elbows, both knees, shoulders, hands, fingers.  The pain started steadily about a week ago and has gotten worse over that time.  Has been significant enough that he has not been able to complete ADLs and other basic tasks without assistance from his wife.  He states that this is unlike any pain he is experienced previously, and that it is unlike his prior gout attacks.  He endorses headaches, night sweats, chills, some lightheadedness, and may be a little confusion.  He denies any fevers, abdominal pain, diarrhea, rashes, known bug bites, recent travel, recent illness.    In the time leading up to this illness, he does state that he and his wife visited some friends who own some land with a [place]field near a wooded area, which they have occasionally spend time in with their dog.  Most recently they visited about 3 weeks ago.  He denies any known bug bites in the area, but does note that he noticed some acne on the back of his neck around that same time period, which is unusual for him.  That acne has largely resolved since then.    Past Medical History    Past Medical History:   Diagnosis Date    Arthritis     Gout    Atrial  fibrillation with rapid ventricular response (H) 08/10/2023    Cancer (H) 2018    Prostrate    CARDIOVASCULAR SCREENING; LDL GOAL LESS THAN 160 08/14/2006    Elevated PSA     Gout, unspecified     Hypertension     Kidney stone 2009    Doing ok now    Obese     Pneumonia due to 2019 novel coronavirus 12/22/2020    Seasonal allergic rhinitis     Spring and Fall    Sleep apnea     DOES NOT USE CPAP    Umbilical hernia without mention of obstruction or gangrene 06/2013       Past Surgical History   Past Surgical History:   Procedure Laterality Date    BIOPSY  2020    prostrate    COLONOSCOPY  6/16/2006    COLONOSCOPY N/A 6/15/2016    Procedure: COLONOSCOPY;  Surgeon: Poly Sanchez MD;  Location:  GI    CRYOABLATION PROSTATE N/A 12/1/2020    Procedure: CRYOTHERAPY OF PROSTATE;  Surgeon: Aj Caal MD;  Location:  OR    CYSTOSCOPY FLEXIBLE, CYOABLATION PROSTATE N/A 2/13/2018    Procedure: CYSTOSCOPY FLEXIBLE, CRYOABLATION PROSTATE;  FLEXIBLE CYSTOSCOPY, CYROABLATION OF PROSTATE ;  Surgeon: Aj Caal MD;  Location:  OR    GENITOURINARY SURGERY      HERNIORRHAPHY UMBILICAL  7/11/2013    Procedure: HERNIORRHAPHY UMBILICAL;  Open Umbilical Hernia Repair With Mesh ;  Surgeon: Sergio Tomas MD;  Location: [facility] OR    ROTATOR CUFF REPAIR RT/LT  5/19/2011    Left Shoulder    SURGICAL HISTORY OF -       ear operation as child    SURGICAL HISTORY OF -       removal of pseudogout deposits - Right knee    TONSILLECTOMY      Child       Prior to Admission Medications   Prior to Admission Medications   Prescriptions Last Dose Informant Patient Reported? Taking?   Omega-3 Fatty Acids (OMEGA-3 FISH OIL PO)  Self Yes No   Sig: Take 2 g by mouth 2 times daily (with meals).   albuterol (PROAIR HFA/PROVENTIL HFA/VENTOLIN HFA) 108 (90 Base) MCG/ACT inhaler  Self No No   Sig: INHALE 1-2 PUFFS INTO THE LUNGS EVERY 6 HOURS AS NEEDED FOR SHORTNESS OF BREATH, WHEEZING OR COUGH   allopurinol (ZYLOPRIM) 300 MG tablet   Self No No   Sig: Take 1 tablet (300 mg) by mouth daily.   apixaban ANTICOAGULANT (ELIQUIS) 5 MG tablet  Self No No   Sig: Take 1 tablet (5 mg) by mouth 2 times daily   cetirizine (ZYRTEC) 10 MG tablet  Self No No   Sig: TAKE 1 TABLET (10 MG) BY MOUTH DAILY.   Patient taking differently: Take 10 mg by mouth daily as needed for allergies.   flecainide (TAMBOCOR) 50 MG tablet  Self No No   Sig: Take 1 tablet (50 mg) by mouth 2 times daily   indomethacin (INDOCIN) 50 MG capsule  Self No No   Sig: Take 1 capsule (50 mg) by mouth 3 times daily as needed for moderate pain.   losartan (COZAAR) 25 MG tablet  Self No No   Sig: Take 1 tablet (25 mg) by mouth 2 times daily   metoprolol succinate ER (TOPROL XL) 25 MG 24 hr tablet  Self No No   Sig: Take 0.5 tablets (12.5 mg) by mouth daily   oxymetazoline (AFRIN) 0.05 % nasal spray  Self Yes No   Sig: Spray 2 sprays into both nostrils daily.   tamsulosin (FLOMAX) 0.4 MG capsule  Self Yes No   Sig: Take 0.4 mg by mouth every evening      Facility-Administered Medications: None        Physical Exam   Vital Signs: Temp: 98.7  F (37.1  C) Temp src: Oral BP: 130/79 Pulse: 95   Resp: 18 SpO2: 98 % O2 Device: None (Room air)    Weight: 0 lbs 0 oz  Vitals reviewed.  Constitutional: Awake, alert, in NAD. Resting in bed, cooperative but visibly uncomfortable  Eyes: Sclera without jaundice or injection. Conjunctiva without pallor, erythema, or drainage. PERRLA, EOM intact.  HENT: NCAT, oral cavity with MMM and without lesions, intact dentition with multiple dental caps/fillings.  Respiratory: Lungs CTAB without crackles, wheezing, rales, or increased work of breathing.  Cardiovascular: Heart sounds distant, but with RRR without murmurs or extra sounds, radial pulses 2+ bilaterally. Cap refill <2 sec.  Abdomen: Soft, non-distended, with positive bowel sounds. No tenderness, guarding, or rebound.  Skin: Warm & dry, without evidence of any rashes. Back of neck with evidence of well-healed,  mild acne. R 4th finger with well-healed excoriation (pt reports from playing with his dog)  Musculoskeletal: Multiple joints erythematous, edematous, hot to touch, tender to palpation, some with superficial nodules (L elbow, PIP joint of the R 3rd finger, MTP joint of the L 5th finger, R knee); significantly reduced ROM in all large extremity joints, unable to close fists  Neurologic: A&Ox4, without focal deficit, cranial nerves II-XII grossly intact.  Psychiatric: Alert & calm with appropriate affect, mood, insight, and thought processes.    Medical Decision Making   Please see A&P for additional details of medical decision making    Data   ------------------------- PAST 24 HR DATA REVIEWED -----------------------------------------------    I have personally reviewed the following data over the past 24 hrs:    11.4 (H)  \   14.4   / 141 (L)     131 (L) 95 (L) 31.7 (H) /  118 (H)   4.4 24 1.76 (H) \     ALT: 82 (H) AST: 66 (H) AP: 91 TBILI: 0.7   ALB: 3.6 TOT PROTEIN: 6.5 LIPASE: N/A     Procal: 1.17 (H) CRP: 254.89 (H) Lactic Acid: 1.6         Imaging results reviewed over the past 24 hrs:   Recent Results (from the past 24 hours)   CT Head w/o Contrast    Narrative    EXAM: CT HEAD W/O CONTRAST  10/31/2024 1:53 PM     HISTORY: check for bleed, HA's and weakness       COMPARISON: None    TECHNIQUE: Using multidetector thin collimation helical acquisition  technique, axial, coronal and sagittal CT images from the skull base  to the vertex were obtained without intravenous contrast.   (topogram) image(s) also obtained and reviewed. Dose reduction  techniques were used.    FINDINGS:  No acute intracranial hemorrhage, mass effect, or midline shift.  Preserved gray-white matter differentiation and subarachnoid spaces.    Atraumatic calvarium. No substantial paranasal sinus mucosal disease.  Clear mastoid air cells. Nonfocal orbits.       Impression    IMPRESSION: Negative for acute intracranial hemorrhage,  hydrocephalus  or transcortical infarct. No skull fracture.    TRUPTI ROACH DO         SYSTEM ID:  [id number]   XR Chest 2 Views    Narrative    CHEST TWO VIEWS 10/31/2024 2:02 PM     HISTORY: check for pneumonia, weakness    COMPARISON: CT chest 8/10/2023       Impression    IMPRESSION: No focal consolidation, pleural effusion or pneumothorax.  Cardiomediastinal silhouette is unremarkable. Mild degenerative  changes of the spine.    HAWA WARREN MD         SYSTEM ID:  [id number]

## 2024-11-01 NOTE — PROGRESS NOTES
[facility]    Progress Note - \[facility]\"" Family Medicine Service       Date of Admission:  10/31/2024    Main plans for today:  - ID and rheum consults, appreciate recs  - ASTRID: improving, will treat suspected hemodynamic ASTRID with 1 L fluids and PO intake    Assessment & Plan   Saul Hairston is a 79 year old male with a PMH of Afib, HTN, HLD, MUNIRA, prostate cancer diagnosed in 2018 now in remission, and gout admitted on 10/31/2024 for ASTRID & progressive, debilitating polyarthralgia of unclear etiology.     # Polyarthralgia concerning for inflammatory vs infectious arthritis  # Transaminitis  Initially presented to [facility] ED for ~1wk of progressive and disabling pain/stiffness in multiple joints and generalized weakness. Endorses lightheadedness, chills, night sweats, headaches, confusion but denies chest pain, shortness of breath, abdominal pain, mucosal lesions, diarrhea, rashes, congestion, pharyngitis, recent hiking or insect bites. On ED work-up noted to have markedly elevated CRP (254), mildly elevated ESR (23), procal elevation at 1.17, mild transaminitis, mild leukocytosis, & UA w/positive protein, small blood, small ketones; lyme panel negative, CXR & CT head reassuring, EKG w/o significant changes. Differential broad but most likely either rheumatic (RA, Lupus, Sjogrens, PMR, etc) vs infectious causing reactive arthritis (lyme, borrelia, ehrlichiosis, anaplasma, etc.). Polyarticular gout less likely.   - Rheumatology consulted, recs appreciated  - ID consulted, recs appreciated  - Pending Workup: ANCA, Anti-CCP, Tick-borne disease panel, peripheral smear, uric acid, blood cultures, RUQ ultrasound (assess for liver cysts), histoplasma urine antigen, blastomyces urine antigens, m.genitalium and U.urealyticum urine PCR, stool O&P x 3 days, enteric panel, mycoplasma pneumoniae antibody, trichinella IgG, coxiella Ab  - Negative Workup: Lyme, gonorrhea & chlamydia,  "HIV, syphilis, acute hepatitis panel, RF,   - Antibiotics: None at present, pending work up  - Pain: Tylenol scheduled  - Nausea: Zofran PRN  - Constipation: Miralax Senna PRN  - Labs: CMP, CBC daily, CRP q48h  - Nursing: Vitals q8h     # ASTRID  # Hyponatremia  Cr elevated to 1.76 with improvement after 1 L fluid in the ED. BUN:Cr ratio and FENa indicative of hemodynamic ASTRID. Will give additional 1 L NS today. Mild hyponatremia likely 2/2 hypovolemia and improved with fluids.  - 1 L NS over 4 hours  - Encourage PO intake  - Daily CMP; monitor to ensure bicarb improves  - HOLD PTA Losartan, Indomethacin     Chronic/Stable:  # AFib: PTA Eliquis 5mg daily, Flecanide 50mg BID, Metoprolol 12.5mg daily  # HTN: PTA HOLD Losartan 25mg daily  # Gout: PTA allopurinol 300mg daily, HOLD indomethacin 50mg TID PRN. Will likely need further up-titration of allopurinol in outpatient setting  # BPH: PTA Tamsulosin 0.4mg daily  # Seasonal allergies: PTA Loratadine 10mg daily, Afrin PRN         Diet: Combination Diet Regular Diet Adult    DVT Prophylaxis: DOAC  Brown Catheter: Not present  Fluids: PO, 1 L NS over 4 hours  Lines: None     Cardiac Monitoring: None  Code Status: Full Code      Clinically Significant Risk Factors Present on Admission         # Hyponatremia: Lowest Na = 131 mmol/L in last 2 days, will monitor as appropriate  # Hypochloremia: Lowest Cl = 95 mmol/L in last 2 days, will monitor as appropriate      # Hypoalbuminemia: Lowest albumin = 3.3 g/dL at 11/1/2024  8:33 AM, will monitor as appropriate    # Drug Induced Coagulation Defect: home medication list includes an anticoagulant medication    # Hypertension: Noted on problem list         # Overweight: Estimated body mass index is 27.32 kg/m  as calculated from the following:    Height as of an earlier encounter on 10/31/24: 1.753 m (5' 9\").    Weight as of an earlier encounter on 10/31/24: 83.9 kg (185 lb).              Disposition Plan      Expected Discharge " Date: 11/02/2024                The patient's care was discussed with the Attending Physician, Dr. Bolden .    Yossi Weber MD  [place]  [facility]  Securely message with [employer] (more info)  Text page via [facility] Paging/Directory   See signed in provider for up to date coverage information  ______________________________________________________________________    Interval History   Admitted overnight. This morning, patient reports his pain is feeling better after receiving his scheduled tylenol. Is able to move his hands a bit better without significant pain.    Physical Exam   Vital Signs: Temp: 98.1  F (36.7  C) Temp src: Oral BP: 132/70 Pulse: 72   Resp: 18 SpO2: 96 % O2 Device: None (Room air)      Constitutional: Awake, alert, in NAD. Resting in bed  Eyes: Sclera without jaundice or injection. Conjunctiva without pallor, erythema, or drainage. PERRLA, EOM intact.  HENT: NCAT, oral cavity with MMM and without lesions, intact dentition with multiple dental caps/fillings.  Respiratory: Lungs CTAB without crackles, wheezing, rales, or increased work of breathing.  Cardiovascular: Heart sounds distant, but with RRR without murmurs or extra sounds, radial pulses 2+ bilaterally. Cap refill <2 sec.  Abdomen: Soft, non-distended, with positive bowel sounds. No tenderness, guarding, or rebound.  Skin: Warm & dry, without evidence of any rashes. Back of neck with evidence of well-healed, mild acne. R 4th finger with well-healed excoriation (pt reports from playing with his dog)  Musculoskeletal: Multiple joints tender to palpation, some with superficial nodules (L elbow, PIP joint of the R 3rd finger, MTP joint of the L 5th finger, R knee); reduced ROM in all large extremity joints, unable to close fists. Lateral joint line tenderness in bilateral knees but no significant erythema, effusion, or warmth  Neurologic: A&Ox4, without focal deficit, cranial  nerves II-XII grossly intact.  Psychiatric: Alert & calm with appropriate affect, mood, insight, and thought processes.       Medical Decision Making   Please see A&P for additional details of medical decision making.      Data     I have personally reviewed the following data over the past 24 hrs:    9.3  \   14.0   / 167     134 (L) 100 33.3 (H) /  103 (H)   4.4 18 (L) 1.54 (H) \     ALT: 133 (H) AST: 120 (H) AP: 93 TBILI: 0.7   ALB: 3.3 (L) TOT PROTEIN: 6.1 (L) LIPASE: N/A     Ferritin:  N/A % Retic:  0.7 LDH:  N/A       Imaging results reviewed over the past 24 hrs:   No results found for this or any previous visit (from the past 24 hours).
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
## 2024-11-01 NOTE — PLAN OF CARE
Goal Outcome Evaluation:    VS: /70 (BP Location: Left arm)   Pulse 72   Temp 98.1  F (36.7  C) (Oral)   Resp 18   SpO2 96%      O2: Stable on room air >90%   Output: Voids without difficulties, needs help with urinal    Last BM: 10/31/24   Activity: Assist x1   Up for meals? Yes   Skin: Intact   Pain: Reports generalized pain @ 7/10   CMS: AO x4   Dressing: None   Diet: Reg   LDA: Rt PIV SL   Equipment: Walker, gait belt   Plan: Continue with POC   Additional Info: Rheumatology consult and ID consult ordered   US ordered, per  Odalys Darling, pt needs to be NPO for 8 hours which should start at midnight 11/2/24 so US can be done first thing in the morning.   Stool sample pending. Per Lab collect in a sterile container and send to lab stat.

## 2024-11-01 NOTE — CONSULTS
[facility] [place] [facility] INFECTIOUS DISEASES CONSULTATION     Patient:  Saul Hairston   Date of birth 1945, Medical record number [id number]  Date of Visit:  11/01/2024  Date of Admission: 10/31/2024  Consult Requester:Jeannie Villalba MD          Assessment and Recommendations:   ASSESSMENT:  Inflammatory polyarthralgia- infectious vs autoimmune  Transaminitis  ASTRID    DISCUSSION:   Saul Hairston is a 79 year old male with history of HTN, Afib, HLD, MUNIRA, prostate cancer treated with cryoablation (2018), and gout who was admitted on 10/31/24 with ASTRID and progressive polyarthralgia.     Afebrile with markedly elevated CRP at 254.9, ESR 23, WBC 9.7. Cr elevated 1.76-->1.54 and LFTs with   with Tbili 0.7 and Alk phos 93. Vitals stable. CXR without infiltrate. Multiple swollen, lightly erythematous joints with drenching night sweats and chills/rigors, soft stools, and mild nausea.     Ddx for polyarthropathy is broad. With extent of joints involved, suspect a reactive arthritis rather than septic arthritis. In addition to more routine causes of reactive arthritis (Mycoplasma and GI illnesses, as well as infections already screened for), he has pertinent exposures of visiting pig farm (Y.enterocolitica, Coxiella, Leptospira), chickens (Salmonella), history of eating game meat (parasitic infections, including Echinococcus), and time spent in the outdoors (histoplasma/blastomyces). No clear risk factors for brucella or bartonella. Has history of mono and does not have lymphadenopathy to suggest acute EBV infection. Described acne on neck has resolved but raises question of HSV or VZV, which may be associated with LFT abnormalities- no lesions to swab at the moment. Recent dog bite wound on finger does not have erythema or drainage and appears to be healing- would not expect Capnocytophaga infection to present this way. Bcx are pending. Other negative work up as outlined below. Non-infectious causes also in differential  including vasculitis (LFT elevations and ASTRID; though no rash) and other autoimmune conditions as outlined in primary team note.    Negative ID work up: GC/chlamydia, HIV, treponema Ab, Lyme Ab, HBV screening, HCV ab, tickborne PCR panel.     RECOMMENDATION:  Monitor off of antimicrobials  Follow pending blood culture  Appreciate Rheumatology consult  Work up - imaging:  - US right upper quadrant - attn liver assessing for cysts  Work up - labs:  - Enteric panel, consider Stool culture for Yersinia enterocolitica if panel cannot be done  - Stool O&P x3 separate days  - M.genitalium/U.urealyticum urine PCR  - M.pneumoniae Ab  - Histoplasma/Blastomyces urine antigens  - Trichinella IgG - [facility]  - Coxiella Ab  - Leptospira IgM      Thank you for this consult. ID will continue to follow.     Sherine Rowe PA-C  Pronouns: she/her/hers  Infectious Diseases  Contact via [employer] or [email or web address] Paging/Directory      65 MINUTES SPENT BY ME on the date of service doing chart review, history, exam, documentation & further activities per the note.       ________________________________________________________________    Consult Question: Polyarticular arthropathy, unclear autoimmune vs infectious etiology.  Admission Diagnosis: inflammatory arthritis, ASTRID  Inflammatory arthropathy         History of Present Illness:   Saul Hairston is a 79 year old male with history of HTN, Afib, HLD, MUNIRA, prostate cancer treated with cryoablation (2018), and gout who was admitted on 10/31/24 with ASTRID and progressive polyarthralgia.     Symptoms started about a week ago and include diffuse bilateral joint pain, swelling, redness and warmth involving large joints and fingers. Associated with drenching night sweats, rigors, and soft stools (change from baseline), dull frontotemporal headache, generalized weakness and lightheadedness with standing that resolves within a few seconds. Has had a more gagging than usual when brushing teeth, one episode caused  emesis. Notes gout flares in left wrist and left elbow that were recently treated with medrol dosepak with improvement. Gout is normally well-controlled on allopurinol 300mg daily, had his first flare in his 20's.     Pet dog, recent bite on his finger that drained pus and was red and swollen- states that he probably should have gone in but it got better on its own last week. Recent skin lesions on neck described as acne, that have since resolved.     Has a daily AM cough with phlegm production, typically happens in the morning then no other respiratory symptoms throughout the day- this is at baseline. Denies fevers, vision change, photophobia, rhinorrhea, nasal congestion, dental pain, recent dental work (last routine cleaning was about 4-5 months ago), sore throat, shortness of breath, increased cough or sputum production, abdominal pain, rashes or skin changes. blood in stools or urine, dysuria, change in urinary frequency/urgency or increased difficulty initiating urinary stream.     Social/Exposure hx: lives in [place], [place] with his wife. 2 adult children ([place] and [place]) and one grandson ([place]).  Has visited a family member's farm near [place], [place] with wooded area, where they raise pigs for meat and chickens for eggs. Has not helped with butchering the pigs or any animal birthing. No recent exposure to wild animals. Does go hunting- typically for deer, previously for grouse, no small game hunting. Omnivorous diet, typically cooks meats well-done (has been accused of overcooking, per patient). Does not eat sushi. One time he wondered if fish was undercooked but that was after symptoms started; no other questionable foods that he can recall. Had some [employer] deli meat at home, they threw it away when the recall was announced. Has consumed deer venison and more remotely other game meat including seal in [place]. No raw milk or dairy, only gets those items from the grocery store. 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 No work in agriculture, landscaping. 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  No gardening, does yard work - trimming and mowing. Enjoys the outdoors, goes hunting and fishing.  No know insect bites. Has traveled to [place] and to [place]; no other international travel. No recent sick contacts. Grandson lives in [place], not around any kids regularly other than being in the same large room at [employer]. Had mono ~30-40 years ago.          Past Medical History:     Past Medical History:   Diagnosis Date    Arthritis     Gout    Atrial fibrillation with rapid ventricular response (H) 08/10/2023    Cancer (H) 2018    Prostrate    CARDIOVASCULAR SCREENING; LDL GOAL LESS THAN 160 08/14/2006    Elevated PSA     Gout, unspecified     Hypertension     Kidney stone 2009    Doing ok now    Obese     Pneumonia due to 2019 novel coronavirus 12/22/2020    Seasonal allergic rhinitis     Spring and Fall    Sleep apnea     DOES NOT USE CPAP    Umbilical hernia without mention of obstruction or gangrene 06/2013            Past Surgical History:     Past Surgical History:   Procedure Laterality Date    BIOPSY  2020    prostrate    COLONOSCOPY  6/16/2006    COLONOSCOPY N/A 6/15/2016    Procedure: COLONOSCOPY;  Surgeon: Poly Sanchez MD;  Location:  GI    CRYOABLATION PROSTATE N/A 12/1/2020    Procedure: CRYOTHERAPY OF PROSTATE;  Surgeon: Aj Caal MD;  Location:  OR    CYSTOSCOPY FLEXIBLE, CYOABLATION PROSTATE N/A 2/13/2018    Procedure: CYSTOSCOPY FLEXIBLE, CRYOABLATION PROSTATE;  FLEXIBLE CYSTOSCOPY, CYROABLATION OF PROSTATE ;  Surgeon: Aj Caal MD;  Location:  OR    GENITOURINARY SURGERY      HERNIORRHAPHY UMBILICAL  7/11/2013    Procedure: HERNIORRHAPHY UMBILICAL;  Open Umbilical Hernia Repair With Mesh ;  Surgeon: Sergio Tomas  MD Danish;  Location: [facility] OR    ROTATOR CUFF REPAIR RT/LT  5/19/2011    Left Shoulder    SURGICAL HISTORY OF -       ear operation as child    SURGICAL HISTORY OF -       removal of pseudogout deposits - Right knee    TONSILLECTOMY      Child            Family History:   Reviewed and non-contributory.   Family History   Problem Relation Age of Onset    Hypertension Father     Alcohol/Drug Father     Allergies Father     Respiratory Father     Asthma Father     Cerebrovascular Disease Maternal Grandmother     Arthritis Maternal Grandmother     Diabetes Maternal Grandmother     Alzheimer Disease Mother     Arthritis Mother     Eye Disorder Mother     Cerebrovascular Disease Paternal Grandfather     C.A.D. Maternal Uncle     C.A.D. Paternal Uncle     Prostate Cancer Maternal Uncle     Lipids Sister     Lipids Brother     Obesity Brother         Has lost weight    C.A.D. Son     Hyperlipidemia Sister     Obesity Brother             Social History:     Social History     Tobacco Use    Smoking status: Never    Smokeless tobacco: Never   Substance Use Topics    Alcohol use: No     History   Sexual Activity    Sexual activity: Not Currently    Partners: Female    Birth control/ protection: Male Surgical            Current Medications:     Current Facility-Administered Medications   Medication Dose Route Frequency Provider Last Rate Last Admin    acetaminophen (TYLENOL) tablet 975 mg  975 mg Oral TID Rock Alarcon DO   975 mg at 11/01/24 0829    allopurinol (ZYLOPRIM) tablet 300 mg  300 mg Oral Daily Rock Alarcon DO   300 mg at 11/01/24 0828    apixaban ANTICOAGULANT (ELIQUIS) tablet 5 mg  5 mg Oral BID Rock Alarcon DO   5 mg at 11/01/24 0829    cetirizine (zyrTEC) tablet 10 mg  10 mg Oral Daily Rock Alarcon DO        flecainide (TAMBOCOR) tablet 50 mg  50 mg Oral BID Rock Alarcon DO   50 mg at 11/01/24 0828    [Held by provider] losartan (COZAAR) tablet  25 mg  25 mg Oral BID Rock Alarcon DO        metoprolol succinate ER (TOPROL-XL) 24 hr half-tab 12.5 mg  12.5 mg Oral Daily Rock Alarcon DO   12.5 mg at 11/01/24 0829    oxymetazoline (AFRIN) 0.05 % spray 2 spray  2 spray Both Nostrils Daily Rock Alarcon DO   2 spray at 11/01/24 0829    sodium chloride (PF) 0.9% PF flush 3 mL  3 mL Intracatheter Q8H Rock Alarcon DO   3 mL at 11/01/24 0829    tamsulosin (FLOMAX) capsule 0.4 mg  0.4 mg Oral QPM Rock Alarcon DO   0.4 mg at 11/01/24 0027            Allergies:     Allergies   Allergen Reactions    Ciprofloxacin     Norco [Hydrocodone-Acetaminophen] Nausea and Vomiting            Physical Exam:   Vitals were reviewed  Patient Vitals for the past 24 hrs:   BP Temp Temp src Pulse Resp SpO2   11/01/24 0741 132/70 98.1  F (36.7  C) Oral 72 -- --   11/01/24 0400 -- -- -- -- -- 96 %   10/31/24 2035 130/79 98.7  F (37.1  C) Oral 95 18 98 %       Physical Examination:  GENERAL:  awake, alert, pleasant and interactive. Sitting up on edge of bed in NAD.  HEENT:  Head is normocephalic, atraumatic   EYES:  Eyes have anicteric sclerae without conjunctival injection or discharge.    ENT:  Oropharynx is moist without exudates or ulcers. +previous dental work intact. Tongue is midline. Uvula midline. S/p tonsillectomy.   NECK:  Supple. No anterior or posterior cervical lymphadenopathy  LUNGS:  Clear to auscultation bilateral without wheeze, crackles or rhonchi. Normal respiratory effort on RA.   CARDIOVASCULAR:  RRR, +S1/S2, no murmur appreciated.  ABDOMEN:  Soft, nondistended. + bowel sounds.  MUSCULOSKELETAL: +pink erythema and swelling over bilateral wrists, elbows L>R, knees, ankles and finger joints.   SKIN:  No acute rashes, jaundice or diaphoresis. Dry, scabbed over lesion on right 3rd(?) digit- patient reports this is site of dog bite, without erythema, induration, drainage or more swelling than other digits.   No stigmata of endocarditis. PIV in place without any surrounding erythema or exudate.  NEUROLOGIC:  Grossly nonfocal. Active x4 extremities. Speech clear. No tremor.         Laboratory Data:     Inflammatory Markers    Recent Labs   Lab Test 10/31/24  1317 03/30/17  1601   SED 23* 8   CRPI 254.89*  --        Hematology Studies    Recent Labs   Lab Test 11/01/24  0833 10/31/24  2337 10/31/24  1317 09/17/24  1049 09/24/23  1310 08/11/23  0549 05/17/22  1426 12/26/20  0741 12/25/20  0742 12/24/20  0759 12/23/20  0714 12/22/20  1218 12/18/20  1200   WBC 9.3 9.7 11.4* 6.9 7.0 8.3   < > 7.0 7.0 7.6 8.9 13.4* 5.9   ANEU  --   --   --   --   --   --   --  5.6 5.7 6.3 7.6 12.4* 5.2   AEOS  --   --   --   --   --   --   --  0.0 0.0 0.0 0.0 0.0 0.0   HGB 14.0 14.2 14.4 14.5 15.2 14.3   < > 14.8 16.1 15.2 15.4 16.4 15.9   MCV 86 88 86 90 88 88   < > 85 84 85 86 85 87    149* 141* 179 186 196   < > 226 256 232 207 207 160    < > = values in this interval not displayed.       Metabolic Studies     Recent Labs   Lab Test 10/31/24  1317 09/17/24  1049 12/13/23  1511 09/24/23  1310 08/17/23  1618   * 139 139 138 140   POTASSIUM 4.4 4.3 4.0 4.5 4.7   CHLORIDE 95* 107 103 106 105   CO2 24 22 26 20* 22   BUN 31.7* 21.3 19.6 18.6 24.4*   CR 1.76* 1.32* 1.11 1.05 1.08   GFRESTIMATED 39* 56* 69 74 72       Hepatic Studies    Recent Labs   Lab Test 10/31/24  1317 09/17/24  1049 05/17/22  1426 12/26/20  0741 12/25/20  0742 12/24/20  0759 12/18/20  1200   BILITOTAL 0.7 0.4 0.6 0.8 0.9  --  0.7   ALKPHOS 91 103 121 67 73  --  78   ALBUMIN 3.6 4.2 3.9 2.3* 2.6*  --  3.2*   AST 66* 29 26 68* 76* 79* 33   ALT 82* 29 49 188* 199* 193* 49       Microbiology:  Culture   Date Value Ref Range Status   10/31/2024 No growth after 12 hours  Preliminary   10/31/2024 No growth after 12 hours  Preliminary       Urine Studies    Recent Labs   Lab Test 10/31/24  1432 12/18/20  1344   LEUKEST Negative Negative   WBCU 4 1       Hepatitis B  Testing   Recent Labs   Lab Test 10/31/24  2337   HEPBANG Nonreactive   HBCM Nonreactive     Hepatitis C Testing     Hepatitis C Antibody   Date Value Ref Range Status   10/31/2024 Nonreactive Nonreactive Final     Comment:     A nonreactive screening test result does not exclude the possibility of exposure to or infection with HCV. Nonreactive screening test results in individuals with prior exposure to HCV may be due to antibody levels below the limit of detection of this assay or lack of reactivity to the HCV antigens used in this assay. Patients with recent HCV infections (<3 months from time of exposure) may have false-negative HCV antibody results due to the time needed for seroconversion (average of 8 to 9 weeks).   05/28/2013 Negative NEG Final           Imaging:     CXR 10/31/24  IMPRESSION: No focal consolidation, pleural effusion or pneumothorax.  Cardiomediastinal silhouette is unremarkable. Mild degenerative  changes of the spine.    CT head 10/31/24  IMPRESSION: Negative for acute intracranial hemorrhage, hydrocephalus  or transcortical infarct. No skull fracture.

## 2024-11-01 NOTE — PROGRESS NOTES
VS: Blood pressure 130/79, pulse 95, temperature 98.7  F (37.1  C), temperature source Oral, resp. rate 18, SpO2 98%.      O2: Stable on RA. Denies chest pain or SOB.   Output: Voids adequately without difficulty using urinal    Last BM: 10/31   Activity: Up with A1, GB and walker        Pain: Generalized pain mostly in hand and knee joints.   Neuro: Alert and oriented x4   Dressing: None    Diet: Regular with thins   LDA: RUE PIV SL   Equipment:    Plan: TBD. Possibly rheumatology follow up   Additional Info:

## 2024-11-01 NOTE — PLAN OF CARE
Goal Outcome Evaluation:                Admission Note    Reason for admission: Generalize joint pain  Primary team notified of pt arrival.Yes  Admitted from: [facility] ER  Via: stretcher  Accompanied by: EMT  Belongings: Placed in closet  Admission Required Doc Completed: No  Teaching: Orientation to unit and call light- call light within reach, use of console, meal times, when to call for the RN, and enforced importance of safety.  IV Access: R ac  Telemetry: No  Ht./Wt.: Completed  Code Status verified on armband: Yes/  2 RN Skin Assessment Completed with: 2 RN skin check with LK.   Suction/Ambu bag/Flowmeter at bedside: Yes      VS: Temp: 98.7  F (37.1  C) Temp src: Oral BP: 130/79 Pulse: 95   Resp: 18 SpO2: 98 % O2 Device: None (Room air)      O2: Stable on RA    Output: Voids without difficulty in urinal    Last BM: 10/31   Activity: Up with A1, GB and walker    Skin: Scattered small redness on the back   Pain: Generalized pain.   Neuro: Alert and oriented x4  Baseline intermittent numbness to L hand    Dressing: None    Diet: Regular    LDA: PIV to R ac: SL    Equipment: Cellphone and clothing at bedside    Plan: TBD    Additional Info:

## 2024-11-02 ENCOUNTER — APPOINTMENT (OUTPATIENT)
Dept: ULTRASOUND IMAGING | Facility: CLINIC | Age: 79
DRG: 549 | End: 2024-11-02
Payer: COMMERCIAL

## 2024-11-02 LAB
ADV 40+41 DNA STL QL NAA+NON-PROBE: NEGATIVE
ALBUMIN SERPL BCG-MCNC: 3.5 G/DL (ref 3.5–5.2)
ALP SERPL-CCNC: 105 U/L (ref 40–150)
ALT SERPL W P-5'-P-CCNC: 282 U/L (ref 0–70)
ANION GAP SERPL CALCULATED.3IONS-SCNC: 13 MMOL/L (ref 7–15)
AST SERPL W P-5'-P-CCNC: 206 U/L (ref 0–45)
ASTRO TYP 1-8 RNA STL QL NAA+NON-PROBE: NEGATIVE
BILIRUB SERPL-MCNC: 0.4 MG/DL
BUN SERPL-MCNC: 38.9 MG/DL (ref 8–23)
C CAYETANENSIS DNA STL QL NAA+NON-PROBE: NEGATIVE
CALCIUM SERPL-MCNC: 8.9 MG/DL (ref 8.8–10.4)
CAMPYLOBACTER DNA SPEC NAA+PROBE: NEGATIVE
CHLORIDE SERPL-SCNC: 104 MMOL/L (ref 98–107)
CREAT SERPL-MCNC: 1.36 MG/DL (ref 0.67–1.17)
CRP SERPL-MCNC: 213.82 MG/L
CRYPTOSP DNA STL QL NAA+NON-PROBE: NEGATIVE
E COLI O157 DNA STL QL NAA+NON-PROBE: NORMAL
E HISTOLYT DNA STL QL NAA+NON-PROBE: NEGATIVE
EAEC ASTA GENE ISLT QL NAA+PROBE: NEGATIVE
EC STX1+STX2 GENES STL QL NAA+NON-PROBE: NEGATIVE
EGFRCR SERPLBLD CKD-EPI 2021: 54 ML/MIN/1.73M2
EPEC EAE GENE STL QL NAA+NON-PROBE: NEGATIVE
ERYTHROCYTE [DISTWIDTH] IN BLOOD BY AUTOMATED COUNT: 13.5 % (ref 10–15)
ETEC LTA+ST1A+ST1B TOX ST NAA+NON-PROBE: NEGATIVE
G LAMBLIA DNA STL QL NAA+NON-PROBE: NEGATIVE
GLUCOSE SERPL-MCNC: 117 MG/DL (ref 70–99)
HCO3 SERPL-SCNC: 20 MMOL/L (ref 22–29)
HCT VFR BLD AUTO: 43 % (ref 40–53)
HGB BLD-MCNC: 14.3 G/DL (ref 13.3–17.7)
HOLD SPECIMEN: NORMAL
Lab: NORMAL
MCH RBC QN AUTO: 28.9 PG (ref 26.5–33)
MCHC RBC AUTO-ENTMCNC: 33.3 G/DL (ref 31.5–36.5)
MCV RBC AUTO: 87 FL (ref 78–100)
NOROVIRUS GI+II RNA STL QL NAA+NON-PROBE: NEGATIVE
P SHIGELLOIDES DNA STL QL NAA+NON-PROBE: NEGATIVE
PERFORMING LABORATORY: NORMAL
PLATELET # BLD AUTO: 186 10E3/UL (ref 150–450)
POTASSIUM SERPL-SCNC: 3.9 MMOL/L (ref 3.4–5.3)
PROT SERPL-MCNC: 6.3 G/DL (ref 6.4–8.3)
RBC # BLD AUTO: 4.94 10E6/UL (ref 4.4–5.9)
RVA RNA STL QL NAA+NON-PROBE: NEGATIVE
SALMONELLA SP RPOD STL QL NAA+PROBE: NEGATIVE
SAPO I+II+IV+V RNA STL QL NAA+NON-PROBE: NEGATIVE
SHIGELLA SP+EIEC IPAH ST NAA+NON-PROBE: NEGATIVE
SODIUM SERPL-SCNC: 137 MMOL/L (ref 135–145)
SPECIMEN STATUS: NORMAL
TEST NAME: NORMAL
V CHOLERAE DNA SPEC QL NAA+PROBE: NEGATIVE
VIBRIO DNA SPEC NAA+PROBE: NEGATIVE
WBC # BLD AUTO: 5.8 10E3/UL (ref 4–11)
Y ENTEROCOL DNA STL QL NAA+PROBE: NEGATIVE

## 2024-11-02 PROCEDURE — 99232 SBSQ HOSP IP/OBS MODERATE 35: CPT | Mod: GC

## 2024-11-02 PROCEDURE — 86140 C-REACTIVE PROTEIN: CPT

## 2024-11-02 PROCEDURE — 86638 Q FEVER ANTIBODY: CPT | Performed by: PHYSICIAN ASSISTANT

## 2024-11-02 PROCEDURE — 86784 TRICHINELLA ANTIBODY: CPT | Performed by: PHYSICIAN ASSISTANT

## 2024-11-02 PROCEDURE — 93010 ELECTROCARDIOGRAM REPORT: CPT | Performed by: INTERNAL MEDICINE

## 2024-11-02 PROCEDURE — 87177 OVA AND PARASITES SMEARS: CPT | Performed by: PHYSICIAN ASSISTANT

## 2024-11-02 PROCEDURE — 86738 MYCOPLASMA ANTIBODY: CPT | Performed by: PHYSICIAN ASSISTANT

## 2024-11-02 PROCEDURE — 85014 HEMATOCRIT: CPT

## 2024-11-02 PROCEDURE — 85048 AUTOMATED LEUKOCYTE COUNT: CPT

## 2024-11-02 PROCEDURE — 99232 SBSQ HOSP IP/OBS MODERATE 35: CPT | Performed by: INTERNAL MEDICINE

## 2024-11-02 PROCEDURE — 87507 IADNA-DNA/RNA PROBE TQ 12-25: CPT | Performed by: PHYSICIAN ASSISTANT

## 2024-11-02 PROCEDURE — 36415 COLL VENOUS BLD VENIPUNCTURE: CPT | Performed by: PHYSICIAN ASSISTANT

## 2024-11-02 PROCEDURE — 76705 ECHO EXAM OF ABDOMEN: CPT

## 2024-11-02 PROCEDURE — 120N000002 HC R&B MED SURG/OB UMMC

## 2024-11-02 PROCEDURE — 250N000013 HC RX MED GY IP 250 OP 250 PS 637

## 2024-11-02 PROCEDURE — 80053 COMPREHEN METABOLIC PANEL: CPT

## 2024-11-02 PROCEDURE — 86720 LEPTOSPIRA ANTIBODY: CPT | Performed by: PHYSICIAN ASSISTANT

## 2024-11-02 PROCEDURE — 93005 ELECTROCARDIOGRAM TRACING: CPT

## 2024-11-02 PROCEDURE — 87209 SMEAR COMPLEX STAIN: CPT | Performed by: PHYSICIAN ASSISTANT

## 2024-11-02 PROCEDURE — 250N000009 HC RX 250: Performed by: STUDENT IN AN ORGANIZED HEALTH CARE EDUCATION/TRAINING PROGRAM

## 2024-11-02 PROCEDURE — 76705 ECHO EXAM OF ABDOMEN: CPT | Mod: 26 | Performed by: STUDENT IN AN ORGANIZED HEALTH CARE EDUCATION/TRAINING PROGRAM

## 2024-11-02 RX ORDER — ACETAMINOPHEN 325 MG/1
975 TABLET ORAL EVERY 8 HOURS PRN
Status: DISCONTINUED | OUTPATIENT
Start: 2024-11-02 | End: 2024-11-05 | Stop reason: HOSPADM

## 2024-11-02 RX ADMIN — FLECAINIDE ACETATE 50 MG: 50 TABLET ORAL at 19:48

## 2024-11-02 RX ADMIN — TAMSULOSIN HYDROCHLORIDE 0.4 MG: 0.4 CAPSULE ORAL at 19:48

## 2024-11-02 RX ADMIN — ALLOPURINOL 300 MG: 300 TABLET ORAL at 07:38

## 2024-11-02 RX ADMIN — FLECAINIDE ACETATE 50 MG: 50 TABLET ORAL at 07:38

## 2024-11-02 RX ADMIN — APIXABAN 5 MG: 5 TABLET, FILM COATED ORAL at 07:38

## 2024-11-02 RX ADMIN — ANAKINRA 100 MG: 100 INJECTION, SOLUTION SUBCUTANEOUS at 17:23

## 2024-11-02 RX ADMIN — APIXABAN 5 MG: 5 TABLET, FILM COATED ORAL at 19:48

## 2024-11-02 RX ADMIN — ACETAMINOPHEN 975 MG: 325 TABLET, FILM COATED ORAL at 14:11

## 2024-11-02 RX ADMIN — ACETAMINOPHEN 975 MG: 325 TABLET, FILM COATED ORAL at 07:38

## 2024-11-02 RX ADMIN — Medication 12.5 MG: at 07:38

## 2024-11-02 RX ADMIN — OXYMETAZOLINE HYDROCHLORIDE 2 SPRAY: 0.05 SPRAY NASAL at 09:09

## 2024-11-02 NOTE — PLAN OF CARE
Goal Outcome Evaluation:    VS: /78 (BP Location: Right arm)   Pulse 73   Temp 97.4  F (36.3  C) (Oral)   Resp 18   SpO2 95%      O2: Stable on room air >90%   Output: Voids without difficulties   Last BM: 11/2/24   Activity: SBA   Up for meals? Yes   Skin: Intact   Pain: Denies pain   CMS: AO x4   Dressing: None   Diet: Regular   LDA: Rt PIV SL   Equipment: Walker   Plan: Continue with POC    Additional Info: US done this shift

## 2024-11-02 NOTE — PROGRESS NOTES
[facility]    Progress Note - [facility] Family Medicine Service       Date of Admission:  10/31/2024    Main plans for today:  - EKG  - Follow up rheum and ID recs  - Switch Tylenol from scheduled to PRN in setting of transaminitis    Assessment & Plan   Saul Hairston is a 79 year old male with a PMH of Afib, HTN, HLD, MUNIRA, prostate cancer diagnosed in 2018 now in remission, and gout admitted on 10/31/2024 for ASTRID & progressive, debilitating polyarthralgia of unclear etiology.     # Polyarthralgia concerning for inflammatory vs infectious arthritis, improving  # Transaminitis, worsening  Initially presented to [facility] ED for ~1wk of progressive and disabling pain/stiffness in multiple joints and generalized weakness. Endorses lightheadedness, chills, night sweats, headaches, confusion but denies chest pain, shortness of breath, abdominal pain, mucosal lesions, diarrhea, rashes, congestion, pharyngitis, recent hiking or insect bites. Uric acid slightly elevated at 7.2. AST and ALT worse on 11/2 without abdominal pain. Preliminary read of RUQ US shows benign-appearing hepatic cysts and a simple cyst in pancreatic head/body. Gallbladder unremarkable besides possible cholelithiasis without signs of infection. Unclear etiology for worsening transaminitis; however, CRP is improving.   - Pending Workup: ANCA, peripheral smear, histoplasma urine antigen, blastomyces urine antigens, m.genitalium and U.urealyticum urine PCR, stool O&P x 3 days, mycoplasma pneumoniae antibody, trichinella IgG, coxiella Ab  - Negative Workup: Lyme, gonorrhea & chlamydia, HIV, syphilis, acute hepatitis panel, RF, tick-borne panel, enteric bacteria and virus panel, blood culture (10/31): NGTD, Anti-CCP  - Antibiotics: None at present, pending work up  - Pain: Tylenol PRN in setting of transaminitis  - Nausea: Zofran PRN  - Constipation: Miralax Senna PRN  - Labs: CMP, CBC daily, CRP q48h  - Nursing:  "Vitals q8h  Consults:  - Rheumatology consulted, recs appreciated  - ID consulted, recs appreciated     # ASTRID, improving  # Hyponatremia, resolved  Cr elevated to 1.76 with improvement after 1 L fluid in the ED. BUN:Cr ratio and FENa indicative of hemodynamic ASTRID. Will give additional 1 L NS today. Mild hyponatremia likely 2/2 hypovolemia and improved with fluids.  - S/p 1 L NS over 4 hours  - Encourage PO intake  - Daily CMP; bicarb improving  - HOLD PTA Losartan, Indomethacin    # Afib  In Afib during conversation. Will increase his metoprolol dosing if he is in RVR.   Workup:  - EKG  Management:  - PTA Eliquis 5mg daily  - PTA Flecanide 50mg BID  - PTA Metoprolol 12.5mg daily     Chronic/Stable:  # HTN: PTA HOLD Losartan 25mg daily  # Gout: PTA allopurinol 300mg daily, HOLD indomethacin 50mg TID PRN. Will likely need further up-titration of allopurinol in outpatient setting  # BPH: PTA Tamsulosin 0.4mg daily  # Seasonal allergies: PTA Loratadine 10mg daily, Afrin PRN         Diet: Regular Diet Adult    DVT Prophylaxis: DOAC  Brown Catheter: Not present  Fluids: PO, 1 L NS over 4 hours  Lines: None     Cardiac Monitoring: None  Code Status: Full Code      Clinically Significant Risk Factors         # Hyponatremia: Lowest Na = 134 mmol/L in last 2 days, will monitor as appropriate       # Hypoalbuminemia: Lowest albumin = 3.3 g/dL at 11/1/2024  8:33 AM, will monitor as appropriate       # Hypertension: Noted on problem list           # Overweight: Estimated body mass index is 27.32 kg/m  as calculated from the following:    Height as of an earlier encounter on 10/31/24: 1.753 m (5' 9\").    Weight as of an earlier encounter on 10/31/24: 83.9 kg (185 lb)., PRESENT ON ADMISSION            Disposition Plan      Expected Discharge Date: 11/04/2024                The patient's care was discussed with the Attending Physician, Dr. Bolden .    [facility]  [facility] " [facility]  Securely message with [employer] (more info)  Text page via [employer] Paging/Directory   See signed in provider for up to date coverage information  ______________________________________________________________________    Interval History   Overnight: NAOE.    Subjective: Reports that he is feeling much better. Less pain, more range of motion. States that he is feeling a little dizzy and that he is currently in Afib, but he isn't concerned about being syncopal at this time.    Physical Exam   Vital Signs: Temp: 97.4  F (36.3  C) Temp src: Oral BP: 110/75 Pulse: 76   Resp: 16 SpO2: 97 % O2 Device: None (Room air)      Constitutional: Awake, alert, in NAD. Resting in bed  Eyes: Sclera without jaundice or injection. Conjunctiva without pallor, erythema, or drainage. PERRLA, EOM intact.  HENT: NCAT, oral cavity with MMM and without lesions, intact dentition with multiple dental caps/fillings.  Respiratory: Lungs CTAB without crackles, wheezing, rales, or increased work of breathing.  Cardiovascular: Irregularly irregular rhythm, radial pulses 2+ bilaterally. Cap refill <2 sec.  Abdomen: Soft, non-distended, with positive bowel sounds. No tenderness, guarding, or rebound.  Skin: Warm & dry, without evidence of any rashes. Back of neck with evidence of well-healed, mild acne. R 4th finger with well-healed excoriation (pt reports from playing with his dog)  Musculoskeletal: Joint swelling in bilateral hands and knees has improved with increased ROM and decreased tenderness to palpation.   Neurologic: A&Ox4, without focal deficit, cranial nerves II-XII grossly intact.  Psychiatric: Alert & calm with appropriate affect, mood, insight, and thought processes.    Medical Decision Making   Please see A&P for additional details of medical decision making.      Data     I have personally reviewed the following data over the past 24 hrs:    5.8  \   14.3   / 186     137 104 38.9 (H) /  117 (H)   3.9 20 (L)  1.36 (H) \     ALT: 282 (H) AST: 206 (H) AP: 105 TBILI: 0.4   ALB: 3.5 TOT PROTEIN: 6.3 (L) LIPASE: N/A     Procal: N/A CRP: 213.82 (H) Lactic Acid: N/A         Imaging results reviewed over the past 24 hrs:   Recent Results (from the past 24 hours)   US Abdomen Limited    Impression    RESIDENT PRELIMINARY INTERPRETATION  IMPRESSION:   1. Benign-appearing hepatic cysts as detailed above, favored to  represent those seen on prior CT 1/12/2021.  2. Simple appearing cystic structure within/just superior to the  pancreatic head/body. Indeterminate for etiology. Would recommend  nonemergent follow-up MRI abdomen for further evaluation.  3. Unremarkable gallbladder, no biliary ductal dilation. There are  several linear and echogenic foci within the common bile duct likely  representing small stones. No secondary evidence for acute  cholecystitis. Can consider follow-up MRI/MRCP abdomen for further  evaluation.

## 2024-11-02 NOTE — PROGRESS NOTES
Shift [id number]    No acute event overnight. Pt AxOx4, denies SOB, chest pain or cough. Denies numbness or tingling of extremities. Pain manage with schedule medication. Pt had shower before bed. Assist x1, uses wheelchair. New IV access placed. NPO after midnight for abd ultrasound in AM. Pt made aware of stool sample needed, awaiting. Call light within reach. POC ongoing.    Temp: 97.7  F (36.5  C) Temp src: Oral BP: 121/72 Pulse: 72   Resp: 18 SpO2: 96 % O2 Device: None (Room air)

## 2024-11-03 ENCOUNTER — APPOINTMENT (OUTPATIENT)
Dept: MRI IMAGING | Facility: CLINIC | Age: 79
DRG: 549 | End: 2024-11-03
Payer: COMMERCIAL

## 2024-11-03 LAB
ALBUMIN SERPL BCG-MCNC: 3.2 G/DL (ref 3.5–5.2)
ALP SERPL-CCNC: 88 U/L (ref 40–150)
ALT SERPL W P-5'-P-CCNC: 181 U/L (ref 0–70)
ANION GAP SERPL CALCULATED.3IONS-SCNC: 9 MMOL/L (ref 7–15)
AST SERPL W P-5'-P-CCNC: 69 U/L (ref 0–45)
BILIRUB SERPL-MCNC: 0.4 MG/DL
BUN SERPL-MCNC: 34.8 MG/DL (ref 8–23)
CALCIUM SERPL-MCNC: 8.7 MG/DL (ref 8.8–10.4)
CHLORIDE SERPL-SCNC: 109 MMOL/L (ref 98–107)
CREAT SERPL-MCNC: 1.28 MG/DL (ref 0.67–1.17)
CRP SERPL-MCNC: 88.03 MG/L
EGFRCR SERPLBLD CKD-EPI 2021: 58 ML/MIN/1.73M2
ERYTHROCYTE [DISTWIDTH] IN BLOOD BY AUTOMATED COUNT: 13.5 % (ref 10–15)
ERYTHROCYTE [SEDIMENTATION RATE] IN BLOOD BY WESTERGREN METHOD: 41 MM/HR (ref 0–20)
GLUCOSE SERPL-MCNC: 101 MG/DL (ref 70–99)
HCO3 SERPL-SCNC: 22 MMOL/L (ref 22–29)
HCT VFR BLD AUTO: 38.7 % (ref 40–53)
HGB BLD-MCNC: 12.8 G/DL (ref 13.3–17.7)
HOLD SPECIMEN: NORMAL
MCH RBC QN AUTO: 28.6 PG (ref 26.5–33)
MCHC RBC AUTO-ENTMCNC: 33.1 G/DL (ref 31.5–36.5)
MCV RBC AUTO: 87 FL (ref 78–100)
PLATELET # BLD AUTO: 184 10E3/UL (ref 150–450)
POTASSIUM SERPL-SCNC: 4.2 MMOL/L (ref 3.4–5.3)
PROT SERPL-MCNC: 5.6 G/DL (ref 6.4–8.3)
RBC # BLD AUTO: 4.47 10E6/UL (ref 4.4–5.9)
SODIUM SERPL-SCNC: 140 MMOL/L (ref 135–145)
WBC # BLD AUTO: 5.4 10E3/UL (ref 4–11)

## 2024-11-03 PROCEDURE — 36415 COLL VENOUS BLD VENIPUNCTURE: CPT

## 2024-11-03 PROCEDURE — 120N000002 HC R&B MED SURG/OB UMMC

## 2024-11-03 PROCEDURE — 74183 MRI ABD W/O CNTR FLWD CNTR: CPT

## 2024-11-03 PROCEDURE — 87209 SMEAR COMPLEX STAIN: CPT | Performed by: PHYSICIAN ASSISTANT

## 2024-11-03 PROCEDURE — A9585 GADOBUTROL INJECTION: HCPCS

## 2024-11-03 PROCEDURE — 255N000002 HC RX 255 OP 636

## 2024-11-03 PROCEDURE — 86481 TB AG RESPONSE T-CELL SUSP: CPT | Performed by: INTERNAL MEDICINE

## 2024-11-03 PROCEDURE — 82390 ASSAY OF CERULOPLASMIN: CPT

## 2024-11-03 PROCEDURE — 85652 RBC SED RATE AUTOMATED: CPT | Performed by: INTERNAL MEDICINE

## 2024-11-03 PROCEDURE — 80053 COMPREHEN METABOLIC PANEL: CPT

## 2024-11-03 PROCEDURE — 250N000009 HC RX 250: Performed by: STUDENT IN AN ORGANIZED HEALTH CARE EDUCATION/TRAINING PROGRAM

## 2024-11-03 PROCEDURE — 87177 OVA AND PARASITES SMEARS: CPT | Performed by: PHYSICIAN ASSISTANT

## 2024-11-03 PROCEDURE — 74183 MRI ABD W/O CNTR FLWD CNTR: CPT | Mod: 26 | Performed by: RADIOLOGY

## 2024-11-03 PROCEDURE — 250N000013 HC RX MED GY IP 250 OP 250 PS 637

## 2024-11-03 PROCEDURE — 86140 C-REACTIVE PROTEIN: CPT | Performed by: INTERNAL MEDICINE

## 2024-11-03 PROCEDURE — 85027 COMPLETE CBC AUTOMATED: CPT

## 2024-11-03 PROCEDURE — 99232 SBSQ HOSP IP/OBS MODERATE 35: CPT | Mod: GC | Performed by: STUDENT IN AN ORGANIZED HEALTH CARE EDUCATION/TRAINING PROGRAM

## 2024-11-03 RX ORDER — GADOBUTROL 604.72 MG/ML
0.1 INJECTION INTRAVENOUS ONCE
Status: COMPLETED | OUTPATIENT
Start: 2024-11-03 | End: 2024-11-03

## 2024-11-03 RX ADMIN — GADOBUTROL 8.39 ML: 604.72 INJECTION INTRAVENOUS at 21:10

## 2024-11-03 RX ADMIN — FLECAINIDE ACETATE 50 MG: 50 TABLET ORAL at 08:53

## 2024-11-03 RX ADMIN — APIXABAN 5 MG: 5 TABLET, FILM COATED ORAL at 19:57

## 2024-11-03 RX ADMIN — ANAKINRA 100 MG: 100 INJECTION, SOLUTION SUBCUTANEOUS at 17:15

## 2024-11-03 RX ADMIN — TAMSULOSIN HYDROCHLORIDE 0.4 MG: 0.4 CAPSULE ORAL at 19:57

## 2024-11-03 RX ADMIN — Medication 12.5 MG: at 08:53

## 2024-11-03 RX ADMIN — FLECAINIDE ACETATE 50 MG: 50 TABLET ORAL at 19:57

## 2024-11-03 RX ADMIN — APIXABAN 5 MG: 5 TABLET, FILM COATED ORAL at 08:53

## 2024-11-03 RX ADMIN — ALLOPURINOL 300 MG: 300 TABLET ORAL at 08:53

## 2024-11-03 NOTE — PLAN OF CARE
Goal Outcome Evaluation:    VS: BP (!) 149/79 (BP Location: Right arm)   Pulse 67   Temp 98  F (36.7  C) (Oral)   Resp 18   SpO2 98%     O2: Stable on room air >90 %   Output: Voids without difficulties   Last BM: 11/2/24   Activity: Assist x1   Up for meals? Yes   Skin: Intact   Pain: Denies pain   CMS: AO x4   Dressing: None   Diet: Regular   LDA: Rt PIV SL   Equipment: Walker, personal belongings   Plan: Continue with POC   Additional Info: Stool sample sent to lab  MRI checklist done ans in chart, per MRI they will be ready for pt at 1900
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
## 2024-11-03 NOTE — PROGRESS NOTES
Shift [id number]    No acute event overnight. Pt is AxO x4. Denies SOB, dizziness, and chest pain. Denies pain. Independent in the room. No other concern converse to the writer. Pt slept through the night. Respiration nonlabor and regular rhythm. Call light within reach. POC ongoing.    Temp: 97.8  F (36.6  C) Temp src: Oral BP: (!) 140/77 (140/76) Pulse: 68   Resp: 18 SpO2: 99 % O2 Device: None (Room air)

## 2024-11-03 NOTE — PROGRESS NOTES
[facility]    Progress Note - [facility]       Date of Admission:  10/31/2024    Main plans for today:  - Follow up rheum and ID recs and labs  - MRCP ordered  - Encourage po intake in context of improving ASTRID without fluids    Assessment & Plan   Saul Hairston is a 79 year old male with a PMH of Afib, HTN, HLD, MUNIRA, prostate cancer diagnosed in 2018 now in remission, and gout admitted on 10/31/2024 for ASTRID & progressive, debilitating polyarthralgia of unclear etiology.     # Polyarthralgia concerning for inflammatory vs infectious arthritis, improving  Initially presented to [facility] ED for ~1wk of progressive and disabling pain/stiffness in multiple joints and generalized weakness with lightheadedness, chills, night sweats, headaches, confusion but without chest pain, shortness of breath, abdominal pain, mucosal lesions, diarrhea, rashes, congestion, pharyngitis, recent hiking or insect bites. Uric acid slightly elevated at 7.2 and workup below indicating more likely polyarticular gout than RA, lyme, gonococcal arthritis, reactive arthritis. CRP downtrending today and pain improving despite unknown etiology. Plan to continue therapies below. Awaiting ID and rheumatologic labs below for further diagnostic clarity.     - Pending Workup: ANCA, peripheral smear, histoplasma urine antigen, blastomyces urine antigens, m.genitalium and U.urealyticum urine PCR, stool O&P x 3 days, mycoplasma pneumoniae antibody, trichinella IgG, coxiella Ab  - Negative Workup: Lyme, gonorrhea & chlamydia, HIV, syphilis, acute hepatitis panel, RF, tick-borne panel, enteric bacteria and virus panel, blood culture (10/31): NGTD, Anti-CCP  - Antibiotics: None at present, pending work up  - Pain: Tylenol PRN in setting of transaminitis  - Nausea: Zofran PRN  - Constipation: Miralax Senna PRN  - Labs: CMP, CBC daily, CRP q48h  - Nursing: Vitals q8h  Consults:  - Rheumatology  consulted, recs appreciated  - ID consulted, recs appreciated    # Transaminitis, improving  AST/ALT elevated at 82/66 on 10/30; peaked at 206/282 on 11/2 and now downtrending. RUQ US shows clustered simple hepatic cysts in RLL of liver and a simple cyst in pancreatic head/body and gallbladder unremarkable besides possible cholelithiasis without signs of infection indicating unclear etiology for transaminitis; read recommended considering intraductal papillary neoplasm of pancreas (IPMN) for pancreatic lesion but liver lesions likely benign and low concern for association with transaminitis. In addition, given no abdominal pain and downtrending CRP, will obtain MRCP and consider further management based on findings.  - MRCP ordered    # ASTRID, improving  # Hyponatremia, resolved  Cr elevated to 1.76 on admission and now downtrending to 1.28 in context of multiple IVF bolus; no IVF today and patient has been supplementing with po fluids. Initial BUN:Cr ratio and FENa indicative of hemodynamic ASTRID. Mild hyponatremia likely 2/2 hypovolemia and improved with fluids.  - Encourage PO intake  - Daily CMP  - HOLD PTA Losartan, Indomethacin    # Afib  In Afib during conversation yesterday per EKG with palpatiations; however, EKG showed Afib without RVR. Will increase his metoprolol dosing if he is in RVR.   Workup:  - EKG if palpitations reoccur  Management:  - PTA Eliquis 5mg daily  - PTA Flecanide 50mg BID  - PTA Metoprolol 12.5mg daily     Chronic/Stable:  # HTN: PTA HOLD Losartan 25mg daily  # Gout: PTA allopurinol 300mg daily, HOLD indomethacin 50mg TID PRN. Will likely need further up-titration of allopurinol in outpatient setting  # BPH: PTA Tamsulosin 0.4mg daily  # Seasonal allergies: PTA Loratadine 10mg daily, Afrin PRN         Diet: Regular Diet Adult    DVT Prophylaxis: DOAC  Brown Catheter: Not present  Fluids: PO, 1 L NS over 4 hours  Lines: None     Cardiac Monitoring: None  Code Status: Full Code   "    Clinically Significant Risk Factors          # Hyperchloremia: Highest Cl = 109 mmol/L in last 2 days, will monitor as appropriate          # Hypoalbuminemia: Lowest albumin = 3.2 g/dL at 11/3/2024  8:36 AM, will monitor as appropriate       # Hypertension: Noted on problem list           # Overweight: Estimated body mass index is 27.32 kg/m  as calculated from the following:    Height as of an earlier encounter on 10/31/24: 1.753 m (5' 9\").    Weight as of an earlier encounter on 10/31/24: 83.9 kg (185 lb)., PRESENT ON ADMISSION            Disposition Plan      Expected Discharge Date: 11/04/2024                The patient's care was discussed with the Attending Physician, Dr. Bolden .    Remi Rowland MD  [facility]  [facility]  Securely message with [employer] (more info)  Text page via [employer] Paging/Directory   See signed in provider for up to date coverage information  ______________________________________________________________________    Interval History   Overnight: NAOE. EKG during episode of palpitations showing Afib without RVR.    Subjective: Joint pain continuing to improve. Denies palpitations or abdominal pain. Taking in po fluids appropriately. Relayed RUQ US results and plans for MRCP today or tomorrow.      Physical Exam   Vital Signs: Temp: 98  F (36.7  C) Temp src: Oral BP: (!) 149/79 Pulse: 67   Resp: 18 SpO2: 98 % O2 Device: None (Room air)      Constitutional: Awake, alert, in NAD. Resting in bed  Eyes: Sclera without jaundice or injection. Conjunctiva without pallor, erythema, or drainage. PERRLA, EOM intact.  HENT: NCAT, oral cavity with MMM and without lesions, intact dentition with multiple dental caps/fillings.  Respiratory: Lungs CTAB without crackles, wheezing, rales, or increased work of breathing.  Cardiovascular: Irregularly irregular rhythm, radial pulses 2+ bilaterally. Cap refill <2 sec.  Abdomen: Soft, " non-distended, with positive bowel sounds. No tenderness, guarding, or rebound.  Skin: Warm & dry, without evidence of any rashes. Back of neck with evidence of well-healed, mild acne. R 4th finger with well-healed excoriation (pt reports from playing with his dog)  Musculoskeletal: Joint swelling in bilateral hands and knees has improved with increased ROM and decreased tenderness to palpation.   Neurologic: A&Ox4, without focal deficit, cranial nerves II-XII grossly intact.  Psychiatric: Alert & calm with appropriate affect, mood, insight, and thought processes.

## 2024-11-04 DIAGNOSIS — M10.9 POLYARTICULAR GOUT: Primary | ICD-10-CM

## 2024-11-04 LAB
ALBUMIN SERPL BCG-MCNC: 3.5 G/DL (ref 3.5–5.2)
ALP SERPL-CCNC: 101 U/L (ref 40–150)
ALT SERPL W P-5'-P-CCNC: 230 U/L (ref 0–70)
ANA SER QL IF: NEGATIVE
ANION GAP SERPL CALCULATED.3IONS-SCNC: 10 MMOL/L (ref 7–15)
AST SERPL W P-5'-P-CCNC: 107 U/L (ref 0–45)
ATRIAL RATE - MUSE: 77 BPM
BILIRUB SERPL-MCNC: 0.5 MG/DL
BUN SERPL-MCNC: 29.3 MG/DL (ref 8–23)
CALCIUM SERPL-MCNC: 9.3 MG/DL (ref 8.8–10.4)
CHLORIDE SERPL-SCNC: 104 MMOL/L (ref 98–107)
CREAT SERPL-MCNC: 1.21 MG/DL (ref 0.67–1.17)
CRP SERPL-MCNC: 53.42 MG/L
DIASTOLIC BLOOD PRESSURE - MUSE: NORMAL MMHG
EGFRCR SERPLBLD CKD-EPI 2021: 62 ML/MIN/1.73M2
ERYTHROCYTE [DISTWIDTH] IN BLOOD BY AUTOMATED COUNT: 13.3 % (ref 10–15)
FERRITIN SERPL-MCNC: 1359 NG/ML (ref 31–409)
GAMMA INTERFERON BACKGROUND BLD IA-ACNC: 0.05 IU/ML
GLUCOSE SERPL-MCNC: 98 MG/DL (ref 70–99)
HCO3 SERPL-SCNC: 23 MMOL/L (ref 22–29)
HCT VFR BLD AUTO: 40.8 % (ref 40–53)
HGB BLD-MCNC: 13.5 G/DL (ref 13.3–17.7)
INTERPRETATION ECG - MUSE: NORMAL
IRON BINDING CAPACITY (ROCHE): 201 UG/DL (ref 240–430)
IRON SATN MFR SERPL: 52 % (ref 15–46)
IRON SERPL-MCNC: 104 UG/DL (ref 61–157)
M TB IFN-G BLD-IMP: NEGATIVE
M TB IFN-G CD4+ BCKGRND COR BLD-ACNC: 3.22 IU/ML
MCH RBC QN AUTO: 28.5 PG (ref 26.5–33)
MCHC RBC AUTO-ENTMCNC: 33.1 G/DL (ref 31.5–36.5)
MCV RBC AUTO: 86 FL (ref 78–100)
MITOGEN IGNF BCKGRD COR BLD-ACNC: 0 IU/ML
MITOGEN IGNF BCKGRD COR BLD-ACNC: 0 IU/ML
O+P STL MICRO: NEGATIVE
O+P STL MICRO: NEGATIVE
P AXIS - MUSE: NORMAL DEGREES
PLATELET # BLD AUTO: 239 10E3/UL (ref 150–450)
POTASSIUM SERPL-SCNC: 3.9 MMOL/L (ref 3.4–5.3)
PR INTERVAL - MUSE: NORMAL MS
PROT SERPL-MCNC: 6.3 G/DL (ref 6.4–8.3)
QRS DURATION - MUSE: 138 MS
QT - MUSE: 410 MS
QTC - MUSE: 493 MS
QUANTIFERON MITOGEN: 3.27 IU/ML
QUANTIFERON NIL TUBE: 0.05 IU/ML
QUANTIFERON TB1 TUBE: 0.05 IU/ML
QUANTIFERON TB2 TUBE: 0.05
R AXIS - MUSE: 27 DEGREES
RBC # BLD AUTO: 4.74 10E6/UL (ref 4.4–5.9)
SODIUM SERPL-SCNC: 137 MMOL/L (ref 135–145)
SPECIMEN TYPE: NORMAL
SPECIMEN TYPE: NORMAL
SYSTOLIC BLOOD PRESSURE - MUSE: NORMAL MMHG
T AXIS - MUSE: 20 DEGREES
VENTRICULAR RATE- MUSE: 87 BPM
WBC # BLD AUTO: 5.7 10E3/UL (ref 4–11)

## 2024-11-04 PROCEDURE — 83540 ASSAY OF IRON: CPT

## 2024-11-04 PROCEDURE — 85027 COMPLETE CBC AUTOMATED: CPT

## 2024-11-04 PROCEDURE — 36415 COLL VENOUS BLD VENIPUNCTURE: CPT

## 2024-11-04 PROCEDURE — 82728 ASSAY OF FERRITIN: CPT

## 2024-11-04 PROCEDURE — 250N000009 HC RX 250: Performed by: INTERNAL MEDICINE

## 2024-11-04 PROCEDURE — 250N000013 HC RX MED GY IP 250 OP 250 PS 637

## 2024-11-04 PROCEDURE — 99232 SBSQ HOSP IP/OBS MODERATE 35: CPT | Mod: GC | Performed by: STUDENT IN AN ORGANIZED HEALTH CARE EDUCATION/TRAINING PROGRAM

## 2024-11-04 PROCEDURE — 86140 C-REACTIVE PROTEIN: CPT

## 2024-11-04 PROCEDURE — 80051 ELECTROLYTE PANEL: CPT

## 2024-11-04 PROCEDURE — 120N000002 HC R&B MED SURG/OB UMMC

## 2024-11-04 PROCEDURE — 83550 IRON BINDING TEST: CPT

## 2024-11-04 PROCEDURE — 84075 ASSAY ALKALINE PHOSPHATASE: CPT

## 2024-11-04 PROCEDURE — 99233 SBSQ HOSP IP/OBS HIGH 50: CPT | Performed by: PHYSICIAN ASSISTANT

## 2024-11-04 PROCEDURE — 99232 SBSQ HOSP IP/OBS MODERATE 35: CPT | Performed by: INTERNAL MEDICINE

## 2024-11-04 RX ORDER — OXYMETAZOLINE HYDROCHLORIDE 0.05 G/100ML
2 SPRAY NASAL
Status: DISCONTINUED | OUTPATIENT
Start: 2024-11-06 | End: 2024-11-05 | Stop reason: HOSPADM

## 2024-11-04 RX ADMIN — FLECAINIDE ACETATE 50 MG: 50 TABLET ORAL at 08:36

## 2024-11-04 RX ADMIN — Medication 12.5 MG: at 08:36

## 2024-11-04 RX ADMIN — OXYMETAZOLINE HYDROCHLORIDE 2 SPRAY: 0.05 SPRAY NASAL at 08:36

## 2024-11-04 RX ADMIN — ANAKINRA 100 MG: 100 INJECTION, SOLUTION SUBCUTANEOUS at 18:28

## 2024-11-04 RX ADMIN — FLECAINIDE ACETATE 50 MG: 50 TABLET ORAL at 20:01

## 2024-11-04 RX ADMIN — TAMSULOSIN HYDROCHLORIDE 0.4 MG: 0.4 CAPSULE ORAL at 20:01

## 2024-11-04 RX ADMIN — CETIRIZINE HYDROCHLORIDE 10 MG: 10 TABLET, FILM COATED ORAL at 08:36

## 2024-11-04 RX ADMIN — ALLOPURINOL 300 MG: 300 TABLET ORAL at 08:36

## 2024-11-04 RX ADMIN — APIXABAN 5 MG: 5 TABLET, FILM COATED ORAL at 20:00

## 2024-11-04 RX ADMIN — APIXABAN 5 MG: 5 TABLET, FILM COATED ORAL at 08:36

## 2024-11-04 NOTE — PROGRESS NOTES
[facility] ID Service: Follow Up Note      Patient:  Saul Hairston   Date of birth 1945, Medical record number [id number]  Date of Visit:  11/04/2024  Date of Admission: 10/31/2024         Assessment and Recommendations:   ID Problem List:  Inflammatory polyarthralgia- suspected to be a polyarticular gout flare   >Multiple infectious tests pending.  Transaminitis  ASTRID- improved     Recommendations:  Continue off antibiotics.   We will follow pending infectious work up. Nothing has returned + to explain patient's presentation.   ID follow up in clinic 11/13 at 845 AM with Dr. Wilkerson.   If patient is discharged prior to 3rd O&P stool collection, please order as an outpatient.     Thank you for the consult. Infectious disease will sign off at this time. Do not hesitate to contact us with additional questions or change in patient's clinical status.      Discussion:  Sual Hairston is a 79 year old male with history of HTN, Afib, HLD, MUNIRA, prostate cancer treated with cryoablation (2018), and gout who was admitted on 10/31/24 with ASTRID and progressive polyarthralgia.      Afebrile with markedly elevated CRP at 254.9, ESR 23, WBC 9.7. Cr elevated 1.76-->1.54 and LFTs with   with Tbili 0.7 and Alk phos 93. Vitals stable. CXR without infiltrate. Multiple swollen, lightly erythematous joints with drenching night sweats and chills/rigors, soft stools, and mild nausea.     Ddx for polyarthropathy is broad. With extent of joints involved, suspect a reactive arthritis rather than septic arthritis. In addition to more routine causes of reactive arthritis (Mycoplasma and GI illnesses, as well as infections already screened for), he has pertinent exposures of visiting pig farm (Y.enterocolitica, Coxiella, Leptospira), chickens (Salmonella), history of eating game meat (parasitic infections, including Echinococcus), and time spent in the outdoors (histoplasma/blastomyces). No clear risk factors for brucella or  bartonella. Has history of mono and does not have lymphadenopathy to suggest acute EBV infection. Described acne on neck has resolved but raises question of HSV or VZV, which may be associated with LFT abnormalities- no lesions to swab at the moment. Recent dog bite wound on finger does not have erythema or drainage and appears to be healing- would not expect Capnocytophaga infection to present this way. Bcx are NGTD. Other negative work up as outlined below. Non-infectious causes also in differential including vasculitis (LFT elevations and ASTRID; though no rash) and other autoimmune conditions as outlined in primary team note.     Negative ID work up: GC/chlamydia, HIV, treponema Ab, Lyme Ab, HBV screening, HCV ab, tickborne PCR panel, Stool enteric panel, quantiferon gold.    Pending ID testing:  O&P stool exam x 3 separate day collections, Mycoplasma pneumoniae serologies, histoplasma uAg, Blastomyces uAg, ureaplasma and mycoplasma sp PCR, Leptospira IgM, Coxiella B parag, Lyme DNA PCR.     Underwent abd US 11/2 with findings of clustered simples cysts in lateral R lobe of liver and cyst like lesion of head of pancreas c/w IPMN. 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 Some sm hepatic cysts appearing in liver with no suspicious hepatic lesions.    Recs were discussed with primary team today. Don't hesitate to call with questions.     Attestation:  I have reviewed today's vital signs, medications, labs and imaging.    Annel Santana PA-C  Infectious Diseases  Contact via [employer] or [employer] Paging/Directory        Medical Decision Making     50 MINUTES SPENT BY ME on the date of service doing chart review, history, exam, documentation & further activities per the note.             Interval History:   Saul is feeling very well today- much better than presentation. Joint pains/swelling almost fully  resolved. Fatigue and weakness improved. Has more energy. Denies n/v PTA or diarrhea PTA or currently. No new sx endorsed.              Review of Systems:   Focused 5 pt ROS obtained, pertinent positives and negatives as above.          Current Antimicrobials   Current:  NONE         Physical Exam:   Ranges for vital signs:  Temp:  [97.3  F (36.3  C)-98.2  F (36.8  C)] 97.8  F (36.6  C)  Pulse:  [64-90] 90  Resp:  [18] 18  BP: (130-143)/(70-78) 130/71  SpO2:  [97 %-98 %] 97 %  No intake or output data in the 24 hours ending 11/04/24 0956  Exam:  Constitutional: Pleasant male seen sitting up on EOB, in NAD. Alert and interactive.   HEENT: NCAT, anicteric sclerae, conjunctiva clear. Moist mucous membranes.  Respiratory: Non-labored breathing on RA. No cough or SOB.  GI: Abdomen is soft, non-distended.  Skin: Warm and dry. No rashes or lesions on exposed surfaces.  Musculoskeletal: Extremities grossly normal. No tenderness or edema present.   Neurologic: A &O x3, speech normal, answering questions appropriately. Moves all extremities spontaneously. Grossly non-focal.  Neuropsychiatric: Mentation and affect normal/appropriate.  VAD: PIV            Laboratory Data:   Reviewed.  Pertinent for:    Culture data:  Culture   Date Value Ref Range Status   10/31/2024 No growth after 3 days  Preliminary   10/31/2024 No growth after 3 days  Preliminary       Inflammatory Markers    Recent Labs   Lab Test 11/04/24  0736 11/03/24  0836 11/02/24  0734 10/31/24  1317 03/30/17  1601   SED  --  41*  --  23* 8   CRPI 53.42* 88.03* 213.82* 254.89*  --        Hematology Studies    Recent Labs   Lab Test 11/04/24  0736 11/03/24  0836 11/02/24  0734 11/01/24  0833   WBC 5.7 5.4 5.8 9.3   HGB 13.5 12.8* 14.3 14.0   MCV 86 87 87 86    184 186 167     Recent Labs   Lab Test 12/26/20  0741 12/25/20  0742 12/24/20  0759 12/23/20  0714   ANEU 5.6 5.7 6.3 7.6   AEOS 0.0 0.0 0.0 0.0       Metabolic Studies     Recent Labs   Lab Test  11/04/24  0736 11/03/24  0836 11/02/24  0734 11/01/24  0833    140 137 134*   POTASSIUM 3.9 4.2 3.9 4.4   CHLORIDE 104 109* 104 100   CO2 23 22 20* 18*   BUN 29.3* 34.8* 38.9* 33.3*   CR 1.21* 1.28* 1.36* 1.54*   GFRESTIMATED 62 58* 54* 46*       Hepatic Studies    Recent Labs   Lab Test 11/04/24  0736 11/03/24  0836 11/02/24  0734   BILITOTAL 0.5 0.4 0.4   ALKPHOS 101 88 105   ALBUMIN 3.5 3.2* 3.5   * 69* 206*   * 181* 282*            Imaging:     CXR 10/31/24  IMPRESSION: No focal consolidation, pleural effusion or pneumothorax.  Cardiomediastinal silhouette is unremarkable. Mild degenerative  changes of the spine.     CT head 10/31/24  IMPRESSION: Negative for acute intracranial hemorrhage, hydrocephalus  or transcortical infarct. No skull fracture.

## 2024-11-04 NOTE — PROGRESS NOTES
Shift [id number]    No acute event overnight. Pt AxO x4, denies sob, dizziness, nor chest pain. VSS, stable in room air. Pt reported improvement of joints pain. Independent in the room. No new medical concern. MRI done tonight. POC ongoing.    Temp: 98.2  F (36.8  C) Temp src: Oral BP: (!) 143/70 (154/78) Pulse: 72   Resp: 18 SpO2: 98 % O2 Device: None (Room air)
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
## 2024-11-05 ENCOUNTER — TELEPHONE (OUTPATIENT)
Dept: GASTROENTEROLOGY | Facility: CLINIC | Age: 79
End: 2024-11-05
Payer: COMMERCIAL

## 2024-11-05 VITALS
OXYGEN SATURATION: 97 % | DIASTOLIC BLOOD PRESSURE: 64 MMHG | RESPIRATION RATE: 16 BRPM | HEART RATE: 62 BPM | SYSTOLIC BLOOD PRESSURE: 136 MMHG | TEMPERATURE: 97.8 F

## 2024-11-05 LAB
ALBUMIN SERPL BCG-MCNC: 3.5 G/DL (ref 3.5–5.2)
ALP SERPL-CCNC: 103 U/L (ref 40–150)
ALT SERPL W P-5'-P-CCNC: 246 U/L (ref 0–70)
ANCA AB PATTERN SER IF-IMP: NORMAL
ANION GAP SERPL CALCULATED.3IONS-SCNC: 9 MMOL/L (ref 7–15)
AST SERPL W P-5'-P-CCNC: 110 U/L (ref 0–45)
B BURGDOR DNA SPEC QL NAA+PROBE: NOT DETECTED
BACTERIA BLD CULT: NO GROWTH
BACTERIA BLD CULT: NO GROWTH
BILIRUB SERPL-MCNC: 0.3 MG/DL
BUN SERPL-MCNC: 25.8 MG/DL (ref 8–23)
CALCIUM SERPL-MCNC: 9.3 MG/DL (ref 8.8–10.4)
CHLORIDE SERPL-SCNC: 104 MMOL/L (ref 98–107)
CREAT SERPL-MCNC: 1.3 MG/DL (ref 0.67–1.17)
CRP SERPL-MCNC: 28.64 MG/L
EGFRCR SERPLBLD CKD-EPI 2021: 57 ML/MIN/1.73M2
ERYTHROCYTE [DISTWIDTH] IN BLOOD BY AUTOMATED COUNT: 13.5 % (ref 10–15)
GLUCOSE SERPL-MCNC: 98 MG/DL (ref 70–99)
HCO3 SERPL-SCNC: 25 MMOL/L (ref 22–29)
HCT VFR BLD AUTO: 42.9 % (ref 40–53)
HGB BLD-MCNC: 14.2 G/DL (ref 13.3–17.7)
HOLD SPECIMEN: NORMAL
INR PPP: 1.25 (ref 0.85–1.15)
LAB DIRECTOR COMMENTS: NORMAL
LAB DIRECTOR DISCLAIMER: NORMAL
LAB DIRECTOR INTERPRETATION: NORMAL
LAB DIRECTOR METHODOLOGY: NORMAL
LAB DIRECTOR RESULTS: NORMAL
LEPTOSPIRA IGM SER QL: NEGATIVE
LOCATION OF TASK: NORMAL
M GENITALIUM DNA SPEC QL NAA+PROBE: NOT DETECTED
M HOMINIS DNA SPEC QL NAA+PROBE: NOT DETECTED
MCH RBC QN AUTO: 28.9 PG (ref 26.5–33)
MCHC RBC AUTO-ENTMCNC: 33.1 G/DL (ref 31.5–36.5)
MCV RBC AUTO: 87 FL (ref 78–100)
PLATELET # BLD AUTO: 227 10E3/UL (ref 150–450)
POTASSIUM SERPL-SCNC: 4.9 MMOL/L (ref 3.4–5.3)
PROT SERPL-MCNC: 6 G/DL (ref 6.4–8.3)
RBC # BLD AUTO: 4.92 10E6/UL (ref 4.4–5.9)
SODIUM SERPL-SCNC: 138 MMOL/L (ref 135–145)
SPECIMEN TYPE: NORMAL
U PARVUM DNA SPEC QL NAA+PROBE: NOT DETECTED
U UREALYTICUM DNA SPEC QL NAA+PROBE: NOT DETECTED
WBC # BLD AUTO: 6.1 10E3/UL (ref 4–11)

## 2024-11-05 PROCEDURE — 250N000013 HC RX MED GY IP 250 OP 250 PS 637

## 2024-11-05 PROCEDURE — 82525 ASSAY OF COPPER: CPT

## 2024-11-05 PROCEDURE — 99239 HOSP IP/OBS DSCHRG MGMT >30: CPT | Mod: GC | Performed by: STUDENT IN AN ORGANIZED HEALTH CARE EDUCATION/TRAINING PROGRAM

## 2024-11-05 PROCEDURE — 80053 COMPREHEN METABOLIC PANEL: CPT

## 2024-11-05 PROCEDURE — 250N000009 HC RX 250: Performed by: INTERNAL MEDICINE

## 2024-11-05 PROCEDURE — 86140 C-REACTIVE PROTEIN: CPT | Performed by: INTERNAL MEDICINE

## 2024-11-05 PROCEDURE — 81256 HFE GENE: CPT | Performed by: INTERNAL MEDICINE

## 2024-11-05 PROCEDURE — G0452 MOLECULAR PATHOLOGY INTERPR: HCPCS | Mod: 26 | Performed by: PATHOLOGY

## 2024-11-05 PROCEDURE — 85610 PROTHROMBIN TIME: CPT

## 2024-11-05 PROCEDURE — 36415 COLL VENOUS BLD VENIPUNCTURE: CPT

## 2024-11-05 PROCEDURE — 85027 COMPLETE CBC AUTOMATED: CPT

## 2024-11-05 RX ORDER — OXYMETAZOLINE HYDROCHLORIDE 0.05 G/100ML
2 SPRAY NASAL EVERY OTHER DAY
Qty: 15 ML | Refills: 0 | Status: SHIPPED | OUTPATIENT
Start: 2024-11-05

## 2024-11-05 RX ORDER — OXYMETAZOLINE HYDROCHLORIDE 0.05 G/100ML
2 SPRAY NASAL EVERY OTHER DAY
Qty: 15 ML | Refills: 0 | Status: SHIPPED | OUTPATIENT
Start: 2024-11-05 | End: 2024-11-05

## 2024-11-05 RX ORDER — ALLOPURINOL 100 MG/1
400 TABLET ORAL DAILY
Qty: 120 TABLET | Refills: 0 | Status: SHIPPED | OUTPATIENT
Start: 2024-11-05 | End: 2024-11-14

## 2024-11-05 RX ADMIN — CETIRIZINE HYDROCHLORIDE 10 MG: 10 TABLET, FILM COATED ORAL at 08:01

## 2024-11-05 RX ADMIN — APIXABAN 5 MG: 5 TABLET, FILM COATED ORAL at 08:01

## 2024-11-05 RX ADMIN — ALLOPURINOL 300 MG: 300 TABLET ORAL at 08:01

## 2024-11-05 RX ADMIN — ANAKINRA 100 MG: 100 INJECTION, SOLUTION SUBCUTANEOUS at 13:01

## 2024-11-05 RX ADMIN — Medication 12.5 MG: at 08:01

## 2024-11-05 RX ADMIN — FLECAINIDE ACETATE 50 MG: 50 TABLET ORAL at 08:01

## 2024-11-05 ASSESSMENT — ACTIVITIES OF DAILY LIVING (ADL)
ADLS_ACUITY_SCORE: 0

## 2024-11-05 NOTE — PROGRESS NOTES
VS: Temp: 98.2  F (36.8  C) Temp src: Oral BP: (!) 155/76 Pulse: 63   Resp: 16 SpO2: 98 % O2 Device: None (Room air)       O2: On room air, denies cough, no cough noted   Output: Voids spontaneously in the bathroom   Last BM: Normoactive bowel sounds   Activity: Up independently in the room and hallways   Skin: Intact   Pain: denies   CMS: intact   Dressing: N/A   Diet: REG   LDA: PIV to R arm, SL   Equipment: Personal belongings   Plan: Patient voiced wishes to discharge today after rheumatology clearance, continue with POC   Additional Info:

## 2024-11-05 NOTE — PLAN OF CARE
Goal Outcome Evaluation:      Plan of Care Reviewed With: patient    Overall Patient Progress: improvingOverall Patient Progress: improving    Outcome Evaluation: Pt having no pain in joints this shift. Pt is hoping for discharge tomorrow. Headache this morning resolved with rest.

## 2024-11-05 NOTE — PROGRESS NOTES
10A ICU End of Shift Summary.       Neuro:Afebrile, A&Ox4  Cardiac: BP elevated, Known HTN    Respiratory: RA   GI/: Continent of bladder and bowel  Diet/appetite: Regular  Pain: Denies pain and discomfort  Skin: Grossly intact  LDA's: PIV x 1, SL  Activities: Up ad paula    Drips:   None       Plan: Hand Xray AM, Continue POC

## 2024-11-05 NOTE — DISCHARGE SUMMARY
Reviewed discharge orders and After Visit Summary (AVS) with the patient. Medication orders were reviewed and instructions given as to the frequency to take each med, along with when last medication was given. Patient belongings sent with patient. Patient instructed to follow up with primary physican with any further questions they may have. Patient states they understand discharge orders as they are written and has no questions. Patient was discharged at 2:12 PM.  Wife was here to take patient home.

## 2024-11-05 NOTE — PROGRESS NOTES
[facility]    Progress Note - [facility]       Date of Admission:  10/31/2024    Main plans for today:  - Anakinra x 1 today and tomorrow and plan to discharge tomorrow with rheum follow up  - MRCP with siderosis of liver with super high ferritin indicating possibility of hemochromatosis or other disorder and will get GI outpt follow up    Assessment & Plan   Saul Hairston is a 79 year old male with a PMH of Afib, HTN, HLD, MUNIRA, prostate cancer diagnosed in 2018 now in remission, and gout admitted on 10/31/2024 for ASTRID & progressive, debilitating polyarthralgia of unclear etiology.     # Polyarthralgia concerning for inflammatory vs infectious arthritis, improving  Initially presented to [facility] ED for ~1wk of progressive and disabling pain/stiffness in multiple joints and generalized weakness with lightheadedness, chills, night sweats, headaches, confusion but without chest pain, shortness of breath, abdominal pain, mucosal lesions, diarrhea, rashes, congestion, pharyngitis, recent hiking or insect bites. Uric acid slightly elevated at 7.2 and workup below indicating more likely polyarticular gout than RA, lyme, gonococcal arthritis, reactive arthritis. CRP downtrending tand pain improving despite unknown etiology. Anakinra x 1 today and tomorrow and likely discharge tomorrow with rheum follow up.    - Pending Workup: ANCA, peripheral smear, histoplasma urine antigen, blastomyces urine antigens, m.genitalium and U.urealyticum urine PCR, stool O&P x 3 days, mycoplasma pneumoniae antibody, trichinella IgG, coxiella Ab  - Negative Workup: Lyme, gonorrhea & chlamydia, HIV, syphilis, acute hepatitis panel, RF, tick-borne panel, enteric bacteria and virus panel, blood culture (10/31): NGTD, Anti-CCP  - Antibiotics: None at present, pending work up  - Pain: Tylenol PRN in setting of transaminitis  - Anakinra dosing per rheum  - Nausea: Zofran PRN  -  Constipation: Miralax Senna PRN  - Labs: CMP, CBC daily, CRP q48h  - Nursing: Vitals q8h  Consults:  - Rheumatology consulted, recs appreciated  - ID consulted, recs appreciated    # Transaminitis, improving  # Siderosis  # Elevated ferritin  AST/ALT elevated at 82/66 on 10/30; peaked at 206/282 on 11/2 and downtrended 11/3 but uptrending near peak today. RUQ US shows clustered simple hepatic cysts in RLL of liver and a simple cyst in pancreatic head/body and gallbladder unremarkable besides possible cholelithiasis without signs of infection indicating unclear etiology for transaminitis. MRCP reassuring though did show siderosis with ferritin >1300 and iron studies indicative of iron overload. Per rheum hemochromatosis may be associated with pseudogout and gout--will order GI outpatient follow up and will likely need repeat imaging and possibly phlebotomy but nothing to do inpatient.    # ASTRID, improving  # Hyponatremia, resolved  Cr elevated to 1.76 on admission and now stable in 1.2 range in context of multiple IVF boluses. Initial BUN:Cr ratio and FENa indicative of hemodynamic ASTRID. Mild hyponatremia likely 2/2 hypovolemia and improved with fluids.  - Encourage PO intake  - Daily CMP  - HOLD PTA Losartan, Indomethacin    # Afib  Afib shown on EKG with palpatations earlier in admission; however, EKG showed Afib without RVR. Will increase his metoprolol dosing if he is in RVR.   Workup:  - EKG if palpitations reoccur  Management:  - PTA Eliquis 5mg daily  - PTA Flecanide 50mg BID  - PTA Metoprolol 12.5mg daily     Chronic/Stable:  # HTN: PTA HOLD Losartan 25mg daily  # Gout: PTA allopurinol 300mg daily, HOLD indomethacin 50mg TID PRN. Will likely need further up-titration of allopurinol in outpatient setting - per rheum, 400 mg tid on discharge.  # BPH: PTA Tamsulosin 0.4mg daily  # Seasonal allergies: PTA Loratadine 10mg daily, Afrin PRN         Diet: Regular Diet Adult    DVT Prophylaxis: DOAC  Brown Catheter: Not  "present  Fluids: PO, 1 L NS over 4 hours  Lines: None     Cardiac Monitoring: None  Code Status: Full Code      Clinically Significant Risk Factors          # Hyperchloremia: Highest Cl = 109 mmol/L in last 2 days, will monitor as appropriate          # Hypoalbuminemia: Lowest albumin = 3.2 g/dL at 11/3/2024  8:36 AM, will monitor as appropriate       # Hypertension: Noted on problem list           # Overweight: Estimated body mass index is 27.32 kg/m  as calculated from the following:    Height as of an earlier encounter on 10/31/24: 1.753 m (5' 9\").    Weight as of an earlier encounter on 10/31/24: 83.9 kg (185 lb)., PRESENT ON ADMISSION            Disposition Plan      Expected Discharge Date: 11/05/2024                The patient's care was discussed with the Attending Physician, Dr. Bolden .    Remi Rowland MD  [facility]  [facility]  Securely message with Vocera (more info)  Text page via [facility] Paging/Directory   See signed in provider for up to date coverage information  ______________________________________________________________________    Interval History   Overnight: NAOE.     Subjective: Joint pain continuing to improve. Denies palpitations or abdominal pain. Taking in po fluids appropriately. Relayed MRCP results and plan for discharge tomorrow pending rheum agreement.      Physical Exam   Vital Signs: Temp: 98.1  F (36.7  C) Temp src: Oral BP: 130/71 Pulse: 62   Resp: 16 SpO2: 97 % O2 Device: None (Room air)      Constitutional: Awake, alert, in NAD. Resting in bed  Eyes: Sclera without jaundice or injection. Conjunctiva without pallor, erythema, or drainage. PERRLA, EOM intact.  HENT: NCAT, oral cavity with MMM and without lesions, intact dentition with multiple dental caps/fillings.  Respiratory: Lungs CTAB without crackles, wheezing, rales, or increased work of breathing.  Cardiovascular: Irregularly irregular rhythm, " radial pulses 2+ bilaterally. Cap refill <2 sec.  Abdomen: Soft, non-distended, with positive bowel sounds. No tenderness, guarding, or rebound.  Skin: Warm & dry, without evidence of any rashes. Back of neck with evidence of well-healed, mild acne. R 4th finger with well-healed excoriation (pt reports from playing with his dog)  Musculoskeletal: Joint swelling in bilateral hands and knees has improved with increased ROM and decreased tenderness to palpation.   Neurologic: A&Ox4, without focal deficit, cranial nerves II-XII grossly intact.  Psychiatric: Alert & calm with appropriate affect, mood, insight, and thought processes.         08-27    128<L>  |  94<L>  |  62<H>  ----------------------------<  195<H>  5.0   |  17<L>  |  2.13<H>    Ca    8.1<L>      27 Aug 2024 06:25  Phos  4.4     08-27  Mg     2.60     08-27    TPro  6.1  /  Alb  2.4<L>  /  TBili  21.5<H>  /  DBili  x   /  AST  230<H>  /  ALT  215<H>  /  AlkPhos  1356<H>  08-27  POCT Blood Glucose.: 235 mg/dL (08-27-24 @ 09:02)  A1C with Estimated Average Glucose Result: 8.2 % (08-24-24 @ 06:00)   DISPLAY PLAN FREE TEXT DISPLAY PLAN FREE TEXT DISPLAY PLAN FREE TEXT DISPLAY PLAN FREE TEXT DISPLAY PLAN FREE TEXT DISPLAY PLAN FREE TEXT DISPLAY PLAN FREE TEXT DISPLAY PLAN FREE TEXT DISPLAY PLAN FREE TEXT DISPLAY PLAN FREE TEXT DISPLAY PLAN FREE TEXT DISPLAY PLAN FREE TEXT DISPLAY PLAN FREE TEXT DISPLAY PLAN FREE TEXT DISPLAY PLAN FREE TEXT DISPLAY PLAN FREE TEXT DISPLAY PLAN FREE TEXT

## 2024-11-05 NOTE — PLAN OF CARE
Goal Outcome Evaluation:      Plan of Care Reviewed With: patient    Overall Patient Progress: improving    Outcome Evaluation: Pt overall is doing well, has no concerns for pain or discomfort. Looking forward to discharging home today.    O2: On RA   Output: Continent of B/B, voids spontaneously without difficulty   Last BM: 11/04/24 per patient   Activity: Independent with ADLs   Skin: WDL   Pain: Denies   CMS:  Neuro: Intact  A/O x4, able to communicate needs   Dressing: None   Diet: Regular with good appetite   LDA: None   Equipment: IV pole, patient's belonging   Plan: Con't POC.    Additional Info: Pt is calm and cooperative with cares. VSS on RA. Denies CP, SOB, N/V.

## 2024-11-06 ENCOUNTER — TELEPHONE (OUTPATIENT)
Dept: GASTROENTEROLOGY | Facility: CLINIC | Age: 79
End: 2024-11-06
Payer: COMMERCIAL

## 2024-11-06 ENCOUNTER — PATIENT OUTREACH (OUTPATIENT)
Dept: FAMILY MEDICINE | Facility: CLINIC | Age: 79
End: 2024-11-06
Payer: COMMERCIAL

## 2024-11-06 LAB
C BURNET PH1 IGG SER QL IF: NEGATIVE
C BURNET PH2 IGG SER QL IF: NEGATIVE
COPPER SERPL-MCNC: 115.8 UG/DL
M PNEUMO IGG SER IA-ACNC: 0.14 U/L
M PNEUMO IGM SER IA-ACNC: 0.09 U/L
SCANNED LAB RESULT: NORMAL
SCANNED LAB RESULT: NORMAL

## 2024-11-06 NOTE — DISCHARGE SUMMARY
"[facility]  Discharge Summary - Medicine & Pediatrics       Date of Admission:  10/31/2024  Date of Discharge:  11/5/2024  2:17 PM  Discharging Provider: Memo Garnett MD  Discharge Service: [facility] Service    Discharge Diagnoses   # Polyarthralgia concerning for inflammatory vs infectious arthritis, improving  #Gout  # Transaminitis  # Siderosis  # Elevated ferritin  # ASTRID, improving  # Hyponatremia, resolved  # Afib     Chronic/stable problems listed in hospital course below.    Clinically Significant Risk Factors     # Overweight: Estimated body mass index is 27.32 kg/m  as calculated from the following:    Height as of an earlier encounter on 10/31/24: 1.753 m (5' 9\").    Weight as of an earlier encounter on 10/31/24: 83.9 kg (185 lb).       Follow-ups Needed After Discharge   Follow-up Appointments       Follow Up ([facility])      Follow up with primary care provider, Waldo Matson MD, within 7 days for hospital follow- up.  The following labs/tests are recommended: CMP (LFTs).      Appointments on [place] and/or [facility] (with [facility] or [facility] provider or service). Call [phone] if you haven't heard regarding these appointments within 7 days of discharge.            Repeat CMP for LFTs    Unresulted Labs Ordered in the Past 30 Days of this Admission       Date and Time Order Name Status Description    11/5/2024 12:00 AM Copper level In process     11/4/2024 11:16 AM Ceruloplasmin In process     11/2/2024  7:55 AM [id number]; Trichinella Antibody IgG: [facility] Miscellaneous Test In process     11/2/2024 12:00 AM Coxiella B Antibody IGG Phase 1 and 2 with Reflex In process     11/2/2024 12:00 AM Mycoplasma pneumonia abys IgG and IgM In process     11/1/2024  2:24 PM Blastomyces Agn Quant EIA Non Blood In process     11/1/2024  2:24 PM Histoplasma Capsulatum Agn Non Blood In process         These results will be followed up by " infectious disease, rheumatology    Discharge Disposition   Discharged to home  Condition at discharge: Stable    Hospital Course   Saul Hairston is a 79 year old male with a PMH of Afib, HTN, HLD, MUNIRA, prostate cancer diagnosed in 2018 now in remission, and gout admitted on 10/31/2024 for ASTRID & progressive, debilitating polyarthralgia that is most likely related to gout.     # Polyarthralgia concerning for inflammatory vs infectious arthritis, improving  Initially presented to [facility] ED for ~1wk of progressive and disabling pain/stiffness in multiple joints and generalized weakness with lightheadedness, chills, night sweats, headaches, confusion but without chest pain, shortness of breath, abdominal pain, mucosal lesions, diarrhea, rashes, congestion, pharyngitis, recent hiking or insect bites. Workup below indicating more likely polyarticular gout than RA, lyme, gonococcal arthritis, reactive arthritis. CRP initially elevated but downtrending and near wnl on discharge with pain much improved since daily Anakinra injections from 11/1 to 11/5. Allopurinol  mg daily dose increased to 400 mg daily on discharge. Worked closely with rheumatology and infectious disease who will follow up with him outpatient. Given his presentation on physical exam and that he was was afebrile and HDS and in context of the labs that had resulted negative below, it was determined that antibiotics were not needed. See above for pending workup, rheumatology and ID to follow up on labs.     Negative Workup: Lyme, gonorrhea & chlamydia, HIV, syphilis, acute hepatitis panel, RF, tick-borne panel, enteric bacteria and virus panel, blood culture (10/31): NGTD, Anti-CCP, ANCA, peripheral smear, m.genitalium and U.urealyticum urine PCR, stool O&P x 2 (third ordered for outpatient),      # Transaminitis  # Siderosis  # Elevated ferritin  AST/ALT elevated at 66/82 on 10/30; increased to 100s/200s and stable at those levels on discharge without  RUQ abdominal pain. RUQ US shows clustered simple hepatic cysts in RLL of liver and a simple cyst in pancreatic head/body, prompting MRCP order. MRCP reassuring against acute pathology but did show siderosis with ferritin >1300 and iron studies indicative of iron overload. Per rheumatology hemochromatosis may be associated gout; ordered GI follow up outpatient.     # ASTRID, improving  # Hyponatremia, resolved  Cr elevated to 1.76 on admission and now stable in 1.2-3 range; received several IVF boluses on admission but maintaining po fluid intake on discharge. Initial BUN:Cr ratio and FENa indicative of hemodynamic ASTRID. Held PTA losartan but resumed on discharge. Discontinued indomethacin inpatient and did not resume on discharge.     # Afib  Afib shown on EKG with palpatations earlier in admission; however, EKG showed Afib without RVR. Continued:   - PTA Eliquis 5mg daily  - PTA Flecanide 50mg BID  - PTA Metoprolol 12.5mg daily     Chronic/Stable:  # HTN: PTA Losartan 25mg daily resumed on discharge  # Gout: PTA allopurinol 300mg daily --> 400 mg daily on discharge, discontinued indomethacin 50mg TID  # BPH: PTA Tamsulosin 0.4mg daily  # Seasonal allergies: PTA Loratadine 10mg daily, Afrin PRN     Consultations This Hospital Stay   RHEUMATOLOGY IP CONSULT  INFECTIOUS DISEASE [facility] ADULT IP CONSULT    Code Status   Prior       The patient was discussed with MD [facility]  [facility] [address]  [address] [place] [id number]  Phone: [phone]  Fax: [phone]  ______________________________________________________________________    Physical Exam   Vital Signs: Temp: 97.8  F (36.6  C) Temp src: Oral BP: 136/64 Pulse: 62   Resp: 16 SpO2: 97 % O2 Device: None (Room air)    Weight: 0 lbs 0 oz    Constitutional: Awake, alert, in NAD. Resting in bed  Eyes: Sclera without jaundice or injection. Conjunctiva without pallor, erythema, or drainage. PERRLA, EOM  intact.  HENT: NCAT, oral cavity with MMM and without lesions, intact dentition with multiple dental caps/fillings.  Respiratory: Lungs CTAB without crackles, wheezing, rales, or increased work of breathing.  Cardiovascular: Irregularly irregular rhythm, radial pulses 2+ bilaterally. Cap refill <2 sec.  Abdomen: Soft, non-distended, with positive bowel sounds. No tenderness, guarding, or rebound.  Skin: Warm & dry, without evidence of any rashes. Back of neck with evidence of well-healed, mild acne. R 4th finger with well-healed excoriation (pt reports from playing with his dog)  Musculoskeletal: Joint swelling in bilateral hands and knees has improved with increased ROM and decreased tenderness to palpation.   Neurologic: A&Ox4, without focal deficit, cranial nerves II-XII grossly intact.  Psychiatric: Alert & calm with appropriate affect, mood, insight, and thought processes.         Primary Care Physician   Waldo Matson MD    Discharge Orders      Adult GI  Referral - Consult Only      Adult Rheumatology  Referral      Follow Up ([facility])    Follow up with primary care provider, Waldo Matson MD, within 7 days for hospital follow- up.  The following labs/tests are recommended: CMP (LFTs).      Appointments on [place] and/or [facility] (with [facility] or [facility] provider or service). Call [phone] if you haven't heard regarding these appointments within 7 days of discharge.     Reason for your hospital stay    You were hospitalized for joint pain. Please take your allopurinol at increased dose of 400 mg daily and do not resume indomethacin. Please resume your losartan on 11/6. Please hydrate to keep your kidneys healthy and follow up with rheumatology, infectious disease, and gastroenterology.  Please return to the hospital if your joint pain becomes severe once more or if you have severe abdominal pain or yellowing of the skin or eyes.    Best,  [facility]      Activity    Your activity upon discharge: activity as tolerated     Ova and Parasite Exam Routine     Diet    Follow this diet upon discharge: Current Diet:Orders Placed This Encounter      Regular Diet Adult       Significant Results and Procedures   Results for orders placed or performed during the hospital encounter of 10/31/24   US Abdomen Limited    Narrative    EXAMINATION: Limited Abdominal Ultrasound, 11/2/2024 8:59 AM     COMPARISON: None.    HISTORY: please assess RUQ, has transaminitis and also would like to  assess for liver cysts    FINDINGS:   Fluid: No evidence of ascites or pleural effusions.    Liver: The liver demonstrates normal echotexture, measuring 15.5 cm in  craniocaudal dimension. Focal hepatic lesions adjacent to each other  in lateral right lobe:  Lesion 1: Well-circumscribed anechoic with distal acoustic enhancement  measuring 1.6 x 1 x 1 cm in the lateral right lobe  Lesion 2: Well-circumscribed anechoic with distal acoustic enhancement  measuring 1.4 x 0.9 x 1 cm in the lateral right lobe  Favored to represent simple cysts.   Main portal vein is patent with antegrade flow and measures 9 mm..    Gallbladder: There is no wall thickening, pericholecystic fluid,  positive sonographic Cabrera's sign or evidence for cholelithiasis.    Bile Ducts: No intrahepatic ductal dilatation demonstrated. Visualized  common bile duct measures 3 mm in diameter.  Intraluminal echogenicity  in the common bile duct (image 18 and 19) measuring up to 1.3 cm  craniocaudally without significant shadowing.    Pancreas: Pancreas is partially obscured. Focal anechoic lesion along  the pancreatic head measuring 0.9 x 0.8 x 0.9 cm. (Image 20), not  increased internal vascularity on color Doppler    Kidney: The right kidney measures 9.3 cm long. No hydronephrosis or  mass        Impression    IMPRESSION:     1. Clustered simple appearing cysts in the lateral right lobe of  liver.  2. Cyst like lesion along  pancreatic head, measuring up to 0.9 cm  differentials include IPMN. May consider MRI pancreas with MRCP for  further evaluation  3. Curvilinear echogenicity in the visualized nondilated common bile  duct, indeterminate differential include choledocholithiasis, debris  versus others, may further be evaluated at the time of MRCP.    I have personally reviewed the examination and initial interpretation  and I agree with the findings.    JOSÉ MIGUEL RUEDA MD         SYSTEM ID:  [id number]   MR Abdomen MRCP w/o & w Contrast    Narrative    MRCP Without and With Contrast    CLINICAL HISTORY: 0.9 cm cyst like lesion on pancreatic head found on  limited abd US, liver cysts (incidental finding, no symptoms)    DATE: 11/3/2024 9:10 PM    TECHNIQUE:     Images were acquired with and without intravenous gadolinium contrast  through the upper abdomen. The following MR images were acquired  without intravenous contrast: TrueFISP, multiplanar T2-weighted, axial  T1 in/out of phase, T2-weighted MRCP images, axial diffusion-weighted  and axial apparent diffusion coefficient. T1-weighted images were  obtained before contrast at the multiple time points following  contrast injection. 3-D reformatted images were generated by the  technologist. Contrast dose: 9mL Gadavist    Comparison study: Ultrasound 11/2/2024. CT 1/12/2024.    FINDINGS:    Liver: Noncirrhotic morphology of the liver. Loss of signal on in  phase imaging. There are multiple T2 hyperintense nonenhancing cysts  within hepatic segment 6. Additional subcentimeter T2 hyperintense  cysts throughout the liver. No suspicious focal lesions.    Biliary system: The gallbladder is within normal limits.. No  intrahepatic or extrahepatic biliary dilation.    Spleen: Not enlarged.    Kidneys: No hydronephrosis. Multiple cortical T2 hyperintense  nonenhancing cysts bilaterally.    Adrenal glands: Within normal limits.    Pancreas: Preservation of intrinsic T1 hyperintense  "parenchymal  signal. Moderate atrophy of the pancreas. No pancreatic ductal  dilation. No pancreatic divisum. There are multiple T2 hyperintense  cysts throughout the pancreas measuring up to 1.3 cm in the pancreatic  tail (11/53). No worrisome features on contrast enhanced sequences.    Bowel: The visualized small and large bowel is nondistended.    Lymph nodes: No adenopathy.    Blood vessels: The major abdominal arteries and portal vessels are  patent. Mild-to-moderate atherosclerotic disease of the abdominal  aorta.    Lung bases: Small to moderate hiatal hernia. Grossly clear lung bases.    Bones and soft tissues: No suspicious osseous lesion. Osseous  hemangioma within the T12 vertebral body. Multilevel degenerative  changes of the spine.    Mesentery: Within normal limits.    Ascites: None.      Impression    IMPRESSION:    1. 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 Follow-up guidelines are  provided below.    2. Multiple hepatic cysts and additional too small to characterize T2  hyperintensities. No suspicious hepatic lesion.    3. Hepatic siderosis.     International evidence-based [place] guidelines for the management of  intraductal papillary mucinous neoplasm of the pancreas:   Surveillance is recommended if no high risk stigmata or worrisome  features are present:  Primary imaging modalities recommended are MRI/MRCP and MDCT  Size of largest cyst:   *  Less than 20 mm: Follow-up imaging in 6 months once, then every 18  months if no change. Stop surveillance if stable for 5 years.  *  More than 20 mm but less than 30 mm: Follow-up imaging at 6 months,  12 months, then yearly if no change.   *  More than 30 mm- follow up imaging every 6 months.    GI consultation for surgery/endoscopic ultrasound is recommended for  cysts with \"high-risk stigmata\" of high grade dysplasia or " invasive  carcinoma such as:  1. Obstructive jaundice in a patient with cystic lesion of the head of  the pancreas  2. Enhancing mural nodule > 5mm or solid component  3. Main pancreatic duct >10mm  4. Suspicious or positive results of cytology    If worrisome features are present, GI consultation is recommended.  Worrisome features on imagin. Cyst more than or equal to 30 mm  2. Thickened or enhancing cyst walls  3. Main pancreatic duct > 5mm and < 10mm.  4. Abrupt change in caliber of pancreatic duct with distal atrophy  5. Lymphadenopathy  6. Cyst growth rate > 2.5mm/year    *Reference: International evidence-based [place] guidelines for the  management of  intraductal papillary mucinous neoplasm of the pancreas Pancreatology:  24:(); 255-270.  https://doi.org/10.1016/j.abarca.2023.12.009    I have personally reviewed the examination and initial interpretation  and I agree with the findings.    YVONNE DE LEÓN MD         SYSTEM ID:  [id number]       Discharge Medications   Discharge Medication List as of 2024  1:21 PM        CONTINUE these medications which have CHANGED    Details   allopurinol (ZYLOPRIM) 100 MG tablet Take 4 tablets (400 mg) by mouth daily., Disp-120 tablet, R-0, E-Prescribe      oxymetazoline (AFRIN) 0.05 % nasal spray Spray 0.2 mLs (2 sprays) into both nostrils every other day., Disp-15 mL, R-0, E-PrescribePlease see PCP to taper dose           CONTINUE these medications which have NOT CHANGED    Details   cetirizine (ZYRTEC) 10 MG tablet TAKE 1 TABLET (10 MG) BY MOUTH DAILY., Disp-90 tablet, R-1, E-Prescribe      albuterol (PROAIR HFA/PROVENTIL HFA/VENTOLIN HFA) 108 (90 Base) MCG/ACT inhaler INHALE 1-2 PUFFS INTO THE LUNGS EVERY 6 HOURS AS NEEDED FOR SHORTNESS OF BREATH, WHEEZING OR COUGH, Disp-8.5 g, R-0, E-PrescribePharmacy may dispense brand covered by insurance (Proair, or proventil or ventolin or generic albuterol inhaler)      apixaban ANTICOAGULANT (ELIQUIS) 5 MG tablet Take 1  tablet (5 mg) by mouth 2 times daily, Disp-180 tablet, R-3, E-Prescribe      flecainide (TAMBOCOR) 50 MG tablet Take 1 tablet (50 mg) by mouth 2 times daily, Disp-180 tablet, R-3, E-Prescribe      losartan (COZAAR) 25 MG tablet Take 1 tablet (25 mg) by mouth 2 times daily, Disp-180 tablet, R-3, E-Prescribe      metoprolol succinate ER (TOPROL XL) 25 MG 24 hr tablet Take 0.5 tablets (12.5 mg) by mouth daily, Disp-45 tablet, R-3, E-PrescribePt doesn't need a refill at this time.      Omega-3 Fatty Acids (OMEGA-3 FISH OIL PO) Take 2 g by mouth 2 times daily (with meals)., Historical      tamsulosin (FLOMAX) 0.4 MG capsule Take 0.4 mg by mouth every evening, Historical           STOP taking these medications       anakinra (KINERET) 100 MG/0.67ML SOSY injection Comments:   Reason for Stopping:         indomethacin (INDOCIN) 50 MG capsule Comments:   Reason for Stopping:             Allergies   Allergies   Allergen Reactions    Ciprofloxacin     Norco [Hydrocodone-Acetaminophen] Nausea and Vomiting

## 2024-11-06 NOTE — TELEPHONE ENCOUNTER
ED / Discharge Outreach Protocol    Patient Contact    Attempt # 2    Was call answered?  No.  Left message on voicemail with information to call me back.    Tricia SAENZ RN, BSN  [facility]

## 2024-11-06 NOTE — TELEPHONE ENCOUNTER
Left Voicemail (1st Attempt) for the patient to call back and schedule the following:     Appointment type: New    Provider: Dr. Viveros  Return date: next available  Specialty phone number: [phone]  Additional appointment(s) needed:   Additonal Notes:          *  Please assist Saul to schedule a clinic visit with Dr. Viveros. Any site     When: Next available     New/Return patient: New     Virtual/In person: Patient preference     Reason for visit (please add to appointment notes): Pancreatic cyst     Thank you,     Trang Krause, RN Care Coordinator

## 2024-11-06 NOTE — TELEPHONE ENCOUNTER
Advanced Endoscopy     Referring provider: Remi Rowland MD [address], [address] [place] [address] Phone: [phone]    Referred to: [facility]     Provider Requested: NA     Referral Received: 11/4/24     Records received: EPIC     Images received: PACS    Insurance Coverage: [facility]    Evaluation for:   Diagnosis   K86.2 (ICD-10-CM) - Pancreatic cyst     Clinical History (per RN review):   MRCP Without and With Contrast  CLINICAL HISTORY: 0.9 cm cyst like lesion on pancreatic head found on  limited abd US, liver cysts (incidental finding, no symptoms)  DATE: 11/3/2024   Pancreas: Preservation of intrinsic T1 hyperintense parenchymal  signal. Moderate atrophy of the pancreas. No pancreatic ductal  dilation. No pancreatic divisum. There are multiple T2 hyperintense  cysts throughout the pancreas measuring up to 1.3 cm in the pancreatic  tail (11/53). No worrisome features on contrast enhanced sequences.  IMPRESSION:     1. 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 Follow-up guidelines are  provided below.     2. Multiple hepatic cysts and additional too small to characterize T2  hyperintensities. No suspicious hepatic lesion.     3. Hepatic siderosis.     MD review date: 11/6/24 Dr. Felice HAWKINS Decision for clinic consultation/Orders:       Clinic visit with me next available      Referral updates/Patient contacted:  Referral to GI clinic accepted 11/6/24

## 2024-11-07 ENCOUNTER — TELEPHONE (OUTPATIENT)
Dept: RHEUMATOLOGY | Facility: CLINIC | Age: 79
End: 2024-11-07
Payer: COMMERCIAL

## 2024-11-07 LAB
CERULOPLASMIN SERPL-MCNC: 42 MG/DL (ref 20–60)
MISCELLANEOUS TEST 1 (ARUP): NORMAL

## 2024-11-07 NOTE — TELEPHONE ENCOUNTER
Called and spoke to patient. Scheduled virtual visit on 11/14 at 1230 with Dr. Rico per her instructions. Also clarified that patient does not need in person visit with MTM visit on 11/8. He is ok with phone call, MTM appointment changed to phone, Gladys richmond.       Bella Hobson RN  Adult Rheumatology Clinic

## 2024-11-08 ENCOUNTER — VIRTUAL VISIT (OUTPATIENT)
Dept: PHARMACY | Facility: CLINIC | Age: 79
End: 2024-11-08
Attending: INTERNAL MEDICINE
Payer: COMMERCIAL

## 2024-11-08 DIAGNOSIS — Z71.85 VACCINE COUNSELING: ICD-10-CM

## 2024-11-08 DIAGNOSIS — M1A.0720 IDIOPATHIC CHRONIC GOUT OF LEFT FOOT WITHOUT TOPHUS: Primary | ICD-10-CM

## 2024-11-08 RX ORDER — ANAKINRA 100 MG/.67ML
100 INJECTION, SOLUTION SUBCUTANEOUS DAILY
Qty: 20.1 ML | Refills: 5 | OUTPATIENT
Start: 2024-11-08

## 2024-11-12 ENCOUNTER — TELEPHONE (OUTPATIENT)
Dept: RHEUMATOLOGY | Facility: CLINIC | Age: 79
End: 2024-11-12
Payer: COMMERCIAL

## 2024-11-12 NOTE — TELEPHONE ENCOUNTER
Patient has follow ups scheduled with ID, rheumatology, and MTM. No callback from patient.    ANAID MitchellN, RN (she/her)  [facility] RN

## 2024-11-12 NOTE — TELEPHONE ENCOUNTER
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          NASEEM Magaña, [facility]  Specialty Pharmacy Clinic Liaison     [facility] Specialty    [email or web address]     Phone: [phone]  Fax: [phone]

## 2024-11-13 ENCOUNTER — OFFICE VISIT (OUTPATIENT)
Dept: INFECTIOUS DISEASES | Facility: CLINIC | Age: 79
End: 2024-11-13
Attending: INTERNAL MEDICINE
Payer: COMMERCIAL

## 2024-11-13 ENCOUNTER — LAB (OUTPATIENT)
Dept: LAB | Facility: CLINIC | Age: 79
End: 2024-11-13
Payer: COMMERCIAL

## 2024-11-13 VITALS
TEMPERATURE: 98 F | BODY MASS INDEX: 28.58 KG/M2 | OXYGEN SATURATION: 97 % | DIASTOLIC BLOOD PRESSURE: 73 MMHG | HEIGHT: 69 IN | WEIGHT: 193 LBS | SYSTOLIC BLOOD PRESSURE: 154 MMHG | HEART RATE: 60 BPM

## 2024-11-13 DIAGNOSIS — M79.646 PAIN OF FINGER, UNSPECIFIED LATERALITY: ICD-10-CM

## 2024-11-13 DIAGNOSIS — A49.3 MYCOPLASMA INFECTION: Primary | ICD-10-CM

## 2024-11-13 DIAGNOSIS — A49.3 MYCOPLASMA INFECTION: ICD-10-CM

## 2024-11-13 DIAGNOSIS — M10.9 POLYARTICULAR GOUT: ICD-10-CM

## 2024-11-13 LAB
ALBUMIN SERPL BCG-MCNC: 3.9 G/DL (ref 3.5–5.2)
ALP SERPL-CCNC: 96 U/L (ref 40–150)
ALT SERPL W P-5'-P-CCNC: 54 U/L (ref 0–70)
ANION GAP SERPL CALCULATED.3IONS-SCNC: 9 MMOL/L (ref 7–15)
AST SERPL W P-5'-P-CCNC: 29 U/L (ref 0–45)
BASOPHILS # BLD AUTO: 0.1 10E3/UL (ref 0–0.2)
BASOPHILS NFR BLD AUTO: 1 %
BILIRUB SERPL-MCNC: 0.4 MG/DL
BUN SERPL-MCNC: 20.7 MG/DL (ref 8–23)
CALCIUM SERPL-MCNC: 9.6 MG/DL (ref 8.8–10.4)
CHLORIDE SERPL-SCNC: 104 MMOL/L (ref 98–107)
CREAT SERPL-MCNC: 1.17 MG/DL (ref 0.67–1.17)
EGFRCR SERPLBLD CKD-EPI 2021: 64 ML/MIN/1.73M2
EOSINOPHIL # BLD AUTO: 0.3 10E3/UL (ref 0–0.7)
EOSINOPHIL NFR BLD AUTO: 4 %
ERYTHROCYTE [DISTWIDTH] IN BLOOD BY AUTOMATED COUNT: 13.4 % (ref 10–15)
GLUCOSE SERPL-MCNC: 111 MG/DL (ref 70–99)
HCO3 SERPL-SCNC: 26 MMOL/L (ref 22–29)
HCT VFR BLD AUTO: 43.6 % (ref 40–53)
HGB BLD-MCNC: 14.3 G/DL (ref 13.3–17.7)
IMM GRANULOCYTES # BLD: 0 10E3/UL
IMM GRANULOCYTES NFR BLD: 0 %
LYMPHOCYTES # BLD AUTO: 1.5 10E3/UL (ref 0.8–5.3)
LYMPHOCYTES NFR BLD AUTO: 20 %
MCH RBC QN AUTO: 29.2 PG (ref 26.5–33)
MCHC RBC AUTO-ENTMCNC: 32.8 G/DL (ref 31.5–36.5)
MCV RBC AUTO: 89 FL (ref 78–100)
MONOCYTES # BLD AUTO: 0.8 10E3/UL (ref 0–1.3)
MONOCYTES NFR BLD AUTO: 10 %
NEUTROPHILS # BLD AUTO: 4.8 10E3/UL (ref 1.6–8.3)
NEUTROPHILS NFR BLD AUTO: 64 %
NRBC # BLD AUTO: 0 10E3/UL
NRBC BLD AUTO-RTO: 0 /100
PLATELET # BLD AUTO: 235 10E3/UL (ref 150–450)
POTASSIUM SERPL-SCNC: 4.1 MMOL/L (ref 3.4–5.3)
PROT SERPL-MCNC: 6.5 G/DL (ref 6.4–8.3)
RBC # BLD AUTO: 4.89 10E6/UL (ref 4.4–5.9)
SODIUM SERPL-SCNC: 139 MMOL/L (ref 135–145)
URATE SERPL-MCNC: 4.1 MG/DL (ref 3.4–7)
WBC # BLD AUTO: 7.5 10E3/UL (ref 4–11)

## 2024-11-13 PROCEDURE — 80053 COMPREHEN METABOLIC PANEL: CPT | Performed by: PATHOLOGY

## 2024-11-13 PROCEDURE — 82728 ASSAY OF FERRITIN: CPT | Performed by: PATHOLOGY

## 2024-11-13 PROCEDURE — G0463 HOSPITAL OUTPT CLINIC VISIT: HCPCS | Performed by: INTERNAL MEDICINE

## 2024-11-13 PROCEDURE — 85025 COMPLETE CBC W/AUTO DIFF WBC: CPT | Performed by: PATHOLOGY

## 2024-11-13 PROCEDURE — 36415 COLL VENOUS BLD VENIPUNCTURE: CPT | Performed by: PATHOLOGY

## 2024-11-13 PROCEDURE — 86140 C-REACTIVE PROTEIN: CPT | Performed by: PATHOLOGY

## 2024-11-13 PROCEDURE — 84550 ASSAY OF BLOOD/URIC ACID: CPT | Performed by: PATHOLOGY

## 2024-11-13 RX ORDER — DOXYCYCLINE HYCLATE 100 MG
100 TABLET ORAL 2 TIMES DAILY
Qty: 14 TABLET | Refills: 0 | Status: SHIPPED | OUTPATIENT
Start: 2024-11-13 | End: 2024-11-20

## 2024-11-13 ASSESSMENT — PAIN SCALES - GENERAL: PAINLEVEL_OUTOF10: NO PAIN (0)

## 2024-11-13 NOTE — NURSING NOTE
"Chief Complaint   Patient presents with    RECHECK     Follow Up     BP (!) 154/73   Pulse 60   Temp 98  F (36.7  C) (Oral)   Ht 1.753 m (5' 9\")   Wt 87.5 kg (193 lb)   SpO2 97%   BMI 28.50 kg/m    Treva Ferris on 11/13/2024 at 8:52 AM    "

## 2024-11-13 NOTE — LETTER
11/13/2024       RE: Saul Hairston  [address]  [facility] [address]     Dear Colleague,    Thank you for referring your patient, aSul Hairston, to the [facility] DISE[facility] [place] at [facility]. Please see a copy of my visit note below.      [facility] [place]  [address] [facility] [address]  Phone: [phone]  Fax: [phone]    Patient:  Saul Hairston, Date of birth 1945  Date of Visit:  11/13/2024  Referring Provider Shanta Wilkerson MD  Reason for visit: followup hospitalization for polyarthritis    Assessment & Plan   Mycoplasma infection  Gout  Afib  HTN  Hx prostate cancer  CKD with Cr 1.3-1.5 at baseline it seems  Recent transaminitis    Discussion:  His mycoplasma serologies are equivocal. There has been substantially increased Mycoplasma infection in the community. It is probably most likely that he had a viral infection and a succeeding gout flare. However, I can't completely exclude the possibility that this was a Mycoplasma infection. This organism can be somewhat indolent, so in the somewhat unlikely event that the infection was and is still present, there would be benefit in treating now to prevent additional complications. Doxycycline is the preferred agent and the one with the narrowest side effect profile (macrolides and quinolones could prolong QTc and his was 493 at last check).    Plan:    - Comprehensive metabolic panel (BMP + Alb, Alk Phos, ALT, AST, Total. Bili, TP)  - doxycycline hyclate (VIBRA-TABS) 100 MG tablet  Dispense: 14 tablet; Refill: 0. Take apart from dairy, vitamin supplements  - CBC with platelets and differential      40 minutes spent by me on the date of the encounter doing chart review, history and exam, documentation and further activities per the note         History of Present Illness    Pertinent history obtain from: chart review and  "the patient    Specialty Problems    None     Saul was recently hospitalized from 10/31-11/5. See prior documentation for full detail. 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 He received Anakinra.  Now, 1 week later, he's following up.  Saul is about 70% back to his baseline. Still feels weak and tired, though. Not driving due to feeling a bit out of it. His arthritis has essentially subsided. He has R knee pain, but this is somewhat chronic for him. No cough. No shortness of breath.    Lives at home with his wife. She is at her baseline state of health without symptoms of new infection that he's aware of.    Physical Exam    Vital signs:  BP (!) 154/73   Pulse 60   Temp 98  F (36.7  C) (Oral)   Ht 1.753 m (5' 9\")   Wt 87.5 kg (193 lb)   SpO2 97%   BMI 28.50 kg/m      GENERAL: alert and no distress  NECK: no adenopathy, no asymmetry, masses, or scars  RESP: lungs clear to auscultation - no rales, rhonchi or wheezes  CV: regular rate and rhythm, normal S1 S2, no S3 or S4, no murmur, click or rub, no peripheral edema  ABDOMEN: soft, nontender, no hepatosplenomegaly, no masses and bowel sounds normal  MS: no gross musculoskeletal defects noted, no edema    Data  Laboratory data and imaging listed below was reviewed prior to this encounter.     Microbiology:    Culture   Date Value Ref Range Status   10/31/2024 No Growth  Final   10/31/2024 No Growth  Final     Inflammatory Markers:   Recent Labs   Lab Test 11/03/24  0836 10/31/24  1317 12/25/20  0742 12/24/20  0759 12/23/20  0714 12/22/20  1558 12/18/20  1200 03/30/17  1601   SED 41* 23*  --   --   --   --   --  8   CRP  --   --  29.2* 48.7* 102.0* 147.0* 117.0* 8.2*            Again, thank you for allowing me to participate in the care of your patient.      Sincerely,    Shanta Wilkerson MD    "

## 2024-11-13 NOTE — PROGRESS NOTES
[facility] [address] [address]  Phone: [phone]  Fax: [phone]    Patient:  Saul Hairston, Date of birth 1945  Date of Visit:  11/13/2024  Referring Provider Shanat Wilkerson MD  Reason for visit: followup hospitalization for polyarthritis    Assessment & Plan    Mycoplasma infection  Gout  Afib  HTN  Hx prostate cancer  CKD with Cr 1.3-1.5 at baseline it seems  Recent transaminitis    Discussion:  His mycoplasma serologies are equivocal. There has been substantially increased Mycoplasma infection in the community. It is probably most likely that he had a viral infection and a succeeding gout flare. However, I can't completely exclude the possibility that this was a Mycoplasma infection. This organism can be somewhat indolent, so in the somewhat unlikely event that the infection was and is still present, there would be benefit in treating now to prevent additional complications. Doxycycline is the preferred agent and the one with the narrowest side effect profile (macrolides and quinolones could prolong QTc and his was 493 at last check).    Plan:    - Comprehensive metabolic panel (BMP + Alb, Alk Phos, ALT, AST, Total. Bili, TP)  - doxycycline hyclate (VIBRA-TABS) 100 MG tablet  Dispense: 14 tablet; Refill: 0. Take apart from dairy, vitamin supplements  - CBC with platelets and differential      40 minutes spent by me on the date of the encounter doing chart review, history and exam, documentation and further activities per the note         History of Present Illness     Pertinent history obtain from: chart review and the patient    Specialty Problems    None     Saul was recently hospitalized from 10/31-11/5. See prior documentation for full detail. 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 He received Anakinra.  Now, 1 week later, he's following up.  Saul is about 70% back to his baseline. Still feels weak and  "tired, though. Not driving due to feeling a bit out of it. His arthritis has essentially subsided. He has R knee pain, but this is somewhat chronic for him. No cough. No shortness of breath.    Lives at home with his wife. She is at her baseline state of health without symptoms of new infection that he's aware of.    Physical Exam     Vital signs:  BP (!) 154/73   Pulse 60   Temp 98  F (36.7  C) (Oral)   Ht 1.753 m (5' 9\")   Wt 87.5 kg (193 lb)   SpO2 97%   BMI 28.50 kg/m      GENERAL: alert and no distress  NECK: no adenopathy, no asymmetry, masses, or scars  RESP: lungs clear to auscultation - no rales, rhonchi or wheezes  CV: regular rate and rhythm, normal S1 S2, no S3 or S4, no murmur, click or rub, no peripheral edema  ABDOMEN: soft, nontender, no hepatosplenomegaly, no masses and bowel sounds normal  MS: no gross musculoskeletal defects noted, no edema    Data   Laboratory data and imaging listed below was reviewed prior to this encounter.     Microbiology:    Culture   Date Value Ref Range Status   10/31/2024 No Growth  Final   10/31/2024 No Growth  Final     Inflammatory Markers:   Recent Labs   Lab Test 11/03/24  0836 10/31/24  1317 12/25/20  0742 12/24/20  0759 12/23/20  0714 12/22/20  1558 12/18/20  1200 03/30/17  1601   SED 41* 23*  --   --   --   --   --  8   CRP  --   --  29.2* 48.7* 102.0* 147.0* 117.0* 8.2*        "

## 2024-11-13 NOTE — TELEPHONE ENCOUNTER
Returning call from [employer] Need more info  RE: Konstantin   Phone [phone]  [phone]    Case should be completed by Friday 11/15  NASEEM Magaña, [facility]  Specialty Pharmacy Clinic Liaison     [facility] Specialty    [email or web address]     Phone: [phone]  Fax: [phone]

## 2024-11-14 ENCOUNTER — DOCUMENTATION ONLY (OUTPATIENT)
Dept: LAB | Facility: CLINIC | Age: 79
End: 2024-11-14

## 2024-11-14 ENCOUNTER — VIRTUAL VISIT (OUTPATIENT)
Dept: RHEUMATOLOGY | Facility: CLINIC | Age: 79
End: 2024-11-14
Attending: INTERNAL MEDICINE
Payer: COMMERCIAL

## 2024-11-14 VITALS — HEIGHT: 69 IN | WEIGHT: 193 LBS | BODY MASS INDEX: 28.58 KG/M2

## 2024-11-14 DIAGNOSIS — M79.646 PAIN OF FINGER, UNSPECIFIED LATERALITY: ICD-10-CM

## 2024-11-14 DIAGNOSIS — M10.9 GOUT OF MULTIPLE SITES, UNSPECIFIED CAUSE, UNSPECIFIED CHRONICITY: ICD-10-CM

## 2024-11-14 DIAGNOSIS — M19.90 INFLAMMATORY ARTHROPATHY: ICD-10-CM

## 2024-11-14 DIAGNOSIS — M10.9 POLYARTICULAR GOUT: Primary | ICD-10-CM

## 2024-11-14 LAB
CRP SERPL-MCNC: 12.5 MG/L
FERRITIN SERPL-MCNC: 868 NG/ML (ref 31–409)

## 2024-11-14 RX ORDER — ALLOPURINOL 100 MG/1
100 TABLET ORAL DAILY
Qty: 90 TABLET | Refills: 1 | Status: SHIPPED | OUTPATIENT
Start: 2024-11-14

## 2024-11-14 RX ORDER — ALLOPURINOL 300 MG/1
TABLET ORAL
Qty: 90 TABLET | Refills: 1 | Status: SHIPPED | OUTPATIENT
Start: 2024-11-14

## 2024-11-14 ASSESSMENT — PAIN SCALES - GENERAL: PAINLEVEL_OUTOF10: NO PAIN (0)

## 2024-11-14 NOTE — PATIENT INSTRUCTIONS
No change in plan of care, continue with allopurinol 400 mg a day and anakinra 1 inj x 3 days as needed for flare ups (when you get it mailed to you)    Please call if any gout flare ups    Cancel 11/18 lab appointment    Return video visit in 6 months

## 2024-11-14 NOTE — NURSING NOTE
Current patient location: [address] [place] [id number]    Is the patient currently in the state of [place]? YES    Visit mode:VIDEO    If the visit is dropped, the patient can be reconnected by:VIDEO VISIT: Text to cell phone:   Telephone Information:   Mobile [phone]       Will anyone else be joining the visit? Yes  (If patient encounters technical issues they should call [phone]    Are changes needed to the allergy or medication list? No    Are refills needed on medications prescribed by this physician? NO    Rooming Documentation:  Questionnaire(s) completed    Reason for visit: PAL FREY
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
## 2024-11-14 NOTE — PROGRESS NOTES
Patient has an upcomming lab visit without orders. Please place orders as needed.    Patient requesting: Per Encounter Provider    Appointment date: 11/18/2024

## 2024-11-15 NOTE — TELEPHONE ENCOUNTER
Prior Authorization Approval    Medication: KINERET 100 MG/0.67ML SC SOSY  Authorization Effective Date: 11/15/2024  Authorization Expiration Date: 11/14/2025  Approved Dose/Quantity: 20.1  Reference #:     Insurance Company: [employer] [place] - Phone [phone] Fax [phone]  Expected CoPay: $    CoPay Card Available:      Financial Assistance Needed: fpap offer sent   Which Pharmacy is filling the prescription: [facility] -     The final Patient Pay Amount ($1438.83) exceeds  the max limit for the High Patient Pay Threshold ($100.00).              NASEEM Magaña, [facility]  Specialty Pharmacy Clinic Liaison     [facility]    [email or web address]     Phone: [phone]  Fax: [phone]

## 2024-11-19 NOTE — PATIENT INSTRUCTIONS
"Recommendations from today's MTM visit:                                                       1. Continue allopurinol 400 mg daily. Please make a lab appointment at your local [place] clinic to check your uric acid the week of 11/18/24.    2. We are working on obtaining insurance coverage for Kineret. Once this is approved the pharmacy will call you to set up delivery so you can start 100 mg (1 injection) daily x 3 days as needed for gout flares.     3. A common side effect of Kineret is injection site reactions (red, raised, itchy spot at injection site). You can use hydrocortisone cream and ice to treat these reactions if they occur.     4. Vaccine recommendations: Consider completing your Shingrix (shingles) series and tetanus booster vaccines at your local pharmacy.    Follow-up: Return in 2 weeks (on 11/22/2024) for MTM Pharmacist Visit.    It was great speaking with you today.  I value your experience and would be very thankful for your time in providing feedback in our clinic survey. In the next few days, you may receive an email or text message from [employer] with a link to a survey related to your  clinical pharmacist.\"     To schedule another MTM appointment, please call the clinic directly or you may call the MTM scheduling line at [phone].    My Clinical Pharmacist's contact information:                                                      Please feel free to contact me with any questions or concerns you have.      Gladys Mtz, PharmD  Medication Therapy Management Pharmacist  [facility]  Phone: [phone]     "

## 2024-11-19 NOTE — PROGRESS NOTES
Medication Therapy Management (MTM) Encounter    ASSESSMENT:                            Medication Adherence/Access: See below for considerations.    Gout: Patient is not at goal of uric acid <5 mg/dl. Would benefit from continuing allopurinol 400 mg daily and rechecking uric acid in 2 weeks. Reviewed how to make lab appointment at local [place] lab. Will plan to adjust allopurinol dose if needed after getting repeat uric acid. Provided education on Kineret today including dosing, general administration, side effects (both common/serious), precautions, monitoring and time to efficacy. Discussed data on malignancy and risk of serious infection in depth. Encouraged indicated non-live vaccines and avoidance of live vaccines. Reviewed PA process, patient assistance program options if cost is too expensive, and approximate timeline of medication arrival. Reviewed Kineret may not arrive prior to his trip. Would benefit from starting Kineret 100 mg daily x 3 days as needed for flares.    Vaccines: Per ACIP recommendations, eligible for yearly influenza vaccine, covid series, Tdap booster, and Shingrix series. Per patient preference, recommend completing Tdap booster and Shingrix series at his local pharmacy.    PLAN:                            1. Continue allopurinol 400 mg daily. Please make a lab appointment at your local [place] clinic to check your uric acid the week of 11/18/24.    2. We are working on obtaining insurance coverage for Kineret. Once this is approved the pharmacy will call you to set up delivery so you can start 100 mg (1 injection) daily x 3 days as needed for gout flares.     3. A common side effect of Kineret is injection site reactions (red, raised, itchy spot at injection site). You can use hydrocortisone cream and ice to treat these reactions if they occur.     4. Vaccine recommendations: Consider completing your Shingrix (shingles) series and tetanus booster vaccines at your local  pharmacy.    Follow-up: Return in 2 weeks (on 11/22/2024) for MTM Pharmacist Visit.    SUBJECTIVE/OBJECTIVE:                          Saul Hairston is a 79 year old male seen for an initial visit. He was referred to me from Lien Rico MD. Patient was accompanied by his spouse Trang.     Reason for visit: Kineret education and gout management    Allergies/ADRs: Reviewed in chart  Past Medical History: Reviewed in chart  Tobacco: He reports that he has never smoked. He has never used smokeless tobacco.  Alcohol: not currently using    Medication Adherence/Access: Medication barriers: affording medications. Has looked up Kineret and concerned that medication might be too expensive even with his insurance.     Gout:  Allopurinol 400 mg daily  Kineret 100 mg daily x 3 days as needed for flares    Patient reports no current pain concerns. Did use Kineret 100 mg daily x 5 days while inpatient which resolved all symptoms. Notes he hadn't had any gout flares for several months until recently then had 2 larges flares within 6 weeks. Feeling better now after leaving hospital and increasing allopurinol dose. Patient is experiencing the following medication side effects: none. Planning to have Kineret available at home as needed for flares. Would like to go thorough this medication in detail today. Has not completed self-injections yet - nurses administered medication while inpatient. Planning on going on trip to [place] from 11/23 to 12/1 - wondering if he will get the Kineret prior to this.      Uric Acid   Date Value Ref Range Status   10/31/2024 7.2 3.4 - 7.0 mg/dL Final   09/23/2019 6.0 3.5 - 7.2 mg/dL Final      Vaccines: Due for yearly influenza vaccine, covid series, Tdap booster, and Shingrix series. Notes he does not get flu or covid vaccines but is open to getting other recommended vaccinations.  Immunization History   Administered Date(s) Administered    Pneumo Conj 13-V (2010&after) 12/12/2016    Pneumococcal 23  valent 05/28/2013    TD,PF 7+ (Tenivac) 01/01/2003    TDAP Vaccine (Boostrix) 05/28/2013      Unable to review all medications today due to time constraints of visit. Will attempt additional assessment at follow up.    Today's Vitals: There were no vitals taken for this visit.  ----------------  Post Discharge Medication Reconciliation Status: discharge medications reconciled, continue medications without change.    I spent 60 minutes with this patient today. All changes were made via collaborative practice agreement with Lien Rico. A copy of the visit note was provided to the patient's provider(s).    A summary of these recommendations was sent via [employer].    Roberto RankinD  Medication Therapy Management Pharmacist  [facility]  Phone: [phone]    Telemedicine Visit Details  The patient's medications can be safely assessed via a telemedicine encounter.  Type of service:  Telephone visit  Originating Location (pt. Location): Home    Distant Location (provider location):  On-site  Start Time: 12:30 PM  End Time: 1:30 PM     Medication Therapy Recommendations  Idiopathic chronic gout 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   1 Current Medication: anakinra (KINERET) 100 MG/0.67ML SOSY injection   Current Medication Sig: Inject 0.67 mLs (100 mg) subcutaneously daily. Hold for signs of infection, then seek medical attention.   Rationale: Does not understand instructions - Adherence - Adherence   Recommendation: Provide Education   Status: Patient Agreed - Adherence/Education   Identified Date: 11/8/2024 Completed Date: 11/8/2024         Vaccine counseling   1 Rationale: Preventive therapy - Needs additional medication therapy - Indication   Recommendation: Start Medication - Shingrix 50 MCG/0.5ML Susr   Status: Accepted - no CPA Needed   Identified Date: 11/8/2024 Completed Date: 11/8/2024

## 2024-11-22 ENCOUNTER — VIRTUAL VISIT (OUTPATIENT)
Dept: PHARMACY | Facility: CLINIC | Age: 79
End: 2024-11-22
Attending: INTERNAL MEDICINE
Payer: COMMERCIAL

## 2024-11-22 DIAGNOSIS — M1A.0720 IDIOPATHIC CHRONIC GOUT OF LEFT FOOT WITHOUT TOPHUS: Primary | ICD-10-CM

## 2024-11-24 NOTE — PATIENT INSTRUCTIONS
"Recommendations from today's MTM visit:                                                       1. Continue allopurinol 400 mg daily.    2. Once your assistance paperwork is approved, your Kineret will be mailed to you. Please keep this in the refrigerator and use daily x 3 doses as needed for gout flares.      Follow-up: Return in 16 weeks (on 3/14/2025) for MTM Pharmacist Visit.    It was great speaking with you today.  I value your experience and would be very thankful for your time in providing feedback in our clinic survey. In the next few days, you may receive an email or text message from [employer] with a link to a survey related to your  clinical pharmacist.\"     To schedule another MTM appointment, please call the clinic directly or you may call the MTM scheduling line at [phone].    My Clinical Pharmacist's contact information:                                                      Please feel free to contact me with any questions or concerns you have.      Gladys Mtz, PharmD  Medication Therapy Management Pharmacist  [facility]  Phone: [phone]     "

## 2024-11-24 NOTE — PROGRESS NOTES
Medication Therapy Management (MTM) Encounter    ASSESSMENT:                            Medication Adherence/Access: Would benefit from continuing to work with pharmacy liaison to apply for [employer] assistance for Kineret. Reviewed typical approval time once all paperwork is submitted is 3-5 days and medication will be shipped out after approval.    Gout: Patient is at goal of uric acid <6mg/dl. Would benefit from continuing allopurinol 400 mg daily. Reviewed once assistance is approved and Kineret is delivered patient can keep medication in fridge and use as needed for flares.    PLAN:                            1. Continue allopurinol 400 mg daily.    2. Once your assistance paperwork is approved, your Kineret will be mailed to you. Please keep this in the refrigerator and use daily x 3 doses as needed for gout flares.      Follow-up: Return in 16 weeks (on 3/14/2025) for MTM Pharmacist Visit.    SUBJECTIVE/OBJECTIVE:                          Salu Hairston is a 79 year old male seen for a follow-up visit. Patient was accompanied by his spouse Trang.      Reason for visit: Allopurinol follow up    Allergies/ADRs: Reviewed in chart  Past Medical History: Reviewed in chart  Tobacco: He reports that he has never smoked. He has never used smokeless tobacco.  Alcohol: not currently using    Medication Adherence/Access: Medication barriers: affording medications. Currently looking into applying for [employer] to cover high copay cost of Kineret. Notes he sent in income statements today and is hoping to be approved in the next couple weeks.      Gout:  Allopurinol 400 mg daily  Kineret 100 mg daily x 3 days as needed for flares (not started yet)     Patient reports no current pain concerns. Did use Kineret 100 mg daily x 5 days while inpatient which resolved all symptoms. Notes he hadn't had any gout flares for several months until recently then had 2 larges flares within 6 weeks. Feeling better now after leaving hospital and  increasing allopurinol dose. Patient is experiencing the following medication side effects: none. Met with rheumatologist on 11/14 and was told to continue his current allopurinol dose.     Uric Acid   Date Value Ref Range Status   11/13/2024 4.1 3.4 - 7.0 mg/dL Final   09/23/2019 6.0 3.5 - 7.2 mg/dL Final      Today's Vitals: There were no vitals taken for this visit.  ----------------  Post Discharge Medication Reconciliation Status: discharge medications reconciled, continue medications without change.    I spent 12 minutes with this patient today. All changes were made via collaborative practice agreement with Lien Rico. A copy of the visit note was provided to the patient's provider(s).    A summary of these recommendations was sent via [employer].    Roberto RankinD  Medication Therapy Management Pharmacist  [facility]  Phone: [phone]    Telemedicine Visit Details  The patient's medications can be safely assessed via a telemedicine encounter.  Type of service:  Telephone visit  Originating Location (pt. Location): Home    Distant Location (provider location):  On-site  Start Time: 12:30 PM  End Time: 12:42 PM     Medication Therapy Recommendations  No medication therapy recommendations to display

## 2024-11-25 ENCOUNTER — TELEPHONE (OUTPATIENT)
Dept: RHEUMATOLOGY | Facility: CLINIC | Age: 79
End: 2024-11-25
Payer: COMMERCIAL

## 2024-11-25 NOTE — TELEPHONE ENCOUNTER
EP called 11/25 to sched a 6 month video follow up in May with Dr. Rico. Patient said he'll be in [place] for this appt but advised him if not, to please call to resched.

## 2024-11-26 ENCOUNTER — ANCILLARY PROCEDURE (OUTPATIENT)
Dept: GENERAL RADIOLOGY | Facility: CLINIC | Age: 79
End: 2024-11-26
Attending: PHYSICIAN ASSISTANT
Payer: COMMERCIAL

## 2024-11-26 ENCOUNTER — OFFICE VISIT (OUTPATIENT)
Dept: URGENT CARE | Facility: URGENT CARE | Age: 79
End: 2024-11-26
Payer: COMMERCIAL

## 2024-11-26 VITALS
BODY MASS INDEX: 28.21 KG/M2 | HEART RATE: 50 BPM | DIASTOLIC BLOOD PRESSURE: 76 MMHG | TEMPERATURE: 97.8 F | SYSTOLIC BLOOD PRESSURE: 158 MMHG | OXYGEN SATURATION: 100 % | RESPIRATION RATE: 16 BRPM | WEIGHT: 191 LBS

## 2024-11-26 DIAGNOSIS — M25.571 PAIN IN JOINT, ANKLE AND FOOT, RIGHT: ICD-10-CM

## 2024-11-26 DIAGNOSIS — M79.671 RIGHT FOOT PAIN: ICD-10-CM

## 2024-11-26 DIAGNOSIS — M79.671 RIGHT FOOT PAIN: Primary | ICD-10-CM

## 2024-11-26 DIAGNOSIS — M10.9 ACUTE GOUTY ARTHRITIS: ICD-10-CM

## 2024-11-26 DIAGNOSIS — T88.7XXA MEDICATION SIDE EFFECTS: ICD-10-CM

## 2024-11-26 LAB
ERYTHROCYTE [SEDIMENTATION RATE] IN BLOOD BY WESTERGREN METHOD: 16 MM/HR (ref 0–20)
URATE SERPL-MCNC: 4.2 MG/DL (ref 3.4–7)

## 2024-11-26 PROCEDURE — 36415 COLL VENOUS BLD VENIPUNCTURE: CPT | Performed by: PHYSICIAN ASSISTANT

## 2024-11-26 PROCEDURE — 73610 X-RAY EXAM OF ANKLE: CPT | Mod: TC | Performed by: STUDENT IN AN ORGANIZED HEALTH CARE EDUCATION/TRAINING PROGRAM

## 2024-11-26 PROCEDURE — 84550 ASSAY OF BLOOD/URIC ACID: CPT | Performed by: PHYSICIAN ASSISTANT

## 2024-11-26 PROCEDURE — 85652 RBC SED RATE AUTOMATED: CPT | Performed by: PHYSICIAN ASSISTANT

## 2024-11-26 PROCEDURE — 99214 OFFICE O/P EST MOD 30 MIN: CPT | Performed by: PHYSICIAN ASSISTANT

## 2024-11-26 RX ORDER — METHYLPREDNISOLONE 4 MG/1
TABLET ORAL
Qty: 21 TABLET | Refills: 0 | Status: SHIPPED | OUTPATIENT
Start: 2024-11-26

## 2024-11-26 RX ORDER — OMEPRAZOLE 40 MG/1
40 CAPSULE, DELAYED RELEASE ORAL DAILY
Qty: 7 CAPSULE | Refills: 0 | Status: SHIPPED | OUTPATIENT
Start: 2024-11-26

## 2024-11-26 NOTE — PROGRESS NOTES
Assessment & Plan     Right foot pain    Patient is having right foot pain and swelling  ESR is normal  Uric acid pending  Xray ankle Pos for mild swelling around ankle read by Eduar PANDEY at time of visit.    - ESR: Erythrocyte sedimentation rate  - Uric acid  - XR Ankle Right G/E 3 Views  - ESR: Erythrocyte sedimentation rate  - Uric acid  - methylPREDNISolone (MEDROL DOSEPAK) 4 MG tablet therapy pack  Dispense: 21 tablet; Refill: 0    Pain in joint, ankle and foot, right    Blood work pending  Medrol for joint inflammation and tenderness  - ESR: Erythrocyte sedimentation rate  - Uric acid  - XR Ankle Right G/E 3 Views  - ESR: Erythrocyte sedimentation rate  - Uric acid  - methylPREDNISolone (MEDROL DOSEPAK) 4 MG tablet therapy pack  Dispense: 21 tablet; Refill: 0    Acute gouty arthritis    Gout is a form of arthritis caused by a buildup of uric acid crystals in a joint. It causes sudden attacks of pain, swelling, redness, and stiffness, usually in one joint, especially the big toe.  Gout usually comes on without a cause. But it can be brought on by drinking alcohol (especially beer), eating or drinking things made with high-fructose corn syrup, or eating seafood or red meat. Taking certain medicines, such as diuretics, can also trigger an attack of gout.  Taking your medicines as prescribed and following up with your doctor regularly can help you avoid gout attacks in the future.  Follow-up care is a key part of your treatment and safety    - methylPREDNISolone (MEDROL DOSEPAK) 4 MG tablet therapy pack  Dispense: 21 tablet; Refill: 0    Medication side effects    Due to patient being on long term anticoagulation medications I have placed him on omeprazole for gastric protection  - omeprazole (PRILOSEC) 40 MG DR capsule  Dispense: 7 capsule; Refill: 0       At today's visit with Saul Hairston , we discussed results, diagnosis, medications and formulated a plan.  We also discussed red flags for immediate  return to clinic/ER, as well as indications for follow up with PCP if not improved in 3 days. Patient understood and agreed to plan. Saul Hairston was discharged with stable vitals and has no further questions.       No follow-ups on file.    Eduar Fletcher, [facility], PA-C  [facility] Hughes is a 79 year old male who presents to clinic today for the following health issues:  Chief Complaint   Patient presents with    Urgent Care     Starting over a week ago - Pain started on outside, went to arch, then to ball, now pain is in ankle.  Hx - Gout. Emergency Dept 10/31 Gout.       HPI  Review of Systems  Constitutional, HEENT, cardiovascular, pulmonary, gi and gu systems are negative, except as otherwise noted.      Objective    BP (!) 158/76   Pulse 50   Temp 97.8  F (36.6  C) (Tympanic)   Resp 16   Wt 86.6 kg (191 lb)   SpO2 100%   BMI 28.21 kg/m    Physical Exam   GENERAL: alert and no distress  RESP: lungs clear to auscultation - no rales, rhonchi or wheezes  CV: regular rate and rhythm, normal S1 S2, no S3 or S4, no murmur, click or rub, no peripheral edema  MS: pos for left foot and ankle tenderness, localized  SKIN: pos for mild swelling around ankle  EXT: no calr tenderness, pain or swelling  NEURO: Normal strength and tone, mentation intact and speech normal  PSYCH: mentation appears normal, affect normal/bright    Results for orders placed or performed in visit on 11/26/24   XR Ankle Right G/E 3 Views     Status: None    Narrative    XR ANKLE RIGHT G/E 3 VIEWS 11/26/2024 2:40 PM     HISTORY: Right foot pain; Pain in joint, ankle and foot, right    COMPARISON: None.       Impression    IMPRESSION:  No fracture. Intact ankle mortise. Moderate soft tissue swelling  around the ankle. Vascular calcification. Small plantar calcaneal heel  spur.    KRYSTLE ARRIOLA MD         SYSTEM ID:  [id number]   Results for orders placed or performed in visit on 11/26/24   ESR: Erythrocyte  sedimentation rate     Status: Normal   Result Value Ref Range    Erythrocyte Sedimentation Rate 16 0 - 20 mm/hr

## 2024-12-02 ENCOUNTER — OFFICE VISIT (OUTPATIENT)
Dept: GASTROENTEROLOGY | Facility: CLINIC | Age: 79
End: 2024-12-02
Payer: COMMERCIAL

## 2024-12-02 VITALS
DIASTOLIC BLOOD PRESSURE: 75 MMHG | RESPIRATION RATE: 16 BRPM | WEIGHT: 190 LBS | SYSTOLIC BLOOD PRESSURE: 138 MMHG | BODY MASS INDEX: 28.14 KG/M2 | TEMPERATURE: 97.7 F | OXYGEN SATURATION: 97 % | HEIGHT: 69 IN

## 2024-12-02 DIAGNOSIS — D49.0 IPMN (INTRADUCTAL PAPILLARY MUCINOUS NEOPLASM): Primary | ICD-10-CM

## 2024-12-02 PROCEDURE — 99204 OFFICE O/P NEW MOD 45 MIN: CPT | Performed by: INTERNAL MEDICINE

## 2024-12-02 ASSESSMENT — PAIN SCALES - GENERAL: PAINLEVEL_OUTOF10: NO PAIN (0)
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
## 2024-12-02 NOTE — PATIENT INSTRUCTIONS
Dr. Felice Lam has outlined the following steps after your recent clinic visit:    - MRI/MRCP in 6 mo  - Clinic follow up in 6 months - after MRI/MRCP    It is a pleasure being involved in your health care. Please call with any questions or concerns regarding your clinic visit.    Trang Krause RN, BSN  Care Coordinator  Advanced Endoscopy   Phone: [phone]    Please see below for any additional questions and scheduling guidelines.    Sign up for [employer]: [employer] patient portal serves as a secure platform for accessing your medical records from the [facility]. Additionally, [employer] facilitates easy, timely, and secure messaging with your care team. If you have not signed up, you may do so by using the provided code or calling [phone].    Coordinating your care after your visit:  There are multiple options for scheduling your follow-up care based on your provider's recommendation.    How do I schedule a follow-up clinic appointment:   Call to schedule: You may schedule your follow-up appointment(s) by calling [phone], option 1.    How do I schedule my endoscopy or colonoscopy procedure:  If a procedure, such as a colonoscopy or upper endoscopy was ordered by your provider, the scheduling team will contact you to schedule this procedure. Or you may choose to call to schedule at   [phone], option 2.  Please allow 20-30 minutes when scheduling a procedure.    How do I get my blood work done? To get your blood work done, you need to schedule a lab appointment at an [facility].    Call to schedule: You can call [phone] to schedule your lab appointment.    How do I schedule my imaging study: To schedule imaging studies, such as CT scans, ultrasounds, MRIs, or X-rays, contact Imaging Services at [phone].    How do I schedule a referral to another doctor: If your provider recommended a referral to another specialist(s), the referral order was placed by  your provider. You will receive a phone call to schedule this referral, or you may choose to call the number attached to the referral to self-schedule.    For Post-Visit Question(s):  For any inquiries following today's visit:  Please utilize [employer] messaging and allow 48 hours for reply or contact the Call Center during normal business hours at [phone], option 3.  For Emergent After-hours questions, contact the On-Call GI Fellow through the [facility]  at [phone].  In addition, you may contact your Nurse directly using the provided contact information.    Test Results:  Test results will be accessible via [employer] in compliance with the 21st Century Cures Act. This means that your results will be available to you at the same time as your provider. Often you may see your results before your provider does. Results are reviewed by staff within two weeks with communication follow-up. Results may be released in the patient portal prior to your care team review.    Prescription Refill(s):  Medication prescribed by your provider will be addressed during your visit. For future refills, please coordinate with your pharmacy. If you have not had a recent clinic visit or routine labs, for your safety, your provider may not be able to refill your prescription.
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
## 2024-12-02 NOTE — NURSING NOTE
"Chief Complaint   Patient presents with    New Patient     Pancreatic cyst       Vitals:    12/02/24 0913 12/02/24 0918   BP: (!) 152/78 138/75   BP Location: Right arm    Patient Position: Sitting    Cuff Size: Adult Large    Resp: 16    Temp: 97.7  F (36.5  C)    SpO2: 97%    Weight: 86.2 kg (190 lb)    Height: 1.753 m (5' 9\")        Body mass index is 28.06 kg/m .     Emilee Lombardo LPN  "

## 2024-12-03 DIAGNOSIS — I48.0 PAROXYSMAL ATRIAL FIBRILLATION (H): ICD-10-CM

## 2024-12-03 RX ORDER — FLECAINIDE ACETATE 50 MG/1
50 TABLET ORAL 2 TIMES DAILY
Qty: 180 TABLET | Refills: 1 | Status: SHIPPED | OUTPATIENT
Start: 2024-12-03

## 2024-12-04 ENCOUNTER — TELEPHONE (OUTPATIENT)
Dept: GASTROENTEROLOGY | Facility: CLINIC | Age: 79
End: 2024-12-04
Payer: COMMERCIAL

## 2024-12-04 NOTE — TELEPHONE ENCOUNTER
Patient confirmed scheduled appointment:  Date: 5/27/25  Time: 9:00am  Visit type: MRI   Provider: Dr. Viveros  Location: [facility]   Testing/imaging: imaging   Additional notes:

## 2024-12-23 ENCOUNTER — MYC MEDICAL ADVICE (OUTPATIENT)
Dept: RHEUMATOLOGY | Facility: CLINIC | Age: 79
End: 2024-12-23
Payer: COMMERCIAL

## 2024-12-23 DIAGNOSIS — M10.9 POLYARTICULAR GOUT: Primary | ICD-10-CM

## 2024-12-23 RX ORDER — PREDNISONE 10 MG/1
TABLET ORAL
Qty: 30 TABLET | Refills: 0 | Status: SHIPPED | OUTPATIENT
Start: 2024-12-23

## 2024-12-23 NOTE — TELEPHONE ENCOUNTER
Called patient and gave message per Dr. Rico.  Patient verbalized understanding and agreement to plan.     Mavis Cordero RN

## 2024-12-23 NOTE — TELEPHONE ENCOUNTER
"Patient sent message with reports of Gout Flare that has not completely resolved the flare up in hands and wrists after 5 consecutive days of anakinra.    Spoke to patient.   He states his flare symptoms started about 10 days ago.  He began anakinra injections and finished 4 days ago.  Patient states his joint pain improved, with the exception of his bilateral hands and wrists.  Unable to button shirt, open pill bottles, pinch skin for injections.  Patient's wife helping him with this.  Having difficulty sleeping.    Patient rates pain 6/10, with some puffiness.  Denies redness, warmth, fevers/chills, illnesses.    Patient had \"a lot of grapes\" leading up to flare.   Taking other medications as prescribed.    Last seen on 11/14/24;    Follow up hospitalization for tophaceous gout. Resolved polyarticular gout flare, responded well to anakinra 100 mg sub q every day x 5 days. SUA at goal <5 by increasing allopurinol to 400 mg every day. Improved CRP inflammation, ferritin. He has neg hemochromatosis gene, LFTs normalized.     Plan:     No change in plan of care, continue with allopurinol 400 mg a day and anakinra 1 inj x 3 days as needed for flare ups (when you get it mailed to you)     Please call if any gout flare ups     Cancel 11/18 lab appointment     Return video visit in 6 months     "

## 2025-01-10 ENCOUNTER — MYC REFILL (OUTPATIENT)
Dept: CARDIOLOGY | Facility: CLINIC | Age: 80
End: 2025-01-10
Payer: COMMERCIAL

## 2025-01-10 DIAGNOSIS — I48.0 PAROXYSMAL ATRIAL FIBRILLATION (H): ICD-10-CM

## 2025-01-13 RX ORDER — METOPROLOL SUCCINATE 25 MG/1
12.5 TABLET, EXTENDED RELEASE ORAL DAILY
Qty: 45 TABLET | Refills: 1 | Status: SHIPPED | OUTPATIENT
Start: 2025-01-13

## 2025-02-17 DIAGNOSIS — R09.81 NASAL CONGESTION: ICD-10-CM

## 2025-02-18 RX ORDER — CETIRIZINE HYDROCHLORIDE 10 MG/1
10 TABLET ORAL DAILY
Qty: 90 TABLET | Refills: 1 | Status: SHIPPED | OUTPATIENT
Start: 2025-02-18

## 2025-02-19 DIAGNOSIS — I48.0 PAROXYSMAL ATRIAL FIBRILLATION (H): ICD-10-CM

## 2025-02-19 RX ORDER — LOSARTAN POTASSIUM 25 MG/1
25 TABLET ORAL 2 TIMES DAILY
Qty: 180 TABLET | Refills: 0 | Status: SHIPPED | OUTPATIENT
Start: 2025-02-19

## 2025-04-10 ENCOUNTER — MYC MEDICAL ADVICE (OUTPATIENT)
Dept: GASTROENTEROLOGY | Facility: CLINIC | Age: 80
End: 2025-04-10
Payer: COMMERCIAL

## 2025-04-24 ENCOUNTER — OFFICE VISIT (OUTPATIENT)
Dept: CARDIOLOGY | Facility: CLINIC | Age: 80
End: 2025-04-24
Payer: COMMERCIAL

## 2025-04-24 VITALS
WEIGHT: 201.7 LBS | SYSTOLIC BLOOD PRESSURE: 154 MMHG | OXYGEN SATURATION: 99 % | HEIGHT: 69 IN | BODY MASS INDEX: 29.87 KG/M2 | DIASTOLIC BLOOD PRESSURE: 76 MMHG | HEART RATE: 48 BPM

## 2025-04-24 DIAGNOSIS — G47.33 OSA (OBSTRUCTIVE SLEEP APNEA): ICD-10-CM

## 2025-04-24 DIAGNOSIS — I48.0 PAROXYSMAL ATRIAL FIBRILLATION (H): ICD-10-CM

## 2025-04-24 DIAGNOSIS — I10 HYPERTENSION GOAL BP (BLOOD PRESSURE) < 150/90: Primary | ICD-10-CM

## 2025-04-24 RX ORDER — LOSARTAN POTASSIUM 25 MG/1
25 TABLET ORAL DAILY
Qty: 90 TABLET | Refills: 3 | Status: SHIPPED | OUTPATIENT
Start: 2025-04-24

## 2025-04-24 RX ORDER — DILTIAZEM HYDROCHLORIDE 120 MG/1
120 CAPSULE, EXTENDED RELEASE ORAL DAILY
Qty: 90 CAPSULE | Refills: 3 | Status: SHIPPED | OUTPATIENT
Start: 2025-04-24

## 2025-04-24 RX ORDER — METOPROLOL SUCCINATE 25 MG/1
12.5 TABLET, EXTENDED RELEASE ORAL DAILY
Qty: 45 TABLET | Refills: 3 | Status: CANCELLED | OUTPATIENT
Start: 2025-04-24

## 2025-04-24 RX ORDER — FLECAINIDE ACETATE 50 MG/1
50 TABLET ORAL 2 TIMES DAILY
Qty: 180 TABLET | Refills: 3 | Status: SHIPPED | OUTPATIENT
Start: 2025-04-24

## 2025-04-24 NOTE — PATIENT INSTRUCTIONS
It was a pleasure seeing you today and thank you for allowing me to be a part of your health care team.  Should you have any questions regarding your visit or future needs please feel free to reach out to my care team for assistance.      Thank you, Dr. Robert Martinez        **Nursing: [phone]       **Scheduling: [phone]
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
## 2025-05-09 ENCOUNTER — CARE COORDINATION (OUTPATIENT)
Dept: CARDIOLOGY | Facility: CLINIC | Age: 80
End: 2025-05-09
Payer: COMMERCIAL

## 2025-05-09 DIAGNOSIS — I48.0 PAROXYSMAL ATRIAL FIBRILLATION (H): Primary | ICD-10-CM

## 2025-05-09 DIAGNOSIS — I10 HYPERTENSION GOAL BP (BLOOD PRESSURE) < 150/90: ICD-10-CM

## 2025-05-09 NOTE — PROGRESS NOTES
Waiting on pt to call back. Alyssa Ko RN 5/8/25  2:41 PM      5/8/25  2:39 PM  Unsigned Note  RN called pt to discuss his [employer] message. Pt sent message stating he took one dose of diltiazem but developed a rash, shortness of breath, dizziness. He discontinued that and went back to metoprolol. Pt did not answer, I left a VM letting pt know it sounded appropriate to discontinued the diltiazem, and I asked him to call back with an update if he is still taking metoprolol and provide any further home BP/HR readings so that we can update Dr. Martinez for further recommendations. Alyssa Ko RN on 5/8/2025 at 2:30 PM        OV w/Dr. Martinez 4/24/25:     Vitals: BP (!) 154/76   Pulse (!) 48       His heart rates are slow, probably exacerbated by the metoprolol.  I suggested he could try stopping the metoprolol to see if that helps his fatigue and start diltiazem 120 mg a day to prevent rapid heart rates during A-fib episodes.  I am hoping that does not lower his heart rate as much during sinus while at rest.  He will continue losartan 25 mg a day for hypertension.           5/8/25  2:26 PM  You contacted Saul Hairston to [facility] Team 7 (supporting Robert Martinez MD)      5/6/25 11:00 AM  Hi, On 4/24/25 at my appointment you changed my use of metoprolol to diltiazem and after taking the first pill I was having shortness of breath, dizziness and developed a rash so I quit taking it and went back to metoprolol.  Sorry I didn't notify sooner.  I felt pretty weird that day...

## 2025-05-14 RX ORDER — METOPROLOL SUCCINATE 25 MG/1
12.5 TABLET, EXTENDED RELEASE ORAL DAILY
COMMUNITY
End: 2025-05-27

## 2025-05-14 NOTE — PROGRESS NOTES
RN called pt second attempt as he has not returned my call. Spoke w/pt today. He reports he has been taking metoprolol 25 mg, 1/2 tablet (12.5 mg) once daily, since the day after his visit with Dr. Martinez. He tried diltiazem but developed a rash, shortness of breath and dizziness after the first dose. Pt resumed his metoprolol and discontinued the diltiazem. Back on metoprolol pt reports:    BP avg 122/70, HR 44-50 bpm     Pt reports HR did get up to 90 bpm one day recently. He was not exerting himself but just wandering around his house at that time. Pt states he just returned from [place] and has noticed some shortness of breath. He is not sure if that is due to allergies or whether it's cardiac in nature. Will update Dr. Martinez for further recommendations. Alyssa Ko RN on 5/14/2025 at 12:37 PM

## 2025-05-15 ENCOUNTER — VIRTUAL VISIT (OUTPATIENT)
Dept: RHEUMATOLOGY | Facility: CLINIC | Age: 80
End: 2025-05-15
Attending: INTERNAL MEDICINE
Payer: COMMERCIAL

## 2025-05-15 VITALS
HEIGHT: 69 IN | WEIGHT: 190 LBS | DIASTOLIC BLOOD PRESSURE: 70 MMHG | HEART RATE: 50 BPM | BODY MASS INDEX: 28.14 KG/M2 | SYSTOLIC BLOOD PRESSURE: 122 MMHG

## 2025-05-15 DIAGNOSIS — M10.9 POLYARTICULAR GOUT: ICD-10-CM

## 2025-05-15 PROCEDURE — 1125F AMNT PAIN NOTED PAIN PRSNT: CPT | Performed by: INTERNAL MEDICINE

## 2025-05-15 PROCEDURE — 98006 SYNCH AUDIO-VIDEO EST MOD 30: CPT | Performed by: INTERNAL MEDICINE

## 2025-05-15 PROCEDURE — 3074F SYST BP LT 130 MM HG: CPT | Performed by: INTERNAL MEDICINE

## 2025-05-15 PROCEDURE — G2211 COMPLEX E/M VISIT ADD ON: HCPCS | Performed by: INTERNAL MEDICINE

## 2025-05-15 PROCEDURE — 3078F DIAST BP <80 MM HG: CPT | Performed by: INTERNAL MEDICINE

## 2025-05-15 RX ORDER — PREDNISONE 10 MG/1
TABLET ORAL
Qty: 30 TABLET | Refills: 3 | Status: SHIPPED | OUTPATIENT
Start: 2025-05-15

## 2025-05-15 ASSESSMENT — PAIN SCALES - GENERAL: PAINLEVEL_OUTOF10: MILD PAIN (2)
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
## 2025-05-15 NOTE — NURSING NOTE
Current patient location: [address] [place] [id number]    Is the patient currently in the state of [place]? YES    Visit mode: VIDEO    If the visit is dropped, the patient can be reconnected by:VIDEO VISIT: Send to e-mail at: [email or web address]    Will anyone else be joining the visit? Pts wife  (If patient encounters technical issues they should call [phone]    Are changes needed to the allergy or medication list? Medications flagged for removal, please review/remove    Patient denies any changes since echeck-in completion and states all information entered during echeck-in remains accurate.    Are refills needed on medications prescribed by this physician? NO, not that aware of     Rooming Documentation:  Questionnaire(s) completed    Reason for visit: PAL Tejada MA VVF
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
## 2025-05-15 NOTE — PATIENT INSTRUCTIONS
Try anakinra 1 injection a day starting today x 3-7 days, if gout flare does not get better, try the prednisone taper: 40-30-20-10 mg a day, each course x 3 days then stop    Return in 6 months in person or video

## 2025-05-19 DIAGNOSIS — M10.9 GOUT OF MULTIPLE SITES, UNSPECIFIED CAUSE, UNSPECIFIED CHRONICITY: ICD-10-CM

## 2025-05-21 RX ORDER — ALLOPURINOL 300 MG/1
TABLET ORAL
Qty: 90 TABLET | Refills: 1 | Status: SHIPPED | OUTPATIENT
Start: 2025-05-21

## 2025-05-21 NOTE — TELEPHONE ENCOUNTER
Last Written Prescription:  allopurinol (ZYLOPRIM) 300 MG tablet             Summary: Take along with allopurinol 100 mg every day, total 400 mg a day, Disp-90 tablet, R-1, E-Prescribe  Start: 11/14/2024Ordered On: 11/14/2024[id number] [place] [facility] [address] Associated: Taking: Long-term: Med Note:                Directions: Take along with allopurinol 100 mg every day, total 400 mg a day  Ordering Department:  ADULT RHEUMATOLOGY  Authorized By: Lien Rico MD  Dispense: 90 tablet  Refills: 1 ordered       ----------------------  Last Visit Date: 05/15/2025 Virtual Visit Rheumatology - FORREST Rico   Future Visit Date: None  ----------------------      Refill decision: Medication refilled per  Medication Refill in Ambulatory Care  policy.   []  If no future appointment scheduled: Route to Clinic Coordinators to contact the pt for appointment.          Request from pharmacy:  Requested Prescriptions   Pending Prescriptions Disp Refills    allopurinol (ZYLOPRIM) 300 MG tablet [Pharmacy Med Name: ALLOPURINOL 300 MG TABLET] 90 tablet 1     Sig: TAKE 1 TABLET EVERY DAY , ALONG WITH ALLOPURINOL 100 MG EVERY DAY, TOTAL 400 MG A DAY       Gout Agents Protocol Failed - 5/21/2025  3:20 PM        Failed - Medication is active on med list and the sig matches. RN to manually verify dose and sig if red X/fail.     If the protocol passes (green check), you do not need to verify med dose and sig.    A prescription matches if they are the same clinical intention.    For Example: once daily and every morning are the same.    The protocol can not identify upper and lower case letters as matching and will fail.     For Example: Take 1 tablet (50 mg) by mouth daily     TAKE 1 TABLET (50 MG) BY MOUTH DAILY    For all fails (red x), verify dose and sig.    If the refill does match what is on file, the RN can still proceed to approve the refill request.       If they do not match, route to the  appropriate provider.             Passed - CBC on file in past 12 months     Recent Labs   Lab Test 11/13/24  1017   WBC 7.5   RBC 4.89   HGB 14.3   HCT 43.6                    Passed - ALT on file in past 12 months     Recent Labs   Lab Test 11/13/24  1017   ALT 54             Passed - Has Uric Acid on file in past 12 months and value is less than 6     Recent Labs   Lab Test 11/26/24  1418   URIC 4.2     If level is 6mg/dL or greater, ok to refill one time and refer to provider.           Passed - Medication indicated for associated diagnosis     One of the following medications: colchicine is prescribed for one or more of the following conditions:     Amyloidosis   ulcer of mouth   Bechet's syndrome   Pericarditis   Peyronie's disease, psoriasis          Passed - Has GFR on file in past 12 months and most recent value is normal        Passed - Recent (12 month) or future (90 days) visit with authorizing provider's specialty (provided they have been seen in the past 15 months)     The patient must have completed an in-person or virtual visit within the past 12 months or has a future visit scheduled within the next 90 days with the authorizing provider s specialty.  Urgent care and e-visits do not qualify as an office visit for this protocol.          Passed - Patient is age 18 or older     Refill protocol is for patient's aged 18 years and older

## 2025-05-23 NOTE — TELEPHONE ENCOUNTER
I am sorry to hear about the adverse reaction to diltiazem.  I agree with restarting metoprolol at half tablet 12.5 mg daily.  Blood pressure and heart rate look good.  Based on the appropriate heart rate and blood pressure readings, I am not sure what might be causing the shortness of breath.  Notify us if it gets worse.  Robert Martinez MD

## 2025-05-27 RX ORDER — METOPROLOL SUCCINATE 25 MG/1
12.5 TABLET, EXTENDED RELEASE ORAL DAILY
Qty: 45 TABLET | Refills: 2 | Status: SHIPPED | OUTPATIENT
Start: 2025-05-27

## 2025-06-01 DIAGNOSIS — M19.90 INFLAMMATORY ARTHROPATHY: ICD-10-CM

## 2025-06-03 ENCOUNTER — ANCILLARY PROCEDURE (OUTPATIENT)
Dept: MRI IMAGING | Facility: CLINIC | Age: 80
End: 2025-06-03
Attending: INTERNAL MEDICINE
Payer: COMMERCIAL

## 2025-06-03 DIAGNOSIS — D49.0 IPMN (INTRADUCTAL PAPILLARY MUCINOUS NEOPLASM): ICD-10-CM

## 2025-06-03 PROCEDURE — A9585 GADOBUTROL INJECTION: HCPCS | Performed by: INTERNAL MEDICINE

## 2025-06-03 PROCEDURE — 255N000002 HC RX 255 OP 636: Performed by: INTERNAL MEDICINE

## 2025-06-03 PROCEDURE — 74183 MRI ABD W/O CNTR FLWD CNTR: CPT

## 2025-06-03 RX ORDER — ALLOPURINOL 100 MG/1
TABLET ORAL
Qty: 90 TABLET | Refills: 1 | Status: SHIPPED | OUTPATIENT
Start: 2025-06-03

## 2025-06-03 RX ORDER — GADOBUTROL 604.72 MG/ML
9 INJECTION INTRAVENOUS ONCE
Status: COMPLETED | OUTPATIENT
Start: 2025-06-03 | End: 2025-06-03

## 2025-06-03 RX ADMIN — GADOBUTROL 9 ML: 604.72 INJECTION INTRAVENOUS at 08:54

## 2025-06-03 NOTE — TELEPHONE ENCOUNTER
Last Written Prescription:  allopurinol (ZYLOPRIM) 100 MG tablet  100 mg, DAILY           Summary: Take 1 tablet (100 mg) by mouth daily. Take along with allopurinol 300 mg every day, total 400 mg a day, Disp-90 tablet, R-1, E-Prescribe  Dose, Route, Frequency: 100 mg, Oral, DAILYStart: 11/14/2024Ordered On: 11/14/2024[id number] [place] [address] Associated: Taking: Long-term: Med Note:                Directions: Take 1 tablet (100 mg) by mouth daily. Take along with allopurinol 300 mg every day, total 400 mg a day  Ordering Department:  ADULT RHEUMATOLOGY  Authorized By: Lien Rico MD  Dispense: 90 tablet  Refills: 1 ordered       ----------------------    allopurinol (ZYLOPRIM) 100 MG tablet  100 mg, DAILY           Summary: Take 1 tablet (100 mg) by mouth daily. Take along with allopurinol 300 mg every day, total 400 mg a day, Disp-90 tablet, R-1, E-Prescribe  Dose, Route, Frequency: 100 mg, Oral, DAILYStart: 11/14/2024Ordered On: 11/14/2024Pharmacy: [facility] [id number] [place] [address] Associated: Taking: Long-term: Med Note:                Directions: Take 1 tablet (100 mg) by mouth daily. Take along with allopurinol 300 mg every day, total 400 mg a day  Ordering Department:  ADULT RHEUMATOLOGY  Authorized By: Lien Rico MD  Dispense: 90 tablet  Refills: 1 ordered     ast Visit Date: 05/15/2025 Virtual Visit Rheumatology - FORREST Rico   Future Visit Date: 11/13/25  ----------------------      Refill decision: Medication refilled per  Medication Refill in Ambulatory Care  policy.   []  If no future appointment scheduled: Route to Clinic Coordinators to contact the pt for appointment.        Request from pharmacy:  Requested Prescriptions   Pending Prescriptions Disp Refills    allopurinol (ZYLOPRIM) 100 MG tablet [Pharmacy Med Name: ALLOPURINOL 100 MG TABLET] 90 tablet 1     Sig: TAKE 1 TABLET (100 MG) BY MOUTH DAILY.  ALONG WITH 300 MG TABLET EVERY DAY, TOTAL 400 MG A DAY       Gout Agents Protocol Failed - 6/3/2025  3:52 PM        Failed - Medication is active on med list and the sig matches. RN to manually verify dose and sig if red X/fail.     If the protocol passes (green check), you do not need to verify med dose and sig.    A prescription matches if they are the same clinical intention.    For Example: once daily and every morning are the same.    The protocol can not identify upper and lower case letters as matching and will fail.     For Example: Take 1 tablet (50 mg) by mouth daily     TAKE 1 TABLET (50 MG) BY MOUTH DAILY    For all fails (red x), verify dose and sig.    If the refill does match what is on file, the RN can still proceed to approve the refill request.       If they do not match, route to the appropriate provider.             Failed - Medication indicated for associated diagnosis     One of the following medications: colchicine is prescribed for one or more of the following conditions:     Amyloidosis   ulcer of mouth   Bechet's syndrome   Pericarditis   Peyronie's disease, psoriasis          Passed - CBC on file in past 12 months     Recent Labs   Lab Test 11/13/24  1017   WBC 7.5   RBC 4.89   HGB 14.3   HCT 43.6                    Passed - ALT on file in past 12 months     Recent Labs   Lab Test 11/13/24  1017   ALT 54             Passed - Has Uric Acid on file in past 12 months and value is less than 6     Recent Labs   Lab Test 11/26/24  1418   URIC 4.2     If level is 6mg/dL or greater, ok to refill one time and refer to provider.           Passed - Has GFR on file in past 12 months and most recent value is normal        Passed - Recent (12 month) or future (90 days) visit with authorizing provider's specialty (provided they have been seen in the past 15 months)     The patient must have completed an in-person or virtual visit within the past 12 months or has a future visit scheduled within the  next 90 days with the authorizing provider s specialty.  Urgent care and e-visits do not qualify as an office visit for this protocol.          Passed - Patient is age 18 or older     Refill protocol is for patient's aged 18 years and older           Laila B, RN  [facility]/Red Flag Triage & Med Refill Team

## 2025-06-09 ENCOUNTER — VIRTUAL VISIT (OUTPATIENT)
Dept: GASTROENTEROLOGY | Facility: CLINIC | Age: 80
End: 2025-06-09
Payer: COMMERCIAL

## 2025-06-09 DIAGNOSIS — K86.2 PANCREAS CYST: Primary | ICD-10-CM

## 2025-06-09 PROCEDURE — 98006 SYNCH AUDIO-VIDEO EST MOD 30: CPT | Performed by: INTERNAL MEDICINE

## 2025-06-09 PROCEDURE — 1126F AMNT PAIN NOTED NONE PRSNT: CPT | Mod: 95 | Performed by: INTERNAL MEDICINE

## 2025-06-09 NOTE — PROGRESS NOTES
Virtual Visit Details    Type of service:  Video Visit   Video Start Time: 8:40AM  Video End Time:8:58 AM    Originating Location (pt. Location): Home    Distant Location (provider location):  On-site  Platform used for Video Visit: Maria Guadalupe
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
## 2025-06-09 NOTE — NURSING NOTE
Current patient location: [address] [place], [place] [address]  [facility] [address]    Is the patient currently in the state of [place]? YES    Visit mode:VIDEO    If the visit is dropped, the patient can be reconnected by: VIDEO VISIT: Send to e-mail at: [email or web address]    Will anyone else be joining the visit? Wife, Trang, is present  (If patient encounters technical issues they should call [phone]    How would you like to obtain your AVS? MyChart    Are changes needed to the allergy or medication list? Pt stated no changes to allergies and Pt stated no med changes    Are refills needed on medications prescribed by this physician? NO    Rooming Documentation:  Questionnaire(s) completed    Reason for visit: RECHECK    Lisa FREY

## 2025-07-23 ENCOUNTER — TELEPHONE (OUTPATIENT)
Dept: GASTROENTEROLOGY | Facility: CLINIC | Age: 80
End: 2025-07-23
Payer: COMMERCIAL

## 2025-07-23 NOTE — TELEPHONE ENCOUNTER
Left Voicemail (1st Attempt) and Sent Mychart (1st Attempt) for the patient to call back and schedule the following:    Appointment type: New   Provider: CHAY Schmidt  Return date: Dec 2025  Specialty phone number: [phone]   Additional appointment(s) needed: MRI - DUE AT LEAST 1 WK PRIOR     ---------    Krause, Trang, RN  [id number]  Hello,    Please assist Saul to schedule a MRI followed by a clinic visit with Shakira Sosa.    When: December 2025    New/Return patient: New    Virtual/In person: Patient preference    Reason for visit (please add to appointment notes): Pancreatic cyst surveillance; MRI prior to appointment.    Thank you,    Trang Krause, RN Care Coordinator

## (undated) DEVICE — SYR 50ML CATH TIP W/O NDL 309620

## (undated) DEVICE — MANIFOLD NEPTUNE 4 PORT 700-20

## (undated) DEVICE — JELLY LUBRICATING SURGILUBE 4OZ TUBE

## (undated) DEVICE — DRSG TEGADERM 4X4 3/4" 1626

## (undated) DEVICE — BLADE CLIPPER 4406

## (undated) DEVICE — PAD CHUX UNDERPAD 23X24" 7136

## (undated) DEVICE — Device

## (undated) DEVICE — TUBING SUCTION 12"X1/4" N612

## (undated) DEVICE — SYR BULB IRRIG 50ML LATEX FREE 0035280

## (undated) DEVICE — DRAPE MAYO STAND 23X54 8337

## (undated) DEVICE — GLOVE PROTEXIS W/NEU-THERA 7.5  2D73TE75

## (undated) DEVICE — DRSG GAUZE 4X4" 3033

## (undated) DEVICE — ESU HOLSTER PLASTIC DISP E2400

## (undated) DEVICE — ENDO ADAPTER CHECK-FLO DISP 27550-CKU

## (undated) DEVICE — STPL SKIN 35W 6.9MM  PXW35

## (undated) DEVICE — WIPES FOLEY CARE SURESTEP PROVON DFC100

## (undated) DEVICE — CATH TRAY FOLEY COUDE SURESTEP 16FR W/URNE MTR STLK A304716A

## (undated) DEVICE — DRAPE POUCH IRR 1016

## (undated) DEVICE — SUCTION TIP YANKAUER VIAGUARD W/SLEEVE SMP-2006-001SS

## (undated) DEVICE — STPL SKIN 35W APPOSE 8886803712

## (undated) DEVICE — PACK CYSTOSCOPY SBA15CYFSI

## (undated) DEVICE — SOL NACL 0.9% IRRIG 3000ML BAG 2B7477

## (undated) DEVICE — DRAPE UNDER BUTTOCK 8483

## (undated) DEVICE — SOL WATER IRRIG 3000ML BAG 2B7117

## (undated) DEVICE — LINEN TOWEL PACK X5 5464

## (undated) DEVICE — DRAPE IOBAN INCISE 23X17" 6650EZ

## (undated) DEVICE — WIRE GUIDE AMPLATZ SUPER STIFF 0.035"X145CM 46-524

## (undated) DEVICE — SOL WATER IRRIG 1000ML BOTTLE 07139-09

## (undated) DEVICE — DRSG TEGADERM 2 1/2X 2 3/4"

## (undated) DEVICE — SOL WATER IRRIG 1000ML BOTTLE 2F7114

## (undated) DEVICE — DRAPE SHEET REV FOLD 3/4 9349

## (undated) RX ORDER — HYDROCODONE BITARTRATE AND ACETAMINOPHEN 5; 325 MG/1; MG/1
TABLET ORAL
Status: DISPENSED
Start: 2018-02-13

## (undated) RX ORDER — LIDOCAINE HYDROCHLORIDE 20 MG/ML
INJECTION, SOLUTION EPIDURAL; INFILTRATION; INTRACAUDAL; PERINEURAL
Status: DISPENSED
Start: 2018-02-13

## (undated) RX ORDER — CEFAZOLIN SODIUM 2 G/100ML
INJECTION, SOLUTION INTRAVENOUS
Status: DISPENSED
Start: 2020-12-01

## (undated) RX ORDER — PROPOFOL 10 MG/ML
INJECTION, EMULSION INTRAVENOUS
Status: DISPENSED
Start: 2020-12-01

## (undated) RX ORDER — FENTANYL CITRATE 50 UG/ML
INJECTION, SOLUTION INTRAMUSCULAR; INTRAVENOUS
Status: DISPENSED
Start: 2018-02-13

## (undated) RX ORDER — FUROSEMIDE 10 MG/ML
INJECTION INTRAMUSCULAR; INTRAVENOUS
Status: DISPENSED
Start: 2020-12-01

## (undated) RX ORDER — ONDANSETRON 2 MG/ML
INJECTION INTRAMUSCULAR; INTRAVENOUS
Status: DISPENSED
Start: 2018-02-13

## (undated) RX ORDER — REGADENOSON 0.08 MG/ML
INJECTION, SOLUTION INTRAVENOUS
Status: DISPENSED
Start: 2017-11-28

## (undated) RX ORDER — HYDROMORPHONE HYDROCHLORIDE 1 MG/ML
INJECTION, SOLUTION INTRAMUSCULAR; INTRAVENOUS; SUBCUTANEOUS
Status: DISPENSED
Start: 2018-02-13

## (undated) RX ORDER — FUROSEMIDE 10 MG/ML
INJECTION INTRAMUSCULAR; INTRAVENOUS
Status: DISPENSED
Start: 2018-02-13

## (undated) RX ORDER — GENTAMICIN SULFATE 80 MG/100ML
INJECTION, SOLUTION INTRAVENOUS
Status: DISPENSED
Start: 2018-02-13

## (undated) RX ORDER — DEXAMETHASONE SODIUM PHOSPHATE 4 MG/ML
INJECTION, SOLUTION INTRA-ARTICULAR; INTRALESIONAL; INTRAMUSCULAR; INTRAVENOUS; SOFT TISSUE
Status: DISPENSED
Start: 2018-02-13

## (undated) RX ORDER — PROPOFOL 10 MG/ML
INJECTION, EMULSION INTRAVENOUS
Status: DISPENSED
Start: 2018-02-13

## (undated) RX ORDER — FENTANYL CITRATE 50 UG/ML
INJECTION, SOLUTION INTRAMUSCULAR; INTRAVENOUS
Status: DISPENSED
Start: 2020-12-01

## (undated) RX ORDER — LIDOCAINE HYDROCHLORIDE 20 MG/ML
JELLY TOPICAL
Status: DISPENSED
Start: 2020-12-01

## (undated) RX ORDER — LIDOCAINE HYDROCHLORIDE 20 MG/ML
INJECTION, SOLUTION EPIDURAL; INFILTRATION; INTRACAUDAL; PERINEURAL
Status: DISPENSED
Start: 2020-12-01

## (undated) RX ORDER — ONDANSETRON 2 MG/ML
INJECTION INTRAMUSCULAR; INTRAVENOUS
Status: DISPENSED
Start: 2020-12-01

## (undated) RX ORDER — ATROPA BELLADONNA AND OPIUM 16.2; 3 MG/1; MG/1
SUPPOSITORY RECTAL
Status: DISPENSED
Start: 2020-12-01